# Patient Record
Sex: MALE | Race: WHITE | Employment: OTHER | URBAN - METROPOLITAN AREA
[De-identification: names, ages, dates, MRNs, and addresses within clinical notes are randomized per-mention and may not be internally consistent; named-entity substitution may affect disease eponyms.]

---

## 2020-03-12 ENCOUNTER — APPOINTMENT (OUTPATIENT)
Dept: NON INVASIVE DIAGNOSTICS | Age: 60
DRG: 247 | End: 2020-03-12
Attending: PHYSICIAN ASSISTANT
Payer: MEDICARE

## 2020-03-12 ENCOUNTER — APPOINTMENT (OUTPATIENT)
Dept: CT IMAGING | Age: 60
DRG: 247 | End: 2020-03-12
Attending: PHYSICIAN ASSISTANT
Payer: MEDICARE

## 2020-03-12 ENCOUNTER — APPOINTMENT (OUTPATIENT)
Dept: GENERAL RADIOLOGY | Age: 60
DRG: 247 | End: 2020-03-12
Attending: EMERGENCY MEDICINE
Payer: MEDICARE

## 2020-03-12 ENCOUNTER — HOSPITAL ENCOUNTER (INPATIENT)
Age: 60
LOS: 1 days | Discharge: HOME HEALTH | DRG: 247 | End: 2020-03-13
Attending: EMERGENCY MEDICINE | Admitting: FAMILY MEDICINE
Payer: MEDICARE

## 2020-03-12 DIAGNOSIS — R73.9 HYPERGLYCEMIA: ICD-10-CM

## 2020-03-12 DIAGNOSIS — I21.4 NSTEMI (NON-ST ELEVATED MYOCARDIAL INFARCTION) (HCC): Primary | ICD-10-CM

## 2020-03-12 PROBLEM — I25.10 CAD (CORONARY ARTERY DISEASE): Status: ACTIVE | Noted: 2020-03-12

## 2020-03-12 LAB
ACT BLD: 120 SECS (ref 79–138)
ALBUMIN SERPL-MCNC: 3.5 G/DL (ref 3.4–5)
ALBUMIN/GLOB SERPL: 0.9 {RATIO} (ref 0.8–1.7)
ALP SERPL-CCNC: 141 U/L (ref 45–117)
ALT SERPL-CCNC: 19 U/L (ref 16–61)
ANION GAP SERPL CALC-SCNC: 6 MMOL/L (ref 3–18)
APTT PPP: 140.7 SEC (ref 23–36.4)
AST SERPL-CCNC: 12 U/L (ref 10–38)
ATRIAL RATE: 84 BPM
ATRIAL RATE: 86 BPM
BASOPHILS # BLD: 0 K/UL (ref 0–0.1)
BASOPHILS NFR BLD: 0 % (ref 0–2)
BILIRUB SERPL-MCNC: 1.2 MG/DL (ref 0.2–1)
BUN SERPL-MCNC: 12 MG/DL (ref 7–18)
BUN/CREAT SERPL: 15 (ref 12–20)
CALCIUM SERPL-MCNC: 9 MG/DL (ref 8.5–10.1)
CALCULATED P AXIS, ECG09: 74 DEGREES
CALCULATED P AXIS, ECG09: 78 DEGREES
CALCULATED R AXIS, ECG10: 35 DEGREES
CALCULATED R AXIS, ECG10: 41 DEGREES
CALCULATED T AXIS, ECG11: 0 DEGREES
CALCULATED T AXIS, ECG11: 42 DEGREES
CHLORIDE SERPL-SCNC: 98 MMOL/L (ref 100–111)
CO2 SERPL-SCNC: 29 MMOL/L (ref 21–32)
CREAT SERPL-MCNC: 0.78 MG/DL (ref 0.6–1.3)
D DIMER PPP FEU-MCNC: 0.57 UG/ML(FEU)
DIAGNOSIS, 93000: NORMAL
DIAGNOSIS, 93000: NORMAL
DIFFERENTIAL METHOD BLD: ABNORMAL
EOSINOPHIL # BLD: 0.2 K/UL (ref 0–0.4)
EOSINOPHIL NFR BLD: 1 % (ref 0–5)
ERYTHROCYTE [DISTWIDTH] IN BLOOD BY AUTOMATED COUNT: 12.9 % (ref 11.6–14.5)
EST. AVERAGE GLUCOSE BLD GHB EST-MCNC: 309 MG/DL
GLOBULIN SER CALC-MCNC: 3.9 G/DL (ref 2–4)
GLUCOSE BLD STRIP.AUTO-MCNC: 321 MG/DL (ref 70–110)
GLUCOSE BLD STRIP.AUTO-MCNC: 361 MG/DL (ref 70–110)
GLUCOSE BLD STRIP.AUTO-MCNC: 362 MG/DL (ref 70–110)
GLUCOSE SERPL-MCNC: 330 MG/DL (ref 74–99)
HBA1C MFR BLD: 12.4 % (ref 4.2–5.6)
HCT VFR BLD AUTO: 45.1 % (ref 36–48)
HGB BLD-MCNC: 15.9 G/DL (ref 13–16)
LYMPHOCYTES # BLD: 3.2 K/UL (ref 0.9–3.6)
LYMPHOCYTES NFR BLD: 26 % (ref 21–52)
MCH RBC QN AUTO: 29.4 PG (ref 24–34)
MCHC RBC AUTO-ENTMCNC: 35.3 G/DL (ref 31–37)
MCV RBC AUTO: 83.5 FL (ref 74–97)
MONOCYTES # BLD: 0.6 K/UL (ref 0.05–1.2)
MONOCYTES NFR BLD: 5 % (ref 3–10)
NEUTS SEG # BLD: 8.1 K/UL (ref 1.8–8)
NEUTS SEG NFR BLD: 68 % (ref 40–73)
P-R INTERVAL, ECG05: 170 MS
P-R INTERVAL, ECG05: 176 MS
PLATELET # BLD AUTO: 224 K/UL (ref 135–420)
PMV BLD AUTO: 11.5 FL (ref 9.2–11.8)
POTASSIUM SERPL-SCNC: 3.7 MMOL/L (ref 3.5–5.5)
PROT SERPL-MCNC: 7.4 G/DL (ref 6.4–8.2)
Q-T INTERVAL, ECG07: 368 MS
Q-T INTERVAL, ECG07: 378 MS
QRS DURATION, ECG06: 104 MS
QRS DURATION, ECG06: 92 MS
QTC CALCULATION (BEZET), ECG08: 440 MS
QTC CALCULATION (BEZET), ECG08: 446 MS
RBC # BLD AUTO: 5.4 M/UL (ref 4.7–5.5)
SODIUM SERPL-SCNC: 133 MMOL/L (ref 136–145)
TROPONIN I SERPL-MCNC: 1.24 NG/ML (ref 0–0.04)
TROPONIN I SERPL-MCNC: <0.02 NG/ML (ref 0–0.04)
VENTRICULAR RATE, ECG03: 84 BPM
VENTRICULAR RATE, ECG03: 86 BPM
WBC # BLD AUTO: 12.1 K/UL (ref 4.6–13.2)

## 2020-03-12 PROCEDURE — 027034Z DILATION OF CORONARY ARTERY, ONE ARTERY WITH DRUG-ELUTING INTRALUMINAL DEVICE, PERCUTANEOUS APPROACH: ICD-10-PCS | Performed by: INTERNAL MEDICINE

## 2020-03-12 PROCEDURE — 74011636637 HC RX REV CODE- 636/637: Performed by: PHYSICIAN ASSISTANT

## 2020-03-12 PROCEDURE — 99153 MOD SED SAME PHYS/QHP EA: CPT | Performed by: INTERNAL MEDICINE

## 2020-03-12 PROCEDURE — 96365 THER/PROPH/DIAG IV INF INIT: CPT

## 2020-03-12 PROCEDURE — 83036 HEMOGLOBIN GLYCOSYLATED A1C: CPT

## 2020-03-12 PROCEDURE — C1887 CATHETER, GUIDING: HCPCS | Performed by: INTERNAL MEDICINE

## 2020-03-12 PROCEDURE — 93005 ELECTROCARDIOGRAM TRACING: CPT

## 2020-03-12 PROCEDURE — 92928 PRQ TCAT PLMT NTRAC ST 1 LES: CPT | Performed by: INTERNAL MEDICINE

## 2020-03-12 PROCEDURE — C1725 CATH, TRANSLUMIN NON-LASER: HCPCS | Performed by: INTERNAL MEDICINE

## 2020-03-12 PROCEDURE — 74011000250 HC RX REV CODE- 250: Performed by: NURSE PRACTITIONER

## 2020-03-12 PROCEDURE — 99218 HC RM OBSERVATION: CPT

## 2020-03-12 PROCEDURE — A9270 NON-COVERED ITEM OR SERVICE: HCPCS | Performed by: PHYSICIAN ASSISTANT

## 2020-03-12 PROCEDURE — 74011250636 HC RX REV CODE- 250/636: Performed by: PHYSICIAN ASSISTANT

## 2020-03-12 PROCEDURE — 96366 THER/PROPH/DIAG IV INF ADDON: CPT

## 2020-03-12 PROCEDURE — C1769 GUIDE WIRE: HCPCS | Performed by: INTERNAL MEDICINE

## 2020-03-12 PROCEDURE — 80053 COMPREHEN METABOLIC PANEL: CPT

## 2020-03-12 PROCEDURE — 74011000258 HC RX REV CODE- 258: Performed by: INTERNAL MEDICINE

## 2020-03-12 PROCEDURE — 77030013515 HC DEV INFL ANGI BSC -B: Performed by: INTERNAL MEDICINE

## 2020-03-12 PROCEDURE — 74011000250 HC RX REV CODE- 250: Performed by: INTERNAL MEDICINE

## 2020-03-12 PROCEDURE — 99152 MOD SED SAME PHYS/QHP 5/>YRS: CPT | Performed by: INTERNAL MEDICINE

## 2020-03-12 PROCEDURE — 71045 X-RAY EXAM CHEST 1 VIEW: CPT

## 2020-03-12 PROCEDURE — 77030027845 HC BND COM RDL D-STAT TELE -B: Performed by: INTERNAL MEDICINE

## 2020-03-12 PROCEDURE — 02703ZZ DILATION OF CORONARY ARTERY, ONE ARTERY, PERCUTANEOUS APPROACH: ICD-10-PCS | Performed by: INTERNAL MEDICINE

## 2020-03-12 PROCEDURE — 77030013797 HC KT TRNSDUC PRSSR EDWD -A: Performed by: INTERNAL MEDICINE

## 2020-03-12 PROCEDURE — 85379 FIBRIN DEGRADATION QUANT: CPT

## 2020-03-12 PROCEDURE — 85730 THROMBOPLASTIN TIME PARTIAL: CPT

## 2020-03-12 PROCEDURE — 74011636320 HC RX REV CODE- 636/320: Performed by: EMERGENCY MEDICINE

## 2020-03-12 PROCEDURE — 85025 COMPLETE CBC W/AUTO DIFF WBC: CPT

## 2020-03-12 PROCEDURE — 82962 GLUCOSE BLOOD TEST: CPT

## 2020-03-12 PROCEDURE — B2151ZZ FLUOROSCOPY OF LEFT HEART USING LOW OSMOLAR CONTRAST: ICD-10-PCS | Performed by: INTERNAL MEDICINE

## 2020-03-12 PROCEDURE — C1874 STENT, COATED/COV W/DEL SYS: HCPCS | Performed by: INTERNAL MEDICINE

## 2020-03-12 PROCEDURE — 85347 COAGULATION TIME ACTIVATED: CPT

## 2020-03-12 PROCEDURE — 77030015766: Performed by: INTERNAL MEDICINE

## 2020-03-12 PROCEDURE — 94640 AIRWAY INHALATION TREATMENT: CPT

## 2020-03-12 PROCEDURE — 4A023N7 MEASUREMENT OF CARDIAC SAMPLING AND PRESSURE, LEFT HEART, PERCUTANEOUS APPROACH: ICD-10-PCS | Performed by: INTERNAL MEDICINE

## 2020-03-12 PROCEDURE — 71275 CT ANGIOGRAPHY CHEST: CPT

## 2020-03-12 PROCEDURE — 74011636637 HC RX REV CODE- 636/637: Performed by: FAMILY MEDICINE

## 2020-03-12 PROCEDURE — 74011250637 HC RX REV CODE- 250/637: Performed by: INTERNAL MEDICINE

## 2020-03-12 PROCEDURE — 77030028790 HC ACC ST CTRL VLV COPLT ABBT -B: Performed by: INTERNAL MEDICINE

## 2020-03-12 PROCEDURE — 65660000004 HC RM CVT STEPDOWN

## 2020-03-12 PROCEDURE — 99285 EMERGENCY DEPT VISIT HI MDM: CPT

## 2020-03-12 PROCEDURE — 74011636637 HC RX REV CODE- 636/637: Performed by: NURSE PRACTITIONER

## 2020-03-12 PROCEDURE — C1894 INTRO/SHEATH, NON-LASER: HCPCS | Performed by: INTERNAL MEDICINE

## 2020-03-12 PROCEDURE — 84484 ASSAY OF TROPONIN QUANT: CPT

## 2020-03-12 PROCEDURE — 74011250637 HC RX REV CODE- 250/637: Performed by: EMERGENCY MEDICINE

## 2020-03-12 PROCEDURE — 74011000250 HC RX REV CODE- 250: Performed by: PHYSICIAN ASSISTANT

## 2020-03-12 PROCEDURE — 74011636320 HC RX REV CODE- 636/320: Performed by: INTERNAL MEDICINE

## 2020-03-12 PROCEDURE — B2111ZZ FLUOROSCOPY OF MULTIPLE CORONARY ARTERIES USING LOW OSMOLAR CONTRAST: ICD-10-PCS | Performed by: INTERNAL MEDICINE

## 2020-03-12 PROCEDURE — 96375 TX/PRO/DX INJ NEW DRUG ADDON: CPT

## 2020-03-12 PROCEDURE — 93458 L HRT ARTERY/VENTRICLE ANGIO: CPT | Performed by: INTERNAL MEDICINE

## 2020-03-12 PROCEDURE — 74011250636 HC RX REV CODE- 250/636: Performed by: INTERNAL MEDICINE

## 2020-03-12 PROCEDURE — 96368 THER/DIAG CONCURRENT INF: CPT

## 2020-03-12 DEVICE — XIENCE SIERRA™ EVEROLIMUS ELUTING CORONARY STENT SYSTEM 3.00 MM X 23 MM / RAPID-EXCHANGE
Type: IMPLANTABLE DEVICE | Status: FUNCTIONAL
Brand: XIENCE SIERRA™

## 2020-03-12 RX ORDER — INSULIN LISPRO 100 [IU]/ML
5 INJECTION, SOLUTION INTRAVENOUS; SUBCUTANEOUS
Status: COMPLETED | OUTPATIENT
Start: 2020-03-12 | End: 2020-03-12

## 2020-03-12 RX ORDER — MIDAZOLAM HYDROCHLORIDE 1 MG/ML
INJECTION, SOLUTION INTRAMUSCULAR; INTRAVENOUS AS NEEDED
Status: DISCONTINUED | OUTPATIENT
Start: 2020-03-12 | End: 2020-03-12 | Stop reason: HOSPADM

## 2020-03-12 RX ORDER — INSULIN LISPRO 100 [IU]/ML
INJECTION, SOLUTION INTRAVENOUS; SUBCUTANEOUS
Status: DISCONTINUED | OUTPATIENT
Start: 2020-03-12 | End: 2020-03-13 | Stop reason: HOSPADM

## 2020-03-12 RX ORDER — LIDOCAINE HYDROCHLORIDE 10 MG/ML
INJECTION, SOLUTION EPIDURAL; INFILTRATION; INTRACAUDAL; PERINEURAL AS NEEDED
Status: DISCONTINUED | OUTPATIENT
Start: 2020-03-12 | End: 2020-03-12 | Stop reason: HOSPADM

## 2020-03-12 RX ORDER — PREDNISONE 20 MG/1
40 TABLET ORAL
Status: DISCONTINUED | OUTPATIENT
Start: 2020-03-13 | End: 2020-03-13

## 2020-03-12 RX ORDER — ONDANSETRON 2 MG/ML
4 INJECTION INTRAMUSCULAR; INTRAVENOUS
Status: COMPLETED | OUTPATIENT
Start: 2020-03-12 | End: 2020-03-12

## 2020-03-12 RX ORDER — PREDNISONE 20 MG/1
60 TABLET ORAL
Status: DISCONTINUED | OUTPATIENT
Start: 2020-03-13 | End: 2020-03-12

## 2020-03-12 RX ORDER — GUAIFENESIN 100 MG/5ML
81 LIQUID (ML) ORAL ONCE
Status: DISCONTINUED | OUTPATIENT
Start: 2020-03-12 | End: 2020-03-12

## 2020-03-12 RX ORDER — SODIUM CHLORIDE 450 MG/100ML
150 INJECTION, SOLUTION INTRAVENOUS CONTINUOUS
Status: DISPENSED | OUTPATIENT
Start: 2020-03-12 | End: 2020-03-12

## 2020-03-12 RX ORDER — PREDNISONE 20 MG/1
60 TABLET ORAL
Status: COMPLETED | OUTPATIENT
Start: 2020-03-12 | End: 2020-03-12

## 2020-03-12 RX ORDER — DEXTROSE 50 % IN WATER (D50W) INTRAVENOUS SYRINGE
25-50 AS NEEDED
Status: DISCONTINUED | OUTPATIENT
Start: 2020-03-12 | End: 2020-03-13 | Stop reason: HOSPADM

## 2020-03-12 RX ORDER — FENTANYL CITRATE 50 UG/ML
INJECTION, SOLUTION INTRAMUSCULAR; INTRAVENOUS AS NEEDED
Status: DISCONTINUED | OUTPATIENT
Start: 2020-03-12 | End: 2020-03-12 | Stop reason: HOSPADM

## 2020-03-12 RX ORDER — HEPARIN SODIUM 10000 [USP'U]/100ML
9.7-25 INJECTION, SOLUTION INTRAVENOUS
Status: DISCONTINUED | OUTPATIENT
Start: 2020-03-12 | End: 2020-03-13

## 2020-03-12 RX ORDER — IPRATROPIUM BROMIDE AND ALBUTEROL SULFATE 2.5; .5 MG/3ML; MG/3ML
3 SOLUTION RESPIRATORY (INHALATION)
Status: DISCONTINUED | OUTPATIENT
Start: 2020-03-12 | End: 2020-03-13 | Stop reason: HOSPADM

## 2020-03-12 RX ORDER — VERAPAMIL HYDROCHLORIDE 2.5 MG/ML
INJECTION, SOLUTION INTRAVENOUS AS NEEDED
Status: DISCONTINUED | OUTPATIENT
Start: 2020-03-12 | End: 2020-03-12 | Stop reason: HOSPADM

## 2020-03-12 RX ORDER — BIVALIRUDIN 250 MG/5ML
INJECTION, POWDER, LYOPHILIZED, FOR SOLUTION INTRAVENOUS AS NEEDED
Status: DISCONTINUED | OUTPATIENT
Start: 2020-03-12 | End: 2020-03-12 | Stop reason: HOSPADM

## 2020-03-12 RX ORDER — INSULIN GLARGINE 100 [IU]/ML
20 INJECTION, SOLUTION SUBCUTANEOUS
Status: DISCONTINUED | OUTPATIENT
Start: 2020-03-12 | End: 2020-03-13

## 2020-03-12 RX ORDER — MAGNESIUM SULFATE 100 %
4 CRYSTALS MISCELLANEOUS AS NEEDED
Status: DISCONTINUED | OUTPATIENT
Start: 2020-03-12 | End: 2020-03-13 | Stop reason: HOSPADM

## 2020-03-12 RX ORDER — SODIUM CHLORIDE 0.9 % (FLUSH) 0.9 %
5-40 SYRINGE (ML) INJECTION AS NEEDED
Status: DISCONTINUED | OUTPATIENT
Start: 2020-03-12 | End: 2020-03-13 | Stop reason: HOSPADM

## 2020-03-12 RX ORDER — HEPARIN SODIUM 1000 [USP'U]/ML
INJECTION, SOLUTION INTRAVENOUS; SUBCUTANEOUS AS NEEDED
Status: DISCONTINUED | OUTPATIENT
Start: 2020-03-12 | End: 2020-03-12 | Stop reason: HOSPADM

## 2020-03-12 RX ORDER — SODIUM CHLORIDE 0.9 % (FLUSH) 0.9 %
5-40 SYRINGE (ML) INJECTION EVERY 8 HOURS
Status: DISCONTINUED | OUTPATIENT
Start: 2020-03-12 | End: 2020-03-13 | Stop reason: HOSPADM

## 2020-03-12 RX ORDER — NITROGLYCERIN 40 MG/100ML
0-20 INJECTION INTRAVENOUS
Status: DISCONTINUED | OUTPATIENT
Start: 2020-03-12 | End: 2020-03-13

## 2020-03-12 RX ORDER — ASPIRIN 81 MG/1
81 TABLET ORAL DAILY
Status: DISCONTINUED | OUTPATIENT
Start: 2020-03-13 | End: 2020-03-13 | Stop reason: HOSPADM

## 2020-03-12 RX ORDER — IPRATROPIUM BROMIDE AND ALBUTEROL SULFATE 2.5; .5 MG/3ML; MG/3ML
3 SOLUTION RESPIRATORY (INHALATION)
Status: COMPLETED | OUTPATIENT
Start: 2020-03-12 | End: 2020-03-12

## 2020-03-12 RX ORDER — HEPARIN SODIUM 1000 [USP'U]/ML
4000 INJECTION, SOLUTION INTRAVENOUS; SUBCUTANEOUS ONCE
Status: COMPLETED | OUTPATIENT
Start: 2020-03-12 | End: 2020-03-12

## 2020-03-12 RX ORDER — GUAIFENESIN 100 MG/5ML
324 LIQUID (ML) ORAL
Status: COMPLETED | OUTPATIENT
Start: 2020-03-12 | End: 2020-03-12

## 2020-03-12 RX ORDER — NITROGLYCERIN 400 UG/1
1 SPRAY ORAL
Status: ACTIVE | OUTPATIENT
Start: 2020-03-12 | End: 2020-03-13

## 2020-03-12 RX ORDER — ATORVASTATIN CALCIUM 40 MG/1
80 TABLET, FILM COATED ORAL
Status: DISCONTINUED | OUTPATIENT
Start: 2020-03-12 | End: 2020-03-13 | Stop reason: HOSPADM

## 2020-03-12 RX ORDER — MORPHINE SULFATE 2 MG/ML
2 INJECTION, SOLUTION INTRAMUSCULAR; INTRAVENOUS
Status: COMPLETED | OUTPATIENT
Start: 2020-03-12 | End: 2020-03-12

## 2020-03-12 RX ADMIN — INSULIN LISPRO 10 UNITS: 100 INJECTION, SOLUTION INTRAVENOUS; SUBCUTANEOUS at 21:21

## 2020-03-12 RX ADMIN — SODIUM CHLORIDE 150 ML: 450 INJECTION, SOLUTION INTRAVENOUS at 16:58

## 2020-03-12 RX ADMIN — IOPAMIDOL 80 ML: 755 INJECTION, SOLUTION INTRAVENOUS at 09:39

## 2020-03-12 RX ADMIN — SODIUM CHLORIDE 1000 ML: 900 INJECTION, SOLUTION INTRAVENOUS at 08:12

## 2020-03-12 RX ADMIN — ONDANSETRON 4 MG: 2 INJECTION INTRAMUSCULAR; INTRAVENOUS at 06:31

## 2020-03-12 RX ADMIN — ASPIRIN 81 MG 324 MG: 81 TABLET ORAL at 06:23

## 2020-03-12 RX ADMIN — INSULIN LISPRO 5 UNITS: 100 INJECTION, SOLUTION INTRAVENOUS; SUBCUTANEOUS at 08:00

## 2020-03-12 RX ADMIN — INSULIN GLARGINE 20 UNITS: 100 INJECTION, SOLUTION SUBCUTANEOUS at 21:20

## 2020-03-12 RX ADMIN — ATORVASTATIN CALCIUM 80 MG: 40 TABLET, FILM COATED ORAL at 21:19

## 2020-03-12 RX ADMIN — Medication 10 ML: at 21:25

## 2020-03-12 RX ADMIN — NITROGLYCERIN 10 MCG/MIN: 40 INJECTION INTRAVENOUS at 12:36

## 2020-03-12 RX ADMIN — MORPHINE SULFATE 2 MG: 2 INJECTION, SOLUTION INTRAMUSCULAR; INTRAVENOUS at 06:55

## 2020-03-12 RX ADMIN — Medication 10 ML: at 13:29

## 2020-03-12 RX ADMIN — INSULIN LISPRO 10 UNITS: 100 INJECTION, SOLUTION INTRAVENOUS; SUBCUTANEOUS at 18:50

## 2020-03-12 RX ADMIN — HEPARIN SODIUM 9.7 UNITS/KG/HR: 10000 INJECTION, SOLUTION INTRAVENOUS at 12:34

## 2020-03-12 RX ADMIN — PREDNISONE 60 MG: 20 TABLET ORAL at 06:31

## 2020-03-12 RX ADMIN — IPRATROPIUM BROMIDE AND ALBUTEROL SULFATE 3 ML: .5; 3 SOLUTION RESPIRATORY (INHALATION) at 06:31

## 2020-03-12 RX ADMIN — IPRATROPIUM BROMIDE AND ALBUTEROL SULFATE 3 ML: .5; 3 SOLUTION RESPIRATORY (INHALATION) at 21:35

## 2020-03-12 RX ADMIN — HEPARIN SODIUM 4000 UNITS: 1000 INJECTION INTRAVENOUS; SUBCUTANEOUS at 12:33

## 2020-03-12 NOTE — PROGRESS NOTES
D-Stat band removed from the right radial with no complications. No bleeding and bandage is dry and intact. Small hematoma noted around the site that was expressed with no issues.

## 2020-03-12 NOTE — Clinical Note
Lesion located in the Proximal LAD. Balloon inserted. Balloon inflated using multiple inflations inflation technique. Lesion #1: Pressure = 6 penelope; Duration = 13 sec. Inflation 2: Pressure = 6 penelope; Duration = 8 sec.    Over 1st wire

## 2020-03-12 NOTE — Clinical Note
Balloon inserted. Balloon inflated using multiple inflations inflation technique. Lesion #1: Pressure = 16 penelope; Duration = 14 sec. Inflation 2: Pressure = 14 penelope; Duration = 11 sec. Inflation 3: Pressure = 12 penelope; Duration = 7 sec.

## 2020-03-12 NOTE — Clinical Note
Lesion located in the Proximal LAD. Balloon inserted. Balloon inflated using multiple inflations inflation technique. Lesion #1: Pressure = 6 penelope; Duration = 5 sec. Inflation 2: Pressure = 8 penelope; Duration = 6 sec. Inflation 3: Pressure = 8 penelope; Duration = 3 sec.

## 2020-03-12 NOTE — PROGRESS NOTES
TRANSFER - OUT REPORT:    Verbal report given to Messi Edwards RN (name) on Tessie Dimas  being transferred to CVT Stepdown (unit) for routine progression of care       Report consisted of patients Situation, Background, Assessment and   Recommendations(SBAR). Information from the following report(s) SBAR, Procedure Summary, Intake/Output and MAR was reviewed with the receiving nurse. Lines:   Peripheral IV 03/12/20 Left Antecubital (Active)   Site Assessment Clean, dry, & intact 3/12/2020  6:15 AM   Phlebitis Assessment 0 3/12/2020  6:15 AM   Infiltration Assessment 0 3/12/2020  6:15 AM   Dressing Status Clean, dry, & intact 3/12/2020  6:15 AM   Dressing Type Transparent 3/12/2020  6:15 AM   Hub Color/Line Status Pink;Flushed;Patent 3/12/2020  6:15 AM   Action Taken Blood drawn 3/12/2020  6:15 AM       Peripheral IV 03/12/20 Right Antecubital (Active)   Site Assessment Clean, dry, & intact 3/12/2020  4:59 PM   Phlebitis Assessment 0 3/12/2020  4:59 PM   Dressing Status Clean, dry, & intact 3/12/2020  4:59 PM   Dressing Type Transparent 3/12/2020  4:59 PM   Hub Color/Line Status Pink;Flushed 3/12/2020  4:59 PM        Opportunity for questions and clarification was provided.       Patient transported with:   Registered Nurse, patient chart, family members and belongings

## 2020-03-12 NOTE — Clinical Note
Contrast Dose Calculator:   Patient's age: 63.   Patient's sex: Male. Patient weight (kg) = 102.1. Creatinine level (mg/dL) = 0.78. Creatinine clearance (mL/min): 149. Contrast concentration (mg/mL) = 300. MACD = 300 mL. Max Contrast dose per Creatinine Cl calculator = 335.25 mL.

## 2020-03-12 NOTE — Clinical Note
Stent inserted. Single technique used. First inflation pressure = 16 penelope; inflation time: 11 sec.

## 2020-03-12 NOTE — H&P
History & Physical    Patient: Vinnie Victoria MRN: [id number]  CSN: [id number]    YOB: 1960  Age: 63 y.o. Sex: male      DOA: 3/12/2020  CC:chest pain    PCP: Stefanie Pedraza MD       HPI:     Vinnie Victoria is a 63 y.o. male who presents with chest pain. Patient is on his way to cath lab. Chest pain sharp and constant, midsternal pleuritic chest pain that began one hour prior to ER. Nausea associated symptoms. Productive cough x 2 weeks with steroid and antibiotic treatment. History of COPD and use of nebulizer more frequently in last 2 weeks. Denies SOB, abdominal pain, fever or chills. In ER troponin 0.02 and 1.24. nitro and heparin infusion initiated. Cardiology consulted and plan for cardiac cath.  mg, duoneb, zofran, prednisone and morphine given in ER. Review of Systems   Constitutional: Negative. HENT: Negative. Eyes: Negative. Respiratory: Positive for cough and sputum production. Cardiovascular: Positive for chest pain. Gastrointestinal: Negative. Genitourinary: Negative. Musculoskeletal: Negative. Neurological: Negative. Psychiatric/Behavioral: Negative. Past Medical History:   Diagnosis Date    [facility] [address]     Diabetes [facility] [address]     Emphysema     H/O cardiovascular stress test 2012    negative    Hypertension        No past surgical history on file. Family History   Problem Relation Age of Onset    Diabetes Mother        Social History     Socioeconomic History    Marital status: SINGLE     Spouse name: Not on file    Number of children: Not on file    Years of education: Not on file    Highest education level: Not on file   Tobacco Use    Smoking status: Current Every Day Smoker     Types: Cigarettes    Smokeless tobacco: Never Used   Substance and Sexual Activity    Alcohol use: Yes       Prior to Admission medications    Medication Sig Start Date End Date Taking?  Authorizing Provider   metFORMIN (GLUCOPHAGE) 1,000 mg tablet Take 1 Tab by mouth two (2) times daily (with meals). 5/17/13   Liad Montes De Oca MD   lisinopril (PRINIVIL, ZESTRIL) 10 mg tablet Take 1 Tab by mouth daily. 5/17/13   Lida Montes De Oca MD   albuterol (PROVENTIL HFA, VENTOLIN HFA) 90 mcg/actuation inhaler Take 1 Puff by inhalation every four (4) hours as needed for Wheezing. 11/20/12   Lida Montes De Oca MD   tiotropium (SPIRIVA WITH HANDIHALER) 18 mcg inhalation capsule Take 1 Cap by inhalation daily. 11/20/12   Lida Montes De Oca MD   mometasone-formoterol (DULERA) 100-5 mcg/actuation HFAA HFA inhaler Take 2 Puffs by inhalation two (2) times a day. 11/20/12   Lida Montes De Oca MD       No Known Allergies           Physical Exam:      Visit Vitals  /71   Pulse 80   Temp 97.8 °F (36.6 °C)   Resp 20   Ht 5' 11\" (1.803 m)   Wt 102.1 kg (225 lb)   SpO2 99%   BMI 31.38 kg/m²       Physical Exam:  General:  Alert, NAD  Cardiovascular:  RRR  Pulmonary: rhonchi t/o, productive cough  GI:  +BS in all four quadrants, soft, non-tender  Extremities:  No edema; 2+ dorsalis pedis pulses bilaterally  Neuro: alert and oriented x3    Lab/Data Review:  Labs: Results:       Chemistry Recent Labs     03/12/20  0604   *   *   K 3.7   CL 98*   CO2 29   BUN 12   CREA 0.78   CA 9.0   AGAP 6   BUCR 15   *   TP 7.4   ALB 3.5   GLOB 3.9   AGRAT 0.9      CBC w/Diff Recent Labs     03/12/20  0604   WBC 12.1   RBC 5.40   HGB 15.9   HCT 45.1      GRANS 68   LYMPH 26   EOS 1      Coagulation No results for input(s): PTP, INR, APTT, INREXT in the last 72 hours. Iron/Ferritin No results for input(s): IRON in the last 72 hours. No lab exists for component: TIBCCALC   BNP No results for input(s): BNPP in the last 72 hours. Cardiac Enzymes No results for input(s): CPK, CKND1, LIANE in the last 72 hours.     No lab exists for component: CKRMB, TROIP   Liver Enzymes Recent Labs     03/12/20  0604   TP 7.4   ALB 3.5   * SGOT 12      Thyroid Studies Lab Results   Component Value Date/Time    TSH 0.920 08/27/2019 02:15 PM          All Micro Results     None          Imaging Reviewed:  CT Results (most recent):  Results from [place] encounter on 03/12/20   CTA CHEST W OR W WO CONT    Narrative CT chest with contrast for PE    HISTORY: Respiratory illness for several weeks with weakness and chest pain    COMPARISON: None. TECHNIQUE: Dynamic spiral scan through the chest is obtained from the thoracic  inlet to the diaphragm after dynamic nonionic IV contrast administration  per PE  protocol. Coronal and sagittal MIP computer reconstructions are also obtained  for better visualization of the integrity of pulmonary arteries in 3D dimension,  particularly for lobar/interlobar arterial branches and to minimize radiation  dose. All CT scans at this facility performed using dose optimization techniques as  appreciated to a performed exam, to include automated exposure control,  adjustment of the mA and or KU according to patient size (including appropriate  matching for site specific examination), or use of iterative reconstruction  technique. FINDINGS:    PULMONARY ARTERIES: The contrast bolus is adequate. The right and left mainstem  pulmonary arteries and their branches appear patent without convincing evidence  of intraluminal filling defect identified to suggest pulmonary embolism. No  pulmonary arterial dilatation. AORTA AND THE GREAT VESSELS: Unremarkable. LUNG PARENCHYMA: The lung parenchyma appears clear. No pulmonary infarction or  infiltration identified. IMAGED THYROID: Unremarkable. MEDIASTINUM: No adenopathy. HEART: The heart and the pericardium appear unremarkable. PLEURAL SPACES AND CHEST WALL: No significant pleural pathology. VISUALIZED UPPER ABDOMEN: Unremarkable. OSSEOUS STRUCTURES: Unremarkable. Impression IMPRESSION:    1.   No convincing CT evidence of pulmonary embolism. 2.  Negative chest CT for significant finding. Thank you for your referral.       EKG RESULTS     Procedure Component Value Units Date/Time    EKG, 12 LEAD, SUBSEQUENT [[id number]] Collected:  03/12/20 1202    Order Status:  Completed Updated:  03/12/20 1212     Ventricular Rate 84 BPM      Atrial Rate 84 BPM      P-R Interval 170 ms      QRS Duration 104 ms      Q-T Interval 378 ms      QTC Calculation (Bezet) 446 ms      Calculated P Axis 74 degrees      Calculated R Axis 41 degrees      Calculated T Axis 42 degrees      Diagnosis --     Normal sinus rhythm  Normal ECG  When compared with ECG of 12-MAR-2020 05:59,  Nonspecific T wave abnormality has replaced inverted T waves in Inferior   leads      EKG, 12 LEAD, INITIAL [[id number]] Collected:  03/12/20 0558    Order Status:  Completed Updated:  03/12/20 0820     Ventricular Rate 86 BPM      Atrial Rate 86 BPM      P-R Interval 176 ms      QRS Duration 92 ms      Q-T Interval 368 ms      QTC Calculation (Bezet) 440 ms      Calculated P Axis 78 degrees      Calculated R Axis 35 degrees      Calculated T Axis 0 degrees      Diagnosis --     Normal sinus rhythm  Normal ECG  When compared with ECG of 12-MAR-2020 05:58,  No significant change was found                Assessment:   Active Problems:    NSTEMI (non-ST elevated myocardial infarction) ([facility] [address] (3/12/2020)    COPD exacerbation        Plan:   1. Cardiology consult. Cardiac cath. Plan of care to be updated post cath  2. Continue prednisone and duoneb treatments. Pulmonary rehab consult. 3. Code status, full code  4. Elevated glucose. a1c pending. SSI. Diabetes management consult.            Meliza Baires NP  3/12/2020, 1:39 PM

## 2020-03-12 NOTE — PROGRESS NOTES
TRANSFER - OUT REPORT:    Verbal report given to Psychiatric, RN (name) on Emilia Sinclair  being transferred to Cath Lab (unit) for ordered procedure       Report consisted of patients Situation, Background, Assessment and   Recommendations(SBAR). Information from the following report(s) SBAR, ED Summary, Intake/Output, MAR and Pre Procedure Checklist was reviewed with the receiving nurse. Lines:   Peripheral IV 03/12/20 Left Antecubital (Active)   Site Assessment Clean, dry, & intact 3/12/2020  6:15 AM   Phlebitis Assessment 0 3/12/2020  6:15 AM   Infiltration Assessment 0 3/12/2020  6:15 AM   Dressing Status Clean, dry, & intact 3/12/2020  6:15 AM   Dressing Type Transparent 3/12/2020  6:15 AM   Hub Color/Line Status Pink;Flushed;Patent 3/12/2020  6:15 AM   Action Taken Blood drawn 3/12/2020  6:15 AM        Opportunity for questions and clarification was provided.       Patient transported with:   Registered Nurse

## 2020-03-12 NOTE — PROGRESS NOTES
TRANSFER - IN REPORT:    Verbal report received from Jennifer(name) on Basilio Ambrosio  being received from cath lab(unit) for routine post - op      Report consisted of patients Situation, Background, Assessment and   Recommendations(SBAR). Information from the following report(s) SBAR, Procedure Summary, MAR and Recent Results was reviewed with the receiving nurse. Opportunity for questions and clarification was provided. Assessment completed upon patients arrival to unit and care assumed.      Pt A&Ox4, c/o chest pain still, DSTAT in place to R radial.   /75  HR 83  O2 sat 100% on 2L NC

## 2020-03-12 NOTE — PHYSICIAN ADVISORY
Letter of Admission Status Determination: Upgrade to Inpatient     Barbara Mosher was hospitalized on 3/12/2020 and underwent urgent angiogram and coronary stenting in the setting of NSTEMI (an Inpatient-only procedure per CMS). Therefore, upgrade to Inpatient Admission is indicated. The final decision regarding the patient's hospitalization status depends on the attending physician's clinical judgment.        Luis El MD, EDU, [address] [facility], [address] [place] [address]  [phone]

## 2020-03-12 NOTE — PROGRESS NOTES
1700: TRANSFER - IN REPORT:    Verbal report received from Toño Medina RN(name) on Rodney Alfred  being received from Cath holding(unit) for routine post - op      Report consisted of patients Situation, Background, Assessment and   Recommendations(SBAR). Information from the following report(s) SBAR, Kardex and Cardiac Rhythm NSR was reviewed with the receiving nurse. Opportunity for questions and clarification was provided. Assessment completed upon patients arrival to unit and care assumed. 1753:   Lab Results   Component Value Date/Time    Glucose 330 (H) 03/12/2020 06:04 AM    Glucose (POC) 361 (H) 03/12/2020 05:52 PM    Glucose (POC) 185 (H) 07/19/2014 05:28 PM    Covered patient according to [facility] Paged Dr. Lalo Cardozo.    1900: Bedside shift change report given to Barb Roman RN (oncoming nurse) by Yury Mckeon RN (offgoing nurse). Report included the following information SBAR, Kardex and Cardiac Rhythm NSR.     1945: Updated Dr. Ramires Matter on patient's BS.

## 2020-03-12 NOTE — Clinical Note
TRANSFER - OUT REPORT:     Verbal report given to: Edil Ko. Report consisted of patient's Situation, Background, Assessment and   Recommendations(SBAR). Opportunity for questions and clarification was provided. Patient transported with a Registered Nurse. Patient transported to: [address].

## 2020-03-12 NOTE — ED PROVIDER NOTES
EMERGENCY DEPARTMENT HISTORY AND PHYSICAL EXAM      Date: 3/12/2020  Patient Name: Bennie Lux    History of Presenting Illness     Chief Complaint   Patient presents with    Chest Pain       History Provided By: Patient    HPI: Bennie Lux, 63 y.o. male PMHx significant for COPD, DM, presents ambulatory to the ED with cc of sharp constant nonradiating midsternal pleuritic chest pain that began about 1 hour PTA with assoc nausea. Denies SOB, dizziness, vomiting. Patient reports productive cough x 2 weeks. Patient recently treated with nebulizer solution, steroids, and abx. Patient reports symptoms continue. Patient has not taken anything for symptoms. Denies cardiac history. Denies change in symptoms with activity and movement. There are no other complaints, changes, or physical findings at this time. PCP: Fabian Vences MD    No current facility-administered medications on file prior to encounter. Current Outpatient Medications on File Prior to Encounter   Medication Sig Dispense Refill    metFORMIN (GLUCOPHAGE) 1,000 mg tablet Take 1 Tab by mouth two (2) times daily (with meals). 60 Tab 0    lisinopril (PRINIVIL, ZESTRIL) 10 mg tablet Take 1 Tab by mouth daily. 30 Tab 0    albuterol (PROVENTIL HFA, VENTOLIN HFA) 90 mcg/actuation inhaler Take 1 Puff by inhalation every four (4) hours as needed for Wheezing. 1 Inhaler 1    tiotropium (SPIRIVA WITH HANDIHALER) 18 mcg inhalation capsule Take 1 Cap by inhalation daily. 30 Cap 0    mometasone-formoterol (DULERA) 100-5 mcg/actuation HFAA HFA inhaler Take 2 Puffs by inhalation two (2) times a day. 2 Inhaler 0       Past History     Past Medical History:  Past Medical History:   Diagnosis Date    [facility] [address] [address]     Diabetes ([facility] [address]     Emphysema     H/O cardiovascular stress test 2012    negative    Hypertension        Past Surgical History:  No past surgical history on file.     Family History:  Family History   Problem Relation Age of Onset    Diabetes Mother        Social History:  Social History     Tobacco Use    Smoking status: Current Every Day Smoker     Types: Cigarettes    Smokeless tobacco: Never Used   Substance Use Topics    Alcohol use: Yes    Drug use: Not on file       Allergies:  No Known Allergies      Review of Systems   Review of Systems   Constitutional: Negative for chills and fever. HENT: Negative for facial swelling. Eyes: Negative for photophobia and visual disturbance. Respiratory: Negative for shortness of breath. Cardiovascular: Positive for chest pain. Gastrointestinal: Negative for abdominal pain, nausea and vomiting. Genitourinary: Negative for flank pain. Skin: Negative for color change, pallor, rash and wound. Neurological: Negative for dizziness, weakness, light-headedness and headaches. All other systems reviewed and are negative. Physical Exam   Physical Exam  Vitals signs and nursing note reviewed. Constitutional:       General: He is not in acute distress. Appearance: He is well-developed. Comments: Pt well-appearing in NAD   HENT:      Head: Normocephalic and atraumatic. Eyes:      Conjunctiva/sclera: Conjunctivae normal.   Cardiovascular:      Rate and Rhythm: Normal rate and regular rhythm. Heart sounds: Normal heart sounds. Comments: No murmurs rubs or gallops  No leg swelling  Pulmonary:      Effort: Pulmonary effort is normal. No respiratory distress. Breath sounds: Normal breath sounds. Comments: Expiratory wheezing in all lung fields  No working to breathe  Abdominal:      General: Bowel sounds are normal.      Palpations: Abdomen is soft. Tenderness: There is no abdominal tenderness. Musculoskeletal: Normal range of motion. Skin:     General: Skin is warm. Findings: No rash. Neurological:      Mental Status: He is alert and oriented to person, place, and time.       Cranial Nerves: No cranial nerve deficit. Psychiatric:         Behavior: Behavior normal.         Diagnostic Study Results     Labs -     Recent Results (from the past 12 hour(s))   EKG, 12 LEAD, INITIAL    Collection Time: 03/12/20  5:58 AM   Result Value Ref Range    Ventricular Rate 86 BPM    Atrial Rate 86 BPM    P-R Interval 176 ms    QRS Duration 92 ms    Q-T Interval 368 ms    QTC Calculation (Bezet) 440 ms    Calculated P Axis 78 degrees    Calculated R Axis 35 degrees    Calculated T Axis 0 degrees    Diagnosis       Normal sinus rhythm  Normal ECG  When compared with ECG of 12-MAR-2020 05:58,  No significant change was found     CBC WITH AUTOMATED DIFF    Collection Time: 03/12/20  6:04 AM   Result Value Ref Range    WBC 12.1 4.6 - 13.2 K/uL    RBC 5.40 4.70 - 5.50 M/uL    HGB 15.9 13.0 - 16.0 g/dL    HCT 45.1 36.0 - 48.0 %    MCV 83.5 74.0 - 97.0 FL    MCH 29.4 24.0 - 34.0 PG    MCHC 35.3 31.0 - 37.0 g/dL    RDW 12.9 11.6 - 14.5 %    PLATELET 981 375 - 636 K/uL    MPV 11.5 9.2 - 11.8 FL    NEUTROPHILS 68 40 - 73 %    LYMPHOCYTES 26 21 - 52 %    MONOCYTES 5 3 - 10 %    EOSINOPHILS 1 0 - 5 %    BASOPHILS 0 0 - 2 %    ABS. NEUTROPHILS 8.1 (H) 1.8 - 8.0 K/UL    ABS. LYMPHOCYTES 3.2 0.9 - 3.6 K/UL    ABS. MONOCYTES 0.6 0.05 - 1.2 K/UL    ABS. EOSINOPHILS 0.2 0.0 - 0.4 K/UL    ABS.  BASOPHILS 0.0 0.0 - 0.1 K/UL    DF AUTOMATED     METABOLIC PANEL, COMPREHENSIVE    Collection Time: 03/12/20  6:04 AM   Result Value Ref Range    Sodium 133 (L) 136 - 145 mmol/L    Potassium 3.7 3.5 - 5.5 mmol/L    Chloride 98 (L) 100 - 111 mmol/L    CO2 29 21 - 32 mmol/L    Anion gap 6 3.0 - 18 mmol/L    Glucose 330 (H) 74 - 99 mg/dL    BUN 12 7.0 - 18 MG/DL    Creatinine 0.78 0.6 - 1.3 MG/DL    BUN/Creatinine ratio 15 12 - 20      GFR est AA >60 >60 ml/min/1.73m2    GFR est non-AA >60 >60 ml/min/1.73m2    Calcium 9.0 8.5 - 10.1 MG/DL    Bilirubin, total 1.2 (H) 0.2 - 1.0 MG/DL    ALT (SGPT) 19 16 - 61 U/L    AST (SGOT) 12 10 - 38 U/L Alk. phosphatase 141 (H) 45 - 117 U/L    Protein, total 7.4 6.4 - 8.2 g/dL    Albumin 3.5 3.4 - 5.0 g/dL    Globulin 3.9 2.0 - 4.0 g/dL    A-G Ratio 0.9 0.8 - 1.7     D DIMER    Collection Time: 03/12/20  6:04 AM   Result Value Ref Range    D DIMER 0.57 (H) <0.46 ug/ml(FEU)   TROPONIN I    Collection Time: 03/12/20  6:04 AM   Result Value Ref Range    Troponin-I, QT <0.02 0.0 - 0.045 NG/ML   GLUCOSE, POC    Collection Time: 03/12/20  7:50 AM   Result Value Ref Range    Glucose (POC) 321 (H) 70 - 110 mg/dL   TROPONIN I    Collection Time: 03/12/20 11:10 AM   Result Value Ref Range    Troponin-I, QT 1.24 (H) 0.0 - 0.045 NG/ML   EKG, 12 LEAD, SUBSEQUENT    Collection Time: 03/12/20 12:02 PM   Result Value Ref Range    Ventricular Rate 84 BPM    Atrial Rate 84 BPM    P-R Interval 170 ms    QRS Duration 104 ms    Q-T Interval 378 ms    QTC Calculation (Bezet) 446 ms    Calculated P Axis 74 degrees    Calculated R Axis 41 degrees    Calculated T Axis 42 degrees    Diagnosis       Normal sinus rhythm  Normal ECG  When compared with ECG of 12-MAR-2020 05:59,  Nonspecific T wave abnormality has replaced inverted T waves in Inferior   leads         Radiologic Studies -   CTA CHEST W OR W WO CONT   Final Result   IMPRESSION:      1. No convincing CT evidence of pulmonary embolism. 2.  Negative chest CT for significant finding. Thank you for your referral.         XR CHEST PORT   Final Result   IMPRESSION:      No acute chest process. CT Results  (Last 48 hours)               03/12/20 0939  CTA CHEST W OR W WO CONT Final result    Impression:  IMPRESSION:       1. No convincing CT evidence of pulmonary embolism. 2.  Negative chest CT for significant finding. Thank you for your referral.           Narrative:  CT chest with contrast for PE       HISTORY: Respiratory illness for several weeks with weakness and chest pain       COMPARISON: None.        TECHNIQUE: Dynamic spiral scan through the chest is obtained from the thoracic   inlet to the diaphragm after dynamic nonionic IV contrast administration  per PE   protocol. Coronal and sagittal MIP computer reconstructions are also obtained   for better visualization of the integrity of pulmonary arteries in 3D dimension,   particularly for lobar/interlobar arterial branches and to minimize radiation   dose. All CT scans at this facility performed using dose optimization techniques as   appreciated to a performed exam, to include automated exposure control,   adjustment of the mA and or KU according to patient size (including appropriate   matching for site specific examination), or use of iterative reconstruction   technique. FINDINGS:       PULMONARY ARTERIES: The contrast bolus is adequate. The right and left mainstem   pulmonary arteries and their branches appear patent without convincing evidence   of intraluminal filling defect identified to suggest pulmonary embolism. No   pulmonary arterial dilatation. AORTA AND THE GREAT VESSELS: Unremarkable. LUNG PARENCHYMA: The lung parenchyma appears clear. No pulmonary infarction or   infiltration identified. IMAGED THYROID: Unremarkable. MEDIASTINUM: No adenopathy. HEART: The heart and the pericardium appear unremarkable. PLEURAL SPACES AND CHEST WALL: No significant pleural pathology. VISUALIZED UPPER ABDOMEN: Unremarkable. OSSEOUS STRUCTURES: Unremarkable. CXR Results  (Last 48 hours)               03/12/20 0613  XR CHEST PORT Final result    Impression:  IMPRESSION:       No acute chest process. Narrative:  EXAM:  XR CHEST PORT       INDICATION:   Chest pain       COMPARISON: None       FINDINGS:        Normally aerated lungs. No pneumothorax or pleural effusion. Normal heart size. Bones intact. Medical Decision Making   I am the first provider for this patient.     I reviewed the vital signs, available nursing notes, past medical history, past surgical history, family history and social history. Vital Signs-Reviewed the patient's vital signs. Patient Vitals for the past 12 hrs:   Temp Pulse Resp BP SpO2   03/12/20 1236 -- 80 -- 127/71 --   03/12/20 0815 97.8 °F (36.6 °C) 72 20 129/90 99 %   03/12/20 0814 -- -- -- -- 100 %   03/12/20 0551 97.8 °F (36.6 °C) 90 17 (!) 141/98 93 %         EKG interpretation: (Preliminary) 0559  Rhythm: normal sinus rhythm; and regular . Rate (approx.): 86; Axis: normal; MT interval: normal; QRS interval: normal ; ST/T wave: T wave inverted. When compared to previous EKG from 8/2013 - AVF inverted previously. ED EKG interpretation: 1202  Rhythm: normal sinus rhythm; and regular . Rate (approx.): 84; Axis: normal; P wave: normal; QRS interval: normal ; ST/T wave: T wave inverted; in  Lead: aVF and II. Unchanged from previous EKG. Records Reviewed: Nursing Notes, Old Medical Records and Previous electrocardiograms    Provider Notes (Medical Decision Making):   DDx: ACS, COPD exacerbation, URI, Bronchitis, PNA, GERD, Pericarditis, Pleurisy, PE    62 yo M with complaints of constant sharp nonradiating pleuritic midsternal chest pain that began at 0530 this am. Denies cardiac hx. CTA negative for PE. EKG shows no acute ischemic changes. Second troponin elevated. Heparin and nitro drip started in ED. Admitted. ED Course:   Initial assessment performed. The patients presenting problems have been discussed, and they are in agreement with the care plan formulated and outlined with them. I have encouraged them to ask questions as they arise throughout their visit. 1155  Elevated troponin. Discussed with pt. Nitro drip and heparin ordered. Cardiology consult placed. Pt reports pain has improved but is still present. [address] [facility] [address] with Peyton Nobles, from cardiology. Discussed pt's elevated troponin, EKG, and aspirin, heparin, nitro drip ordered.  She agreed to come evaluate patient. [place] with Dr. Rigoberto Baer, consult for hospitalist.  Discussed patient's elevated troponin, EKG and cardiology consult. He agreed to admit patient to inpatient hospital services. Disposition:  Admit    PLAN:  1. Current Discharge Medication List        2. Follow-up Information    None       Return to ED if worse     Diagnosis     Clinical Impression:   1. NSTEMI (non-ST elevated myocardial infarction) [facility])        Attestations:    GIORGI Dunbar    Please note that this dictation was completed with Ksplice, the computer voice recognition software. Quite often unanticipated grammatical, syntax, homophones, and other interpretive errors are inadvertently transcribed by the computer software. Please disregard these errors. Please excuse any errors that have escaped final proofreading. Thank you.

## 2020-03-12 NOTE — CONSULTS
[facility] - Consult Note    Consultation request by GIORGI Lua for advice/opinion related to evaluating NSTEMI    Date of  Admission: 3/12/2020  6:10 AM   Primary Care Physician:  Brennan Gregg MD     Assessment:     -NSTEMI, troponin < 0.02 --> 1.24, constant chest pain since awakening ~0530 this AM.   -DMII, uncontrolled, A1c 10.2 in 08/2019. On oral agents but admits to noncompliance (takes meds ~3 days/week). Did not take any of his medications today. -Hyperlipidemia. Lipid panel 08/2019 with , Chol 233, HDL 41,   -COPD/emphysema  -Tobacco abuse, approx. 25 pack-year smoking history  -Hx back surgery 4 months ago    No primary cardiologist     Plan:     Independently seen and evaluated. Agree with below. His chest pain is quite suspicious for unstable angina. With inferior ST depression, suspect active CAD. We will proceed with coronary angiography and possible revascularization. Patient agrees with the plan. All questions answered.    -Will plan cardiac catheterization. Pt advised to remain NPO. Details of procedure, risks and benefits were discussed. Answered all questions. Consent form signed. -Continued on nitroglycerin and heparin infusions.  -Has been given 324 mg ASA in ER.  -Ordered Echocardiogram.     History of Present Illness: This is a 63 y.o. male admitted for NSTEMI (non-ST elevated myocardial infarction) ([facility] [address] [I21.4]. Patient complains of:  Chest pain    Salima Coughlin is a 63 y.o. male with PMHx as described above, who presented to the hospital due to chest pain. Pt reports onset onset of lower sternal chest pain this morning after coughing soon after he awakened. He describes the pain as a burning/stabbing sensation associated with nausea and cold sweat. He states the pain has been constant and unchanging, other than some improvement with morphine, which he states \"took the edge off. \"  He currently rates his pain as a 6/10 soreness. He reports a prior episode of \"indigestion\" about a week ago but states that it didn't last long. He reports that he has had a cold recently and just finished a course of steroids yesterday. Pt states that his breathing currently feels at baseline. Cardiac risk factors: smoking/ tobacco exposure, dyslipidemia, diabetes mellitus, male gender      Review of Symptoms:  Except as stated above include:  Constitutional:  + cold sweat this AM  Respiratory:  negative  Cardiovascular:  As per HPI  Gastrointestinal: + N, no vomiting  Genitourinary:  negative  Musculoskeletal:  Negative  Neurological:  Negative  Dermatological:  Negative  Endocrinological: Negative  Psychological:  Negative       Past Medical History:     Past Medical History:   Diagnosis Date    COPD [facility]chronic obstructive pulmonary disease) ([facility] [address]     Diabetes ([facility] [address]     Emphysema     H/O cardiovascular stress test 2012    negative    Hypertension          Social History:     Social History     Socioeconomic History    Marital status: SINGLE     Spouse name: Not on file    Number of children: Not on file    Years of education: Not on file    Highest education level: Not on file   Tobacco Use    Smoking status: Current Every Day Smoker     Types: Cigarettes    Smokeless tobacco: Never Used   Substance and Sexual Activity    Alcohol use: Yes        Family History:     Family History   Problem Relation Age of Onset    Diabetes Mother         Medications:   No Known Allergies     Current Facility-Administered Medications   Medication Dose Route Frequency    heparin 25,000 units in D5W 250 ml infusion  9.7-25 Units/kg/hr IntraVENous TITRATE    nitroglycerin (TRIDIL) 400 mcg/ml infusion  0-20 mcg/min IntraVENous TITRATE     Current Outpatient Medications   Medication Sig    metFORMIN (GLUCOPHAGE) 1,000 mg tablet Take 1 Tab by mouth two (2) times daily (with meals).     lisinopril (PRINIVIL, ZESTRIL) 10 mg tablet Take 1 Tab by mouth daily.    albuterol (PROVENTIL HFA, VENTOLIN HFA) 90 mcg/actuation inhaler Take 1 Puff by inhalation every four (4) hours as needed for Wheezing.  tiotropium (SPIRIVA WITH HANDIHALER) 18 mcg inhalation capsule Take 1 Cap by inhalation daily.  mometasone-formoterol (DULERA) 100-5 mcg/actuation HFAA HFA inhaler Take 2 Puffs by inhalation two (2) times a day. Physical Exam:     Visit Vitals  /71   Pulse 80   Temp 97.8 °F (36.6 °C)   Resp 20   Ht 5' 11\" (1.803 m)   Wt 225 lb (102.1 kg)   SpO2 99%   BMI 31.38 kg/m²     BP Readings from Last 3 Encounters:   03/12/20 127/71   11/20/12 132/72     Pulse Readings from Last 3 Encounters:   03/12/20 80   11/20/12 70     Wt Readings from Last 3 Encounters:   03/12/20 225 lb (102.1 kg)   11/20/12 262 lb 6.4 oz (119 kg)       General:  alert, cooperative, no distress, appears stated age  Neck:  supple  Lungs:  Coarse with expiratory wheezing  Heart:  Regular rate and rhythm  Abdomen:  abdomen is soft without significant tenderness, masses, organomegaly or guarding  Extremities:  Atraumatic, no edema  Skin: Warm and dry.    Neuro: alert, oriented x3, affect appropriate, no focal neurological deficits, moves all extremities well, no involuntary movements  Psych: non focal     Data Review:     Recent Labs     03/12/20  0604   WBC 12.1   HGB 15.9   HCT 45.1        Recent Labs     03/12/20  0604   *   K 3.7   CL 98*   CO2 29   *   BUN 12   CREA 0.78   CA 9.0   ALB 3.5   SGOT 12   ALT 19       Results for orders placed or performed during the hospital encounter of 03/12/20   EKG, 12 LEAD, INITIAL   Result Value Ref Range    Ventricular Rate 86 BPM    Atrial Rate 86 BPM    P-R Interval 176 ms    QRS Duration 92 ms    Q-T Interval 368 ms    QTC Calculation (Bezet) 440 ms    Calculated P Axis 78 degrees    Calculated R Axis 35 degrees    Calculated T Axis 0 degrees    Diagnosis       Normal sinus rhythm  Normal ECG  When compared with ECG of 12-MAR-2020 05:58,  No significant change was found         All Cardiac Markers in the last 24 hours:    Lab Results   Component Value Date/Time    TROIQ 1.24 (H) 03/12/2020 11:10 AM    TROIQ <0.02 03/12/2020 06:04 AM       Last Lipid:    Lab Results   Component Value Date/Time    Cholesterol, total 233 (H) 08/27/2019 02:15 PM    HDL Cholesterol 41 08/27/2019 02:15 PM    LDL, calculated 133 (H) 08/27/2019 02:15 PM    Triglyceride 297 (H) 08/27/2019 02:15 PM    CHOL/HDL Ratio 5.7 (H) 08/27/2019 02:15 PM       Signed By: Laura Turner PA-C     March 12, 2020

## 2020-03-12 NOTE — Clinical Note
Right groin and right radial clipped, prepped with ChloraPrep and draped. Wet prep solution applied at: 1425. Wet prep solution dried at: 1428. Wet prep elapsed drying time: 3 mins.

## 2020-03-12 NOTE — Clinical Note
Balloon inserted. Balloon inflated using multiple inflations inflation technique. Lesion #1: Pressure = 10 penelope; Duration = 5 sec. Inflation 2: Pressure = 10 penelope; Duration = 9 sec. Inflation 3: Pressure = 10 penelope; Duration = 8 sec.

## 2020-03-12 NOTE — ED NOTES
Patient complaining of chest pain, requesting pain medication. Provider made aware, will administer medication as ordered by provider.

## 2020-03-12 NOTE — ED TRIAGE NOTES
Patient c/o chest pain that started about 20-30 when he coughed in bed. Patient says he is just getting over and upper respiratory infection and has a history of COPD. Patient reports some nausea and some shortness of breath.

## 2020-03-13 ENCOUNTER — APPOINTMENT (OUTPATIENT)
Dept: NON INVASIVE DIAGNOSTICS | Age: 60
DRG: 247 | End: 2020-03-13
Attending: PHYSICIAN ASSISTANT
Payer: MEDICARE

## 2020-03-13 ENCOUNTER — HOME HEALTH ADMISSION (OUTPATIENT)
Dept: HOME HEALTH SERVICES | Facility: HOME HEALTH | Age: 60
End: 2020-03-13

## 2020-03-13 VITALS
DIASTOLIC BLOOD PRESSURE: 70 MMHG | RESPIRATION RATE: 19 BRPM | HEIGHT: 70 IN | OXYGEN SATURATION: 95 % | WEIGHT: 211 LBS | HEART RATE: 71 BPM | BODY MASS INDEX: 30.21 KG/M2 | TEMPERATURE: 98.5 F | SYSTOLIC BLOOD PRESSURE: 123 MMHG

## 2020-03-13 LAB
ALBUMIN SERPL-MCNC: 2.9 G/DL (ref 3.4–5)
ALBUMIN/GLOB SERPL: 0.9 {RATIO} (ref 0.8–1.7)
ALP SERPL-CCNC: 121 U/L (ref 45–117)
ALT SERPL-CCNC: 25 U/L (ref 16–61)
ANION GAP SERPL CALC-SCNC: 7 MMOL/L (ref 3–18)
APTT PPP: 25 SEC (ref 23–36.4)
AST SERPL-CCNC: 113 U/L (ref 10–38)
BASOPHILS # BLD: 0 K/UL (ref 0–0.1)
BASOPHILS NFR BLD: 0 % (ref 0–2)
BILIRUB SERPL-MCNC: 0.6 MG/DL (ref 0.2–1)
BUN SERPL-MCNC: 17 MG/DL (ref 7–18)
BUN/CREAT SERPL: 22 (ref 12–20)
CALCIUM SERPL-MCNC: 8.1 MG/DL (ref 8.5–10.1)
CHLORIDE SERPL-SCNC: 102 MMOL/L (ref 100–111)
CO2 SERPL-SCNC: 26 MMOL/L (ref 21–32)
CREAT SERPL-MCNC: 0.78 MG/DL (ref 0.6–1.3)
DIFFERENTIAL METHOD BLD: NORMAL
ECHO AO ROOT DIAM: 3.17 CM
ECHO LA AREA 4C: 12.9 CM2
ECHO LA VOL 2C: 30.88 ML (ref 18–58)
ECHO LA VOL 4C: 30.23 ML (ref 18–58)
ECHO LA VOL BP: 35.17 ML (ref 18–58)
ECHO LA VOL/BSA BIPLANE: 16.47 ML/M2 (ref 16–28)
ECHO LA VOLUME INDEX A2C: 14.46 ML/M2 (ref 16–28)
ECHO LA VOLUME INDEX A4C: 14.16 ML/M2 (ref 16–28)
ECHO LV EDV A2C: 109 ML
ECHO LV EDV A4C: 86.5 ML
ECHO LV EDV BP: 101.5 ML (ref 67–155)
ECHO LV EDV INDEX A4C: 40.5 ML/M2
ECHO LV EDV INDEX BP: 47.5 ML/M2
ECHO LV EDV NDEX A2C: 51 ML/M2
ECHO LV EDV TEICHHOLZ: 0.72 ML
ECHO LV EJECTION FRACTION A2C: 34 %
ECHO LV EJECTION FRACTION A4C: 47 %
ECHO LV EJECTION FRACTION BIPLANE: 43.2 % (ref 55–100)
ECHO LV ESV A2C: 71.8 ML
ECHO LV ESV A4C: 46.2 ML
ECHO LV ESV BP: 57.6 ML (ref 22–58)
ECHO LV ESV INDEX A2C: 33.6 ML/M2
ECHO LV ESV INDEX A4C: 21.6 ML/M2
ECHO LV ESV INDEX BP: 27 ML/M2
ECHO LV ESV TEICHHOLZ: 0.45 ML
ECHO LV INTERNAL DIMENSION DIASTOLIC: 5.15 CM (ref 4.2–5.9)
ECHO LV INTERNAL DIMENSION SYSTOLIC: 4.19 CM
ECHO LV IVSD: 1.05 CM (ref 0.6–1)
ECHO LV MASS 2D: 253.1 G (ref 88–224)
ECHO LV MASS INDEX 2D: 118.5 G/M2 (ref 49–115)
ECHO LV POSTERIOR WALL DIASTOLIC: 1.13 CM (ref 0.6–1)
ECHO LVOT DIAM: 2.09 CM
ECHO LVOT PEAK GRADIENT: 4 MMHG
ECHO LVOT PEAK VELOCITY: 100.52 CM/S
ECHO LVOT VTI: 18.64 CM
ECHO MV A VELOCITY: 61.6 CM/S
ECHO MV E DECELERATION TIME (DT): 125.8 MS
ECHO MV E VELOCITY: 79.72 CM/S
ECHO MV E/A RATIO: 1.29
ECHO PULMONARY ARTERY SYSTOLIC PRESSURE (PASP): 30 MMHG
ECHO TV REGURGITANT MAX VELOCITY: 261.7 CM/S
ECHO TV REGURGITANT PEAK GRADIENT: 27.4 MMHG
EOSINOPHIL # BLD: 0.2 K/UL (ref 0–0.4)
EOSINOPHIL NFR BLD: 1 % (ref 0–5)
ERYTHROCYTE [DISTWIDTH] IN BLOOD BY AUTOMATED COUNT: 13 % (ref 11.6–14.5)
GLOBULIN SER CALC-MCNC: 3.4 G/DL (ref 2–4)
GLUCOSE BLD STRIP.AUTO-MCNC: 305 MG/DL (ref 70–110)
GLUCOSE BLD STRIP.AUTO-MCNC: 366 MG/DL (ref 70–110)
GLUCOSE BLD STRIP.AUTO-MCNC: 424 MG/DL (ref 70–110)
GLUCOSE SERPL-MCNC: 303 MG/DL (ref 74–99)
HCT VFR BLD AUTO: 42 % (ref 36–48)
HGB BLD-MCNC: 14.2 G/DL (ref 13–16)
LVFS 2D: 18.53 %
LVOT MG: 2.08 MMHG
LVOT MV: 0.67 CM/S
LVSV (MOD BI): 19.68 ML
LVSV (MOD SINGLE 4C): 18.08 ML
LVSV (MOD SINGLE): 16.7 ML
LVSV (TEICH): 21.61 ML
LYMPHOCYTES # BLD: 2.5 K/UL (ref 0.9–3.6)
LYMPHOCYTES NFR BLD: 23 % (ref 21–52)
MCH RBC QN AUTO: 29 PG (ref 24–34)
MCHC RBC AUTO-ENTMCNC: 33.8 G/DL (ref 31–37)
MCV RBC AUTO: 85.9 FL (ref 74–97)
MONOCYTES # BLD: 0.7 K/UL (ref 0.05–1.2)
MONOCYTES NFR BLD: 7 % (ref 3–10)
MV DEC SLOPE: 6.34
NEUTS SEG # BLD: 7.6 K/UL (ref 1.8–8)
NEUTS SEG NFR BLD: 69 % (ref 40–73)
PLATELET # BLD AUTO: 203 K/UL (ref 135–420)
PMV BLD AUTO: 11.5 FL (ref 9.2–11.8)
POTASSIUM SERPL-SCNC: 3.7 MMOL/L (ref 3.5–5.5)
PROT SERPL-MCNC: 6.3 G/DL (ref 6.4–8.2)
RBC # BLD AUTO: 4.89 M/UL (ref 4.7–5.5)
SODIUM SERPL-SCNC: 135 MMOL/L (ref 136–145)
WBC # BLD AUTO: 11 K/UL (ref 4.6–13.2)

## 2020-03-13 PROCEDURE — A9270 NON-COVERED ITEM OR SERVICE: HCPCS | Performed by: NURSE PRACTITIONER

## 2020-03-13 PROCEDURE — 74011250637 HC RX REV CODE- 250/637: Performed by: INTERNAL MEDICINE

## 2020-03-13 PROCEDURE — 74011250637 HC RX REV CODE- 250/637: Performed by: PHYSICIAN ASSISTANT

## 2020-03-13 PROCEDURE — 74011000250 HC RX REV CODE- 250: Performed by: NURSE PRACTITIONER

## 2020-03-13 PROCEDURE — 94640 AIRWAY INHALATION TREATMENT: CPT

## 2020-03-13 PROCEDURE — 85025 COMPLETE CBC W/AUTO DIFF WBC: CPT

## 2020-03-13 PROCEDURE — 65660000004 HC RM CVT STEPDOWN

## 2020-03-13 PROCEDURE — 74011636637 HC RX REV CODE- 636/637: Performed by: NURSE PRACTITIONER

## 2020-03-13 PROCEDURE — 93306 TTE W/DOPPLER COMPLETE: CPT

## 2020-03-13 PROCEDURE — 94762 N-INVAS EAR/PLS OXIMTRY CONT: CPT

## 2020-03-13 PROCEDURE — 36415 COLL VENOUS BLD VENIPUNCTURE: CPT

## 2020-03-13 PROCEDURE — 74011636637 HC RX REV CODE- 636/637: Performed by: INTERNAL MEDICINE

## 2020-03-13 PROCEDURE — 82962 GLUCOSE BLOOD TEST: CPT

## 2020-03-13 PROCEDURE — 80053 COMPREHEN METABOLIC PANEL: CPT

## 2020-03-13 PROCEDURE — 93005 ELECTROCARDIOGRAM TRACING: CPT

## 2020-03-13 PROCEDURE — 85730 THROMBOPLASTIN TIME PARTIAL: CPT

## 2020-03-13 PROCEDURE — 99218 HC RM OBSERVATION: CPT

## 2020-03-13 PROCEDURE — 74011636637 HC RX REV CODE- 636/637: Performed by: HOSPITALIST

## 2020-03-13 RX ORDER — INSULIN PUMP SYRINGE, 3 ML
EACH MISCELLANEOUS
Qty: 1 KIT | Refills: 0 | Status: SHIPPED | OUTPATIENT
Start: 2020-03-13 | End: 2022-10-27

## 2020-03-13 RX ORDER — ASPIRIN 81 MG/1
81 TABLET ORAL DAILY
Qty: 30 TAB | Refills: 0 | Status: SHIPPED | OUTPATIENT
Start: 2020-03-14

## 2020-03-13 RX ORDER — METOPROLOL TARTRATE 25 MG/1
25 TABLET, FILM COATED ORAL EVERY 12 HOURS
Status: DISCONTINUED | OUTPATIENT
Start: 2020-03-13 | End: 2020-03-13 | Stop reason: HOSPADM

## 2020-03-13 RX ORDER — INSULIN GLARGINE 100 [IU]/ML
INJECTION, SOLUTION SUBCUTANEOUS
Qty: 1 VIAL | Refills: 0 | Status: SHIPPED | OUTPATIENT
Start: 2020-03-13 | End: 2020-04-02 | Stop reason: ALTCHOICE

## 2020-03-13 RX ORDER — METOPROLOL TARTRATE 25 MG/1
25 TABLET, FILM COATED ORAL EVERY 12 HOURS
Qty: 60 TAB | Refills: 0 | Status: SHIPPED | OUTPATIENT
Start: 2020-03-13 | End: 2020-04-02 | Stop reason: SDUPTHER

## 2020-03-13 RX ORDER — INSULIN LISPRO 100 [IU]/ML
INJECTION, SOLUTION INTRAVENOUS; SUBCUTANEOUS
Qty: 1 VIAL | Refills: 0 | Status: ON HOLD | OUTPATIENT
Start: 2020-03-13 | End: 2022-02-17 | Stop reason: SDUPTHER

## 2020-03-13 RX ORDER — NITROGLYCERIN 400 UG/1
1 SPRAY ORAL
Qty: 1 BOTTLE | Refills: 0 | Status: SHIPPED | OUTPATIENT
Start: 2020-03-13 | End: 2022-02-17

## 2020-03-13 RX ORDER — BUDESONIDE 0.25 MG/2ML
250 INHALANT ORAL
Status: DISCONTINUED | OUTPATIENT
Start: 2020-03-13 | End: 2020-03-13 | Stop reason: HOSPADM

## 2020-03-13 RX ORDER — ATORVASTATIN CALCIUM 80 MG/1
80 TABLET, FILM COATED ORAL
Qty: 30 TAB | Refills: 0 | Status: SHIPPED | OUTPATIENT
Start: 2020-03-13 | End: 2020-04-02 | Stop reason: SDUPTHER

## 2020-03-13 RX ORDER — PREDNISONE 10 MG/1
TABLET ORAL
Qty: 10 TAB | Refills: 0 | OUTPATIENT
Start: 2020-03-13 | End: 2020-07-10

## 2020-03-13 RX ORDER — LANCETS
EACH MISCELLANEOUS
Qty: 100 EACH | Refills: 0 | Status: SHIPPED | OUTPATIENT
Start: 2020-03-13 | End: 2022-10-27

## 2020-03-13 RX ORDER — INSULIN GLARGINE 100 [IU]/ML
5 INJECTION, SOLUTION SUBCUTANEOUS
Status: COMPLETED | OUTPATIENT
Start: 2020-03-13 | End: 2020-03-13

## 2020-03-13 RX ORDER — CLOPIDOGREL BISULFATE 75 MG/1
TABLET ORAL
Qty: 33 TAB | Refills: 0 | Status: SHIPPED | OUTPATIENT
Start: 2020-04-12 | End: 2020-04-02 | Stop reason: SDUPTHER

## 2020-03-13 RX ORDER — CALCIUM CARB/VITAMIN D3/VIT K1 500-100-40
TABLET,CHEWABLE ORAL
Qty: 100 SYRINGE | Refills: 0 | Status: SHIPPED | OUTPATIENT
Start: 2020-03-13

## 2020-03-13 RX ORDER — PREDNISONE 20 MG/1
20 TABLET ORAL
Status: DISCONTINUED | OUTPATIENT
Start: 2020-03-14 | End: 2020-03-13 | Stop reason: HOSPADM

## 2020-03-13 RX ORDER — INSULIN GLARGINE 100 [IU]/ML
25 INJECTION, SOLUTION SUBCUTANEOUS
Status: DISCONTINUED | OUTPATIENT
Start: 2020-03-13 | End: 2020-03-13 | Stop reason: HOSPADM

## 2020-03-13 RX ADMIN — Medication 10 ML: at 15:52

## 2020-03-13 RX ADMIN — INSULIN LISPRO 12 UNITS: 100 INJECTION, SOLUTION INTRAVENOUS; SUBCUTANEOUS at 08:10

## 2020-03-13 RX ADMIN — ASPIRIN 81 MG: 81 TABLET, COATED ORAL at 08:13

## 2020-03-13 RX ADMIN — IPRATROPIUM BROMIDE AND ALBUTEROL SULFATE 3 ML: .5; 3 SOLUTION RESPIRATORY (INHALATION) at 09:00

## 2020-03-13 RX ADMIN — INSULIN GLARGINE 5 UNITS: 100 INJECTION, SOLUTION SUBCUTANEOUS at 11:33

## 2020-03-13 RX ADMIN — TICAGRELOR 90 MG: 90 TABLET ORAL at 08:11

## 2020-03-13 RX ADMIN — IPRATROPIUM BROMIDE AND ALBUTEROL SULFATE 3 ML: .5; 3 SOLUTION RESPIRATORY (INHALATION) at 05:06

## 2020-03-13 RX ADMIN — INSULIN LISPRO 15 UNITS: 100 INJECTION, SOLUTION INTRAVENOUS; SUBCUTANEOUS at 11:32

## 2020-03-13 RX ADMIN — TICAGRELOR 90 MG: 90 TABLET ORAL at 03:32

## 2020-03-13 RX ADMIN — METOPROLOL TARTRATE 25 MG: 25 TABLET ORAL at 11:00

## 2020-03-13 RX ADMIN — PREDNISONE 40 MG: 20 TABLET ORAL at 08:11

## 2020-03-13 RX ADMIN — INSULIN LISPRO 15 UNITS: 100 INJECTION, SOLUTION INTRAVENOUS; SUBCUTANEOUS at 15:42

## 2020-03-13 RX ADMIN — Medication 25 ML: at 06:00

## 2020-03-13 NOTE — PROGRESS NOTES
1630: I have reviewed discharge instructions with the patient and spouse. The patient and spouse verbalized understanding. Current Discharge Medication List      START taking these medications    Details   ticagrelor (BRILINTA) 90 mg tablet Take 1 Tab by mouth two (2) times a day. Qty: 60 Tab, Refills: 0      clopidogreL (Plavix) 75 mg tab Take four tablets day 1, then 1 tablet daily starting day 2. Qty: 33 Tab, Refills: 0      aspirin delayed-release 81 mg tablet Take 1 Tab by mouth daily. Qty: 30 Tab, Refills: 0      atorvastatin (LIPITOR) 80 mg tablet Take 1 Tab by mouth nightly. Qty: 30 Tab, Refills: 0      glucagon (GLUCAGEN) 1 mg injection 1 mL by IntraMUSCular route as needed for Hypoglycemia. Qty: 1 Vial, Refills: 0      insulin glargine (LANTUS) 100 unit/mL injection Inject 25 units subcutaneously every night  Qty: 1 Vial, Refills: 0      insulin lispro (HUMALOG) 100 unit/mL injection Very Insulin Resistant  For Blood Sugar (mg/dL) of:              Less than 150 =   0 units  150 -199 =   3 units  200 -249 =   6 units  250 -299 =   9 units  300 -349 =   12 units  350 and above =   15 units  Initiate Hypoglycemic protocol if blood glucose is <70 mg/dL. Fast Acting - Administer Immediately - or within 15 minutes of start of meal, if mealtime coverage. Qty: 1 Vial, Refills: 0      metoprolol tartrate (LOPRESSOR) 25 mg tablet Take 1 Tab by mouth every twelve (12) hours. Qty: 60 Tab, Refills: 0      nitroglycerin (NITROLINGUAL) 400 mcg/spray spray 1 Spray by SubLINGual route every five (5) minutes as needed for Chest Pain (do not exceed more than 3 sprays in one day, seek acute medical care if chest pain continues).   Qty: 1 Bottle, Refills: 0      predniSONE (DELTASONE) 10 mg tablet Take two tabs by mouth daily for two days, then take one tablet by mouth daily for four days, then take half a tablet by mouth for four days [23MAR end date]  Qty: 10 Tab, Refills: 0      Insulin Syringe-Needle U-100 0.3 mL 31 gauge x 5/16\" syrg TID as needed  Qty: 100 Syringe, Refills: 0      lancets misc As directed  Qty: 100 Each, Refills: 0      Blood-Glucose Meter monitoring kit As directed  Qty: 1 Kit, Refills: 0      glucose blood VI test strips (blood glucose test) strip As directed  Qty: 60 Strip, Refills: 0      OTHER CMP in 2 week  Dx: elevated AST  Fax results to Dr. Allan Guy: 1 Each, Refills: 0         CONTINUE these medications which have NOT CHANGED    Details   metFORMIN (GLUCOPHAGE) 1,000 mg tablet Take 1 Tab by mouth two (2) times daily (with meals). Qty: 60 Tab, Refills: 0    Associated Diagnoses: Type II or unspecified type diabetes mellitus without mention of complication, uncontrolled      lisinopril (PRINIVIL, ZESTRIL) 10 mg tablet Take 1 Tab by mouth daily. Qty: 30 Tab, Refills: 0    Associated Diagnoses: Hypertension      albuterol (PROVENTIL HFA, VENTOLIN HFA) 90 mcg/actuation inhaler Take 1 Puff by inhalation every four (4) hours as needed for Wheezing. Qty: 1 Inhaler, Refills: 1    Associated Diagnoses: COPD exacerbation ([facility])      tiotropium (SPIRIVA WITH HANDIHALER) 18 mcg inhalation capsule Take 1 Cap by inhalation daily. Qty: 30 Cap, Refills: 0    Associated Diagnoses: COPD exacerbation ([address]      mometasone-formoterol (DULERA) 100-5 mcg/actuation HFAA HFA inhaler Take 2 Puffs by inhalation two (2) times a day.   Qty: 2 Inhaler, Refills: 0    Associated Diagnoses: COPD exacerbation ([address]

## 2020-03-13 NOTE — DISCHARGE INSTRUCTIONS
Discharge Instructions    Patient: Seda Garcia MRN: [id number]  CSN: [id number]    YOB: 1960  Age: 63 y.o. Sex: male    DOA: 3/12/2020 LOS:  LOS: 1 day   Discharge Date:      DIET:  Diabetic Diet    ACTIVITY: Activity as tolerated  Home health care for Skilled care for DM, Hypertension, and medication management    ADDITIONAL INFORMATION: If you experience any of the following symptoms but not limited to Fever, chills, nausea, vomiting, diarrhea, change in mentation, falling, bleeding, shortness of breath, chest pain, please call your primary care physician or return to the emergency room if you cannot get hold of your doctor:     FOLLOW UP CARE:  MD Dr. Shante Lawler MD, MD  3/13/2020 12:21 PM      Patient Education        Percutaneous Coronary Intervention: What to Expect at Home  Your Recovery    Percutaneous coronary intervention (PCI) is the name for procedures that are used to open a narrowed or blocked coronary artery. The two most common PCI procedures are coronary angioplasty and coronary stent placement. Your groin or arm may have a bruise and feel sore for a day or two after a percutaneous coronary intervention (PCI). You can do light activities around the house, but nothing strenuous for several days. This care sheet gives you a general idea about how long it will take for you to recover. But each person recovers at a different pace. Follow the steps below to get better as quickly as possible. How can you care for yourself at home? Activity    · If the doctor gave you a sedative:  ? For 24 hours, don't do anything that requires attention to detail, such as going to work, making important decisions, or signing any legal documents. It takes time for the medicine's effects to completely wear off.  ? For your safety, do not drive or operate any machinery that could be dangerous.  Wait until the medicine wears off and you can think clearly and react easily.     · Do not do strenuous exercise and do not lift, pull, or push anything heavy until your doctor says it is okay. This may be for a day or two. You can walk around the house and do light activity, such as cooking.     · If the catheter was placed in your groin, try not to walk up stairs for the first couple of days.     · If the catheter was placed in your arm near your wrist, do not bend your wrist deeply for the first couple of days. Be careful using your hand to get into and out of a chair or bed.     · Carry your stent identification card with you at all times.     · If your doctor recommends it, get more exercise. Walking is a good choice. Bit by bit, increase the amount you walk every day. Try for at least 30 minutes on most days of the week. Diet    · Drink plenty of fluids to help your body flush out the dye. If you have kidney, heart, or liver disease and have to limit fluids, talk with your doctor before you increase the amount of fluids you drink.     · Keep eating a heart-healthy diet that has lots of fruits, vegetables, and whole grains. If you have not been eating this way, talk to your doctor. You also may want to talk to a dietitian. This expert can help you to learn about healthy foods and plan meals. Medicines    · Your doctor will tell you if and when you can restart your medicines. He or she will also give you instructions about taking any new medicines.     · If you take aspirin or some other blood thinner, ask your doctor if and when to start taking it again. Make sure that you understand exactly what your doctor wants you to do.     · Your doctor will prescribe blood-thinning medicines. You will likely take aspirin plus another antiplatelet, such as clopidogrel (Plavix). It is very important that you take these medicines exactly as directed.  These medicines help keep the coronary artery open and reduce your risk of a heart attack.     · Call your doctor if you think you are having a problem with your medicine.    Care of the catheter site    · For 1 or 2 days, keep a bandage over the spot where the catheter was inserted. The bandage probably will fall off in this time.     · Put ice or a cold pack on the area for 10 to 20 minutes at a time to help with soreness or swelling. Put a thin cloth between the ice and your skin.     · You may shower 24 to 48 hours after the procedure, if your doctor okays it. Pat the incision dry.     · Do not soak the catheter site until it is healed. Don't take a bath for 1 week, or until your doctor tells you it is okay.     · Watch for bleeding from the site. A small amount of blood (up to the size of a quarter) on the bandage can be normal.     · If you are bleeding, lie down and press on the area for 15 minutes to try to make it stop. If the bleeding does not stop, call your doctor or seek immediate medical care. Follow-up care is a key part of your treatment and safety. Be sure to make and go to all appointments, and call your doctor if you are having problems. It's also a good idea to know your test results and keep a list of the medicines you take. When should you call for help? Call 911 anytime you think you may need emergency care. For example, call if:    · You passed out (lost consciousness).     · You have severe trouble breathing.     · You have sudden chest pain and shortness of breath, or you cough up blood.     · You have symptoms of a heart attack, such as:  ? Chest pain or pressure. ? Sweating. ? Shortness of breath. ? Nausea or vomiting. ? Pain that spreads from the chest to the neck, jaw, or one or both shoulders or arms. ? Dizziness or lightheadedness. ? A fast or uneven pulse. After calling  911, chew 1 adult-strength aspirin. Wait for an ambulance.  Do not try to drive yourself.     · You have been diagnosed with angina, and you have angina symptoms that do not go away with rest or are not getting better within 5 minutes after you take one dose of nitroglycerin.    Call your doctor now or seek immediate medical care if:    · You are bleeding from the area where the catheter was put in your artery.     · You have a fast-growing, painful lump at the catheter site.     · You have signs of infection, such as:  ? Increased pain, swelling, warmth, or redness. ? Red streaks leading from the catheter site. ? Pus draining from the catheter site. ? A fever.     · Your leg, arm, or hand is painful, looks blue, or feels cold, numb, or tingly.    Watch closely for changes in your health, and be sure to contact your doctor if you have any problems. Where can you learn more? Go to http://dena-carly.info/  Enter C044 in the search box to learn more about \"Percutaneous Coronary Intervention: What to Expect at Home. \"  Current as of: December 15, 2019Content Version: 12.4  © 9788-0812 [employer]. Care instructions adapted under license by [employer] (which disclaims liability or warranty for this information). If you have questions about a medical condition or this instruction, always ask your healthcare professional. [place] [address] any warranty or liability for your use of this information. Patient Education        Coronary Artery Disease: Care Instructions  Your Care Instructions    The heart is a muscle, and like any muscle, it needs blood to work well. Coronary artery disease occurs when the arteries that bring oxygen-rich blood to your heart have a buildup of plaque--deposits of fats and other substances. Plaque can reduce blood flow to the heart muscle. This can cause angina symptoms such as chest pain or pressure. A heart attack can happen if blood flow is completely blocked. You can do a lot to improve your health and prevent a heart attack.  Eating healthy food, not smoking, getting regular exercise, and taking your medicine are the main things you can do every day to stay healthy. Follow-up care is a key part of your treatment and safety. Be sure to make and go to all appointments, and call your doctor if you are having problems. It's also a good idea to know your test results and keep a list of the medicines you take. How can you care for yourself at home? Medicines    · Be safe with medicines. Take your medicines exactly as prescribed. Call your doctor if you think you are having a problem with your medicine. You will get more details on the specific medicines your doctor prescribes. You may need several medicines. ? Angiotensin-converting enzyme (ACE) inhibitors, angiotensin II receptor blockers (ARBs), beta-blockers, and statins can help prevent a heart attack. ACE inhibitors, ARBs, and beta-blockers help lower your blood pressure. Statins help lower cholesterol, which is a type of fat that can clog your arteries. ? Nitrates can help make chest pain happen less often. ? Aspirin and other blood thinners help prevent heart attacks and strokes.     · If your doctor has given you nitroglycerin for angina symptoms (such as chest pain or pressure) keep it with you at all times. If you have symptoms, sit down and rest, and take the first dose of nitroglycerin as directed. If your symptoms get worse or are not getting better within 5 minutes, call  911 right away. Stay on the phone. The emergency  will give you further instructions.     · Be sure to tell your doctor about any angina symptoms that you have had, even if they went away.     · Do not take any over-the-counter medicines, vitamins, or herbal products without talking to your doctor first.   Jeanie Christensent your doctor if a cardiac rehab program is right for you. Cardiac rehab can help you make lifestyle changes. In cardiac rehab, a team of health professionals provides education and support to help you make new, healthy habits.    · Do not smoke. Avoid secondhand smoke too.  Smoking can increase your risk of a heart attack or stroke. If you need help quitting, talk to your doctor about stop-smoking programs and medicines. These can increase your chances of quitting for good.     · Eat a heart-healthy diet that is high in fiber and low in saturated fat and sodium. ? Learn what a serving is. A \"serving\" and a \"portion\" are not always the same thing. Make sure that you are not eating larger portions than recommended. For example, a serving of pasta is ½ cup. A serving size of meat is 2 to 3 ounces; a 3-ounce serving is about the size of a deck of cards. ? Eat a variety of grain products every day. Include whole-grain foods such as oats, whole wheat bread, and brown rice. ? Eat fish, skinless poultry, lean meats, and soy products such as tofu instead of high-fat meats. Cut out all visible fat when you prepare meat. ? Eat a variety of fruit and vegetables every day. They have lots of nutrients that help protect against heart disease, and they have little--if any--fat. Keep carrots, celery, and other veggies handy for snacks. Buy fruit that is in season and store it where you can see it so that you will be tempted to eat it. Cook dishes that have a lot of veggies in them, such as stir-fried dishes and soups. ? Read food labels and try to avoid saturated fat and trans fat. They increase your risk of heart disease. Bake, broil, grill, or steam foods instead of frying them. Use olive or canola oil when you cook. Try cholesterol-lowering spreads, such as Benecol or Take Control. ? Limit sodium. Your doctor may recommend that you limit sodium to less than 1,500 mg a day. ? Limit processed foods, including cookies and crackers. ? Limit drinks and foods with added sugar. ? Choose low-fat or fat-free milk and dairy products. ? Limit alcohol to 2 drinks a day for men and 1 drink a day for women. Too much alcohol can cause health problems.     · If your doctor recommends it, get more exercise.  Walking is a good choice. Bit by bit, increase the amount you walk every day. Try for at least 30 minutes on most days of the week. You also may want to swim, bike, or do other activities.     · Stay at a healthy weight. Lose weight if you need to.     · Talk to your family, friends, or a therapist about your feelings. It is normal to feel upset about having this disease and to feel afraid of having a heart attack. Talking openly about your feelings can help you cope. If you think you have symptoms of depression, talk to your doctor.     · Avoid colds and flu. Get a pneumococcal vaccine shot. If you have had one before, ask your doctor whether you need another dose. Get a flu vaccine every year. If you must be around people with colds or flu, wash your hands often. When should you call for help? Call 911 anytime you think you may need emergency care. For example, call if:    · You have symptoms of a heart attack. These may include:  ? Chest pain or pressure, or a strange feeling in the chest.  ? Sweating. ? Shortness of breath. ? Nausea or vomiting. ? Pain, pressure, or a strange feeling in the back, neck, jaw, or upper belly or in one or both shoulders or arms. ? Lightheadedness or sudden weakness. ? A fast or irregular heartbeat. After you call  911, the  may tell you to chew 1 adult-strength or 2 to 4 low-dose aspirin. Wait for an ambulance.  Do not try to drive yourself.     · You have angina symptoms (such as chest pain or pressure) that do not go away with rest or are not getting better within 5 minutes after you take a dose of nitroglycerin.     · You passed out (lost consciousness).    Call your doctor now or seek immediate medical care if:    · You are having angina symptoms, such as chest pain or pressure, more often than usual, or they are different or worse than usual.     · You have new or increased shortness of breath.     · You are dizzy or lightheaded, or you feel like you may faint.    Watch closely for changes in your health, and be sure to contact your doctor if you have any problems. Where can you learn more? Go to http://dena-carly.info/  Enter B943 in the search box to learn more about \"Coronary Artery Disease: Care Instructions. \"  Current as of: December 15, 2019Content Version: 12.4  © 1611-3233 [employer]. Care instructions adapted under license by [employer] (which disclaims liability or warranty for this information). If you have questions about a medical condition or this instruction, always ask your healthcare professional. [address] any warranty or liability for your use of this information.

## 2020-03-13 NOTE — DISCHARGE SUMMARY
Discharge Summary    Patient: Marlene Joyce MRN: [id number]  CSN: [id number]    YOB: 1960  Age: 63 y.o. Sex: male    DOA: 3/12/2020 LOS:  LOS: 1 day        Disposition: Home with Prachi Kyle    Discharge Date: 13MAR2020    Admission Diagnoses: NSTEMI (non-ST elevated myocardial infarction) ([facility] [I21.4]  CAD (coronary artery disease) [I25.10]  NSTEMI (non-ST elevated myocardial infarction) ([facility] [I21.4]    Discharge Diagnoses:    - Coronary artery disease  - NSTEMI s/p PCI  - COPD  - Diabetes mellitus type II  - HTN  - Tobacco use    Discharge Condition: Stable    PHYSICAL EXAM  Visit Vitals  /67   Pulse 84   Temp 97.4 °F (36.3 °C)   Resp 19   Ht 5' 10\" (1.778 m)   Wt 95.7 kg (211 lb)   SpO2 93%   BMI 30.28 kg/m²       General: Alert, cooperative, no acute distress    HEENT: PERRLA, EOMI. Anicteric sclerae. Lungs:  CTA Bilaterally. No Wheezing/Rales. Heart:             Regular rate and Rhythm. Abdomen: Soft, Non distended, Non tender. + Bowel sounds. Extremities: No edema. Psych:   Good insight. Not anxious or agitated. Neurologic:  AA, oriented X 3. Moves all ext                               Hospital Course:   Mr. Parag Nroth is a 63year old gentleman active smoker with diabetes mellitus type II, hypertension, and COPD who was admitted to [facility] on 12MAR2020 with unstable angina. Patient was found to have an NSTEMI and was then taken to the cardiac catheterization lab. The study showed multiple areas of stenotic coronary artery disease and a drug eluting stent was successfully placed in the left anterior descending coronary artery with good return of flow. Patient has remained chest pain free since the procedure. Blood sugar was difficult to control this short inpatient stay. An A1C on admission was 12.4%. Blood sugars remained in the 300s overnight, despite correctional insulin and long acting insulin.  This is likely a combination of recent high-dose steroid therapy for a COPD exacerbation and lack of attention to the state of patient's diabetes as an outpatient. Given the elevated blood sugars, patient requires insulin for home use on discharge. Diabetic teaching was performed while inpatient and all questions were answered. Also had an extensive discussion on the importance of smoking cessation and medication compliance. Patient to complete a steroid taper, as he was reportedly taking 40mg prednisone for over a week, just prior to admission. Procedures:   [id number] left heart catheterization:  · Non-STEMI. · Mildly diminished LV function with EF of 45-50% with large anteroapical hypokinesis. · Coronary angiography reveals occluded first diagonal branch. He also has 95% long mid LAD stenosis with evidence of an aneurysm. · Successful balloon angioplasty of the diagonal branch to residual 0%. · Successful stent to proximal and mid LAD to residual 0%. Consults:     Imaging studies:       Discharge Medications:     Current Discharge Medication List      START taking these medications    Details   ticagrelor (BRILINTA) 90 mg tablet Take 1 Tab by mouth two (2) times a day. Qty: 60 Tab, Refills: 0      clopidogreL (Plavix) 75 mg tab Take four tablets day 1, then 1 tablet daily starting day 2. Qty: 33 Tab, Refills: 0      aspirin delayed-release 81 mg tablet Take 1 Tab by mouth daily. Qty: 30 Tab, Refills: 0      atorvastatin (LIPITOR) 80 mg tablet Take 1 Tab by mouth nightly. Qty: 30 Tab, Refills: 0      glucagon (GLUCAGEN) 1 mg injection 1 mL by IntraMUSCular route as needed for Hypoglycemia.   Qty: 1 Vial, Refills: 0      insulin glargine (LANTUS) 100 unit/mL injection Inject 25 units subcutaneously every night  Qty: 1 Vial, Refills: 0      insulin lispro (HUMALOG) 100 unit/mL injection Very Insulin Resistant  For Blood Sugar (mg/dL) of:              Less than 150 =   0 units  150 -199 =   3 units  200 -249 =   6 units  250 -299 =   9 units  300 -349 =   12 units  350 and above =   15 units  Initiate Hypoglycemic protocol if blood glucose is <70 mg/dL. Fast Acting - Administer Immediately - or within 15 minutes of start of meal, if mealtime coverage. Qty: 1 Vial, Refills: 0      metoprolol tartrate (LOPRESSOR) 25 mg tablet Take 1 Tab by mouth every twelve (12) hours. Qty: 60 Tab, Refills: 0      nitroglycerin (NITROLINGUAL) 400 mcg/spray spray 1 Spray by SubLINGual route every five (5) minutes as needed for Chest Pain (do not exceed more than 3 sprays in one day, seek acute medical care if chest pain continues). Qty: 1 Bottle, Refills: 0      predniSONE (DELTASONE) 10 mg tablet Take two tabs by mouth daily for two days, then take one tablet by mouth daily for four days, then take half a tablet by mouth for four days [MAR end date]  Qty: 10 Tab, Refills: 0         CONTINUE these medications which have NOT CHANGED    Details   metFORMIN (GLUCOPHAGE) 1,000 mg tablet Take 1 Tab by mouth two (2) times daily (with meals). Qty: 60 Tab, Refills: 0    Associated Diagnoses: Type II or unspecified type diabetes mellitus without mention of complication, uncontrolled      lisinopril (PRINIVIL, ZESTRIL) 10 mg tablet Take 1 Tab by mouth daily. Qty: 30 Tab, Refills: 0    Associated Diagnoses: Hypertension      albuterol (PROVENTIL HFA, VENTOLIN HFA) 90 mcg/actuation inhaler Take 1 Puff by inhalation every four (4) hours as needed for Wheezing. Qty: 1 Inhaler, Refills: 1    Associated Diagnoses: COPD exacerbation (HCC)      tiotropium (SPIRIVA WITH HANDIHALER) 18 mcg inhalation capsule Take 1 Cap by inhalation daily. Qty: 30 Cap, Refills: 0    Associated Diagnoses: COPD exacerbation ([address]      mometasone-formoterol (DULERA) 100-5 mcg/actuation HFAA HFA inhaler Take 2 Puffs by inhalation two (2) times a day. Qty: 2 Inhaler, Refills: 0    Associated Diagnoses: COPD exacerbation ([address]           · It is important that you take the medication exactly as they are prescribed.    · Keep your medication in the bottles provided by the pharmacist and keep a list of the medication names, dosages, and times to be taken in your wallet. · Do not take other medications without consulting your doctor. DIET:  Diabetic Diet    ACTIVITY: Activity as tolerated  Home health care for Skilled care for DM, Hypertension, and medication management    ADDITIONAL INFORMATION: If you experience any of the following symptoms but not limited to Fever, chills, nausea, vomiting, diarrhea, change in mentation, falling, bleeding, shortness of breath, chest pain, please call your primary care physician or return to the emergency room if you cannot get hold of your doctor:     FOLLOW UP CARE:  MD Dr. Sarthak Fay MD      Minutes spent on discharge: 40 minutes spent coordinating this discharge (review instructions/follow-up, prescriptions, preparing report for sign off)    Ivana North MD  3/13/2020 12:23 PM    Disclaimer: Sections of this note are dictated using utilizing voice recognition software. Minor typographical errors may be present. If questions arise, please do not hesitate to contact me or call our department.

## 2020-03-13 NOTE — PROGRESS NOTES
Discharge order noted for today. Pt has been referred to [facility] agency for a [facility] visit. . Met with patient and girlfirend and are agreeable to the transition plan today. Transport has been arranged through girlfriend. Patient's discharge summary and home health  orders have been forwarded to [facility] home health  agency via [facility]. Updated bedside RN to the transition plan. Discharge information has been documented on the AVS. Pt met with diabetes educator and was given a free meter. Pharmacy is supplying pt with a one month supply of Brilinta which will transition to plavix after a month. Encouraged pt to go to [facility] and apply for Medicaid to help pay for his medications.       Emily Richardson RN - Outcomes Manager  [phone]

## 2020-03-13 NOTE — PROGRESS NOTES
Problem: Falls - Risk of  Goal: *Absence of Falls  Description: Document Fifi Lovett Fall Risk and appropriate interventions in the flowsheet. 3/13/2020 1619 by Brodie Elise RN  Outcome: Resolved/Met  Note: Fall Risk Interventions:            Medication Interventions: Teach patient to arise slowly                3/13/2020 0821 by Brodie Elise RN  Outcome: Progressing Towards Goal  Note: Fall Risk Interventions:            Medication Interventions: Bed/chair exit alarm                   Problem: Patient Education: Go to Patient Education Activity  Goal: Patient/Family Education  3/13/2020 1619 by Brodie Elise RN  Outcome: Resolved/Met  3/13/2020 0821 by Brodie Elise RN  Outcome: Progressing Towards Goal     Problem:  Moderate Sedation (Adult)  Goal: *Patent airway  3/13/2020 1619 by Brodie Elise RN  Outcome: Resolved/Met  3/13/2020 0821 by Brodie Elise RN  Outcome: Progressing Towards Goal  Goal: *Adequate oxygenation  3/13/2020 1619 by Brodie Elise RN  Outcome: Resolved/Met  3/13/2020 0821 by Brodie Elise RN  Outcome: Progressing Towards Goal  Goal: *Absence of aspiration  3/13/2020 1619 by Bordie Elise RN  Outcome: Resolved/Met  3/13/2020 0821 by Brodie Elise RN  Outcome: Progressing Towards Goal  Goal: *Hemodynamically stable  3/13/2020 1619 by Brodie Elise RN  Outcome: Resolved/Met  3/13/2020 0821 by Brodie Elise RN  Outcome: Progressing Towards Goal  Goal: *Optimal pain control at patient's stated goal  3/13/2020 1619 by Brodie Elise RN  Outcome: Resolved/Met  3/13/2020 0821 by Brodie Elise RN  Outcome: Progressing Towards Goal  Goal: *Absence of nausea/vomiting  3/13/2020 1619 by Brodie Elise RN  Outcome: Resolved/Met  3/13/2020 0821 by Brodie Elise RN  Outcome: Progressing Towards Goal  Goal: *Anxiety reduced or absent  3/13/2020 1619 by Brodie Elise RN  Outcome: Resolved/Met  3/13/2020 0821 by Brodie Elise RN  Outcome: Progressing Towards Goal  Goal: *Absence of injury  3/13/2020 1619 by Musa Whittington RN  Outcome: Resolved/Met  3/13/2020 0821 by Musa Whittington RN  Outcome: Progressing Towards Goal  Goal: *Level of consciousness returns to baseline  3/13/2020 1619 by Musa Whittington RN  Outcome: Resolved/Met  3/13/2020 0821 by Musa Whittington RN  Outcome: Progressing Towards Goal  Goal: Interventions  3/13/2020 1619 by Musa Whittington RN  Outcome: Resolved/Met  3/13/2020 0821 by Musa Whittington RN  Outcome: Progressing Towards Goal     Problem: Patient Education: Go to Patient Education Activity  Goal: Patient/Family Education  3/13/2020 1619 by Musa Whittington RN  Outcome: Resolved/Met  3/13/2020 0821 by Musa Whittington RN  Outcome: Progressing Towards Goal     Problem: Diabetes Self-Management  Goal: *Disease process and treatment process  Description: Define diabetes and identify own type of diabetes; list 3 options for treating diabetes. 3/13/2020 1619 by Musa Whittington RN  Outcome: Resolved/Met  3/13/2020 0821 by Musa Whittington RN  Outcome: Progressing Towards Goal  Goal: *Incorporating nutritional management into lifestyle  Description: Describe effect of type, amount and timing of food on blood glucose; list 3 methods for planning meals. 3/13/2020 1619 by Musa Whittington RN  Outcome: Resolved/Met  3/13/2020 0821 by Musa Whittington RN  Outcome: Progressing Towards Goal  Goal: *Incorporating physical activity into lifestyle  Description: State effect of exercise on blood glucose levels. 3/13/2020 1619 by Musa Whittington RN  Outcome: Resolved/Met  3/13/2020 0821 by Musa Whittington RN  Outcome: Progressing Towards Goal  Goal: *Developing strategies to promote health/change behavior  Description: Define the ABC's of diabetes; identify appropriate screenings, schedule and personal plan for screenings.   3/13/2020 1619 by Musa Whittington RN  Outcome: Resolved/Met  3/13/2020 0821 by Musa Whittington RN  Outcome: Progressing Towards Goal  Goal: *Using medications safely  Description: State effect of diabetes medications on diabetes; name diabetes medication taking, action and side effects. 3/13/2020 1619 by Mimi Baires RN  Outcome: Resolved/Met  3/13/2020 0821 by Mimi Baires RN  Outcome: Progressing Towards Goal  Goal: *Monitoring blood glucose, interpreting and using results  Description: Identify recommended blood glucose targets  and personal targets. 3/13/2020 1619 by Mimi Baires RN  Outcome: Resolved/Met  3/13/2020 0821 by Mimi Baires RN  Outcome: Progressing Towards Goal  Goal: *Prevention, detection, treatment of acute complications  Description: List symptoms of hyper- and hypoglycemia; describe how to treat low blood sugar and actions for lowering  high blood glucose level. 3/13/2020 1619 by Mimi Baires RN  Outcome: Resolved/Met  3/13/2020 0821 by Mimi Baires RN  Outcome: Progressing Towards Goal  Goal: *Prevention, detection and treatment of chronic complications  Description: Define the natural course of diabetes and describe the relationship of blood glucose levels to long term complications of diabetes.   3/13/2020 1619 by Mimi Baires RN  Outcome: Resolved/Met  3/13/2020 0821 by Mimi Baires RN  Outcome: Progressing Towards Goal  Goal: *Developing strategies to address psychosocial issues  Description: Describe feelings about living with diabetes; identify support needed and support network  3/13/2020 1619 by Mimi Baires RN  Outcome: Resolved/Met  3/13/2020 0821 by Mimi Baires RN  Outcome: Progressing Towards Goal  Goal: *Insulin pump training  3/13/2020 1619 by Mimi Baires RN  Outcome: Resolved/Met  3/13/2020 0821 by Mimi Baires RN  Outcome: Progressing Towards Goal  Goal: *Sick day guidelines  3/13/2020 1619 by Mimi Baires RN  Outcome: Resolved/Met  3/13/2020 0821 by Mimi Baires RN  Outcome: Progressing Towards Goal  Goal: *Patient Specific Goal (EDIT GOAL, INSERT TEXT)  3/13/2020 1619 by Mimi Baires RN  Outcome: Resolved/Met  3/13/2020 0821 by Yeny Martin RN  Outcome: Progressing Towards Goal     Problem: Patient Education: Go to Patient Education Activity  Goal: Patient/Family Education  3/13/2020 1619 by Yeny Martin RN  Outcome: Resolved/Met  3/13/2020 0821 by Yeny Martin RN  Outcome: Progressing Towards Goal

## 2020-03-13 NOTE — HOME CARE
Received [facility] referral, [facility] will follow for [facility] for MI and DM, Discharge order noted for today, patient's life partner Abilio Garnett) states patient has a glucometer and a Nebulizer at home; [facility] will follow, [facility] referral processed to [facility] central intake. CHARLOTTE ARELLANO.

## 2020-03-13 NOTE — PROGRESS NOTES
Lab Results   Component Value Date/Time    Glucose 303 (H) 03/13/2020 05:32 AM    Glucose (POC) 424 (HH) 03/13/2020 11:22 AM    Glucose (POC) 185 (H) 07/19/2014 05:28 PM     Doctor Sulma Santiago notified, received orders to administer sliding scale insulin and Lantus, will continue to monitor

## 2020-03-13 NOTE — PROGRESS NOTES
[facility]  -  Progress Note      Patient: Barbara Mosher MRN: [id number]  SSN: [id number]    YOB: 1960  Age: 63 y.o. Sex: male      Admit Date: 3/12/2020    Assessment:     -NSTEMI. S/p cardiac catheterization 3/12/2020 with findings as follows:  · 95% long mid LAD stenosis with evidence of an aneurysm. · Successful stent to proximal/mid LAD to residual 0%  · Occluded first diagonal branch. · Successful balloon angioplasty to residual 0%  · Mildly diminished LV function with EF 45-50% with large anteroapical hypokinesis. -DMII, uncontrolled, A1c 10.2 in 08/2019. On oral agents but admits to noncompliance (takes meds ~3 days/week). Did not take any of his medications today. -Hyperlipidemia. Lipid panel 08/2019 with , Chol 233, HDL 41,   -COPD/emphysema  -Tobacco abuse, approx. 25 pack-year smoking history  -Hx back surgery 4 months ago     No primary cardiologist    Plan:     -Continued on ASA, Brilinta, Lipitor. Importance of DAPT emphasized to patient. He will take Brilinta x 1 month and then switch to Plavix with loading dose 300 mg --> 75 mg daily afterwards, this was discussed with patient and family. -Advised pt that he needs to completely stop tobacco use. Pt seems motivated to do so. -Will start Metoprolol.  -Pending Echocardiogram, to be completed today.  -Pt needs to follow-up with our office within the next 3-4 weeks. Subjective:     No new complaints. States has been ambulating without complaint.      Objective:      Patient Vitals for the past 8 hrs:   Temp Pulse Resp BP SpO2   03/13/20 0738 99.5 °F (37.5 °C) 80 18 119/74 96 %   03/13/20 0506 -- -- -- -- 99 %   03/13/20 0339 97.9 °F (36.6 °C) 79 18 141/84 93 %         Patient Vitals for the past 96 hrs:   Weight   03/13/20 0339 231 lb 3.2 oz (104.9 kg)   03/12/20 0815 225 lb (102.1 kg)         Intake/Output Summary (Last 24 hours) at 3/13/2020 9346  Last data filed at 3/13/2020 6981  Gross per 24 hour   Intake 522.1 ml   Output 1325 ml   Net -802.9 ml       Physical Exam:  General:  alert, cooperative, no distress, appears stated age  Neck:  supple  Lungs:  clear to auscultation bilaterally  Heart:  Regular rate and rhythm  Abdomen:  abdomen is soft without significant tenderness, masses, organomegaly or guarding  Extremities:  Bruising to R wrist without underlying tenderness. No lower extremity edema.     Data Review:     Labs: Results:       Chemistry Recent Labs     03/13/20  0532 03/12/20  0604   * 330*   * 133*   K 3.7 3.7    98*   CO2 26 29   BUN 17 12   CREA 0.78 0.78   CA 8.1* 9.0   AGAP 7 6   BUCR 22* 15   * 141*   TP 6.3* 7.4   ALB 2.9* 3.5   GLOB 3.4 3.9   AGRAT 0.9 0.9      CBC w/Diff Recent Labs     03/13/20  0532 03/12/20  0604   WBC 11.0 12.1   RBC 4.89 5.40   HGB 14.2 15.9   HCT 42.0 45.1    224   GRANS 69 68   LYMPH 23 26   EOS 1 1      Cardiac Enzymes Lab Results   Component Value Date/Time    TROIQ 1.24 (H) 03/12/2020 11:10 AM      Coagulation Recent Labs     03/13/20  0532 03/12/20  1233   APTT 25.0 140.7*       Lipid Panel Lab Results   Component Value Date/Time    Cholesterol, total 233 (H) 08/27/2019 02:15 PM    HDL Cholesterol 41 08/27/2019 02:15 PM    LDL, calculated 133 (H) 08/27/2019 02:15 PM    Triglyceride 297 (H) 08/27/2019 02:15 PM    CHOL/HDL Ratio 5.7 (H) 08/27/2019 02:15 PM      Liver Enzymes Recent Labs     03/13/20  0532   TP 6.3*   ALB 2.9*   *   SGOT 113*      Thyroid Studies Lab Results   Component Value Date/Time    TSH 0.920 08/27/2019 02:15 PM

## 2020-03-13 NOTE — PROGRESS NOTES
Problem: Falls - Risk of  Goal: *Absence of Falls  Description: Document Tia Manus Fall Risk and appropriate interventions in the flowsheet. Outcome: Progressing Towards Goal  Note: Fall Risk Interventions:            Medication Interventions: Bed/chair exit alarm                   Problem: Patient Education: Go to Patient Education Activity  Goal: Patient/Family Education  Outcome: Progressing Towards Goal     Problem: Diabetes Self-Management  Goal: *Disease process and treatment process  Description: Define diabetes and identify own type of diabetes; list 3 options for treating diabetes. Outcome: Progressing Towards Goal  Goal: *Incorporating nutritional management into lifestyle  Description: Describe effect of type, amount and timing of food on blood glucose; list 3 methods for planning meals. Outcome: Progressing Towards Goal  Goal: *Incorporating physical activity into lifestyle  Description: State effect of exercise on blood glucose levels. Outcome: Progressing Towards Goal  Goal: *Developing strategies to promote health/change behavior  Description: Define the ABC's of diabetes; identify appropriate screenings, schedule and personal plan for screenings. Outcome: Progressing Towards Goal  Goal: *Using medications safely  Description: State effect of diabetes medications on diabetes; name diabetes medication taking, action and side effects. Outcome: Progressing Towards Goal  Goal: *Monitoring blood glucose, interpreting and using results  Description: Identify recommended blood glucose targets  and personal targets. Outcome: Progressing Towards Goal  Goal: *Prevention, detection, treatment of acute complications  Description: List symptoms of hyper- and hypoglycemia; describe how to treat low blood sugar and actions for lowering  high blood glucose level.   Outcome: Progressing Towards Goal  Goal: *Prevention, detection and treatment of chronic complications  Description: Define the natural course of diabetes and describe the relationship of blood glucose levels to long term complications of diabetes.   Outcome: Progressing Towards Goal  Goal: *Developing strategies to address psychosocial issues  Description: Describe feelings about living with diabetes; identify support needed and support network  Outcome: Progressing Towards Goal  Goal: *Insulin pump training  Outcome: Progressing Towards Goal  Goal: *Sick day guidelines  Outcome: Progressing Towards Goal  Goal: *Patient Specific Goal (EDIT GOAL, INSERT TEXT)  Outcome: Progressing Towards Goal     Problem: Patient Education: Go to Patient Education Activity  Goal: Patient/Family Education  Outcome: Progressing Towards Goal     Problem: Patient Education: Go to Patient Education Activity  Goal: Patient/Family Education  Outcome: Progressing Towards Goal

## 2020-03-13 NOTE — DIABETES MGMT
Diabetes Patient/Family Education Record  Factors That  May Influence Patients Ability  to Learn or  Comply with Recommendations   []   Language barrier    []   Cultural needs   []   Motivation    []   Cognitive limitation    []   Physical   [x]   Education    []   Physiological factors   []   Hearing/vision/speaking impairment   []   Islam beliefs    []   Financial factors   []  Other:   []  No factors identified at this time. Person Instructed:   [x]   Patient   [x]   Family: supportive wife at bedside actively participated and stated that she is diabetic also. []  Other     Preference for Learning:   [x]   Verbal   [x]   Written: diab educ packet    [x]  Demonstration     Level of Comprehension & Competence:    [x]  Good                                      [] Fair                                     []  Poor                             []  Needs Reinforcement   [x]  Teachback completed    Education Component:   [x]  Medication management, including how to administer insulin (if appropriate) and potential medication interactions: Yes. Patient reported list of home diabetes medications prior to admission:   Metformin 1000 mg BID with meals. Sitagliptin (Januvia) daily. Patient cannot remember the dose and stated that it was too expensive therefore he did not take the medicine. Discussed/explained to patient that his current A1c level is high at 12.4% (3/12/2020). Educated patient on how adding insulin to his regimen can gradually lower the A1c. He is willing to take insulin. Educated patient:  Types of insulin: lantus (long acting) and humalog (fast acting, sliding scale). Patient verbalized understanding. Completed insulin training. Patient and wife expressed concern about cost of insulin.   Informed patient and his wife about Humulin 70/30 mix insulin [facility], $24.99 per vial. Educated them about mix insulin and encouraged to discuss with his medical provider if this can be an option instead of lantus or levemir insulin. They verbalized understanding to talk to his doctor. Addressed the risks of drinking alcohol. [x]  Nutritional management -obtain usual meal pattern: Yes. The patient reported only eating 2 meals/dayl. Patient and wife already know the basics and they also receptive for additional education. Explained the impt of eating 3 meals daily to spread carbs throughout the day. Also educated them about the plate method for the recommended serving size and portion control of carbs (starches, fruits, dairy) and limiting intake of concentrated sweets. [x]  Exercise: Yes. Patient stated that he's able tolerate regular walking exercise. [x]  Signs, symptoms, and treatment of hyperglycemia and hypoglycemia:Yes. Patient and wife verbalized understanding including symptoms, treatment, cause, and prevention of hypoglycemia. [x] Prevention, recognition and treatment of hyperglycemia and hypoglycemia: Yes. Patient verbalized understanding including symptoms, cause, prevention and when to call his doctor or get urgent medical care for uncontrolled high blood sugar. [x]  Importance of blood glucose monitoring and how to obtain a blood glucose meter: Yes. Patient stated that he has BG meter and testing supplies, but rarely test his blood sugar. Educated patient and encouraged him to monitor his blood sugar regularly, keep log and share it with his medical provider each office visit. Patient verbalized understanding that checking his blood sugar will help determine if the diabetes medication, food and exercise is working. Educated patient about the following recommendations:  Fasting blood sugar range before meals:   Random blood sugar two hours after meals: <180      []  Instruction on use of the blood glucose meter   [x]  Discuss the importance of HbA1C monitoring: Yes.  Educated patient and explained that his current A1c level is 12.4% (3/12/2020) is equivalent to estimated average blood glucose of 309 mg/dL during the past 2-3 months. The recommended A1c level is <7%. []  Sick day guidelines   [x]  Proper use and disposal of lancets, needles, syringes or insulin pens (if appropriate): Yes. [x]  Potential long-term complications (retinopathy, kidney disease, neuropathy, foot care): Yes. [x] Information about whom to contact in case of emergency or for more information:Yes. [x]  Goal:  Patient/family will demonstrate understanding of Diabetes Self Management Skills by: 03/20/2020  Plan for post-discharge education or self-management support:    [x] Outpatient class schedule provided            [] Patient Declined    [] Scheduled for outpatient classes (date) _______  Verify:  Does patient understand how diabetes medications work? Yes, after educating the patient. Does patient know what their most recent A1c is? Yes, after educating the patient. Does patient monitor glucose at home? No. Educated patient. Does patient have difficulty obtaining diabetes medications and testing supplies? No, with diabetes testing supplies but \"yes\" with diabetes medications due to cost of lantus insulin. Educated about other insulin (Humulin 70/30) and encouraged him to discuss with his medical provider.        Axel Cutler RN [facility]  Pager: [phone]

## 2020-03-13 NOTE — PROGRESS NOTES
Progress Note    Patient: Vinnie Victoria MRN: [id number]  SSN: [id number]    YOB: 1960  Age: 63 y.o. Sex: male      Admit Date: 3/12/2020    LOS: 1 day     Subjective:     No acute events recorded overnight. Patient denies chest pain and palpitations. He has no acute concerns this morning. Objective:     Vitals:    03/13/20 0018 03/13/20 0339 03/13/20 0506 03/13/20 0738   BP: 122/72 141/84  119/74   Pulse: 79 79  80   Resp: 18 18  18   Temp: 98.6 °F (37 °C) 97.9 °F (36.6 °C)  99.5 °F (37.5 °C)   SpO2: 94% 93% 99% 96%   Weight:  104.9 kg (231 lb 3.2 oz)     Height:            Intake and Output:  Current Shift: No intake/output data recorded. Last three shifts: 03/11 1901 - 03/13 0700  In: 282.1 [P.O.:240; I.V.:42.1]  Out: 1325 [Urine:1325]    Physical Exam:    General: in no apparent distress, well developed and well nourished, non-toxic and oriented times 3   Chest: normal   Lungs: clear to auscultation bilaterally. Good air movment   Heart: Regular rate and rhythm, S1S2 present or without murmur or extra heart sounds   Abdomen: abdomen is soft without significant tenderness, masses, organomegaly or guarding   Extremity: negative for edema   Neuro: alert.  CNs II-XII grossly intact   Skin: Skin color, texture, turgor normal.     Lab/Data Review:  CMP:   Lab Results   Component Value Date/Time     (L) 03/13/2020 05:32 AM    K 3.7 03/13/2020 05:32 AM     03/13/2020 05:32 AM    CO2 26 03/13/2020 05:32 AM    AGAP 7 03/13/2020 05:32 AM     (H) 03/13/2020 05:32 AM    BUN 17 03/13/2020 05:32 AM    CREA 0.78 03/13/2020 05:32 AM    GFRAA >60 03/13/2020 05:32 AM    GFRNA >60 03/13/2020 05:32 AM    CA 8.1 (L) 03/13/2020 05:32 AM    ALB 2.9 (L) 03/13/2020 05:32 AM    TP 6.3 (L) 03/13/2020 05:32 AM    GLOB 3.4 03/13/2020 05:32 AM    AGRAT 0.9 03/13/2020 05:32 AM    SGOT 113 (H) 03/13/2020 05:32 AM    ALT 25 03/13/2020 05:32 AM     CBC:   Lab Results   Component Value Date/Time    WBC 11.0 03/13/2020 05:32 AM    HGB 14.2 03/13/2020 05:32 AM    HCT 42.0 03/13/2020 05:32 AM     03/13/2020 05:32 AM     All Cardiac Markers in the last 24 hours:   Lab Results   Component Value Date/Time    TROIQ 1.24 (H) 03/12/2020 11:10 AM     Recent Glucose Results:   Lab Results   Component Value Date/Time     (H) 03/13/2020 05:32 AM     ABG: No results found for: PH, PHI, PCO2, PCO2I, PO2, PO2I, HCO3, HCO3I, FIO2, FIO2I  COAGS:   Lab Results   Component Value Date/Time    APTT 25.0 03/13/2020 05:32 AM     Liver Panel:   Lab Results   Component Value Date/Time    ALB 2.9 (L) 03/13/2020 05:32 AM    TP 6.3 (L) 03/13/2020 05:32 AM    GLOB 3.4 03/13/2020 05:32 AM    AGRAT 0.9 03/13/2020 05:32 AM    SGOT 113 (H) 03/13/2020 05:32 AM    ALT 25 03/13/2020 05:32 AM     (H) 03/13/2020 05:32 AM     Pancreatic Markers: No results found for: AMYLPOCT, AML, LIPPOCT, LPSE       Assessment:     - NSTEMI s/p PCI  - COPD  - Diabetes mellitus type II  - HTN    Plan:     # NSTEMI s/p PCI 1xDES to the prox-mid LAD - no further chest pain   - Aspirin 81mg daily  - Ticagrelor 90mg BID  - Atorvastatin 80mg QHS  - Cardiology following    # DMII - blood glucose uncontrolled in the 300s since admission   - Lantus 25 units QHS  - Lispro correctional insulin AC&QHS  - Need to discharge home with insulin and diabetic education     # COPD - uses spiriva and dulera at home  - Duonebs  - Pulmicort     DVT prophylaxis: SCDs, ambulation  Activity: as toelrated  Diet: cardiac  Code status: FULL  Disposition: likely discharge home today    Discussed case with: Dr. Leticia Hernandez By: Moriah Morales MD   [place], [address], [address] [facility] Internal Medicine PGY3    March 13, 2020

## 2020-03-13 NOTE — PROGRESS NOTES
Patient's BS trends greater than 200 twice in 24 hour period. Patient placed on very insulin resistant scale according to protocol.    Lab Results   Component Value Date/Time    Glucose 303 (H) 03/13/2020 05:32 AM    Glucose (POC) 305 (H) 03/13/2020 07:42 AM    Glucose (POC) 185 (H) 07/19/2014 05:28 PM

## 2020-03-13 NOTE — DIABETES MGMT
Glycemic Control Plan of Care    T2DM with current A1c of 12.4% (3/12/2020). See separate notes, 03/13/2020, for assessment of home diabetes management and education. Home diabetes medications: Patient reported on 03/13/2020:  Metformin 1000 mg BID with meals. Sitagliptin daily. The patient cannot remember the dose and stated that it was too expensive to fill the prescription. POC BG range on 03/12/2020: 321-362. He received 20 units of lantus and 20 units of correctional lispro insulin. Patient received 60 mg of prednisone. POC BG report on 03/13/2020 at time of review: 305, 424. Noted lantus insulin dose increased to 25 units at bedtime. Patient stated financial barrier in affording lantus insulin. Noted that he has Medicare part A&B. He does not have part D for prescription. Recommendation(s):  1.) cont glycemic monitoring and intervention. 2.) if decision to disch patient on Humulin 70/30 mix insulin, the conversion dose from 25 units of lantus is 45-46 units daily. Humulin insulin is available at [employer] for $24.99 per vial.  Patient will need prescription for syringes if prescribed insulin vials. [employer] humulin 70/30 mix insulin is now available in pens. He will need prescription for pen needles. Assessment:  Patient is 63year old with past medical history including type 2 diabetes mellitus, COPD, emphysema and hypertension - was admitted on 3/12/2020 with report of chest pain. Noted:  NSTEMI. Status post cardiac stent on 03/12/2020. Hyperlipidemia. Tobacco abuse, approx 25 ppy  T2DM. Most recent blood glucose values:    Results for Lee Ann Novak (MRN [id number]) as of 3/13/2020 15:31   Ref. Range 3/12/2020 07:50 3/12/2020 14:45 3/12/2020 17:52 3/12/2020 21:03   GLUCOSE,FAST - POC Latest Ref Range: 70 - 110 mg/dL 321 (H)  361 (H) 362 (H)     Results for Lee Ann Novak (MRN [id number]) as of 3/13/2020 15:31   Ref.  Range 3/13/2020 07:42 3/13/2020 11:22   GLUCOSE,FAST - POC Latest Ref Range: 70 - 110 mg/dL 305 (H) 424 (HH)     Current A1C: 12.4% (3/12/2020) which is equivalent to estimated average blood glucose of 309 mg/dL during the past 2-3 months. Current hospital diabetes medications:  Basal lantus insulin 25 units daily at bedtime. Correctional lispro insulin ACHS. Very resistant dose.     Total daily dose insulin requirement previous day: 03/12/2020:  Lantus: 20 units  Lispro: 25 units  TDD insulin: 45 units    Diet: Cardiac regular; consistent carb 1800kcal.    Goals:  Blood glucose will be within target range of  mg/dL by 03/16/2020    Education:  __X_  Refer to Diabetes Education Record: 03/13/2020             ___  Education not indicated at this time    Stanley Abel RN [facility]  Pager: [phone]

## 2020-03-13 NOTE — PROGRESS NOTES
1930 bedside turnover given to me by RN . Pt is in the recliner chair visiting with family. Denies pain and denies shortness of breath. Has a small hematoma on his right radial at insertion point for cardiac cath. 2015 visiting with family, educated on lifestyle changes his blood sugars have been running high, that paired with his recent heart conditions informed that changes in smoking cessation and diet modifications can increase his quality of life. 2130 moved from recliner to bed, sleepy. Denies pain and denies needs. Hematoma has gone down. 2200 meds given as well as lantus and humalog. Dr Napoleon Mcfadden is aware of his blood sugar being over 350, he was at bedside discussed patient's A1C. Talked to him and his girlfriend, they are aware and answer questions appropriately regarding carbohydrates and healthy dinner plates as far as decreasing carbohydrates, dressings and sauces while increasing veggies. 0020 in bed sleeping on the cardiac telemetry monitor. No signs of distress  0330 in bed, woke him for brilenta, did an EKG as scheduled by Dr. Soniya Breen. 0430 pt used call bell, c/o feeling short of breath. Respiratory paged to do a breathing tx and the patient was informed that is a side effect of the brilenta. 0730 bedside turnover given to KENDRA Riley as well as MAR, ED summary, SBAR, and overnight updates including patient feeling short of breath post brilenta administration given. Given a chance to ask questions.

## 2020-03-14 LAB
ATRIAL RATE: 74 BPM
CALCULATED P AXIS, ECG09: 67 DEGREES
CALCULATED R AXIS, ECG10: 73 DEGREES
CALCULATED T AXIS, ECG11: 30 DEGREES
DIAGNOSIS, 93000: NORMAL
P-R INTERVAL, ECG05: 166 MS
Q-T INTERVAL, ECG07: 394 MS
QRS DURATION, ECG06: 110 MS
QTC CALCULATION (BEZET), ECG08: 437 MS
VENTRICULAR RATE, ECG03: 74 BPM

## 2020-03-15 NOTE — HOME CARE
Referral processed by central office over the weekend. [facility] will follow.      Samuel Remy LPN   [facility] Liaison  [phone]

## 2020-03-16 ENCOUNTER — TELEPHONE (OUTPATIENT)
Dept: CARDIAC REHAB | Age: 60
End: 2020-03-16

## 2020-03-16 NOTE — TELEPHONE ENCOUNTER
Cardiac Rehab called patient and spoke to him about the program. He wants to follow up with Dr. Farhana Chun first, then get call back after his follow-up visit on 4/2/2020. Will check in at that time.     Thank you,  Layla Simon

## 2020-03-24 ENCOUNTER — HOME CARE VISIT (OUTPATIENT)
Dept: SCHEDULING | Facility: HOME HEALTH | Age: 60
End: 2020-03-24

## 2020-04-02 ENCOUNTER — VIRTUAL VISIT (OUTPATIENT)
Dept: CARDIOLOGY CLINIC | Age: 60
End: 2020-04-02

## 2020-04-02 VITALS — OXYGEN SATURATION: 97 % | HEART RATE: 76 BPM

## 2020-04-02 DIAGNOSIS — J44.9 CHRONIC OBSTRUCTIVE PULMONARY DISEASE, UNSPECIFIED COPD TYPE (HCC): ICD-10-CM

## 2020-04-02 DIAGNOSIS — I10 ESSENTIAL HYPERTENSION: ICD-10-CM

## 2020-04-02 DIAGNOSIS — I21.4 NSTEMI (NON-ST ELEVATED MYOCARDIAL INFARCTION) (HCC): ICD-10-CM

## 2020-04-02 DIAGNOSIS — I25.10 CORONARY ARTERY DISEASE INVOLVING NATIVE CORONARY ARTERY OF NATIVE HEART WITHOUT ANGINA PECTORIS: Primary | ICD-10-CM

## 2020-04-02 RX ORDER — METOPROLOL TARTRATE 25 MG/1
25 TABLET, FILM COATED ORAL EVERY 12 HOURS
Qty: 60 TAB | Refills: 6 | Status: SHIPPED | OUTPATIENT
Start: 2020-04-02 | End: 2022-05-17

## 2020-04-02 RX ORDER — ATORVASTATIN CALCIUM 80 MG/1
80 TABLET, FILM COATED ORAL
Qty: 30 TAB | Refills: 6 | Status: SHIPPED | OUTPATIENT
Start: 2020-04-02 | End: 2021-06-22

## 2020-04-02 RX ORDER — CLOPIDOGREL BISULFATE 75 MG/1
TABLET ORAL
Qty: 33 TAB | Refills: 11 | Status: SHIPPED | OUTPATIENT
Start: 2020-04-12 | End: 2021-06-22

## 2020-04-02 NOTE — PROGRESS NOTES
Consent: Robles Eubanks, who was seen by synchronous (real-time) audio-video technology, and/or his healthcare decision maker, is aware that this patient-initiated, Telehealth encounter on 4/2/2020 is a billable service, with coverage as determined by his insurance carrier. He is aware that he may receive a bill and has provided verbal consent to proceed: Yes. Assessment & Plan:   Diagnoses and all orders for this visit:    1. Coronary artery disease involving native coronary artery of native heart without angina pectoris    2. NSTEMI (non-ST elevated myocardial infarction) ([facility]    3. Essential hypertension    4. Chronic obstructive pulmonary disease, unspecified COPD type ([facility] [address]    Other orders  -     atorvastatin (LIPITOR) 80 mg tablet; Take 1 Tab by mouth nightly. -     metoprolol tartrate (LOPRESSOR) 25 mg tablet; Take 1 Tab by mouth every twelve (12) hours. -     clopidogreL (Plavix) 75 mg tab; Take four tablets day 1, then 1 tablet daily starting day 2. Once Brilinta is completed, begin taking Plavix as instructed on the bottle  Take Brilinta and Aspirin and then Plavix (clopidogrel) and Aspirin daily without fail  Once you obtain a blood pressure monitor, check blood pressure (best time to check it is 2-3 hours after you take your morning medications) and record the readings. Heart healthy, low sodium (2000mg per day) diet  Follow-up with Dr. Jiménez Records as scheduled and as needed    Mr. Marshall Coombs was seen via virtual visit initially. They had some difficulty with audio and therefore could not hear me so the video was ended and I conducted his visit via phone. He reports that he has been feeling well since being discharged home and has not had anymore chest pain. He reports compliance with his medications. We had a long discussion regarding his medications and teaching was done as to the purpose and function of his new cardiac medications.   He was advised to take his Brilinta and ASA (until he switches to Plavix 75mg) daily without fail and when he switches to Plavix, both Plavix and ASA should be taken daily without fail as well. Cath findings explained to him and interventions explained also. He states that he will purchase a blood pressure monitor for home and will begin monitoring his vital signs. I informed him that the metoprolol will lower his heart rate and blood pressure as he was only on lisinopril and his diabetes medications prior to his cardiac event. I asked that he call the office if he has any questions or concerns. He will follow-up with Dr Burnard Dakin as scheduled for early August 2020 and as needed. 20 minutes was spent on the phone with him. 712  Subjective:   Annette Elliott is a 63 y.o. male who was seen for Hospital Follow Up (3-4 week Post Cath)    He presented to the hospital on 3/12/20 with unstable angina. He was diagnosed with a non-STEMI and was taken to the cath lab which showed:       Left Anterior Descending   Prox LAD to Mid LAD lesion 95% stenosed. . Diagnostic coronary angiography shows negative for . The lesion is distal to major branch, dissected and ulcerative. The lesion is severely calcified. The lesion was not previously treated. The stenosis was measured by a visual reading. First Diagonal Branch   1st Diag lesion 100% stenosed. . Lesion is the culprit lesion. The lesion is distal to major branch and thrombotic. The stenosis was measured by a visual reading. He underwent successful balloon angioplasty of the diagonal branch to residual 0% and underwent successful stenting to the proximal and mid LAD to residual 0%. Since being discharged home, he states that he has been feeling well. He denies chest pain, tightness, heaviness, and palpitations. He states that he has some mild chronic shortness of breath due to his COPD. He remains active physically and states that he has not had any cardiac symptoms with activity.     Current Outpatient Medications   Medication Sig    ticagrelor (BRILINTA) 90 mg tablet Take 1 Tab by mouth two (2) times a day.  [START ON 4/12/2020] clopidogreL (Plavix) 75 mg tab Take four tablets day 1, then 1 tablet daily starting day 2.    aspirin delayed-release 81 mg tablet Take 1 Tab by mouth daily.  atorvastatin (LIPITOR) 80 mg tablet Take 1 Tab by mouth nightly.  glucagon (GLUCAGEN) 1 mg injection 1 mL by IntraMUSCular route as needed for Hypoglycemia.  insulin lispro (HUMALOG) 100 unit/mL injection Very Insulin Resistant  For Blood Sugar (mg/dL) of:              Less than 150 =   0 units  150 -199 =   3 units  200 -249 =   6 units  250 -299 =   9 units  300 -349 =   12 units  350 and above =   15 units  Initiate Hypoglycemic protocol if blood glucose is <70 mg/dL. Fast Acting - Administer Immediately - or within 15 minutes of start of meal, if mealtime coverage.  metoprolol tartrate (LOPRESSOR) 25 mg tablet Take 1 Tab by mouth every twelve (12) hours.  nitroglycerin (NITROLINGUAL) 400 mcg/spray spray 1 Spray by SubLINGual route every five (5) minutes as needed for Chest Pain (do not exceed more than 3 sprays in one day, seek acute medical care if chest pain continues).  predniSONE (DELTASONE) 10 mg tablet Take two tabs by mouth daily for two days, then take one tablet by mouth daily for four days, then take half a tablet by mouth for four days [23MAR end date]    Insulin Syringe-Needle U-100 0.3 mL 31 gauge x 5/16\" syrg TID as needed    lancets misc As directed    Blood-Glucose Meter monitoring kit As directed    glucose blood VI test strips (blood glucose test) strip As directed    OTHER CMP in 2 week  Dx: elevated AST  Fax results to Dr. Jordyn Dia    metFORMIN (GLUCOPHAGE) 1,000 mg tablet Take 1 Tab by mouth two (2) times daily (with meals).  lisinopril (PRINIVIL, ZESTRIL) 10 mg tablet Take 1 Tab by mouth daily.     albuterol (PROVENTIL HFA, VENTOLIN HFA) 90 mcg/actuation inhaler Take 1 Puff by inhalation every four (4) hours as needed for Wheezing.  tiotropium (SPIRIVA WITH HANDIHALER) 18 mcg inhalation capsule Take 1 Cap by inhalation daily.  mometasone-formoterol (DULERA) 100-5 mcg/actuation HFAA HFA inhaler Take 2 Puffs by inhalation two (2) times a day. No current facility-administered medications for this visit. No Known Allergies        Review of Systems   Constitutional: Negative. Respiratory: Positive for shortness of breath. Negative for wheezing. Cardiovascular: Negative. Gastrointestinal: Negative. Negative for blood in stool, heartburn, melena and nausea. Neurological: Negative for dizziness. Objective:     Visit Vitals  Pulse 76   SpO2 97%      General: alert, cooperative, no distress   Mental  status: normal mood, behavior, speech, dress, motor activity, and thought processes, able to follow commands   HENT: NCAT   Neck: no visualized mass   Resp: no respiratory distress   Neuro: no gross deficits   Skin: no discoloration or lesions of concern on visible areas   Psychiatric: normal affect, consistent with stated mood, no evidence of hallucinations     Additional exam findings:  none      We discussed the expected course, resolution and complications of the diagnosis(es) in detail. Medication risks, benefits, costs, interactions, and alternatives were discussed as indicated. I advised him to contact the office if his condition worsens, changes or fails to improve as anticipated. He expressed understanding with the diagnosis(es) and plan. Jarred Power is a 63 y.o. male being evaluated by a video visit encounter for concerns as above. A caregiver was present when appropriate. Due to this being a TeleHealth encounter (During [facility] public health emergency), evaluation of the following organ systems was limited: Vitals/Constitutional/EENT/Resp/CV/GI//MS/Neuro/Skin/Heme-Lymph-Imm.   Pursuant to the emergency declaration under the [facility] waiver authority and the [employer] and [employer] Act, this Virtual  Visit was conducted, with patient's (and/or legal guardian's) consent, to reduce the patient's risk of exposure to COVID-19 and provide necessary medical care. Services were provided through a video synchronous discussion virtually to substitute for in-person clinic visit. Patient and provider were located at their individual homes.         Joyce Montez NP

## 2020-04-02 NOTE — PATIENT INSTRUCTIONS
Once Brilinta is completed, begin taking Plavix as instructed on the bottle Take Brilinta and Aspirin and then Plavix (clopidogrel) and Aspirin daily without fail Once you obtain a blood pressure monitor, check blood pressure (best time to check it is 2-3 hours after you take your morning medications) and record the readings. Heart healthy, low sodium (2000mg per day) diet Follow-up with Dr. Ruth Gonsales as scheduled and as needed

## 2020-04-02 NOTE — PROGRESS NOTES
Pacheco Jones presents today for   Chief Complaint   Patient presents with   [facility] Follow Up     3-4 week [address] preferred language for health care discussion is english/other. Is someone accompanying this pt? Virtual Visit    Is the patient using any DME equipment during OV? Virtual Visit    Depression Screening:  3 most recent PHQ Screens 4/2/2020   Little interest or pleasure in doing things Not at all   Feeling down, depressed, irritable, or hopeless Not at all   Total Score PHQ 2 0       Learning Assessment:  Learning Assessment 4/2/2020   PRIMARY LEARNER Patient   HIGHEST LEVEL OF EDUCATION - PRIMARY LEARNER  GRADUATED HIGH SCHOOL OR GED   BARRIERS PRIMARY LEARNER NONE   CO-LEARNER CAREGIVER No   CO-LEARNER NAME no   PRIMARY LANGUAGE ENGLISH   LEARNER PREFERENCE PRIMARY DEMONSTRATION   ANSWERED BY patient   RELATIONSHIP SELF       Abuse Screening:  Abuse Screening Questionnaire 4/2/2020   Do you ever feel afraid of your partner? N   Are you in a relationship with someone who physically or mentally threatens you? N   Is it safe for you to go home? Y       Fall Risk  Fall Risk Assessment, last 12 mths 4/2/2020   Able to walk? Yes   Fall in past 12 months? No       Pt currently taking Anticoagulant therapy? Plavix 75 mg and ASA 81 mg    Coordination of Care:  1. Have you been to the ER, urgent care clinic since your last visit? Hospitalized since your last visit? Yes, heart cath on 03-    2. Have you seen or consulted any other health care providers outside of the [address] [place] [address] since your last visit? Include any pap smears or colon screening.  no

## 2020-06-30 ENCOUNTER — TELEPHONE (OUTPATIENT)
Dept: CARDIAC REHAB | Age: 60
End: 2020-06-30

## 2020-06-30 NOTE — TELEPHONE ENCOUNTER
Cardiac Rehab called patient and left a message about the program. Additional attempts at contact will be made.     Thank you,  Beck Infante

## 2020-07-10 ENCOUNTER — HOSPITAL ENCOUNTER (EMERGENCY)
Age: 60
Discharge: HOME | End: 2020-07-10
Attending: EMERGENCY MEDICINE
Payer: MEDICARE

## 2020-07-10 ENCOUNTER — APPOINTMENT (OUTPATIENT)
Dept: GENERAL RADIOLOGY | Age: 60
End: 2020-07-10
Attending: EMERGENCY MEDICINE
Payer: MEDICARE

## 2020-07-10 VITALS
WEIGHT: 225 LBS | HEART RATE: 74 BPM | OXYGEN SATURATION: 95 % | SYSTOLIC BLOOD PRESSURE: 133 MMHG | HEIGHT: 71 IN | BODY MASS INDEX: 31.5 KG/M2 | DIASTOLIC BLOOD PRESSURE: 64 MMHG | RESPIRATION RATE: 20 BRPM | TEMPERATURE: 98.7 F

## 2020-07-10 DIAGNOSIS — J44.1 ACUTE EXACERBATION OF CHRONIC OBSTRUCTIVE PULMONARY DISEASE (COPD) (HCC): Primary | ICD-10-CM

## 2020-07-10 LAB
ANION GAP SERPL CALC-SCNC: 6 MMOL/L (ref 3–18)
BASOPHILS # BLD: 0 K/UL (ref 0–0.1)
BASOPHILS NFR BLD: 0 % (ref 0–2)
BUN SERPL-MCNC: 16 MG/DL (ref 7–18)
BUN/CREAT SERPL: 18 (ref 12–20)
CALCIUM SERPL-MCNC: 9.3 MG/DL (ref 8.5–10.1)
CHLORIDE SERPL-SCNC: 101 MMOL/L (ref 100–111)
CK MB CFR SERPL CALC: 2 % (ref 0–4)
CK MB SERPL-MCNC: 3 NG/ML (ref 5–25)
CK SERPL-CCNC: 147 U/L (ref 39–308)
CO2 SERPL-SCNC: 33 MMOL/L (ref 21–32)
CREAT SERPL-MCNC: 0.91 MG/DL (ref 0.6–1.3)
DIFFERENTIAL METHOD BLD: NORMAL
EOSINOPHIL # BLD: 0.2 K/UL (ref 0–0.4)
EOSINOPHIL NFR BLD: 2 % (ref 0–5)
ERYTHROCYTE [DISTWIDTH] IN BLOOD BY AUTOMATED COUNT: 12.7 % (ref 11.6–14.5)
GLUCOSE SERPL-MCNC: 239 MG/DL (ref 74–99)
HCT VFR BLD AUTO: 44.9 % (ref 36–48)
HGB BLD-MCNC: 15.2 G/DL (ref 13–16)
LYMPHOCYTES # BLD: 3.4 K/UL (ref 0.9–3.6)
LYMPHOCYTES NFR BLD: 36 % (ref 21–52)
MCH RBC QN AUTO: 29.3 PG (ref 24–34)
MCHC RBC AUTO-ENTMCNC: 33.9 G/DL (ref 31–37)
MCV RBC AUTO: 86.5 FL (ref 74–97)
MONOCYTES # BLD: 0.5 K/UL (ref 0.05–1.2)
MONOCYTES NFR BLD: 5 % (ref 3–10)
NEUTS SEG # BLD: 5.4 K/UL (ref 1.8–8)
NEUTS SEG NFR BLD: 57 % (ref 40–73)
PLATELET # BLD AUTO: 235 K/UL (ref 135–420)
PMV BLD AUTO: 11.3 FL (ref 9.2–11.8)
POTASSIUM SERPL-SCNC: 3.9 MMOL/L (ref 3.5–5.5)
RBC # BLD AUTO: 5.19 M/UL (ref 4.7–5.5)
SODIUM SERPL-SCNC: 140 MMOL/L (ref 136–145)
TROPONIN I SERPL-MCNC: <0.02 NG/ML (ref 0–0.04)
WBC # BLD AUTO: 9.5 K/UL (ref 4.6–13.2)

## 2020-07-10 PROCEDURE — 74011636637 HC RX REV CODE- 636/637: Performed by: EMERGENCY MEDICINE

## 2020-07-10 PROCEDURE — 80048 BASIC METABOLIC PNL TOTAL CA: CPT

## 2020-07-10 PROCEDURE — 82550 ASSAY OF CK (CPK): CPT

## 2020-07-10 PROCEDURE — 85025 COMPLETE CBC W/AUTO DIFF WBC: CPT

## 2020-07-10 PROCEDURE — 93005 ELECTROCARDIOGRAM TRACING: CPT

## 2020-07-10 PROCEDURE — 99284 EMERGENCY DEPT VISIT MOD MDM: CPT

## 2020-07-10 PROCEDURE — A9270 NON-COVERED ITEM OR SERVICE: HCPCS | Performed by: EMERGENCY MEDICINE

## 2020-07-10 PROCEDURE — 71046 X-RAY EXAM CHEST 2 VIEWS: CPT

## 2020-07-10 RX ORDER — PREDNISONE 50 MG/1
50 TABLET ORAL DAILY
Qty: 5 TAB | Refills: 0 | Status: SHIPPED | OUTPATIENT
Start: 2020-07-10 | End: 2020-07-15

## 2020-07-10 RX ORDER — PREDNISONE 20 MG/1
60 TABLET ORAL
Status: COMPLETED | OUTPATIENT
Start: 2020-07-10 | End: 2020-07-10

## 2020-07-10 RX ADMIN — PREDNISONE 60 MG: 20 TABLET ORAL at 22:02

## 2020-07-10 NOTE — ED TRIAGE NOTES
Pt states he thinks he has pneumonia he states he has had SOB feeling run down and productive cough for 2 weeks

## 2020-07-11 ENCOUNTER — PATIENT OUTREACH (OUTPATIENT)
Dept: CASE MANAGEMENT | Age: 60
End: 2020-07-11

## 2020-07-11 LAB
ATRIAL RATE: 85 BPM
CALCULATED P AXIS, ECG09: 83 DEGREES
CALCULATED R AXIS, ECG10: 79 DEGREES
CALCULATED T AXIS, ECG11: 60 DEGREES
DIAGNOSIS, 93000: NORMAL
P-R INTERVAL, ECG05: 154 MS
Q-T INTERVAL, ECG07: 372 MS
QRS DURATION, ECG06: 100 MS
QTC CALCULATION (BEZET), ECG08: 442 MS
VENTRICULAR RATE, ECG03: 85 BPM

## 2020-07-11 NOTE — PROGRESS NOTES
Date/Time:  7/11/2020 9:56 AM  Attempted to reach patient by telephone. Left HIPPA compliant message requesting a return call. Will attempt to reach patient again.

## 2020-07-11 NOTE — ED NOTES
Provided patient with discharge instructions. Informed patient of medications that were called into the pharmacy for him. Patient verbally acknowledge understanding of discharge instructions. He had no further questions or concerns.

## 2020-07-11 NOTE — ED PROVIDER NOTES
EMERGENCY DEPARTMENT HISTORY AND PHYSICAL EXAM  This was created with voice recognition software and transcription errors may be present. 9:25 PM  Date: 7/10/2020  Patient Name: Lyn Hicks    History of Presenting Illness     Chief Complaint:    History Provided By:     HPI: Lyn Hicks is a 63 y.o. male past medical history of COPD diabetes emphysema hypertension and MI who presents with shortness of breath associated with coughing and wheezing for the past 2 weeks. No nausea no vomiting. No chest pain except when coughing. No fever or chills. Consistent with prior COPD    PCP: Delvin Myles MD      Past History     Past Medical History:  Past Medical History:   Diagnosis Date    [facility]chronic obstructive pulmonary disease) [facility]     Diabetes ([facility]     Emphysema     H/O cardiovascular stress test 2012    negative    Hypertension     [facility] infarction[facility]        Past Surgical History:  History reviewed. No pertinent surgical history. Family History:  Family History   Problem Relation Age of Onset    Diabetes Mother        Social History:  Social History     Tobacco Use    Smoking status: Current Every Day Smoker     Packs/day: 0.50     Types: Cigarettes    Smokeless tobacco: Never Used   Substance Use Topics    Alcohol use: Not Currently    Drug use: Yes     Types: Marijuana       Allergies:  No Known Allergies    Review of Systems     Review of Systems   All other systems reviewed and are negative. 10 point review of systems otherwise negative unless noted in HPI. Physical Exam       Physical Exam  Constitutional:       Appearance: He is well-developed. HENT:      Head: Normocephalic and atraumatic. Eyes:      Pupils: Pupils are equal, round, and reactive to light. Neck:      Musculoskeletal: Normal range of motion and neck supple. Cardiovascular:      Rate and Rhythm: Normal rate and regular rhythm. Heart sounds: Normal heart sounds. No murmur.  No friction rub. Pulmonary:      Effort: Pulmonary effort is normal. No respiratory distress. Breath sounds: Normal breath sounds. No wheezing. Comments: Bilateral expiratory wheeze diffusely  Abdominal:      General: There is no distension. Palpations: Abdomen is soft. Tenderness: There is no abdominal tenderness. There is no guarding or rebound. Musculoskeletal: Normal range of motion. Skin:     General: Skin is warm and dry. Neurological:      Mental Status: He is alert and oriented to person, place, and time. Psychiatric:         Behavior: Behavior normal.         Thought Content: Thought content normal.         Diagnostic Study Results     Vital Signs   Visit Vitals  /64 (BP 1 Location: Right arm, BP Patient Position: At rest)   Pulse 74   Temp 98.7 °F (37.1 °C)   Resp 20   Ht 5' 11\" (1.803 m)   Wt 102.1 kg (225 lb)   SpO2 94%   BMI 31.38 kg/m²      EKG: EKG shows sinus at 85 with a normal axis normal intervals there is no ST elevation or depression no hypertrophy  Labs: CBC is unremarkable chemistry unremarkable Trope negative  Imaging: cxr napd     Medical Decision Making     ED Course: Progress Notes, Reevaluation, and Consults: This is a 60-year-old gentleman with a history of COPD who presents with shortness of breath x2 weeks with a diffuse expiratory wheeze. Will check basic labs EKG and give some prednisone. Provider Notes (Medical Decision Making):     \  Patient is feeling much better. Labs unremarkable will discharge with albuterol and steroids       Diagnosis     Clinical Impression: No diagnosis found. Disposition:    Patient's Medications   Start Taking    No medications on file   Continue Taking    ALBUTEROL (PROVENTIL HFA, VENTOLIN HFA) 90 MCG/ACTUATION INHALER    Take 1 Puff by inhalation every four (4) hours as needed for Wheezing. ASPIRIN DELAYED-RELEASE 81 MG TABLET    Take 1 Tab by mouth daily.     ATORVASTATIN (LIPITOR) 80 MG TABLET    Take 1 Tab by mouth nightly. BLOOD-GLUCOSE METER MONITORING KIT    As directed    CLOPIDOGREL (PLAVIX) 75 MG TAB    Take four tablets day 1, then 1 tablet daily starting day 2. GLUCAGON (GLUCAGEN) 1 MG INJECTION    1 mL by IntraMUSCular route as needed for Hypoglycemia. GLUCOSE BLOOD VI TEST STRIPS (BLOOD GLUCOSE TEST) STRIP    As directed    INSULIN LISPRO (HUMALOG) 100 UNIT/ML INJECTION    Very Insulin Resistant  For Blood Sugar (mg/dL) of:              Less than 150 =   0 units  150 -199 =   3 units  200 -249 =   6 units  250 -299 =   9 units  300 -349 =   12 units  350 and above =   15 units  Initiate Hypoglycemic protocol if blood glucose is <70 mg/dL. Fast Acting - Administer Immediately - or within 15 minutes of start of meal, if mealtime coverage. INSULIN SYRINGE-NEEDLE U-100 0.3 ML 31 GAUGE X 5/16\" SYRG    TID as needed    LANCETS MISC    As directed    LISINOPRIL (PRINIVIL, ZESTRIL) 10 MG TABLET    Take 1 Tab by mouth daily. METFORMIN (GLUCOPHAGE) 1,000 MG TABLET    Take 1 Tab by mouth two (2) times daily (with meals). METOPROLOL TARTRATE (LOPRESSOR) 25 MG TABLET    Take 1 Tab by mouth every twelve (12) hours. MOMETASONE-FORMOTEROL (DULERA) 100-5 MCG/ACTUATION HFAA HFA INHALER    Take 2 Puffs by inhalation two (2) times a day. NITROGLYCERIN (NITROLINGUAL) 400 MCG/SPRAY SPRAY    1 Spray by SubLINGual route every five (5) minutes as needed for Chest Pain (do not exceed more than 3 sprays in one day, seek acute medical care if chest pain continues). OTHER    CMP in 2 week  Dx: elevated AST  Fax results to Dr. Dallas Li (DELTASONE) 10 MG TABLET    Take two tabs by mouth daily for two days, then take one tablet by mouth daily for four days, then take half a tablet by mouth for four days [23MAR end date]    TIOTROPIUM (SPIRIVA WITH HANDIHALER) 18 MCG INHALATION CAPSULE    Take 1 Cap by inhalation daily.    These Medications have changed    No medications on file   Stop Taking No medications on file

## 2020-07-11 NOTE — DISCHARGE INSTRUCTIONS
Patient Education        Chronic Obstructive Pulmonary Disease (COPD): Care Instructions  Your Care Instructions     Chronic obstructive pulmonary disease (COPD) is a general term for a group of lung diseases, including emphysema and chronic bronchitis. People with COPD have decreased airflow in and out of the lungs, which makes it hard to breathe. The airways also can get clogged with thick mucus. Cigarette smoking is a major cause of COPD. Although there is no cure for COPD, you can slow its progress. Following your treatment plan and taking care of yourself can help you feel better and live longer. Follow-up care is a key part of your treatment and safety. Be sure to make and go to all appointments, and call your doctor if you are having problems. It's also a good idea to know your test results and keep a list of the medicines you take. How can you care for yourself at home? Staying healthy  · Do not smoke. This is the most important step you can take to prevent more damage to your lungs. If you need help quitting, talk to your doctor about stop-smoking programs and medicines. These can increase your chances of quitting for good. · Avoid colds and flu. Get a pneumococcal vaccine shot. If you have had one before, ask your doctor whether you need a second dose. Get the flu vaccine every fall. If you must be around people with colds or the flu, wash your hands often. · Avoid secondhand smoke, air pollution, and high altitudes. Also avoid cold, dry air and hot, humid air. Stay at home with your windows closed when air pollution is bad. Medicines and oxygen therapy  · Take your medicines exactly as prescribed. Call your doctor if you think you are having a problem with your medicine. You may be taking medicines such as:  ? Bronchodilators. These help open your airways and make breathing easier. They are either short-acting (work for 6 to 9 hours) or long-acting (work for 24 hours).  You inhale most bronchodilators, so they start to act quickly. Always carry your quick-relief inhaler with you in case you need it while you are away from home. ? Corticosteroids (prednisone, budesonide). These reduce airway inflammation. They come in pill or inhaled form. You must take these medicines every day for them to work well. · Ask your doctor or pharmacist if a spacer is right for you. A spacer may help you get more inhaled medicine to your lungs. If you use one, ask how to use it properly. · Do not take any vitamins, over-the-counter medicine, or herbal products without talking to your doctor first.  · If your doctor prescribed antibiotics, take them as directed. Do not stop taking them just because you feel better. You need to take the full course of antibiotics. · If you use oxygen therapy, use the flow rate your doctor has recommended. Don't change it without talking to your doctor first. Oxygen therapy boosts the amount of oxygen in your blood and helps you breathe easier. Activity  · Get regular exercise. Walking is an easy way to get exercise. Start out slowly, and walk a little more each day. · Pay attention to your breathing. You are exercising too hard if you can't talk while you exercise. · Take short rest breaks when doing household chores and other activities. · Learn breathing methods--such as breathing through pursed lips--to help you become less short of breath. · If your doctor has not set you up with a pulmonary rehabilitation program, ask if rehab is right for you. Rehab includes exercise programs, education about your disease and how to manage it, help with diet and other changes, and emotional support. Diet  · Eat regular, healthy meals. Use bronchodilators about 1 hour before you eat to make it easier to eat. Eat several small meals instead of three large ones. Drink beverages at the end of the meal. Avoid foods that are hard to chew.   · Eat foods that contain protein so you don't lose muscle mass. · Talk with your doctor if you gain too much weight or if you lose weight without trying. Mental health  · Talk to your family, friends, or a therapist about your feelings. Some people feel frightened, angry, hopeless, helpless, and even guilty. Talking openly about bad feelings can help you cope. If these feelings last, talk to your doctor. When should you call for help? [id number] anytime you think you may need emergency care. For example, call if:  · You have severe trouble breathing. Call your doctor now or seek immediate medical care if:  · You have new or worse trouble breathing. · You cough up blood. · You have a fever. Watch closely for changes in your health, and be sure to contact your doctor if:  · You cough more deeply or more often, especially if you notice more mucus or a change in the color of your mucus. · You have new or worse swelling in your legs or belly. · You are not getting better as expected. Where can you learn more? Go to http://dena-carly.info/  Enter P757 in the search box to learn more about \"Chronic Obstructive Pulmonary Disease (COPD): Care Instructions. \"  Current as of: February 24, 2020               Content Version: 12.5  © 0861-5894 Healthwise, Incorporated. Care instructions adapted under license by [employer] (which disclaims liability or warranty for this information). If you have questions about a medical condition or this instruction, always ask your healthcare professional. [place] [address] any warranty or liability for your use of this information.

## 2020-07-13 ENCOUNTER — PATIENT OUTREACH (OUTPATIENT)
Dept: CASE MANAGEMENT | Age: 60
End: 2020-07-13

## 2020-07-21 ENCOUNTER — TELEPHONE (OUTPATIENT)
Dept: CARDIAC REHAB | Age: 60
End: 2020-07-21

## 2020-07-21 NOTE — TELEPHONE ENCOUNTER
[facility] called patient and left a message about the program. This is the second attempt at contact with no response, so no additional attempts at contact will be made.     Thank you,  Sintia Francis

## 2020-08-24 ENCOUNTER — APPOINTMENT (OUTPATIENT)
Dept: GENERAL RADIOLOGY | Age: 60
End: 2020-08-24
Attending: PHYSICIAN ASSISTANT
Payer: MEDICARE

## 2020-08-24 ENCOUNTER — HOSPITAL ENCOUNTER (EMERGENCY)
Age: 60
Discharge: HOME | End: 2020-08-24
Attending: EMERGENCY MEDICINE
Payer: MEDICARE

## 2020-08-24 VITALS
DIASTOLIC BLOOD PRESSURE: 74 MMHG | WEIGHT: 225 LBS | HEIGHT: 71 IN | RESPIRATION RATE: 17 BRPM | SYSTOLIC BLOOD PRESSURE: 126 MMHG | BODY MASS INDEX: 31.5 KG/M2 | TEMPERATURE: 98.4 F | HEART RATE: 72 BPM | OXYGEN SATURATION: 98 %

## 2020-08-24 DIAGNOSIS — R73.9 HYPERGLYCEMIA: ICD-10-CM

## 2020-08-24 DIAGNOSIS — R07.9 CHEST PAIN, UNSPECIFIED TYPE: Primary | ICD-10-CM

## 2020-08-24 LAB
ALBUMIN SERPL-MCNC: 3.5 G/DL (ref 3.4–5)
ALBUMIN/GLOB SERPL: 1 {RATIO} (ref 0.8–1.7)
ALP SERPL-CCNC: 106 U/L (ref 45–117)
ALT SERPL-CCNC: 17 U/L (ref 16–61)
ANION GAP SERPL CALC-SCNC: 4 MMOL/L (ref 3–18)
AST SERPL-CCNC: 11 U/L (ref 10–38)
ATRIAL RATE: 71 BPM
BASOPHILS # BLD: 0 K/UL (ref 0–0.1)
BASOPHILS NFR BLD: 0 % (ref 0–2)
BILIRUB SERPL-MCNC: 0.5 MG/DL (ref 0.2–1)
BUN SERPL-MCNC: 15 MG/DL (ref 7–18)
BUN/CREAT SERPL: 17 (ref 12–20)
CALCIUM SERPL-MCNC: 8.8 MG/DL (ref 8.5–10.1)
CALCULATED P AXIS, ECG09: 72 DEGREES
CALCULATED R AXIS, ECG10: 75 DEGREES
CALCULATED T AXIS, ECG11: 71 DEGREES
CHLORIDE SERPL-SCNC: 102 MMOL/L (ref 100–111)
CK MB CFR SERPL CALC: 1.9 % (ref 0–4)
CK MB CFR SERPL CALC: 2 % (ref 0–4)
CK MB SERPL-MCNC: 2.1 NG/ML (ref 5–25)
CK MB SERPL-MCNC: 2.2 NG/ML (ref 5–25)
CK SERPL-CCNC: 106 U/L (ref 39–308)
CK SERPL-CCNC: 114 U/L (ref 39–308)
CO2 SERPL-SCNC: 31 MMOL/L (ref 21–32)
CREAT SERPL-MCNC: 0.89 MG/DL (ref 0.6–1.3)
DIAGNOSIS, 93000: NORMAL
DIFFERENTIAL METHOD BLD: NORMAL
EOSINOPHIL # BLD: 0.2 K/UL (ref 0–0.4)
EOSINOPHIL NFR BLD: 3 % (ref 0–5)
ERYTHROCYTE [DISTWIDTH] IN BLOOD BY AUTOMATED COUNT: 13.2 % (ref 11.6–14.5)
GLOBULIN SER CALC-MCNC: 3.6 G/DL (ref 2–4)
GLUCOSE SERPL-MCNC: 252 MG/DL (ref 74–99)
HCT VFR BLD AUTO: 42.3 % (ref 36–48)
HGB BLD-MCNC: 14.1 G/DL (ref 13–16)
LYMPHOCYTES # BLD: 2.3 K/UL (ref 0.9–3.6)
LYMPHOCYTES NFR BLD: 27 % (ref 21–52)
MCH RBC QN AUTO: 29.3 PG (ref 24–34)
MCHC RBC AUTO-ENTMCNC: 33.3 G/DL (ref 31–37)
MCV RBC AUTO: 87.9 FL (ref 74–97)
MONOCYTES # BLD: 0.7 K/UL (ref 0.05–1.2)
MONOCYTES NFR BLD: 8 % (ref 3–10)
NEUTS SEG # BLD: 5.4 K/UL (ref 1.8–8)
NEUTS SEG NFR BLD: 62 % (ref 40–73)
P-R INTERVAL, ECG05: 174 MS
PLATELET # BLD AUTO: 207 K/UL (ref 135–420)
PMV BLD AUTO: 11.2 FL (ref 9.2–11.8)
POTASSIUM SERPL-SCNC: 4.3 MMOL/L (ref 3.5–5.5)
PROT SERPL-MCNC: 7.1 G/DL (ref 6.4–8.2)
Q-T INTERVAL, ECG07: 400 MS
QRS DURATION, ECG06: 100 MS
QTC CALCULATION (BEZET), ECG08: 434 MS
RBC # BLD AUTO: 4.81 M/UL (ref 4.7–5.5)
SODIUM SERPL-SCNC: 137 MMOL/L (ref 136–145)
TROPONIN I SERPL-MCNC: <0.02 NG/ML (ref 0–0.04)
TROPONIN I SERPL-MCNC: <0.02 NG/ML (ref 0–0.04)
VENTRICULAR RATE, ECG03: 71 BPM
WBC # BLD AUTO: 8.6 K/UL (ref 4.6–13.2)

## 2020-08-24 PROCEDURE — 85025 COMPLETE CBC W/AUTO DIFF WBC: CPT

## 2020-08-24 PROCEDURE — 99283 EMERGENCY DEPT VISIT LOW MDM: CPT

## 2020-08-24 PROCEDURE — 80053 COMPREHEN METABOLIC PANEL: CPT

## 2020-08-24 PROCEDURE — 93005 ELECTROCARDIOGRAM TRACING: CPT

## 2020-08-24 PROCEDURE — 82550 ASSAY OF CK (CPK): CPT

## 2020-08-24 PROCEDURE — 71046 X-RAY EXAM CHEST 2 VIEWS: CPT

## 2020-08-24 NOTE — ED NOTES
Pt discharged completed by provider   No RN assessment completed   Tech completed vital signs before discharge.

## 2020-08-24 NOTE — ED PROVIDER NOTES
EMERGENCY DEPARTMENT HISTORY AND PHYSICAL EXAM    10:25 AM      Date: 8/24/2020  Patient Name: Allie Jackson    History of Presenting Illness     No chief complaint on file. History Provided By: Patient    Additional History (Context): Allie Jackson is a 63 y.o. male with hx of DM, HTN, COPD, NSTEMI, CAD who presents with complaint of throbbing central chest pain that occurred at 2 AM and resolved about an hour later without intervention. Pt denies diaphoresis, dyspnea, pleuritic or exertional symptoms, radiation of pain into neck or arm, orthopnea, leg edema, n/v. Denies hx of DVT/PE, hemoptysis, recent surgery/travel. Notes he took his daily aspirin prior to arrival. Denies sick contacts, known exposure to [employer]. PCP: Agus Huerta MD    Current Outpatient Medications   Medication Sig Dispense Refill    albuterol sulfate 90 mcg/actuation aebs Take 1 Puff by inhalation every four to six (4-6) hours as needed for Wheezing. 1 Each 0    atorvastatin (LIPITOR) 80 mg tablet Take 1 Tab by mouth nightly. 30 Tab 6    metoprolol tartrate (LOPRESSOR) 25 mg tablet Take 1 Tab by mouth every twelve (12) hours. 60 Tab 6    clopidogreL (Plavix) 75 mg tab Take four tablets day 1, then 1 tablet daily starting day 2. 33 Tab 11    aspirin delayed-release 81 mg tablet Take 1 Tab by mouth daily. 30 Tab 0    glucagon (GLUCAGEN) 1 mg injection 1 mL by IntraMUSCular route as needed for Hypoglycemia. 1 Vial 0    insulin lispro (HUMALOG) 100 unit/mL injection Very Insulin Resistant  For Blood Sugar (mg/dL) of:              Less than 150 =   0 units  150 -199 =   3 units  200 -249 =   6 units  250 -299 =   9 units  300 -349 =   12 units  350 and above =   15 units  Initiate Hypoglycemic protocol if blood glucose is <70 mg/dL. Fast Acting - Administer Immediately - or within 15 minutes of start of meal, if mealtime coverage.  1 Vial 0    nitroglycerin (NITROLINGUAL) 400 mcg/spray spray 1 Spray by SubLINGual route every five (5) minutes as needed for Chest Pain (do not exceed more than 3 sprays in one day, seek acute medical care if chest pain continues). 1 Bottle 0    Insulin Syringe-Needle U-100 0.3 mL 31 gauge x 5/16\" syrg TID as needed 100 Syringe 0    lancets misc As directed 100 Each 0    Blood-Glucose Meter monitoring kit As directed 1 Kit 0    glucose blood VI test strips (blood glucose test) strip As directed 60 Strip 0    OTHER CMP in 2 week  Dx: elevated AST  Fax results to Dr. Pinzon Seat 1 Each 0    metFORMIN (GLUCOPHAGE) 1,000 mg tablet Take 1 Tab by mouth two (2) times daily (with meals). 60 Tab 0    lisinopril (PRINIVIL, ZESTRIL) 10 mg tablet Take 1 Tab by mouth daily. 30 Tab 0    tiotropium (SPIRIVA WITH HANDIHALER) 18 mcg inhalation capsule Take 1 Cap by inhalation daily. 30 Cap 0    mometasone-formoterol (DULERA) 100-5 mcg/actuation HFAA HFA inhaler Take 2 Puffs by inhalation two (2) times a day. 2 Inhaler 0       Past History     Past Medical History:  Past Medical History:   Diagnosis Date    [facility]     Diabetes [facility] [address] [facility]     Emphysema     H/O cardiovascular stress test 2012    negative    Hypertension     MI (myocardial infarction) [facility] [address]        Past Surgical History:  No past surgical history on file. Family History:  Family History   Problem Relation Age of Onset    Diabetes Mother        Social History:  Social History     Tobacco Use    Smoking status: Current Every Day Smoker     Packs/day: 0.50     Types: Cigarettes    Smokeless tobacco: Never Used   Substance Use Topics    Alcohol use: Not Currently    Drug use: Yes     Types: Marijuana       Allergies:  No Known Allergies      Review of Systems       Review of Systems   Constitutional: Negative for chills and fever. Respiratory: Negative for shortness of breath. Cardiovascular: Positive for chest pain. Gastrointestinal: Negative for abdominal pain, nausea and vomiting.    Skin: Negative for rash. Neurological: Negative for weakness. All other systems reviewed and are negative. Physical Exam     Visit Vitals  /74 (BP 1 Location: Right arm, BP Patient Position: At rest)   Pulse 72   Temp 98.4 °F (36.9 °C)   Resp 17   Ht 5' 11\" (1.803 m)   Wt 102.1 kg (225 lb)   SpO2 98%   BMI 31.38 kg/m²         Physical Exam  Vitals signs and nursing note reviewed. Constitutional:       General: He is not in acute distress. Appearance: Normal appearance. He is well-developed. He is not ill-appearing, toxic-appearing or diaphoretic. HENT:      Head: Normocephalic and atraumatic. Neck:      Musculoskeletal: Normal range of motion and neck supple. Cardiovascular:      Rate and Rhythm: Normal rate and regular rhythm. Heart sounds: Normal heart sounds. No murmur. No friction rub. No gallop. Pulmonary:      Effort: Pulmonary effort is normal. No respiratory distress. Breath sounds: Normal breath sounds. No wheezing or rales. Abdominal:      General: Abdomen is flat. There is no distension. Palpations: Abdomen is soft. Tenderness: There is no abdominal tenderness. There is no guarding. Musculoskeletal: Normal range of motion. Right lower leg: No edema. Left lower leg: No edema. Skin:     General: Skin is warm. Findings: No rash. Neurological:      Mental Status: He is alert. Diagnostic Study Results     Labs -  No results found for this or any previous visit (from the past 12 hour(s)). Radiologic Studies -   XR CHEST PA LAT   Final Result   IMPRESSION:      No acute cardiopulmonary process. Medical Decision Making   I am the first provider for this patient. I reviewed the vital signs, available nursing notes, past medical history, past surgical history, family history and social history. Vital Signs-Reviewed the patient's vital signs. Pulse Oximetry Analysis -  98 on room air     EKG:   Interpreted by the EP.   Time Interpreted: 900   Rate: 71   Rhythm: normal sinus rhythm, RBBB, T wave inversion avR   Interpretation: no evidence of acute ischemia when compared with 7/10/20    Records Reviewed: Nursing Notes, Old Medical Records and Previous electrocardiograms (Time of Review: 10:25 AM)  Admission 3/12/20:  [id number] left heart catheterization:  · Non-STEMI. · Mildly diminished LV function with EF of 45-50% with large anteroapical hypokinesis. · Coronary angiography reveals occluded first diagonal branch. He also has 95% long mid LAD stenosis with evidence of an aneurysm. · Successful balloon angioplasty of the diagonal branch to residual 0%. · Successful stent to proximal and mid LAD to residual 0%. ED Course: Progress Notes, Reevaluation, and Consults:  CONSULT NOTE:   12:31 PM  I spoke with [place] Dianne  Specialty: Cardiology  Discussed pt's hx, disposition, and available diagnostic and imaging results. Reviewed care plans. Consulting physician agrees with plans as outlined. Reviewed EKGs and labs, pt can follow-up tomorrow in office, outpatient stress test.   Written by Maria Esther Reis PA-C    1:00 PM Reviewed results and plan with patient. Notes cardiology office called and set up an appointment for tomorrow at 2:45 PM.  Discussed need for close outpatient follow-up as scheduled. Discussed strict return precautions, including chest pain, shortness of breath, or any other medical concerns. Patient notes he feels fine, is ready to go home. Provider Notes (Medical Decision Making): 60-year-old male with history of diabetes, hypertension, coronary artery disease who presents to the ED due to central chest pain that occurred at 2 AM and resolved prior to arrival.  Afebrile, nontoxic-appearing, looks well. Vital signs stable. EKG without evidence of acute ischemia, troponin negative x2. Chest x-ray without acute process. Do not suspect ACS, PE, or aortic pathology.   Discussed case with cardiology team, patient will be evaluated tomorrow in office. Stable for discharge with close outpatient follow-up, strict return precautions for any new or worsening symptoms. Diagnosis     Clinical Impression:   1. Chest pain, unspecified type    2. Hyperglycemia        Disposition: home     Follow-up Information     Follow up With Specialties Details Why [address]    [facility] EMERGENCY DEPT Emergency Medicine  If symptoms worsen [address], Phillip Alberts MD Cardiology Schedule an appointment as soon as possible for a visit call today to set up an appointment this week for further assessment, state you were seen at [facility] today [facility]  [phone]             Discharge Medication List as of 8/24/2020 12:34 PM      CONTINUE these medications which have NOT CHANGED    Details   albuterol sulfate 90 mcg/actuation aebs Take 1 Puff by inhalation every four to six (4-6) hours as needed for Wheezing., Normal, Disp-1 Each,R-0      atorvastatin (LIPITOR) 80 mg tablet Take 1 Tab by mouth nightly., Normal, Disp-30 Tab, R-6      metoprolol tartrate (LOPRESSOR) 25 mg tablet Take 1 Tab by mouth every twelve (12) hours. , Normal, Disp-60 Tab, R-6      clopidogreL (Plavix) 75 mg tab Take four tablets day 1, then 1 tablet daily starting day 2., Normal, Disp-33 Tab, R-11      aspirin delayed-release 81 mg tablet Take 1 Tab by mouth daily. , Print, Disp-30 Tab, R-0      glucagon (GLUCAGEN) 1 mg injection 1 mL by IntraMUSCular route as needed for Hypoglycemia., Print, Disp-1 Vial, R-0      nitroglycerin (NITROLINGUAL) 400 mcg/spray spray 1 Spray by SubLINGual route every five (5) minutes as needed for Chest Pain (do not exceed more than 3 sprays in one day, seek acute medical care if chest pain continues). , Print, Disp-1 Bottle, R-0      Insulin Syringe-Needle U-100 0.3 mL 31 gauge x 5/16\" syrg TID as needed, Print, Disp-100 Syringe, R-0 lancets misc As directed, Print, Disp-100 Each, R-0      Blood-Glucose Meter monitoring kit As directed, Print, Disp-1 Kit, R-0      glucose blood VI test strips (blood glucose test) strip As directed, Print, Disp-60 Strip, R-0      metFORMIN (GLUCOPHAGE) 1,000 mg tablet Take 1 Tab by mouth two (2) times daily (with meals). Normal, 1,000 mg, Disp-60 Tab, R-0      lisinopril (PRINIVIL, ZESTRIL) 10 mg tablet Take 1 Tab by mouth daily. Normal, 10 mg, Disp-30 Tab, R-0      tiotropium (SPIRIVA WITH HANDIHALER) 18 mcg inhalation capsule Not intended for PRN useTake 1 Cap by inhalation daily. Sample, 1 Cap = 18 mcg, Disp-30 Cap, R-0      mometasone-formoterol (DULERA) 100-5 mcg/actuation HFAA HFA inhaler Take 2 Puffs by inhalation two (2) times a day. Sample, 2 Puff = 0.2 mg, Disp-2 Inhaler, R-0      insulin lispro (HUMALOG) 100 unit/mL injection Very Insulin Resistant  For Blood Sugar (mg/dL) of:              Less than 150 =   0 units  150 -199 =   3 units  200 -249 =   6 units  250 -299 =   9 units  300 -349 =   12 units  350 and above =   15 units  Initiate Hypoglycemic protocol if blood gluco se is <70 mg/dL. Fast Acting - Administer Immediately - or within 15 minutes of start of meal, if mealtime coverage. , Print, Disp-1 Vial, R-0      OTHER CMP in 2 week  Dx: elevated AST  Fax results to Dr. Tyron Heredia, Print, Disp-1 Each, R-0             Dictation disclaimer:  Please note that this dictation was completed with [employer], the [employer] voice recognition software. Quite often unanticipated grammatical, syntax, homophones, and other interpretive errors are inadvertently transcribed by the computer software. Please disregard these errors. Please excuse any errors that have escaped final proofreading.

## 2020-08-24 NOTE — DISCHARGE INSTRUCTIONS
Patient Education   Take medication as prescribed. Follow-up with your cardiologist within 2 days for reassessment. Bring the results from this visit with you for their review. Return to the ED immediately for any new, worsening, or persistent symptoms, including chest pain, shortness of breath, or any other medical concerns. Chest Pain: Care Instructions  Your Care Instructions     There are many things that can cause chest pain. Some are not serious and will get better on their own in a few days. But some kinds of chest pain need more testing and treatment. Your doctor may have recommended a follow-up visit in the next 8 to 12 hours. If you are not getting better, you may need more tests or treatment. Even though your doctor has released you, you still need to watch for any problems. The doctor carefully checked you, but sometimes problems can develop later. If you have new symptoms or if your symptoms do not get better, get medical care right away. If you have worse or different chest pain or pressure that lasts more than 5 minutes or you passed out (lost consciousness), [id number] or seek other emergency help right away. A medical visit is only one step in your treatment. Even if you feel better, you still need to do what your doctor recommends, such as going to all suggested follow-up appointments and taking medicines exactly as directed. This will help you recover and help prevent future problems. How can you care for yourself at home? · Rest until you feel better. · Take your medicine exactly as prescribed. Call your doctor if you think you are having a problem with your medicine. · Do not drive after taking a prescription pain medicine. When should you call for help? [id number]:   · You passed out (lost consciousness). · You have severe difficulty breathing. · You have symptoms of a heart attack. These may include:  ?  Chest pain or pressure, or a strange feeling in your chest.  ? Sweating. ? Shortness of breath. ? Nausea or vomiting. ? Pain, pressure, or a strange feeling in your back, neck, jaw, or upper belly or in one or both shoulders or arms. ? Lightheadedness or sudden weakness. ? A fast or irregular heartbeat. After you call 911, the  may tell you to chew 1 adult-strength or 2 to 4 low-dose aspirin. Wait for an ambulance. Do not try to drive yourself. Call your doctor today if:   · You have any trouble breathing. · Your chest pain gets worse. · You are dizzy or lightheaded, or you feel like you may faint. · You are not getting better as expected. · You are having new or different chest pain. Where can you learn more? Go to http://dena-carly.info/  Enter A120 in the search box to learn more about \"Chest Pain: Care Instructions. \"  Current as of: June 26, 2019               Content Version: 12.5  © 6670-0028 [employer]. Care instructions adapted under license by [employer] (which disclaims liability or warranty for this information). If you have questions about a medical condition or this instruction, always ask your healthcare professional. [address] any warranty or liability for your use of this information. Patient Education        Learning About High Blood Sugar  What is high blood sugar? Your body turns the food you eat into glucose (sugar), which it uses for energy. But if your body isn't able to use the sugar right away, it can build up in your blood and lead to high blood sugar. When the amount of sugar in your blood stays too high for too much of the time, you may have diabetes. Diabetes is a disease that can cause serious health problems. The good news is that lifestyle changes may help you get your blood sugar back to normal and avoid or delay diabetes. What causes high blood sugar?   Sugar (glucose) can build up in your blood if you:  · Are overweight. · Have a family history of diabetes. · Take certain medicines, such as steroids. What are the symptoms? Having high blood sugar may not cause any symptoms at all. Or it may make you feel very thirsty or very hungry. You may also urinate more often than usual, have blurry vision, or lose weight without trying. How is high blood sugar treated? You can take steps to lower your blood sugar level if you understand what makes it get higher. Your doctor may want you to learn how to test your blood sugar level at home. Then you can see how illness, stress, or different kinds of food or medicine raise or lower your blood sugar level. Other tests may be needed to see if you have diabetes. How can you prevent high blood sugar? · Watch your weight. If you're overweight, losing just a small amount of weight may help. Reducing fat around your waist is most important. · Limit the amount of calories, sweets, and unhealthy fat you eat. Ask your doctor if a dietitian can help you. A registered dietitian can help you create meal plans that fit your lifestyle. · Get at least 30 minutes of exercise on most days of the week. Exercise helps control your blood sugar. It also helps you maintain a healthy weight. Walking is a good choice. You also may want to do other activities, such as running, swimming, cycling, or playing tennis or team sports. · If your doctor prescribed medicines, take them exactly as prescribed. Call your doctor if you think you are having a problem with your medicine. You will get more details on the specific medicines your doctor prescribes. Follow-up care is a key part of your treatment and safety. Be sure to make and go to all appointments, and call your doctor if you are having problems. It's also a good idea to know your test results and keep a list of the medicines you take. Where can you learn more?   Go to http://dena-carly.info/  Enter O108 in the search box to learn more about \"Learning About High Blood Sugar. \"  Current as of: December 20, 2019               Content Version: 12.5  © 3784-1149 Healthwise, Incorporated. Care instructions adapted under license by [employer] (which disclaims liability or warranty for this information). If you have questions about a medical condition or this instruction, always ask your healthcare professional. [address] any warranty or liability for your use of this information.

## 2020-08-25 ENCOUNTER — OFFICE VISIT (OUTPATIENT)
Dept: CARDIOLOGY CLINIC | Age: 60
End: 2020-08-25

## 2020-08-25 VITALS
RESPIRATION RATE: 22 BRPM | HEART RATE: 78 BPM | OXYGEN SATURATION: 97 % | SYSTOLIC BLOOD PRESSURE: 140 MMHG | DIASTOLIC BLOOD PRESSURE: 80 MMHG

## 2020-08-25 DIAGNOSIS — I25.10 CORONARY ARTERY DISEASE INVOLVING NATIVE CORONARY ARTERY OF NATIVE HEART WITHOUT ANGINA PECTORIS: ICD-10-CM

## 2020-08-25 DIAGNOSIS — R07.9 CHEST PAIN, UNSPECIFIED TYPE: ICD-10-CM

## 2020-08-25 DIAGNOSIS — I21.4 NSTEMI (NON-ST ELEVATED MYOCARDIAL INFARCTION) (HCC): Primary | ICD-10-CM

## 2020-08-25 NOTE — PROGRESS NOTES
HISTORY OF PRESENT ILLNESS  Rodolfo Barrios is a 63 y.o. male. ASSESSMENT and PLAN    Mr. Jess Rodriguez presented in March 2020 with chest pains. He was diagnosed with non-STEMI. You underwent emergent heart catheterization coronary angiography. His EF was noted to be 45-50% with anteroapical hypokinesis. He had long mid 95% LAD stenosis which was successfully stented to residual 0%. He also had diagonal branch lesion which was balloon dilated. Unfortunately, he has smoked for over 40 years and continued to smoke at the time of his non-STEMI. He has COPD as well as insulin-dependent diabetes mellitus. He has hypertension and hyperlipidemia. 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 When he gets tired, he slows down. He has not had any recurrent episodes of chest pain despite vigorous physical activity. He was seen in the emergency room on 8/24/2020 for atypical chest pain. He was evaluated and discharged home. From cardiac standpoint, he is doing well. He has not had any recurrent angina. His blood pressure is well controlled. His heart rate is stable. There is no evidence of decompensated CHF noted. His weight today is 229 pounds. His weight was 210 pounds in earlier 2020. Again, I have encouraged him to try to get down to his weight of 210 pounds. His target LDL is less than 70. Lipitor 80 mg daily. He continues on aspirin and Plavix daily. Unfortunately, he still smokes about half a pack a day. Strong encouragement and lengthy discussion was carried on with the patient. He understands and will try his best.  Discussion about smoking to 15-20 minutes. See him back in 6 months. He will have exercise nuclear scan prior to his next visit. Encounter Diagnoses   Name Primary?     NSTEMI (non-ST elevated myocardial infarction) ([facility] Yes    Coronary artery disease involving native coronary artery of native heart without angina pectoris     Chest pain, unspecified type      current treatment plan is effective, no change in therapy  lab results and schedule of future lab studies reviewed with patient  reviewed diet, exercise and weight control  very strongly urged to quit smoking to reduce cardiovascular risk  cardiovascular risk and specific lipid/LDL goals reviewed  use of aspirin to prevent MI and TIA's discussed      HPI   Today, Mr. Mac Lara has no chest pains. Yesterday, he had an episode of atypical chest pain that was at rest.  He was seen in emergency room and evaluated. He was subsequently discharged home. He has his own lawn care service where he does most of the physical activities. He has not noted any exertional chest pains. Unfortunately, he still smokes about half a pack a day. Denies any orthopnea or PND. He denies any palpitations or dizziness. Review of Systems   Respiratory: Positive for cough, shortness of breath and wheezing. Cardiovascular: Positive for chest pain. Negative for palpitations, orthopnea, claudication, leg swelling and PND. All other systems reviewed and are negative. Physical Exam  Vitals signs and nursing note reviewed. Constitutional:       Appearance: He is obese. HENT:      Head: Normocephalic and atraumatic. Eyes:      Conjunctiva/sclera: Conjunctivae normal.   Neck:      Musculoskeletal: No neck rigidity. Cardiovascular:      Rate and Rhythm: Normal rate and regular rhythm. Pulmonary:      Effort: Pulmonary effort is normal.      Breath sounds: Wheezing present. Abdominal:      General: Bowel sounds are normal.      Palpations: Abdomen is soft. Musculoskeletal:         General: No swelling. Skin:     General: Skin is warm and dry. Neurological:      General: No focal deficit present. Mental Status: He is alert and oriented to person, place, and time.    Psychiatric:         Mood and Affect: Mood normal.         Behavior: Behavior normal. PCP: Nichole Damon MD    Past Medical History:   Diagnosis Date    COPD (chronic obstructive pulmonary disease) ([facility]     Diabetes ([facility]     Emphysema     H/O cardiovascular stress test 2012    negative    Hypertension     MI (myocardial infarction) ([facility]        No past surgical history on file. Current Outpatient Medications   Medication Sig Dispense Refill    albuterol sulfate 90 mcg/actuation aebs Take 1 Puff by inhalation every four to six (4-6) hours as needed for Wheezing. 1 Each 0    atorvastatin (LIPITOR) 80 mg tablet Take 1 Tab by mouth nightly. 30 Tab 6    metoprolol tartrate (LOPRESSOR) 25 mg tablet Take 1 Tab by mouth every twelve (12) hours. 60 Tab 6    clopidogreL (Plavix) 75 mg tab Take four tablets day 1, then 1 tablet daily starting day 2. 33 Tab 11    aspirin delayed-release 81 mg tablet Take 1 Tab by mouth daily. 30 Tab 0    glucagon (GLUCAGEN) 1 mg injection 1 mL by IntraMUSCular route as needed for Hypoglycemia. 1 Vial 0    nitroglycerin (NITROLINGUAL) 400 mcg/spray spray 1 Spray by SubLINGual route every five (5) minutes as needed for Chest Pain (do not exceed more than 3 sprays in one day, seek acute medical care if chest pain continues). 1 Bottle 0    Insulin Syringe-Needle U-100 0.3 mL 31 gauge x 5/16\" syrg TID as needed 100 Syringe 0    lancets misc As directed 100 Each 0    Blood-Glucose Meter monitoring kit As directed 1 Kit 0    glucose blood VI test strips (blood glucose test) strip As directed 60 Strip 0    metFORMIN (GLUCOPHAGE) 1,000 mg tablet Take 1 Tab by mouth two (2) times daily (with meals). 60 Tab 0    lisinopril (PRINIVIL, ZESTRIL) 10 mg tablet Take 1 Tab by mouth daily. 30 Tab 0    tiotropium (SPIRIVA WITH HANDIHALER) 18 mcg inhalation capsule Take 1 Cap by inhalation daily. 30 Cap 0    mometasone-formoterol (DULERA) 100-5 mcg/actuation HFAA HFA inhaler Take 2 Puffs by inhalation two (2) times a day.  2 Inhaler 0    insulin lispro (HUMALOG) 100 unit/mL injection Very Insulin Resistant  For Blood Sugar (mg/dL) of:              Less than 150 =   0 units  150 -199 =   3 units  200 -249 =   6 units  250 -299 =   9 units  300 -349 =   12 units  350 and above =   15 units  Initiate Hypoglycemic protocol if blood glucose is <70 mg/dL. Fast Acting - Administer Immediately - or within 15 minutes of start of meal, if mealtime coverage. 1 Vial 0    OTHER CMP in 2 week  Dx: elevated AST  Fax results to Dr. Roderick Snyder 1 Each 0       The patient has a family history of    Social History     Tobacco Use    Smoking status: Current Every Day Smoker     Packs/day: 0.50     Types: Cigarettes    Smokeless tobacco: Never Used   Substance Use Topics    Alcohol use: Not Currently    Drug use: Yes     Types: Marijuana       Lab Results   Component Value Date/Time    Cholesterol, total 233 (H) 08/27/2019 02:15 PM    HDL Cholesterol 41 08/27/2019 02:15 PM    LDL, calculated 133 (H) 08/27/2019 02:15 PM    Triglyceride 297 (H) 08/27/2019 02:15 PM    CHOL/HDL Ratio 5.7 (H) 08/27/2019 02:15 PM        BP Readings from Last 3 Encounters:   08/25/20 140/80   08/24/20 126/74   07/10/20 133/64        Pulse Readings from Last 3 Encounters:   08/25/20 78   08/24/20 72   07/10/20 74       Wt Readings from Last 3 Encounters:   08/24/20 102.1 kg (225 lb)   07/10/20 102.1 kg (225 lb)   03/13/20 95.7 kg (211 lb)         EKG: unchanged from previous tracings, normal sinus rhythm, nonspecific ST and T waves changes.

## 2020-08-25 NOTE — PATIENT INSTRUCTIONS
Follow up 6 months  Pharm up     If you have not heard from the central scheduler to schedule your testing in 48 hours, please call [phone].

## 2020-08-25 NOTE — PROGRESS NOTES
Lyn Hicks presents today for   Chief Complaint   Patient presents with    Coronary Artery Disease   [facility] Follow Up       Lyn Hicks preferred language for health care discussion is english/other. Is someone accompanying this pt? No     Is the patient using any DME equipment during OV? no    Depression Screening:  3 most recent PHQ Screens 8/25/2020   Little interest or pleasure in doing things Not at all   Feeling down, depressed, irritable, or hopeless Not at all   Total Score PHQ 2 0       Learning Assessment:  Learning Assessment 4/2/2020   PRIMARY LEARNER Patient   HIGHEST LEVEL OF EDUCATION - PRIMARY LEARNER  GRADUATED HIGH SCHOOL OR GED   BARRIERS PRIMARY LEARNER NONE   CO-LEARNER CAREGIVER No   CO-LEARNER NAME no   PRIMARY LANGUAGE ENGLISH   LEARNER PREFERENCE PRIMARY DEMONSTRATION   ANSWERED BY patient   RELATIONSHIP SELF       Abuse Screening:  Abuse Screening Questionnaire 4/2/2020   Do you ever feel afraid of your partner? N   Are you in a relationship with someone who physically or mentally threatens you? N   Is it safe for you to go home? Y       Fall Risk  Fall Risk Assessment, last 12 mths 4/2/2020   Able to walk? Yes   Fall in past 12 months? No       Pt currently taking Anticoagulant therapy? Aspirin daily    Coordination of Care:  1. Have you been to the ER, urgent care clinic since your last visit? Hospitalized since your last visit? yes    2. Have you seen or consulted any other health care providers outside of the [address] [place] [address] since your last visit?  Include any pap smears or colon screening. n/a

## 2021-04-14 ENCOUNTER — OFFICE VISIT (OUTPATIENT)
Dept: CARDIOLOGY CLINIC | Age: 61
End: 2021-04-14
Payer: MEDICARE

## 2021-04-14 VITALS
BODY MASS INDEX: 35 KG/M2 | SYSTOLIC BLOOD PRESSURE: 140 MMHG | OXYGEN SATURATION: 97 % | WEIGHT: 250 LBS | DIASTOLIC BLOOD PRESSURE: 82 MMHG | HEIGHT: 71 IN | HEART RATE: 78 BPM

## 2021-04-14 DIAGNOSIS — R06.02 SOB (SHORTNESS OF BREATH): ICD-10-CM

## 2021-04-14 DIAGNOSIS — R42 DIZZINESS: ICD-10-CM

## 2021-04-14 DIAGNOSIS — I21.4 NSTEMI (NON-ST ELEVATED MYOCARDIAL INFARCTION) (HCC): Primary | ICD-10-CM

## 2021-04-14 DIAGNOSIS — I25.10 CORONARY ARTERY DISEASE INVOLVING NATIVE CORONARY ARTERY OF NATIVE HEART WITHOUT ANGINA PECTORIS: ICD-10-CM

## 2021-04-14 DIAGNOSIS — R07.9 CHEST PAIN, UNSPECIFIED TYPE: ICD-10-CM

## 2021-04-14 PROCEDURE — 3017F COLORECTAL CA SCREEN DOC REV: CPT | Performed by: INTERNAL MEDICINE

## 2021-04-14 PROCEDURE — G8427 DOCREV CUR MEDS BY ELIG CLIN: HCPCS | Performed by: INTERNAL MEDICINE

## 2021-04-14 PROCEDURE — 93000 ELECTROCARDIOGRAM COMPLETE: CPT | Performed by: INTERNAL MEDICINE

## 2021-04-14 PROCEDURE — G8432 DEP SCR NOT DOC, RNG: HCPCS | Performed by: INTERNAL MEDICINE

## 2021-04-14 PROCEDURE — 99214 OFFICE O/P EST MOD 30 MIN: CPT | Performed by: INTERNAL MEDICINE

## 2021-04-14 PROCEDURE — G8754 DIAS BP LESS 90: HCPCS | Performed by: INTERNAL MEDICINE

## 2021-04-14 PROCEDURE — G8417 CALC BMI ABV UP PARAM F/U: HCPCS | Performed by: INTERNAL MEDICINE

## 2021-04-14 PROCEDURE — G8753 SYS BP > OR = 140: HCPCS | Performed by: INTERNAL MEDICINE

## 2021-04-14 RX ORDER — VARENICLINE TARTRATE 25 MG
KIT ORAL
Qty: 1 DOSE PACK | Refills: 1 | Status: SHIPPED | OUTPATIENT
Start: 2021-04-14 | End: 2022-02-14

## 2021-04-14 NOTE — PROGRESS NOTES
HISTORY OF PRESENT ILLNESS  Ca Banks is a 62 y.o. male. ASSESSMENT and PLAN    Mr. Zaira Zacarias presented in March 2020 with chest pains. He was diagnosed with non-STEMI. You underwent emergent heart catheterization coronary angiography. His EF was noted to be 45-50% with anteroapical hypokinesis. He had long mid 95% LAD stenosis which was successfully stented to residual 0%. He also had diagonal branch lesion which was balloon dilated. Unfortunately, he has smoked for over 40 years and continued to smoke at the time of his non-STEMI. He has COPD as well as insulin-dependent diabetes mellitus. He has hypertension and hyperlipidemia. 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 When he gets tired, he slows down. He has not had any recurrent episodes of chest pain despite vigorous physical activity. He was seen in the emergency room on 8/24/2020 for atypical chest pain. He was evaluated and discharged home.  CAD:    Symptomatically stable.  BP:    Upper normal but acceptable.  Rhythm: Stable sinus.  CHF:    Currently, there is no evidence of decompensated CHF noted.  Weight:    His weight today is 250 pounds. His weight back in August 2020 was 229 pounds. Earlier in 2020, he weighed 210 pounds. I had a lengthy discussion with him about the importance of weight control as well as tobacco cessation. All questions were answered. Over 30 minutes spent on discussion.  Cholesterol:  Target LDL<70. Lipitor 80.  Tobacco:    Unfortunately, he still smokes about a pack a day.  Anticoagulant:  Remains on ASA, and Plavix a highly. I will see him back in 6 months. He will have exercise nuclear scan prior to his next visit. Thank you      Encounter Diagnoses   Name Primary?     NSTEMI (non-ST elevated myocardial infarction) ([facility] [address] Yes    Coronary artery disease involving native coronary artery of native heart without angina pectoris     Chest pain, unspecified type     Dizziness     SOB (shortness of breath)      current treatment plan is effective, no change in therapy  lab results and schedule of future lab studies reviewed with patient  reviewed diet, exercise and weight control  very strongly urged to quit smoking to reduce cardiovascular risk  cardiovascular risk and specific lipid/LDL goals reviewed  use of aspirin to prevent MI and TIA's discussed      HPI   Today, Mr. Miguel Angel Barnett has no complaints of chest pains. He denies any changes in his activity levels. 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 His main issue appears to be shortness of breath and loss of balance. He never underwent a nuclear scan that was ordered last time. Hopefully, this can be done. He denies any orthopnea or PND. He denies any palpitations or dizziness. Unfortunately, he still smokes about half a pack per day. He has put on noted 20 pounds since his visit in August 2020. I did advise him to try to lose the weight that he recently gained and try to get back to his weight of 210 pounds. Review of Systems   Respiratory: Positive for shortness of breath. Cardiovascular: Negative for chest pain, palpitations, orthopnea, claudication, leg swelling and PND. Neurological: Positive for dizziness. All other systems reviewed and are negative. Physical Exam  Vitals signs and nursing note reviewed. Constitutional:       Appearance: He is obese. HENT:      Head: Normocephalic. Eyes:      Conjunctiva/sclera: Conjunctivae normal.   Neck:      Musculoskeletal: No neck rigidity. Cardiovascular:      Rate and Rhythm: Normal rate and regular rhythm. Pulmonary:      Breath sounds: Normal breath sounds. Abdominal:      Palpations: Abdomen is soft. Musculoskeletal:         General: No swelling. Skin:     General: Skin is warm and dry.    Neurological:      General: No focal deficit present. Mental Status: He is alert and oriented to person, place, and time. Psychiatric:         Mood and Affect: Mood normal.         Behavior: Behavior normal.         PCP: Cory Segal MD    Past Medical History:   Diagnosis Date    [facility] [facility]     [facility]     Emphysema     H/O cardiovascular stress test 2012    negative    Hypertension     [facility]        No past surgical history on file. Current Outpatient Medications   Medication Sig Dispense Refill    varenicline (CHANTIX STARTER HUMPHREY) 0.5 mg (11)- 1 mg (42) DsPk 0.5 mg tab daily x 3 days, 0.5 mg tab bid x 4 days 1 Dose Pack 1    albuterol sulfate 90 mcg/actuation aebs Take 1 Puff by inhalation every four to six (4-6) hours as needed for Wheezing. 1 Each 0    atorvastatin (LIPITOR) 80 mg tablet Take 1 Tab by mouth nightly. 30 Tab 6    metoprolol tartrate (LOPRESSOR) 25 mg tablet Take 1 Tab by mouth every twelve (12) hours. 60 Tab 6    clopidogreL (Plavix) 75 mg tab Take four tablets day 1, then 1 tablet daily starting day 2. 33 Tab 11    aspirin delayed-release 81 mg tablet Take 1 Tab by mouth daily. 30 Tab 0    glucagon (GLUCAGEN) 1 mg injection 1 mL by IntraMUSCular route as needed for Hypoglycemia. 1 Vial 0    insulin lispro (HUMALOG) 100 unit/mL injection Very Insulin Resistant  For Blood Sugar (mg/dL) of:              Less than 150 =   0 units  150 -199 =   3 units  200 -249 =   6 units  250 -299 =   9 units  300 -349 =   12 units  350 and above =   15 units  Initiate Hypoglycemic protocol if blood glucose is <70 mg/dL. Fast Acting - Administer Immediately - or within 15 minutes of start of meal, if mealtime coverage. 1 Vial 0    nitroglycerin (NITROLINGUAL) 400 mcg/spray spray 1 Spray by SubLINGual route every five (5) minutes as needed for Chest Pain (do not exceed more than 3 sprays in one day, seek acute medical care if chest pain continues).  1 Bottle 0    Insulin Syringe-Needle U-100 0.3 mL 31 gauge x 5/16\" syrg TID as needed 100 Syringe 0    lancets misc As directed 100 Each 0    Blood-Glucose Meter monitoring kit As directed 1 Kit 0    glucose blood VI test strips (blood glucose test) strip As directed 60 Strip 0    OTHER CMP in 2 week  Dx: elevated AST  Fax results to Dr. Raoul Kawasaki 1 Each 0    metFORMIN (GLUCOPHAGE) 1,000 mg tablet Take 1 Tab by mouth two (2) times daily (with meals). 60 Tab 0    lisinopril (PRINIVIL, ZESTRIL) 10 mg tablet Take 1 Tab by mouth daily. 30 Tab 0    tiotropium (SPIRIVA WITH HANDIHALER) 18 mcg inhalation capsule Take 1 Cap by inhalation daily. 30 Cap 0    mometasone-formoterol (DULERA) 100-5 mcg/actuation HFAA HFA inhaler Take 2 Puffs by inhalation two (2) times a day. 2 Inhaler 0       The patient has a family history of    Social History     Tobacco Use    Smoking status: Current Every Day Smoker     Packs/day: 0.50     Types: Cigarettes    Smokeless tobacco: Never Used   Substance Use Topics    Alcohol use: Not Currently    Drug use: Yes     Types: Marijuana       Lab Results   Component Value Date/Time    Cholesterol, total 118 (L) 04/01/2021 09:40 AM    HDL Cholesterol 35 (L) 04/01/2021 09:40 AM    LDL, calculated 58 04/01/2021 09:40 AM    Triglyceride 124 04/01/2021 09:40 AM    CHOL/HDL Ratio 3.4 04/01/2021 09:40 AM        BP Readings from Last 3 Encounters:   04/14/21 (!) 140/82   08/25/20 140/80   08/24/20 126/74        Pulse Readings from Last 3 Encounters:   04/14/21 78   08/25/20 78   08/24/20 72       Wt Readings from Last 3 Encounters:   04/14/21 113.4 kg (250 lb)   08/24/20 102.1 kg (225 lb)   07/10/20 102.1 kg (225 lb)         EKG: unchanged from previous tracings, normal sinus rhythm, nonspecific ST and T waves changes.

## 2021-04-14 NOTE — PROGRESS NOTES
Alberto Marie presents today for   Chief Complaint   Patient presents with    Follow-up     6 month follow up        Alberto Marie preferred language for health care discussion is english/other. Is someone accompanying this pt? no    Is the patient using any DME equipment during [address]? no    Depression Screening:  3 most recent PHQ Screens 8/25/2020   Little interest or pleasure in doing things Not at all   Feeling down, depressed, irritable, or hopeless Not at all   Total Score PHQ 2 0       Learning Assessment:  Learning Assessment 4/2/2020   PRIMARY LEARNER Patient   HIGHEST LEVEL OF EDUCATION - PRIMARY LEARNER  GRADUATED HIGH SCHOOL OR GED   BARRIERS PRIMARY LEARNER NONE   CO-LEARNER CAREGIVER No   CO-LEARNER NAME no   PRIMARY LANGUAGE ENGLISH   LEARNER PREFERENCE PRIMARY DEMONSTRATION   ANSWERED BY patient   RELATIONSHIP SELF       Abuse Screening:  Abuse Screening Questionnaire 4/2/2020   Do you ever feel afraid of your partner? N   Are you in a relationship with someone who physically or mentally threatens you? N   Is it safe for you to go home? Y       Fall Risk  Fall Risk Assessment, last 12 mths 4/2/2020   Able to walk? Yes   Fall in past 12 months? No       Pt currently taking Anticoagulant therapy? ASA 81mg every day     Coordination of Care:  1. Have you been to the ER, urgent care clinic since your last visit? Hospitalized since your last visit? no    2. Have you seen or consulted any other health care providers outside of the [address] [place] [address] since your last visit? Include any pap smears or colon screening.  no

## 2021-04-16 LAB
ECHO AO ROOT DIAM: 3.45 CM
ECHO AV MEAN GRADIENT: 2.79 MMHG
ECHO AV PEAK GRADIENT: 4.69 MMHG
ECHO AV PEAK VELOCITY: 108.34 CM/S
ECHO AV VTI: 22.33 CM
ECHO LA AREA 4C: 20.82 CM2
ECHO LA VOL 2C: 73.94 ML (ref 18–58)
ECHO LA VOL 4C: 56.68 ML (ref 18–58)
ECHO LA VOL BP: 71.8 ML (ref 18–58)
ECHO LA VOL/BSA BIPLANE: 30.97 ML/M2 (ref 16–28)
ECHO LA VOLUME INDEX A2C: 31.89 ML/M2 (ref 16–28)
ECHO LA VOLUME INDEX A4C: 24.44 ML/M2 (ref 16–28)
ECHO LV E' LATERAL VELOCITY: 9.2 CM/S
ECHO LV E' SEPTAL VELOCITY: 7.54 CM/S
ECHO LV INTERNAL DIMENSION DIASTOLIC: 5.24 CM (ref 4.2–5.9)
ECHO LV INTERNAL DIMENSION SYSTOLIC: 3.09 CM
ECHO LV IVSD: 1.37 CM (ref 0.6–1)
ECHO LV MASS 2D: 308.5 G (ref 88–224)
ECHO LV MASS INDEX 2D: 133 G/M2 (ref 49–115)
ECHO LV POSTERIOR WALL DIASTOLIC: 1.4 CM (ref 0.6–1)
ECHO LVOT DIAM: 2.14 CM
ECHO MV A VELOCITY: 91.82 CM/S
ECHO MV E DECELERATION TIME (DT): 195.73 MS
ECHO MV E VELOCITY: 107.68 CM/S
ECHO MV E/A RATIO: 1.17
ECHO MV E/E' LATERAL: 11.7
ECHO MV E/E' RATIO (AVERAGED): 12.99
ECHO MV E/E' SEPTAL: 14.28
ECHO PV REGURGITANT MAX VELOCITY: 271.12 CM/S
ECHO PVEIN A DURATION: 93.24 MS
ECHO PVEIN A VELOCITY: 25.45 CM/S
ECHO RV TAPSE: 2.41 CM (ref 1.5–2)
ECHO TV REGURGITANT PEAK GRADIENT: 31.91 MMHG

## 2021-04-19 NOTE — PROGRESS NOTES
PER YOUR LAST NOTE\" angiography.  His EF was noted to be 45-50% with anteroapical hypokinesis.  He had long mid 95% LAD stenosis which was successfully stented to residual 0%.  He also had diagonal branch lesion which was balloon dilated.  Unfortunately, he has smoked for over 40 years and continued to smoke at the time of his non-STEMI. He has COPD as well as insulin-dependent diabetes mellitus. Latanya Estevez has hypertension and hyperlipidemia. 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  When he gets tired, he slows down. Latanya Estevez has not had any recurrent episodes of chest pain despite vigorous physical activity.  He was seen in the emergency room on 8/24/2020 for atypical chest pain.  He was evaluated and discharged home.     · CAD:    Symptomatically stable. · BP:    Upper normal but acceptable. · Rhythm: Stable sinus. · CHF:    Currently, there is no evidence of decompensated CHF noted. · Weight:    His weight today is 250 pounds. His weight back in August 2020 was 229 pounds. Earlier in 2020, he weighed 210 pounds. I had a lengthy discussion with him about the importance of weight control as well as tobacco cessation. All questions were answered. Over 30 minutes spent on discussion. · Cholesterol:  Target LDL<70. Lipitor 80. · Tobacco:    Unfortunately, he still smokes about a pack a day. · Anticoagulant:  Remains on ASA, and Plavix a highly.

## 2021-05-15 ENCOUNTER — TELEPHONE (OUTPATIENT)
Dept: CARDIOLOGY CLINIC | Age: 61
End: 2021-05-15

## 2021-05-15 NOTE — TELEPHONE ENCOUNTER
----- Message from [id number] aJnnet Miles MD sent at 5/10/2021  3:14 PM EDT -----  Please let the patient know that his echocardiogram shows no remarkable changes. ----- Message -----  From: Lamar Jara LPN  Sent: [id number]/45/9791  10:09 AM EDT  To: [id number] Jannet Miles MD    PER YOUR LAST NOTE\" angiography.  His EF was noted to be 45-50% with anteroapical hypokinesis.  He had long mid 95% LAD stenosis which was successfully stented to residual 0%.  He also had diagonal branch lesion which was balloon dilated.  Unfortunately, he has smoked for over 40 years and continued to smoke at the time of his non-STEMI. He has COPD as well as insulin-dependent diabetes mellitus. James Coon has hypertension and hyperlipidemia. 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  When he gets tired, he slows down. James Coon has not had any recurrent episodes of chest pain despite vigorous physical activity.  He was seen in the emergency room on 8/24/2020 for atypical chest pain.  He was evaluated and discharged home.     · CAD:    Symptomatically stable. · BP:    Upper normal but acceptable. · Rhythm: Stable sinus. · CHF:    Currently, there is no evidence of decompensated CHF noted. · Weight:    His weight today is 250 pounds. His weight back in August 2020 was 229 pounds. Earlier in 2020, he weighed 210 pounds. I had a lengthy discussion with him about the importance of weight control as well as tobacco cessation. All questions were answered. Over 30 minutes spent on discussion. · Cholesterol:  Target LDL<70. Lipitor 80. · Tobacco:    Unfortunately, he still smokes about a pack a day. · Anticoagulant:  Remains on ASA, and Plavix a highly.

## 2021-05-31 ENCOUNTER — HOSPITAL ENCOUNTER (EMERGENCY)
Age: 61
Discharge: HOME | End: 2021-06-01
Attending: STUDENT IN AN ORGANIZED HEALTH CARE EDUCATION/TRAINING PROGRAM
Payer: MEDICARE

## 2021-05-31 ENCOUNTER — APPOINTMENT (OUTPATIENT)
Dept: GENERAL RADIOLOGY | Age: 61
End: 2021-05-31
Attending: STUDENT IN AN ORGANIZED HEALTH CARE EDUCATION/TRAINING PROGRAM
Payer: MEDICARE

## 2021-05-31 VITALS
WEIGHT: 247 LBS | DIASTOLIC BLOOD PRESSURE: 112 MMHG | HEART RATE: 91 BPM | BODY MASS INDEX: 34.58 KG/M2 | TEMPERATURE: 98 F | HEIGHT: 71 IN | SYSTOLIC BLOOD PRESSURE: 162 MMHG | OXYGEN SATURATION: 93 %

## 2021-05-31 DIAGNOSIS — J44.9 CHRONIC OBSTRUCTIVE PULMONARY DISEASE, UNSPECIFIED COPD TYPE (HCC): ICD-10-CM

## 2021-05-31 DIAGNOSIS — I10 ESSENTIAL HYPERTENSION: ICD-10-CM

## 2021-05-31 DIAGNOSIS — R73.9 HYPERGLYCEMIA: ICD-10-CM

## 2021-05-31 DIAGNOSIS — R06.02 SOB (SHORTNESS OF BREATH): Primary | ICD-10-CM

## 2021-05-31 DIAGNOSIS — I25.2 HISTORY OF MI (MYOCARDIAL INFARCTION): ICD-10-CM

## 2021-05-31 LAB
ANION GAP SERPL CALC-SCNC: 2 MMOL/L (ref 3–18)
BASOPHILS # BLD: 0 K/UL (ref 0–0.1)
BASOPHILS NFR BLD: 0 % (ref 0–2)
BNP SERPL-MCNC: 129 PG/ML (ref 0–900)
BUN SERPL-MCNC: 8 MG/DL (ref 7–18)
BUN/CREAT SERPL: 11 (ref 12–20)
CALCIUM SERPL-MCNC: 8.7 MG/DL (ref 8.5–10.1)
CHLORIDE SERPL-SCNC: 105 MMOL/L (ref 100–111)
CO2 SERPL-SCNC: 35 MMOL/L (ref 21–32)
CREAT SERPL-MCNC: 0.72 MG/DL (ref 0.6–1.3)
DIFFERENTIAL METHOD BLD: NORMAL
EOSINOPHIL # BLD: 0.2 K/UL (ref 0–0.4)
EOSINOPHIL NFR BLD: 2 % (ref 0–5)
ERYTHROCYTE [DISTWIDTH] IN BLOOD BY AUTOMATED COUNT: 12.9 % (ref 11.6–14.5)
GLUCOSE SERPL-MCNC: 227 MG/DL (ref 74–99)
HCT VFR BLD AUTO: 42.7 % (ref 36–48)
HGB BLD-MCNC: 13.6 G/DL (ref 13–16)
LYMPHOCYTES # BLD: 2.5 K/UL (ref 0.9–3.6)
LYMPHOCYTES NFR BLD: 26 % (ref 21–52)
MCH RBC QN AUTO: 28.5 PG (ref 24–34)
MCHC RBC AUTO-ENTMCNC: 31.9 G/DL (ref 31–37)
MCV RBC AUTO: 89.3 FL (ref 74–97)
MONOCYTES # BLD: 0.6 K/UL (ref 0.05–1.2)
MONOCYTES NFR BLD: 6 % (ref 3–10)
NEUTS SEG # BLD: 6.2 K/UL (ref 1.8–8)
NEUTS SEG NFR BLD: 65 % (ref 40–73)
PLATELET # BLD AUTO: 186 K/UL (ref 135–420)
PMV BLD AUTO: 10.9 FL (ref 9.2–11.8)
POTASSIUM SERPL-SCNC: 4.2 MMOL/L (ref 3.5–5.5)
RBC # BLD AUTO: 4.78 M/UL (ref 4.35–5.65)
SODIUM SERPL-SCNC: 142 MMOL/L (ref 136–145)
TROPONIN I SERPL-MCNC: <0.02 NG/ML (ref 0–0.04)
TROPONIN I SERPL-MCNC: <0.02 NG/ML (ref 0–0.04)
WBC # BLD AUTO: 9.5 K/UL (ref 4.6–13.2)

## 2021-05-31 PROCEDURE — 94640 AIRWAY INHALATION TREATMENT: CPT

## 2021-05-31 PROCEDURE — 85025 COMPLETE CBC W/AUTO DIFF WBC: CPT

## 2021-05-31 PROCEDURE — 74011000258 HC RX REV CODE- 258: Performed by: STUDENT IN AN ORGANIZED HEALTH CARE EDUCATION/TRAINING PROGRAM

## 2021-05-31 PROCEDURE — 96365 THER/PROPH/DIAG IV INF INIT: CPT

## 2021-05-31 PROCEDURE — 84484 ASSAY OF TROPONIN QUANT: CPT

## 2021-05-31 PROCEDURE — 74011000250 HC RX REV CODE- 250: Performed by: STUDENT IN AN ORGANIZED HEALTH CARE EDUCATION/TRAINING PROGRAM

## 2021-05-31 PROCEDURE — 96375 TX/PRO/DX INJ NEW DRUG ADDON: CPT

## 2021-05-31 PROCEDURE — 74011250636 HC RX REV CODE- 250/636: Performed by: STUDENT IN AN ORGANIZED HEALTH CARE EDUCATION/TRAINING PROGRAM

## 2021-05-31 PROCEDURE — 80048 BASIC METABOLIC PNL TOTAL CA: CPT

## 2021-05-31 PROCEDURE — 74011250637 HC RX REV CODE- 250/637: Performed by: STUDENT IN AN ORGANIZED HEALTH CARE EDUCATION/TRAINING PROGRAM

## 2021-05-31 PROCEDURE — 71045 X-RAY EXAM CHEST 1 VIEW: CPT

## 2021-05-31 PROCEDURE — 83880 ASSAY OF NATRIURETIC PEPTIDE: CPT

## 2021-05-31 PROCEDURE — 93005 ELECTROCARDIOGRAM TRACING: CPT

## 2021-05-31 PROCEDURE — 99284 EMERGENCY DEPT VISIT MOD MDM: CPT

## 2021-05-31 RX ORDER — IPRATROPIUM BROMIDE AND ALBUTEROL SULFATE 2.5; .5 MG/3ML; MG/3ML
3 SOLUTION RESPIRATORY (INHALATION)
Status: COMPLETED | OUTPATIENT
Start: 2021-05-31 | End: 2021-05-31

## 2021-05-31 RX ORDER — GUAIFENESIN 100 MG/5ML
324 LIQUID (ML) ORAL
Status: COMPLETED | OUTPATIENT
Start: 2021-05-31 | End: 2021-05-31

## 2021-05-31 RX ORDER — ALBUTEROL SULFATE 0.83 MG/ML
2.5 SOLUTION RESPIRATORY (INHALATION) ONCE
Status: COMPLETED | OUTPATIENT
Start: 2021-05-31 | End: 2021-05-31

## 2021-05-31 RX ORDER — DOXYCYCLINE 100 MG/1
100 CAPSULE ORAL 2 TIMES DAILY
Qty: 14 CAPSULE | Refills: 0 | Status: SHIPPED | OUTPATIENT
Start: 2021-05-31 | End: 2021-06-07

## 2021-05-31 RX ORDER — PREDNISONE 20 MG/1
60 TABLET ORAL DAILY
Qty: 15 TABLET | Refills: 0 | Status: SHIPPED | OUTPATIENT
Start: 2021-05-31 | End: 2021-06-05

## 2021-05-31 RX ADMIN — IPRATROPIUM BROMIDE AND ALBUTEROL SULFATE 3 ML: .5; 3 SOLUTION RESPIRATORY (INHALATION) at 20:44

## 2021-05-31 RX ADMIN — ASPIRIN 324 MG: 81 TABLET, CHEWABLE ORAL at 20:10

## 2021-05-31 RX ADMIN — IPRATROPIUM BROMIDE AND ALBUTEROL SULFATE 3 ML: .5; 3 SOLUTION RESPIRATORY (INHALATION) at 21:47

## 2021-05-31 RX ADMIN — METHYLPREDNISOLONE SODIUM SUCCINATE 125 MG: 125 INJECTION, POWDER, FOR SOLUTION INTRAMUSCULAR; INTRAVENOUS at 20:10

## 2021-05-31 RX ADMIN — SODIUM CHLORIDE 500 ML: 900 INJECTION, SOLUTION INTRAVENOUS at 20:44

## 2021-05-31 RX ADMIN — DOXYCYCLINE 100 MG: 100 INJECTION, POWDER, LYOPHILIZED, FOR SOLUTION INTRAVENOUS at 20:13

## 2021-05-31 RX ADMIN — IPRATROPIUM BROMIDE AND ALBUTEROL SULFATE 3 ML: .5; 3 SOLUTION RESPIRATORY (INHALATION) at 20:08

## 2021-05-31 RX ADMIN — ALBUTEROL SULFATE 2.5 MG: 2.5 SOLUTION RESPIRATORY (INHALATION) at 23:39

## 2021-05-31 NOTE — ED PROVIDER NOTES
EMERGENCY DEPARTMENT HISTORY AND PHYSICAL EXAM      Date: 5/31/2021  Patient Name: Danielle Whyte    History of Presenting Illness     Chief Complaint   Patient presents with    Shortness of Breath    Chest Pain       History Provided By: Patient and Patient's Wife    HPI:  Danielle Whyte is a 62 y.o. male with history of COPD who complains of shortness of breath, chest tightness. History of MI last march per patient. He states it has been progressively worse for the past 2 days, is frequently using albuterol at home with no significant relief. States it has got worse today after taking some nighttime cold medicine to try to help. Not diaphoretic, denies nausea, vomiting, chills, fever. History of hypertension, low O2 sats in the 80s at home. On arrival here, desats to [de-identified]    The patient denies any aggravating or alleviating factors     PMH, PSH, family history, social history, allergies reviewed with the patient with significant items noted above. PCP: Morales Dominguez MD    Current Outpatient Medications   Medication Sig Dispense Refill    doxycycline (MONODOX) 100 mg capsule Take 1 Capsule by mouth two (2) times a day for 7 days. 14 Capsule 0    predniSONE (DELTASONE) 20 mg tablet Take 60 mg by mouth daily for 5 days. With Breakfast 15 Tablet 0    varenicline (CHANTIX STARTER HUMPHREY) 0.5 mg (11)- 1 mg (42) DsPk 0.5 mg tab daily x 3 days, 0.5 mg tab bid x 4 days 1 Dose Pack 1    albuterol sulfate 90 mcg/actuation aebs Take 1 Puff by inhalation every four to six (4-6) hours as needed for Wheezing. 1 Each 0    atorvastatin (LIPITOR) 80 mg tablet Take 1 Tab by mouth nightly. 30 Tab 6    metoprolol tartrate (LOPRESSOR) 25 mg tablet Take 1 Tab by mouth every twelve (12) hours. 60 Tab 6    clopidogreL (Plavix) 75 mg tab Take four tablets day 1, then 1 tablet daily starting day 2. 33 Tab 11    aspirin delayed-release 81 mg tablet Take 1 Tab by mouth daily.  30 Tab 0    glucagon (GLUCAGEN) 1 mg injection 1 mL by IntraMUSCular route as needed for Hypoglycemia. 1 Vial 0    insulin lispro (HUMALOG) 100 unit/mL injection Very Insulin Resistant  For Blood Sugar (mg/dL) of:              Less than 150 =   0 units  150 -199 =   3 units  200 -249 =   6 units  250 -299 =   9 units  300 -349 =   12 units  350 and above =   15 units  Initiate Hypoglycemic protocol if blood glucose is <70 mg/dL. Fast Acting - Administer Immediately - or within 15 minutes of start of meal, if mealtime coverage. 1 Vial 0    nitroglycerin (NITROLINGUAL) 400 mcg/spray spray 1 Spray by SubLINGual route every five (5) minutes as needed for Chest Pain (do not exceed more than 3 sprays in one day, seek acute medical care if chest pain continues). 1 Bottle 0    Insulin Syringe-Needle U-100 0.3 mL 31 gauge x 5/16\" syrg TID as needed 100 Syringe 0    lancets misc As directed 100 Each 0    Blood-Glucose Meter monitoring kit As directed 1 Kit 0    glucose blood VI test strips (blood glucose test) strip As directed 60 Strip 0    OTHER CMP in 2 week  Dx: elevated AST  Fax results to Dr. Hannah Essex 1 Each 0    metFORMIN (GLUCOPHAGE) 1,000 mg tablet Take 1 Tab by mouth two (2) times daily (with meals). 60 Tab 0    lisinopril (PRINIVIL, ZESTRIL) 10 mg tablet Take 1 Tab by mouth daily. 30 Tab 0    tiotropium (SPIRIVA WITH HANDIHALER) 18 mcg inhalation capsule Take 1 Cap by inhalation daily. 30 Cap 0    mometasone-formoterol (DULERA) 100-5 mcg/actuation HFAA HFA inhaler Take 2 Puffs by inhalation two (2) times a day. 2 Inhaler 0       Past History     Past Medical History:  Past Medical History:   Diagnosis Date    [facility] [facility]     Diabetes ([facility] [facility]     Emphysema     H/O cardiovascular stress test 2012    negative    Hypertension     MI (myocardial infarction) ([facility] [address]        Past Surgical History:  No past surgical history on file.     Family History:  Family History   Problem Relation Age of Onset    Diabetes Mother        Social History:  Social History     Tobacco Use    Smoking status: Current Every Day Smoker     Packs/day: 0.50     Types: Cigarettes    Smokeless tobacco: Never Used   Substance Use Topics    Alcohol use: Not Currently    Drug use: Yes     Types: Marijuana       Allergies:  No Known Allergies    Review of Systems   Review of Systems    In addition to that documented in the HPI above  All other review of systems negative    Constitutional: Denies fevers or chills  Eyes: Denies vision changes  ENMT: Denies sore throat  CV: Denies chest pain  GI: Denies vomiting or diarrhea  : Denies painful urination  MSK: Denies recent trauma  Skin: Denies new rashes  Neuro: Denies new numbness or tingling or weakness  Endocrine: Denies polyuria  Heme: Denies bleeding disorders    Physical Exam     Vitals:    05/31/21 1935   BP: (!) 162/112   Pulse: 91   Temp: 98 °F (36.7 °C)   SpO2: 93%   Weight: 112 kg (247 lb)   Height: 5' 11\" (1.803 m)     Physical Exam    Nursing notes and vital signs reviewed    General: Patient is awake and alert, mild distress, increased work of breathing, increased exhalation phase. Head: Normocephalic and atraumatic  Eyes: Extraocular muscles intact, no conjunctival pallor  Ear, nose, throat: Normal external exam  Neck: Normal range of motion  Cardiovascular: RRR, no murmur auscultated, warm, well-perfused extremities  Respiratory: Patient is using accessory muscle, pursed lip breathing, long wheeze in all fields. GI: soft, non-tender, non-distended  MSK: No gross deformities appreciated  Extremities: pulses intact with good cap refills, no LE pitting edema or calf tenderness  Skin: Warm, dry, and intact  Neuro: The patient is alert and oriented, no gross motor or sensory defects noted. Psych: Appropriate mood and affect.     Constitutional: Non toxic appearing, moderate distress  Head: Normocephalic, Atraumatic  Eyes: EOMI, no conjunctival pallor  Neck: Supple  Cardiovascular: Regular rate and rhythm, no murmurs, rubs, or gallops  Chest: Normal work of breathing and chest excursion bilaterally  Lungs: Clear to ausculation bilaterally  Abdomen: Soft, non tender, non distended, normoactive bowel sounds  Back: No evidence of trauma or deformity  Extremities: No evidence of trauma or deformity, no LE edema  Skin: Warm and dry, normal cap refill  Neuro: Alert and appropriate, CN intact, normal speech, strength and sensation full and symmetric bilaterally, normal gait, normal coordination  Psychiatric: Normal mood and affect    Medical Decision Making   I am the first provider for this patient. I reviewed the vital signs, available nursing notes, past medical history, past surgical history, family history and social history. Vital Signs-Reviewed the patient's vital signs. Visit Vitals  BP (!) 162/112   Pulse 91   Temp 98 °F (36.7 °C)   Ht 5' 11\" (1.803 m)   Wt 112 kg (247 lb)   SpO2 93%   BMI 34.45 kg/m²       Pulse Oximetry Analysis - 93% on room air     Cardiac Monitor:  Rate: 91 bpm  Rhythm: ST    EKG interpretation: (Preliminary)  Interpreted by the EP. Time Interpreted: 7:22 PM   Rate: 89   Rhythm: Normal sinus   Interpretation: ST segment elevation in lead III ST segment depression in 1 aVL elevation also noted in V2 and aVR. Comparison: 2020    Provider Notes (Medical Decision Making):   Danielle Whyte is a 62 y.o. male with signs and symptoms of COPD exacerbation, also with some chest pressure. Doubt acs, suspect maybe type 2. Will give asa nonetheless as he had an MI last year. Considered PE, pneumonia, ACS. Most likely COPD exacerbation upper respiratory viral infection. No systemic signs of pneumonia, will not give long course of antibiotics, will treat COPD exacerbation with serial nebs, steroids, doxycycline, observation emergency department. We will keep for 2 sets of troponin and ekg.       Procedures:  Critical Care  Performed by: Joshua Gutierrez MD  Authorized by: Joshua Gutierrez MD     Critical care provider statement:     Critical care time (minutes):  35    Critical care start time:  5/31/2021 7:45 PM    Critical care end time:  5/31/2021 9:00 PM    Critical care time was exclusive of:  Separately billable procedures and treating other patients and teaching time    Critical care was necessary to treat or prevent imminent or life-threatening deterioration of the following conditions:  Respiratory failure    Critical care was time spent personally by me on the following activities:  Development of treatment plan with patient or surrogate, evaluation of patient's response to treatment, examination of patient, re-evaluation of patient's condition, pulse oximetry, ordering and review of radiographic studies, ordering and review of laboratory studies, ordering and performing treatments and interventions and review of old charts        ED Course:   ED Course as of Jun 02 1409   Mon May 31, 2021   1950 CBC without leukocytosis or anemia. [DM]   2012 Troponin negative   Troponin-I, Qt.: <0.02 [DM]   2012 BNP negative      [DM]   2019 Hyperglycemia noted, no gap   Glucose(!): 227 [DM]   2155 9:55 PM   Feeling much better at this time. [DM]      ED Course User Index  [DM] Joshua Gutierrez MD        Multiple visits to patients bedside for evalation. Patient made an excellent recovery in the ED and I do not believe he will benefit from admission. Now with very faint end expiratory wheeze. He and his wife are in agreement. Suggested he follow up with pulmonology and investigate a sleep study and home oxygen. The patient had a prolonged stay for extensive diagnostics, interventions, observation, serial exams and treatment that were necessary for the diagnosis, treatment and safety of the patient due to their extensive chronic illness.       Vitals Review/addressed -     Diagnostic Study Results     Orders Placed This Encounter    XR CHEST PORT     Standing Status:   Standing     Number of Occurrences:   1     Order Specific Question:   Reason for Exam     Answer:   shortness of breath    CBC WITH AUTOMATED DIFF     Standing Status:   Standing     Number of Occurrences:   1    BASIC METABOLIC PANEL     Standing Status:   Standing     Number of Occurrences:   1    TROPONIN I     Standing Status:   Standing     Number of Occurrences:   1    PRO-BNP     Standing Status:   Standing     Number of Occurrences:   1    TROPONIN I     Standing Status:   Standing     Number of Occurrences:   1    CRITICAL CARE (ASAP ONLY)     This order was created via procedure documentation     Standing Status:   Standing     Number of Occurrences:   1    EKG, 12 LEAD, INITIAL     Standing Status:   Standing     Number of Occurrences:   1     Order Specific Question:   Reason for Exam:     Answer:   chest pain    EKG, 12 LEAD, SUBSEQUENT     Standing Status:   Standing     Number of Occurrences:   1     Order Specific Question:   Reason for Exam:     Answer:   shortness of breath    aspirin chewable tablet 324 mg    methylPREDNISolone (PF) (Solu-MEDROL) injection 125 mg    albuterol-ipratropium (DUO-NEB) 2.5 MG-0.5 MG/3 ML     Order Specific Question:   MODE OF DELIVERY     Answer:   Nebulizer    DISCONTD: doxycycline (VIBRAMYCIN) 100 mg in 0.9% sodium chloride (MBP/ADV) 100 mL MBP     Order Specific Question:   Antibiotic Indications     Answer:   COPD Exacerbation    sodium chloride 0.9 % bolus infusion 500 mL    albuterol-ipratropium (DUO-NEB) 2.5 MG-0.5 MG/3 ML     Order Specific Question:   MODE OF DELIVERY     Answer:   Nebulizer    doxycycline (MONODOX) 100 mg capsule     Sig: Take 1 Capsule by mouth two (2) times a day for 7 days. Dispense:  14 Capsule     Refill:  0    predniSONE (DELTASONE) 20 mg tablet     Sig: Take 60 mg by mouth daily for 5 days.  With Breakfast     Dispense:  15 Tablet     Refill:  0    albuterol (PROVENTIL VENTOLIN) nebulizer solution 2.5 mg     Order Specific Question:   MODE OF DELIVERY     Answer:   Nebulizer       Labs -     No results found for this or any previous visit (from the past 12 hour(s)). Radiologic Studies -   XR CHEST PORT   Final Result      Negative chest.           CT Results  (Last 48 hours)    None        CXR Results  (Last 48 hours)               05/31/21 2018  XR CHEST PORT Final result    Impression:      Negative chest.           Narrative:  EXAM: CHEST ONE VIEW  portable 2005 hours       CLINICAL HISTORY/INDICATION: shortness of breath       COMPARISON: Chest x-ray 8/24/2020. TECHNIQUE: One view obtained. FINDINGS:        The cardiac and mediastinal silhouette is normal. The lungs are clear. Pulmonary   vascularity is normal. The costophrenic angles are sharply defined. No bony   abnormalities are seen. Disposition     11:27 PM  No acute pathology necessitating further emergent workup or hospital admission is suspected or found. Will discharge home with follow up and copd treatment. He is comfortable with the plan and discharge at this time. Expressed the importance of follow up for current symptoms and he agrees and was advised on what signs/symptoms to return immediately to the ER. Ivoyr Laenport  results have been reviewed with him. He has been counseled regarding his diagnosis, treatment, and plan. He verbally conveys understanding and agreement of the signs, symptoms, diagnosis, treatment and prognosis and additionally agrees to follow up as discussed. He also agrees with the care-plan and conveys that all of his questions have been answered. I have also provided discharge instructions for him that include: educational information regarding their diagnosis and treatment, and list of reasons why they would want to return to the ED prior to their follow-up appointment, should his condition change. Disposition:  Home    CLINICAL IMPRESSION:    1. SOB (shortness of breath)    2. Chronic obstructive pulmonary disease, unspecified COPD type ([address]    3. Hyperglycemia    4. Essential hypertension    5. History of MI (myocardial infarction)        It should be noted that I will be the provider of record for this patient  Indu Hunter MD    Follow-up Information     Follow up With Specialties Details Why [facility]    [facility] EMERGENCY DEPT Emergency Medicine Go to  If symptoms worsen [address] [facility]    Vin Katz MD Internal Medicine   [facility]      Luc Buenrostro MD Pulmonary Disease, Internal Medicine Call in 1 day  Haroldo Souza  [phone]            Discharge Medication List as of 5/31/2021 11:28 PM      START taking these medications    Details   doxycycline (MONODOX) 100 mg capsule Take 1 Capsule by mouth two (2) times a day for 7 days. , Normal, Disp-14 Capsule, R-0      predniSONE (DELTASONE) 20 mg tablet Take 60 mg by mouth daily for 5 days. With Breakfast, Normal, Disp-15 Tablet, R-0         CONTINUE these medications which have NOT CHANGED    Details   varenicline (CHANTIX STARTER HUMPHREY) 0.5 mg (11)- 1 mg (42) DsPk 0.5 mg tab daily x 3 days, 0.5 mg tab bid x 4 days, Normal, Disp-1 Dose Pack, R-1      albuterol sulfate 90 mcg/actuation aebs Take 1 Puff by inhalation every four to six (4-6) hours as needed for Wheezing., Normal, Disp-1 Each,R-0      atorvastatin (LIPITOR) 80 mg tablet Take 1 Tab by mouth nightly., Normal, Disp-30 Tab, R-6      metoprolol tartrate (LOPRESSOR) 25 mg tablet Take 1 Tab by mouth every twelve (12) hours. , Normal, Disp-60 Tab, R-6      clopidogreL (Plavix) 75 mg tab Take four tablets day 1, then 1 tablet daily starting day 2., Normal, Disp-33 Tab, R-11      aspirin delayed-release 81 mg tablet Take 1 Tab by mouth daily. , Print, Disp-30 Tab, R-0      glucagon (GLUCAGEN) 1 mg injection 1 mL by IntraMUSCular route as needed for Hypoglycemia., Print, Disp-1 Vial, R-0      insulin lispro (HUMALOG) 100 unit/mL injection Very Insulin Resistant  For Blood Sugar (mg/dL) of:              Less than 150 =   0 units  150 -199 =   3 units  200 -249 =   6 units  250 -299 =   9 units  300 -349 =   12 units  350 and above =   15 units  Initiate Hypoglycemic protocol if blood gluco se is <70 mg/dL. Fast Acting - Administer Immediately - or within 15 minutes of start of meal, if mealtime coverage. , Print, Disp-1 Vial, R-0      nitroglycerin (NITROLINGUAL) 400 mcg/spray spray 1 Spray by SubLINGual route every five (5) minutes as needed for Chest Pain (do not exceed more than 3 sprays in one day, seek acute medical care if chest pain continues). , Print, Disp-1 Bottle, R-0      Insulin Syringe-Needle U-100 0.3 mL 31 gauge x 5/16\" syrg TID as needed, Print, Disp-100 Syringe, R-0      lancets misc As directed, Print, Disp-100 Each, R-0      Blood-Glucose Meter monitoring kit As directed, Print, Disp-1 Kit, R-0      glucose blood VI test strips (blood glucose test) strip As directed, Print, Disp-60 Strip, R-0      OTHER CMP in 2 week  Dx: elevated AST  Fax results to Dr. Enma Hernandez, Print, Disp-1 Each, R-0      metFORMIN (GLUCOPHAGE) 1,000 mg tablet Take 1 Tab by mouth two (2) times daily (with meals). Normal, 1,000 mg, Disp-60 Tab, R-0      lisinopril (PRINIVIL, ZESTRIL) 10 mg tablet Take 1 Tab by mouth daily. Normal, 10 mg, Disp-30 Tab, R-0      tiotropium (SPIRIVA WITH HANDIHALER) 18 mcg inhalation capsule Not intended for PRN useTake 1 Cap by inhalation daily. Sample, 1 Cap = 18 mcg, Disp-30 Cap, R-0      mometasone-formoterol (DULERA) 100-5 mcg/actuation HFAA HFA inhaler Take 2 Puffs by inhalation two (2) times a day. Sample, 2 Puff = 0.2 mg, Disp-2 Inhaler, R-0             Please note that this dictation was completed with [employer], the computer voice recognition software. Quite often unanticipated grammatical, syntax, homophones, and other interpretive errors are inadvertently transcribed by the computer software. Please disregard these errors. Please excuse any errors that have escaped final proofreading.

## 2021-06-01 LAB
ATRIAL RATE: 102 BPM
ATRIAL RATE: 89 BPM
CALCULATED P AXIS, ECG09: -25 DEGREES
CALCULATED P AXIS, ECG09: 83 DEGREES
CALCULATED R AXIS, ECG10: -17 DEGREES
CALCULATED R AXIS, ECG10: 78 DEGREES
CALCULATED T AXIS, ECG11: -13 DEGREES
CALCULATED T AXIS, ECG11: 43 DEGREES
DIAGNOSIS, 93000: NORMAL
DIAGNOSIS, 93000: NORMAL
P-R INTERVAL, ECG05: 164 MS
P-R INTERVAL, ECG05: 180 MS
Q-T INTERVAL, ECG07: 336 MS
Q-T INTERVAL, ECG07: 364 MS
QRS DURATION, ECG06: 104 MS
QRS DURATION, ECG06: 114 MS
QTC CALCULATION (BEZET), ECG08: 437 MS
QTC CALCULATION (BEZET), ECG08: 442 MS
VENTRICULAR RATE, ECG03: 102 BPM
VENTRICULAR RATE, ECG03: 89 BPM

## 2021-06-01 NOTE — DISCHARGE INSTRUCTIONS
Please return to the ER with any new or worsening symptoms or any other concerns. Please return to the Emergency Department if you develop a fever, chills, cannot eat or drink due to nausea or vomiting, or if any of your symptoms worsen. Please follow up with PCP    Also, It is extremely important that you follow-up with a primary care physician and if you do not have one currently use the contact information provided to obtain an appointment. If none was provided please call the number on the back of your insurance card to locate a Primary care doctor. Many offices have \"cancellation lists\" that you can ask to be placed on; should a patient with an earlier appointment cancel you will be notified to take their place. Please return to the Emergency Room immediately if your symptoms worsen. Please carefully read all discharge instructions    [email or web address]    What are GoodRx coupons? GoodRx coupons will help you pay less than the cash price for your prescription. Fernie Ohm free to use and are accepted at virtually every [place] pharmacy. Your pharmacist will know how to enter the codes on the coupon to pull up the lowest discount available.

## 2021-06-24 ENCOUNTER — HOSPITAL ENCOUNTER (EMERGENCY)
Age: 61
Discharge: HOME | End: 2021-06-24
Attending: EMERGENCY MEDICINE
Payer: MEDICARE

## 2021-06-24 ENCOUNTER — APPOINTMENT (OUTPATIENT)
Dept: GENERAL RADIOLOGY | Age: 61
End: 2021-06-24
Attending: EMERGENCY MEDICINE
Payer: MEDICARE

## 2021-06-24 VITALS
SYSTOLIC BLOOD PRESSURE: 147 MMHG | OXYGEN SATURATION: 96 % | HEART RATE: 79 BPM | TEMPERATURE: 98.7 F | DIASTOLIC BLOOD PRESSURE: 80 MMHG | HEIGHT: 71 IN | WEIGHT: 232 LBS | BODY MASS INDEX: 32.48 KG/M2 | RESPIRATION RATE: 18 BRPM

## 2021-06-24 DIAGNOSIS — M79.641 PAIN OF RIGHT HAND: ICD-10-CM

## 2021-06-24 DIAGNOSIS — M79.89 SWELLING OF RIGHT HAND: Primary | ICD-10-CM

## 2021-06-24 PROCEDURE — 75810000053 HC SPLINT APPLICATION

## 2021-06-24 PROCEDURE — 74011250637 HC RX REV CODE- 250/637: Performed by: EMERGENCY MEDICINE

## 2021-06-24 PROCEDURE — 99282 EMERGENCY DEPT VISIT SF MDM: CPT

## 2021-06-24 PROCEDURE — 73130 X-RAY EXAM OF HAND: CPT

## 2021-06-24 RX ORDER — IBUPROFEN 600 MG/1
600 TABLET ORAL
Status: COMPLETED | OUTPATIENT
Start: 2021-06-24 | End: 2021-06-24

## 2021-06-24 RX ORDER — OXYCODONE AND ACETAMINOPHEN 5; 325 MG/1; MG/1
1 TABLET ORAL
Qty: 10 TABLET | Refills: 0 | Status: SHIPPED | OUTPATIENT
Start: 2021-06-24 | End: 2021-07-01

## 2021-06-24 RX ADMIN — IBUPROFEN 600 MG: 600 TABLET ORAL at 12:04

## 2021-06-24 NOTE — ED TRIAGE NOTES
Pt reports right hand pain timed 10 days. Pt states he had a rope on it cutting a tree and the rope tightened on his hand a drug him 2 feet. Pt reports pain ans swelling since.

## 2021-06-24 NOTE — ED PROVIDER NOTES
EMERGENCY DEPARTMENT HISTORY AND PHYSICAL EXAM  This was created with voice recognition software and transcription errors may be present. 11:44 AM  Date: 6/24/2021  Patient Name: Marti Arora    History of Presenting Illness     Chief Complaint:    History Provided By:     HPI: Marti Arora is a 62 y.o. male   Past medical history of COPD diabetes emphysema hypertension who presents with right hand pain. Patient states is been going on for about 10 days he was cutting down a tree he had a rope wrapped around history and the tree did not fall unanticipated direction causing the rope to pull on his hands the rope was wrapped so it did not release causing the hand to be squeezed and causing severe pain since then has had persistent pain unable to really use that hand he does landscaping work and it has been causing him severe pain at work. He does note that he had a history of a laceration across his palmar surface and has been unable to extend his fingers for the past 18 years. PCP: Amy Gonzales MD      Past History     Past Medical History:  Past Medical History:   Diagnosis Date    [facility]ch[facility] obstructive [facility]) [facility] [address]     Diabetes ([facility] [address]     Emphysema     H/O cardiovascular stress test 2012    negative    Hypertension     MI (myocardial infarction) ([facility] [address]        Past Surgical History:  No past surgical history on file. Family History:  Family History   Problem Relation Age of Onset    Diabetes Mother        Social History:  Social History     Tobacco Use    Smoking status: Current Every Day Smoker     Packs/day: 0.50     Types: Cigarettes    Smokeless tobacco: Never Used   Substance Use Topics    Alcohol use: Not Currently    Drug use: Yes     Types: Marijuana       Allergies:  No Known Allergies    Review of Systems     Review of Systems   All other systems reviewed and are negative. 10 point review of systems otherwise negative unless noted in HPI.     Physical Exam Physical Exam  Constitutional:       Appearance: Normal appearance. HENT:      Head: Normocephalic. Mouth/Throat:      Mouth: Mucous membranes are moist.   Pulmonary:      Effort: Pulmonary effort is normal.   Musculoskeletal:      Comments: Pain over the right hand essentially the second and third metacarpal joints. He does have contractures to predominantly the third fourth and fifth fingers which are flexed at about 90 degrees second fingers flexed at about 70 degrees thumb is mobile. No significant ecchymosis however there is definitely swelling to the second and third metacarpal joints. Neurological:      Mental Status: He is alert. Diagnostic Study Results     Vital Signs   Visit Vitals  BP (!) 147/80   Pulse 79   Temp 98.7 °F (37.1 °C)   Resp 18   Ht 5' 11\" (1.803 m)   Wt 105.2 kg (232 lb)   SpO2 96%   BMI 32.36 kg/m²      EKG:  Labs:   Imaging:     Medical Decision Making     ED Course: Progress Notes, Reevaluation, and Consults:    I will be the provider of record for this patient. Provider Notes (Medical Decision Making): FINDINGS:     Scapholunate and lunotriquetral intervals preserved. Ulnar neutral variance. No  osseous coalition. No visualized acute fracture. Mild osseous degenerative  changes noted. Chronic appearing ossific density at the fifth [facility] which is  presumably degenerative given adjacent subchondral cystic change. Degenerative  cysts noted within the capitate. There are flexion deformities of the second  through fifth digits presumably positioning. Correlate clinically. Soft tissue  swelling noted primarily along the dorsal hand. Cystic change noted along the  metacarpal heads. This is primarily seen along the second and fourth digits.     IMPRESSION     No definite acute fracture.     Chronic appearing ossific density at the fifth [facility].      Osseous degenerative changes.     Atherosclerosis.     Soft tissue swelling. Negative for fracture.   Will place in a splint and referred to Ortho hand. Discussed with the patient       Diagnosis     Clinical Impression: No diagnosis found. Disposition:        Patient's Medications   Start Taking    No medications on file   Continue Taking    ALBUTEROL SULFATE 90 MCG/ACTUATION AEBS    Take 1 Puff by inhalation every four to six (4-6) hours as needed for Wheezing. ASPIRIN DELAYED-RELEASE 81 MG TABLET    Take 1 Tab by mouth daily. ATORVASTATIN (LIPITOR) 80 MG TABLET    TAKE ONE TABLET BY MOUTH EVERY EVENING    BLOOD-GLUCOSE METER MONITORING KIT    As directed    CLOPIDOGREL (PLAVIX) 75 MG TAB    TAKE ONE TABLET BY MOUTH DAILY    GLUCAGON (GLUCAGEN) 1 MG INJECTION    1 mL by IntraMUSCular route as needed for Hypoglycemia. GLUCOSE BLOOD VI TEST STRIPS (BLOOD GLUCOSE TEST) STRIP    As directed    INSULIN LISPRO (HUMALOG) 100 UNIT/ML INJECTION    Very Insulin Resistant  For Blood Sugar (mg/dL) of:              Less than 150 =   0 units  150 -199 =   3 units  200 -249 =   6 units  250 -299 =   9 units  300 -349 =   12 units  350 and above =   15 units  Initiate Hypoglycemic protocol if blood glucose is <70 mg/dL. Fast Acting - Administer Immediately - or within 15 minutes of start of meal, if mealtime coverage. INSULIN SYRINGE-NEEDLE U-100 0.3 ML 31 GAUGE X 5/16\" SYRG    TID as needed    LANCETS MISC    As directed    LISINOPRIL (PRINIVIL, ZESTRIL) 10 MG TABLET    Take 1 Tab by mouth daily. METFORMIN (GLUCOPHAGE) 1,000 MG TABLET    Take 1 Tab by mouth two (2) times daily (with meals). METOPROLOL TARTRATE (LOPRESSOR) 25 MG TABLET    Take 1 Tab by mouth every twelve (12) hours. MOMETASONE-FORMOTEROL (DULERA) 100-5 MCG/ACTUATION HFAA HFA INHALER    Take 2 Puffs by inhalation two (2) times a day.     NITROGLYCERIN (NITROLINGUAL) 400 MCG/SPRAY SPRAY    1 Spray by SubLINGual route every five (5) minutes as needed for Chest Pain (do not exceed more than 3 sprays in one day, seek acute medical care if chest pain continues). OTHER    CMP in 2 week  Dx: elevated AST  Fax results to Dr. Jamila Abdalla    TIOTROPIUM (SPIRIVA WITH HANDIHALER) 18 MCG INHALATION CAPSULE    Take 1 Cap by inhalation daily.     VARENICLINE (CHANTIX STARTER HUMPHREY) 0.5 MG (11)- 1 MG (42) DSPK    0.5 mg tab daily x 3 days, 0.5 mg tab bid x 4 days   These Medications have changed    No medications on file   Stop Taking    No medications on file

## 2021-10-05 ENCOUNTER — HOSPITAL ENCOUNTER (EMERGENCY)
Age: 61
Discharge: LEFT BEFORE DISCHARGE | End: 2021-10-05
Attending: STUDENT IN AN ORGANIZED HEALTH CARE EDUCATION/TRAINING PROGRAM
Payer: MEDICARE

## 2021-10-05 ENCOUNTER — APPOINTMENT (OUTPATIENT)
Dept: CT IMAGING | Age: 61
End: 2021-10-05
Attending: PHYSICIAN ASSISTANT
Payer: MEDICARE

## 2021-10-05 VITALS
RESPIRATION RATE: 18 BRPM | HEART RATE: 88 BPM | SYSTOLIC BLOOD PRESSURE: 154 MMHG | TEMPERATURE: 98.2 F | DIASTOLIC BLOOD PRESSURE: 88 MMHG | OXYGEN SATURATION: 98 %

## 2021-10-05 DIAGNOSIS — R07.81 RIB PAIN: Primary | ICD-10-CM
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
## 2021-10-05 LAB
ALBUMIN SERPL-MCNC: 3.6 G/DL (ref 3.4–5)
ALBUMIN/GLOB SERPL: 1 {RATIO} (ref 0.8–1.7)
ALP SERPL-CCNC: 85 U/L (ref 45–117)
ALT SERPL-CCNC: 19 U/L (ref 16–61)
ANION GAP SERPL CALC-SCNC: 6 MMOL/L (ref 3–18)
AST SERPL-CCNC: 13 U/L (ref 10–38)
BASOPHILS # BLD: 0 K/UL (ref 0–0.1)
BASOPHILS NFR BLD: 0 % (ref 0–2)
BILIRUB SERPL-MCNC: 0.6 MG/DL (ref 0.2–1)
BUN SERPL-MCNC: 16 MG/DL (ref 7–18)
BUN/CREAT SERPL: 15 (ref 12–20)
CALCIUM SERPL-MCNC: 9.2 MG/DL (ref 8.5–10.1)
CHLORIDE SERPL-SCNC: 103 MMOL/L (ref 100–111)
CO2 SERPL-SCNC: 33 MMOL/L (ref 21–32)
CREAT SERPL-MCNC: 1.07 MG/DL (ref 0.6–1.3)
DIFFERENTIAL METHOD BLD: NORMAL
EOSINOPHIL # BLD: 0.4 K/UL (ref 0–0.4)
EOSINOPHIL NFR BLD: 5 % (ref 0–5)
ERYTHROCYTE [DISTWIDTH] IN BLOOD BY AUTOMATED COUNT: 12.8 % (ref 11.6–14.5)
GLOBULIN SER CALC-MCNC: 3.5 G/DL (ref 2–4)
GLUCOSE SERPL-MCNC: 201 MG/DL (ref 74–99)
HCT VFR BLD AUTO: 43 % (ref 36–48)
HGB BLD-MCNC: 14.1 G/DL (ref 13–16)
INR PPP: 0.9 (ref 0.8–1.2)
LYMPHOCYTES # BLD: 2.3 K/UL (ref 0.9–3.6)
LYMPHOCYTES NFR BLD: 26 % (ref 21–52)
MCH RBC QN AUTO: 29 PG (ref 24–34)
MCHC RBC AUTO-ENTMCNC: 32.8 G/DL (ref 31–37)
MCV RBC AUTO: 88.5 FL (ref 78–100)
MONOCYTES # BLD: 0.6 K/UL (ref 0.05–1.2)
MONOCYTES NFR BLD: 7 % (ref 3–10)
NEUTS SEG # BLD: 5.3 K/UL (ref 1.8–8)
NEUTS SEG NFR BLD: 61 % (ref 40–73)
PLATELET # BLD AUTO: 223 K/UL (ref 135–420)
PMV BLD AUTO: 10.3 FL (ref 9.2–11.8)
POTASSIUM SERPL-SCNC: 4.3 MMOL/L (ref 3.5–5.5)
PROT SERPL-MCNC: 7.1 G/DL (ref 6.4–8.2)
PROTHROMBIN TIME: 12 SEC (ref 11.5–15.2)
RBC # BLD AUTO: 4.86 M/UL (ref 4.35–5.65)
SODIUM SERPL-SCNC: 142 MMOL/L (ref 136–145)
WBC # BLD AUTO: 8.7 K/UL (ref 4.6–13.2)

## 2021-10-05 PROCEDURE — 85610 PROTHROMBIN TIME: CPT

## 2021-10-05 PROCEDURE — 99281 EMR DPT VST MAYX REQ PHY/QHP: CPT

## 2021-10-05 PROCEDURE — 99282 EMERGENCY DEPT VISIT SF MDM: CPT

## 2021-10-05 PROCEDURE — 96375 TX/PRO/DX INJ NEW DRUG ADDON: CPT

## 2021-10-05 PROCEDURE — 80053 COMPREHEN METABOLIC PANEL: CPT

## 2021-10-05 PROCEDURE — 74011250636 HC RX REV CODE- 250/636: Performed by: PHYSICIAN ASSISTANT

## 2021-10-05 PROCEDURE — 85025 COMPLETE CBC W/AUTO DIFF WBC: CPT

## 2021-10-05 PROCEDURE — 96374 THER/PROPH/DIAG INJ IV PUSH: CPT

## 2021-10-05 RX ORDER — HYDROMORPHONE HYDROCHLORIDE 1 MG/ML
0.5 INJECTION, SOLUTION INTRAMUSCULAR; INTRAVENOUS; SUBCUTANEOUS ONCE
Status: COMPLETED | OUTPATIENT
Start: 2021-10-05 | End: 2021-10-05

## 2021-10-05 RX ORDER — ONDANSETRON 2 MG/ML
4 INJECTION INTRAMUSCULAR; INTRAVENOUS
Status: COMPLETED | OUTPATIENT
Start: 2021-10-05 | End: 2021-10-05

## 2021-10-05 RX ADMIN — ONDANSETRON 4 MG: 2 INJECTION INTRAMUSCULAR; INTRAVENOUS at 13:58

## 2021-10-05 RX ADMIN — HYDROMORPHONE HYDROCHLORIDE 0.5 MG: 1 INJECTION, SOLUTION INTRAMUSCULAR; INTRAVENOUS; SUBCUTANEOUS at 13:58

## 2021-10-05 NOTE — ED PROVIDER NOTES
EMERGENCY DEPARTMENT HISTORY AND PHYSICAL EXAM    2:53 PM      Date: 10/5/2021  Patient Name: Jaden Gil    History of Presenting Illness     Chief Complaint   Patient presents with    Flank Pain       History Provided By: Patient    Additional History (Context): Jaden Gil is a 62 y.o. male with hx of CAD, COPD, DM, and other noted PMH who presents with complaint of right posterior rib and flank pain after injury that occurred 1 week ago. Patient notes he fell ~9 feet off a ladder and hit his back against a stump. Patient denies head injury, loss of consciousness, neck pain, chest pain, shortness of breath, cough, abdominal pain, low back pain, nausea or vomiting, dysuria, hematuria, numbness/tingling, extremity weakness, inability to ambulate. Patient notes he has tried over-the-counter medication for symptoms without relief. PCP: Leo Long MD    Current Facility-Administered Medications   Medication Dose Route Frequency Provider Last Rate Last Admin    iopamidoL (ISOVUE 300) 61 % contrast injection  mL   mL IntraVENous RAD ONCE Micaela Burlington, DO         Current Outpatient Medications   Medication Sig Dispense Refill    clopidogreL (PLAVIX) 75 mg tab TAKE ONE TABLET BY MOUTH DAILY 90 Tablet 3    atorvastatin (LIPITOR) 80 mg tablet TAKE ONE TABLET BY MOUTH EVERY EVENING 90 Tablet 3    varenicline (CHANTIX STARTER HUMPHREY) 0.5 mg (11)- 1 mg (42) DsPk 0.5 mg tab daily x 3 days, 0.5 mg tab bid x 4 days 1 Dose Pack 1    albuterol sulfate 90 mcg/actuation aebs Take 1 Puff by inhalation every four to six (4-6) hours as needed for Wheezing. 1 Each 0    metoprolol tartrate (LOPRESSOR) 25 mg tablet Take 1 Tab by mouth every twelve (12) hours. 60 Tab 6    aspirin delayed-release 81 mg tablet Take 1 Tab by mouth daily. 30 Tab 0    glucagon (GLUCAGEN) 1 mg injection 1 mL by IntraMUSCular route as needed for Hypoglycemia.  1 Vial 0    insulin lispro (HUMALOG) 100 unit/mL injection Very Insulin Resistant  For Blood Sugar (mg/dL) of:              Less than 150 =   0 units  150 -199 =   3 units  200 -249 =   6 units  250 -299 =   9 units  300 -349 =   12 units  350 and above =   15 units  Initiate Hypoglycemic protocol if blood glucose is <70 mg/dL. Fast Acting - Administer Immediately - or within 15 minutes of start of meal, if mealtime coverage. 1 Vial 0    nitroglycerin (NITROLINGUAL) 400 mcg/spray spray 1 Spray by SubLINGual route every five (5) minutes as needed for Chest Pain (do not exceed more than 3 sprays in one day, seek acute medical care if chest pain continues). 1 Bottle 0    Insulin Syringe-Needle U-100 0.3 mL 31 gauge x 5/16\" syrg TID as needed 100 Syringe 0    lancets misc As directed 100 Each 0    Blood-Glucose Meter monitoring kit As directed 1 Kit 0    glucose blood VI test strips (blood glucose test) strip As directed 60 Strip 0    OTHER CMP in 2 week  Dx: elevated AST  Fax results to Dr. Levi Borja 1 Each 0    metFORMIN (GLUCOPHAGE) 1,000 mg tablet Take 1 Tab by mouth two (2) times daily (with meals). 60 Tab 0    lisinopril (PRINIVIL, ZESTRIL) 10 mg tablet Take 1 Tab by mouth daily. 30 Tab 0    tiotropium (SPIRIVA WITH HANDIHALER) 18 mcg inhalation capsule Take 1 Cap by inhalation daily. 30 Cap 0    mometasone-formoterol (DULERA) 100-5 mcg/actuation HFAA HFA inhaler Take 2 Puffs by inhalation two (2) times a day. 2 Inhaler 0       Past History     Past Medical History:  Past Medical History:   Diagnosis Date    COPD [facility]chronic obstructive [facility]) [facility] [address]     Diabetes ([facility] [address]     Emphysema     H/O cardiovascular stress test 2012    negative    Hypertension     MI (myocardial infarction) ([facility] [address]        Past Surgical History:  No past surgical history on file.     Family History:  Family History   Problem Relation Age of Onset    Diabetes Mother        Social History:  Social History     Tobacco Use    Smoking status: Current Every Day Smoker     Packs/day: 0.50     Types: Cigarettes    Smokeless tobacco: Never Used   Substance Use Topics    Alcohol use: Not Currently    Drug use: Yes     Types: Marijuana       Allergies:  No Known Allergies      Review of Systems       Review of Systems   Constitutional: Negative for chills and fever. Respiratory: Negative for shortness of breath. Cardiovascular: Negative for chest pain. Gastrointestinal: Negative for abdominal pain, nausea and vomiting. Genitourinary: Positive for flank pain. Skin: Negative for rash. Neurological: Negative for weakness. All other systems reviewed and are negative. Physical Exam     Visit Vitals  BP (!) 154/88   Pulse 88   Temp 98.2 °F (36.8 °C)   Resp 18   SpO2 98%         Physical Exam  Vitals and nursing note reviewed. Constitutional:       General: He is not in acute distress. Appearance: Normal appearance. He is well-developed. He is not ill-appearing, toxic-appearing or diaphoretic. HENT:      Head: Normocephalic and atraumatic. Cardiovascular:      Rate and Rhythm: Normal rate and regular rhythm. Heart sounds: Normal heart sounds. No murmur heard. No friction rub. No gallop. Pulmonary:      Effort: Pulmonary effort is normal. No respiratory distress. Breath sounds: Normal breath sounds. No wheezing or rales. Abdominal:      General: Abdomen is flat. There is no distension. Palpations: Abdomen is soft. Tenderness: There is no abdominal tenderness. There is no guarding or rebound. Musculoskeletal:         General: Normal range of motion. Arms:       Cervical back: Normal, normal range of motion and neck supple. Thoracic back: Normal.      Lumbar back: Normal.      Comments: Gait intact    Skin:     General: Skin is warm. Findings: No rash. Neurological:      General: No focal deficit present. Mental Status: He is alert and oriented to person, place, and time. Cranial Nerves: No cranial nerve deficit. Sensory: No sensory deficit. Motor: No weakness. Gait: Gait normal.           Diagnostic Study Results     Labs -  Recent Results (from the past 12 hour(s))   CBC WITH AUTOMATED DIFF    Collection Time: 10/05/21  1:26 PM   Result Value Ref Range    WBC 8.7 4.6 - 13.2 K/uL    RBC 4.86 4.35 - 5.65 M/uL    HGB 14.1 13.0 - 16.0 g/dL    HCT 43.0 36.0 - 48.0 %    MCV 88.5 78.0 - 100.0 FL    MCH 29.0 24.0 - 34.0 PG    MCHC 32.8 31.0 - 37.0 g/dL    RDW 12.8 11.6 - 14.5 %    PLATELET 018 857 - 608 K/uL    MPV 10.3 9.2 - 11.8 FL    NEUTROPHILS 61 40 - 73 %    LYMPHOCYTES 26 21 - 52 %    MONOCYTES 7 3 - 10 %    EOSINOPHILS 5 0 - 5 %    BASOPHILS 0 0 - 2 %    ABS. NEUTROPHILS 5.3 1.8 - 8.0 K/UL    ABS. LYMPHOCYTES 2.3 0.9 - 3.6 K/UL    ABS. MONOCYTES 0.6 0.05 - 1.2 K/UL    ABS. EOSINOPHILS 0.4 0.0 - 0.4 K/UL    ABS. BASOPHILS 0.0 0.0 - 0.1 K/UL    DF AUTOMATED     METABOLIC PANEL, COMPREHENSIVE    Collection Time: 10/05/21  1:26 PM   Result Value Ref Range    Sodium 142 136 - 145 mmol/L    Potassium 4.3 3.5 - 5.5 mmol/L    Chloride 103 100 - 111 mmol/L    CO2 33 (H) 21 - 32 mmol/L    Anion gap 6 3.0 - 18 mmol/L    Glucose 201 (H) 74 - 99 mg/dL    BUN 16 7.0 - 18 MG/DL    Creatinine 1.07 0.6 - 1.3 MG/DL    BUN/Creatinine ratio 15 12 - 20      GFR est AA >60 >60 ml/min/1.73m2    GFR est non-AA >60 >60 ml/min/1.73m2    Calcium 9.2 8.5 - 10.1 MG/DL    Bilirubin, total 0.6 0.2 - 1.0 MG/DL    ALT (SGPT) 19 16 - 61 U/L    AST (SGOT) 13 10 - 38 U/L    Alk.  phosphatase 85 45 - 117 U/L    Protein, total 7.1 6.4 - 8.2 g/dL    Albumin 3.6 3.4 - 5.0 g/dL    Globulin 3.5 2.0 - 4.0 g/dL    A-G Ratio 1.0 0.8 - 1.7     PROTHROMBIN TIME + INR    Collection Time: 10/05/21  1:26 PM   Result Value Ref Range    Prothrombin time 12.0 11.5 - 15.2 sec    INR 0.9 0.8 - 1.2         Radiologic Studies -   CT HEAD WO CONT    (Results Pending)   CT SPINE CERV WO CONT    (Results Pending)   CT CHEST ABD PELV W CONT    (Results Pending) Medical Decision Making   I am the first provider for this patient. I reviewed the vital signs, available nursing notes, past medical history, past surgical history, family history and social history. Vital Signs-Reviewed the patient's vital signs. Pulse Oximetry Analysis -  98% on room air     Records Reviewed: Nursing Notes and Old Medical Records (Time of Review: 2:53 PM)    ED Course: Progress Notes, Reevaluation, and Consults:  2:45 PM: Updated patient we are pending CT. Notes pain has improved, resting comfortably. 3:40 PM: Called CT, notes they will send transport. Updated patient. 4:20 PM: Pt notified nurse that he cannot wait any longer. Left prior to CT scans and further assessment. Diagnosis     Clinical Impression:   1. Rib pain    2. Fall, initial encounter        Disposition: eloped     Follow-up Information    None          Patient's Medications   Start Taking    No medications on file   Continue Taking    ALBUTEROL SULFATE 90 MCG/ACTUATION AEBS    Take 1 Puff by inhalation every four to six (4-6) hours as needed for Wheezing. ASPIRIN DELAYED-RELEASE 81 MG TABLET    Take 1 Tab by mouth daily. ATORVASTATIN (LIPITOR) 80 MG TABLET    TAKE ONE TABLET BY MOUTH EVERY EVENING    BLOOD-GLUCOSE METER MONITORING KIT    As directed    CLOPIDOGREL (PLAVIX) 75 MG TAB    TAKE ONE TABLET BY MOUTH DAILY    GLUCAGON (GLUCAGEN) 1 MG INJECTION    1 mL by IntraMUSCular route as needed for Hypoglycemia. GLUCOSE BLOOD VI TEST STRIPS (BLOOD GLUCOSE TEST) STRIP    As directed    INSULIN LISPRO (HUMALOG) 100 UNIT/ML INJECTION    Very Insulin Resistant  For Blood Sugar (mg/dL) of:              Less than 150 =   0 units  150 -199 =   3 units  200 -249 =   6 units  250 -299 =   9 units  300 -349 =   12 units  350 and above =   15 units  Initiate Hypoglycemic protocol if blood glucose is <70 mg/dL.  Fast Acting - Administer Immediately - or within 15 minutes of start of meal, if mealtime coverage. INSULIN SYRINGE-NEEDLE U-100 0.3 ML 31 GAUGE X 5/16\" SYRG    TID as needed    LANCETS MISC    As directed    LISINOPRIL (PRINIVIL, ZESTRIL) 10 MG TABLET    Take 1 Tab by mouth daily. METFORMIN (GLUCOPHAGE) 1,000 MG TABLET    Take 1 Tab by mouth two (2) times daily (with meals). METOPROLOL TARTRATE (LOPRESSOR) 25 MG TABLET    Take 1 Tab by mouth every twelve (12) hours. MOMETASONE-FORMOTEROL (DULERA) 100-5 MCG/ACTUATION HFAA HFA INHALER    Take 2 Puffs by inhalation two (2) times a day. NITROGLYCERIN (NITROLINGUAL) 400 MCG/SPRAY SPRAY    1 Spray by SubLINGual route every five (5) minutes as needed for Chest Pain (do not exceed more than 3 sprays in one day, seek acute medical care if chest pain continues). OTHER    CMP in 2 week  Dx: elevated AST  Fax results to Dr. Karla Nino    TIOTROPIUM (SPIRIVA WITH HANDIHALER) 18 MCG INHALATION CAPSULE    Take 1 Cap by inhalation daily. VARENICLINE (CHANTIX STARTER HUMPHREY) 0.5 MG (11)- 1 MG (42) DSPK    0.5 mg tab daily x 3 days, 0.5 mg tab bid x 4 days   These Medications have changed    No medications on file   Stop Taking    No medications on file       Dictation disclaimer:  Please note that this dictation was completed with Xfire, the [employer] voice recognition software. Quite often unanticipated grammatical, syntax, homophones, and other interpretive errors are inadvertently transcribed by the computer software. Please disregard these errors. Please excuse any errors that have escaped final proofreading.

## 2021-10-05 NOTE — ED TRIAGE NOTES
Patient reports right ribs and flank pain x 7 days since he fell 9ft  off a ladder and hit a stump. Reports recent back surgery.  Denies any urination changes

## 2021-10-06 ENCOUNTER — HOSPITAL ENCOUNTER (EMERGENCY)
Age: 61
Discharge: HOSPICE | End: 2021-10-06
Attending: STUDENT IN AN ORGANIZED HEALTH CARE EDUCATION/TRAINING PROGRAM
Payer: MEDICARE

## 2021-10-06 ENCOUNTER — APPOINTMENT (OUTPATIENT)
Dept: CT IMAGING | Age: 61
End: 2021-10-06
Attending: STUDENT IN AN ORGANIZED HEALTH CARE EDUCATION/TRAINING PROGRAM
Payer: MEDICARE

## 2021-10-06 VITALS
OXYGEN SATURATION: 93 % | TEMPERATURE: 98.7 F | SYSTOLIC BLOOD PRESSURE: 136 MMHG | BODY MASS INDEX: 34.36 KG/M2 | HEIGHT: 69 IN | HEART RATE: 88 BPM | DIASTOLIC BLOOD PRESSURE: 71 MMHG | WEIGHT: 232 LBS | RESPIRATION RATE: 20 BRPM
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
## 2021-10-06 DIAGNOSIS — R07.81 RIB PAIN ON RIGHT SIDE: Primary | ICD-10-CM

## 2021-10-06 LAB
ALBUMIN SERPL-MCNC: 3.5 G/DL (ref 3.4–5)
ALBUMIN/GLOB SERPL: 1 {RATIO} (ref 0.8–1.7)
ALP SERPL-CCNC: 75 U/L (ref 45–117)
ALT SERPL-CCNC: 16 U/L (ref 16–61)
ANION GAP SERPL CALC-SCNC: 3 MMOL/L (ref 3–18)
AST SERPL-CCNC: 18 U/L (ref 10–38)
BASOPHILS # BLD: 0 K/UL (ref 0–0.1)
BASOPHILS NFR BLD: 0 % (ref 0–2)
BILIRUB SERPL-MCNC: 0.5 MG/DL (ref 0.2–1)
BUN SERPL-MCNC: 12 MG/DL (ref 7–18)
BUN/CREAT SERPL: 14 (ref 12–20)
CALCIUM SERPL-MCNC: 9.3 MG/DL (ref 8.5–10.1)
CHLORIDE SERPL-SCNC: 103 MMOL/L (ref 100–111)
CO2 SERPL-SCNC: 35 MMOL/L (ref 21–32)
CREAT SERPL-MCNC: 0.86 MG/DL (ref 0.6–1.3)
DIFFERENTIAL METHOD BLD: NORMAL
EOSINOPHIL # BLD: 0.3 K/UL (ref 0–0.4)
EOSINOPHIL NFR BLD: 3 % (ref 0–5)
ERYTHROCYTE [DISTWIDTH] IN BLOOD BY AUTOMATED COUNT: 12.9 % (ref 11.6–14.5)
GLOBULIN SER CALC-MCNC: 3.4 G/DL (ref 2–4)
GLUCOSE SERPL-MCNC: 121 MG/DL (ref 74–99)
HCT VFR BLD AUTO: 43.8 % (ref 36–48)
HGB BLD-MCNC: 14.2 G/DL (ref 13–16)
LYMPHOCYTES # BLD: 2.5 K/UL (ref 0.9–3.6)
LYMPHOCYTES NFR BLD: 26 % (ref 21–52)
MCH RBC QN AUTO: 28.7 PG (ref 24–34)
MCHC RBC AUTO-ENTMCNC: 32.4 G/DL (ref 31–37)
MCV RBC AUTO: 88.7 FL (ref 78–100)
MONOCYTES # BLD: 0.7 K/UL (ref 0.05–1.2)
MONOCYTES NFR BLD: 7 % (ref 3–10)
NEUTS SEG # BLD: 6 K/UL (ref 1.8–8)
NEUTS SEG NFR BLD: 63 % (ref 40–73)
PLATELET # BLD AUTO: 233 K/UL (ref 135–420)
PMV BLD AUTO: 10.7 FL (ref 9.2–11.8)
POTASSIUM SERPL-SCNC: 3.9 MMOL/L (ref 3.5–5.5)
PROT SERPL-MCNC: 6.9 G/DL (ref 6.4–8.2)
RBC # BLD AUTO: 4.94 M/UL (ref 4.35–5.65)
SODIUM SERPL-SCNC: 141 MMOL/L (ref 136–145)
WBC # BLD AUTO: 9.5 K/UL (ref 4.6–13.2)

## 2021-10-06 PROCEDURE — 96374 THER/PROPH/DIAG INJ IV PUSH: CPT

## 2021-10-06 PROCEDURE — 71260 CT THORAX DX C+: CPT

## 2021-10-06 PROCEDURE — 74011250636 HC RX REV CODE- 250/636: Performed by: STUDENT IN AN ORGANIZED HEALTH CARE EDUCATION/TRAINING PROGRAM

## 2021-10-06 PROCEDURE — 85025 COMPLETE CBC W/AUTO DIFF WBC: CPT

## 2021-10-06 PROCEDURE — 74011000636 HC RX REV CODE- 636: Performed by: STUDENT IN AN ORGANIZED HEALTH CARE EDUCATION/TRAINING PROGRAM

## 2021-10-06 PROCEDURE — 96375 TX/PRO/DX INJ NEW DRUG ADDON: CPT

## 2021-10-06 PROCEDURE — 70450 CT HEAD/BRAIN W/O DYE: CPT

## 2021-10-06 PROCEDURE — 99283 EMERGENCY DEPT VISIT LOW MDM: CPT

## 2021-10-06 PROCEDURE — 72125 CT NECK SPINE W/O DYE: CPT

## 2021-10-06 PROCEDURE — 80053 COMPREHEN METABOLIC PANEL: CPT

## 2021-10-06 RX ORDER — LIDOCAINE 50 MG/G
PATCH TOPICAL
Qty: 10 EACH | Refills: 0 | Status: SHIPPED | OUTPATIENT
Start: 2021-10-06 | End: 2022-10-27

## 2021-10-06 RX ORDER — ONDANSETRON 2 MG/ML
4 INJECTION INTRAMUSCULAR; INTRAVENOUS ONCE
Status: COMPLETED | OUTPATIENT
Start: 2021-10-06 | End: 2021-10-06

## 2021-10-06 RX ORDER — CYCLOBENZAPRINE HCL 10 MG
10 TABLET ORAL
Qty: 20 TABLET | Refills: 0 | Status: SHIPPED | OUTPATIENT
Start: 2021-10-06 | End: 2022-02-14

## 2021-10-06 RX ORDER — CYCLOBENZAPRINE HCL 10 MG
10 TABLET ORAL
Qty: 20 TABLET | Refills: 0 | Status: SHIPPED | OUTPATIENT
Start: 2021-10-06 | End: 2021-10-06 | Stop reason: SDUPTHER

## 2021-10-06 RX ORDER — HYDROMORPHONE HYDROCHLORIDE 1 MG/ML
1 INJECTION, SOLUTION INTRAMUSCULAR; INTRAVENOUS; SUBCUTANEOUS ONCE
Status: COMPLETED | OUTPATIENT
Start: 2021-10-06 | End: 2021-10-06

## 2021-10-06 RX ADMIN — IOPAMIDOL 100 ML: 612 INJECTION, SOLUTION INTRAVENOUS at 17:02

## 2021-10-06 RX ADMIN — ONDANSETRON 4 MG: 2 INJECTION INTRAMUSCULAR; INTRAVENOUS at 16:18

## 2021-10-06 RX ADMIN — HYDROMORPHONE HYDROCHLORIDE 1 MG: 1 INJECTION, SOLUTION INTRAMUSCULAR; INTRAVENOUS; SUBCUTANEOUS at 16:19

## 2021-10-06 NOTE — ED NOTES
EMERGENCY DEPARTMENT HISTORY AND PHYSICAL EXAM    3:30 PM    Date: 10/6/2021  Patient Name: Brandon Sanders    History of Presenting Illness     Fall from ladder    History Provided By: Patient  Location/Duration/Severity/Modifying factors   HPI   Brandon Sanders is a 62 y.o. male with asthma history of COPD presenting for right-sided posterior rib and back pain. Patient was doing tree work 9 days ago when a tree fell on a ladder that he was standing on. Was approximately 8 feet high, the tree knocked down the ladder. He fell landing on a stump that was about 4 feet tall, hitting his right posterior back. He did not hit his head or lose consciousness. He complains of pain on that area, throbbing improved by Dilaudid that he received yesterday in the emergency department. He left without getting his CAT scans performed yesterday because it was taking too long. He reports mild nausea but no vomiting. Mild intermittent headaches but no neurologic complaints. No C-spine pain or midline back pain. No other medical complaints. PCP: Christina Blake MD    Current Outpatient Medications   Medication Sig Dispense Refill    lidocaine (Lidoderm) 5 % Apply patch to the affected area for 12 hours a day and remove for 12 hours a day. 10 Each 0    cyclobenzaprine (FLEXERIL) 10 mg tablet Take 1 Tablet by mouth three (3) times daily as needed for Muscle Spasm(s). 20 Tablet 0    clopidogreL (PLAVIX) 75 mg tab TAKE ONE TABLET BY MOUTH DAILY 90 Tablet 3    atorvastatin (LIPITOR) 80 mg tablet TAKE ONE TABLET BY MOUTH EVERY EVENING 90 Tablet 3    varenicline (CHANTIX STARTER HUMPHREY) 0.5 mg (11)- 1 mg (42) DsPk 0.5 mg tab daily x 3 days, 0.5 mg tab bid x 4 days 1 Dose Pack 1    albuterol sulfate 90 mcg/actuation aebs Take 1 Puff by inhalation every four to six (4-6) hours as needed for Wheezing. 1 Each 0    metoprolol tartrate (LOPRESSOR) 25 mg tablet Take 1 Tab by mouth every twelve (12) hours.  60 Tab 6    aspirin delayed-release 81 mg tablet Take 1 Tab by mouth daily. 30 Tab 0    glucagon (GLUCAGEN) 1 mg injection 1 mL by IntraMUSCular route as needed for Hypoglycemia. 1 Vial 0    insulin lispro (HUMALOG) 100 unit/mL injection Very Insulin Resistant  For Blood Sugar (mg/dL) of:              Less than 150 =   0 units  150 -199 =   3 units  200 -249 =   6 units  250 -299 =   9 units  300 -349 =   12 units  350 and above =   15 units  Initiate Hypoglycemic protocol if blood glucose is <70 mg/dL. Fast Acting - Administer Immediately - or within 15 minutes of start of meal, if mealtime coverage. 1 Vial 0    nitroglycerin (NITROLINGUAL) 400 mcg/spray spray 1 Spray by SubLINGual route every five (5) minutes as needed for Chest Pain (do not exceed more than 3 sprays in one day, seek acute medical care if chest pain continues). 1 Bottle 0    Insulin Syringe-Needle U-100 0.3 mL 31 gauge x 5/16\" syrg TID as needed 100 Syringe 0    lancets misc As directed 100 Each 0    Blood-Glucose Meter monitoring kit As directed 1 Kit 0    glucose blood VI test strips (blood glucose test) strip As directed 60 Strip 0    OTHER CMP in 2 week  Dx: elevated AST  Fax results to Dr. Brianda Tiwari 1 Each 0    metFORMIN (GLUCOPHAGE) 1,000 mg tablet Take 1 Tab by mouth two (2) times daily (with meals). 60 Tab 0    lisinopril (PRINIVIL, ZESTRIL) 10 mg tablet Take 1 Tab by mouth daily. 30 Tab 0    tiotropium (SPIRIVA WITH HANDIHALER) 18 mcg inhalation capsule Take 1 Cap by inhalation daily. 30 Cap 0    mometasone-formoterol (DULERA) 100-5 mcg/actuation HFAA HFA inhaler Take 2 Puffs by inhalation two (2) times a day.  2 Inhaler 0       Past History     Past Medical History:  Past Medical History:   Diagnosis Date    [facility]     Diabetes [facility]     Emphysema     H/O cardiovascular stress test 2012    negative    Hypertension     MI (myocardial infarction) ([facility]        Past Surgical History:  No past surgical history on file. Family History:  Family History   Problem Relation Age of Onset    Diabetes Mother        Social History:  Social History     Tobacco Use    Smoking status: Current Every Day Smoker     Packs/day: 0.50     Types: Cigarettes    Smokeless tobacco: Never Used   Substance Use Topics    Alcohol use: Not Currently    Drug use: Yes     Types: Marijuana       Allergies:  No Known Allergies    I reviewed and confirmed the above information with patient and updated as necessary. Review of Systems     Review of Systems   Constitutional: Negative for diaphoresis and fever. HENT: Negative for ear pain and sore throat. Eyes:        No acute change in vision   Respiratory: Negative for cough and shortness of breath. Cardiovascular: Negative for chest pain and leg swelling. Gastrointestinal: Negative for abdominal pain and vomiting. Genitourinary: Negative for dysuria. Musculoskeletal: Positive for arthralgias and back pain. Negative for neck pain. Skin: Negative for wound. Neurological: Negative for weakness and headaches. Physical Exam     Visit Vitals  /71 (BP 1 Location: Left upper arm, BP Patient Position: At rest)   Pulse 88   Temp 98.7 °F (37.1 °C)   Resp 20   Ht 5' 9\" (1.753 m)   Wt 105.2 kg (232 lb)   SpO2 93%   BMI 34.26 kg/m²       Physical Exam  Vitals and nursing note reviewed. Constitutional:       Appearance: Normal appearance. He is not ill-appearing. Comments: Adult male sitting comfortably in a hospital chair. Independently ambulatory. HENT:      Mouth/Throat:      Mouth: Mucous membranes are moist.   Eyes:      Pupils: Pupils are equal, round, and reactive to light. Cardiovascular:      Rate and Rhythm: Normal rate and regular rhythm. Pulses: Normal pulses. Heart sounds: Normal heart sounds. Pulmonary:      Effort: Pulmonary effort is normal.      Breath sounds: Normal breath sounds.    Abdominal:      General: Abdomen is flat.      Tenderness: There is no abdominal tenderness. Musculoskeletal:         General: No swelling. Normal range of motion. Cervical back: Normal range of motion. No swelling or tenderness. Thoracic back: Tenderness present. Lumbar back: No tenderness or bony tenderness. Back:    Skin:     General: Skin is warm. Neurological:      General: No focal deficit present. Mental Status: He is alert and oriented to person, place, and time. Cranial Nerves: No cranial nerve deficit. Motor: No weakness. Diagnostic Study Results     Labs -  Recent Results (from the past 24 hour(s))   CBC WITH AUTOMATED DIFF    Collection Time: 10/06/21  3:40 PM   Result Value Ref Range    WBC 9.5 4.6 - 13.2 K/uL    RBC 4.94 4.35 - 5.65 M/uL    HGB 14.2 13.0 - 16.0 g/dL    HCT 43.8 36.0 - 48.0 %    MCV 88.7 78.0 - 100.0 FL    MCH 28.7 24.0 - 34.0 PG    MCHC 32.4 31.0 - 37.0 g/dL    RDW 12.9 11.6 - 14.5 %    PLATELET 250 662 - 513 K/uL    MPV 10.7 9.2 - 11.8 FL    NEUTROPHILS 63 40 - 73 %    LYMPHOCYTES 26 21 - 52 %    MONOCYTES 7 3 - 10 %    EOSINOPHILS 3 0 - 5 %    BASOPHILS 0 0 - 2 %    ABS. NEUTROPHILS 6.0 1.8 - 8.0 K/UL    ABS. LYMPHOCYTES 2.5 0.9 - 3.6 K/UL    ABS. MONOCYTES 0.7 0.05 - 1.2 K/UL    ABS. EOSINOPHILS 0.3 0.0 - 0.4 K/UL    ABS. BASOPHILS 0.0 0.0 - 0.1 K/UL    DF AUTOMATED     METABOLIC PANEL, COMPREHENSIVE    Collection Time: 10/06/21  3:40 PM   Result Value Ref Range    Sodium 141 136 - 145 mmol/L    Potassium 3.9 3.5 - 5.5 mmol/L    Chloride 103 100 - 111 mmol/L    CO2 35 (H) 21 - 32 mmol/L    Anion gap 3 3.0 - 18 mmol/L    Glucose 121 (H) 74 - 99 mg/dL    BUN 12 7.0 - 18 MG/DL    Creatinine 0.86 0.6 - 1.3 MG/DL    BUN/Creatinine ratio 14 12 - 20      GFR est AA >60 >60 ml/min/1.73m2    GFR est non-AA >60 >60 ml/min/1.73m2    Calcium 9.3 8.5 - 10.1 MG/DL    Bilirubin, total 0.5 0.2 - 1.0 MG/DL    ALT (SGPT) 16 16 - 61 U/L    AST (SGOT) 18 10 - 38 U/L    Alk.  phosphatase 75 45 - 117 U/L    Protein, total 6.9 6.4 - 8.2 g/dL    Albumin 3.5 3.4 - 5.0 g/dL    Globulin 3.4 2.0 - 4.0 g/dL    A-G Ratio 1.0 0.8 - 1.7           Radiologic Studies -   CT HEAD WO CONT   Final Result                  No acute intracranial abnormalities. CT SPINE CERV WO CONT   Final Result      1. No acute fracture or subluxation. 2.  Degenerative changes in the cervical spine. CT CHEST ABD PELV W CONT   Final Result               1.  No acute abnormalities in the chest, abdomen and pelvis. 2.  Per query, no convincing evidence for acute rib fracture is detected. 3. L4-L5 posterior lumbar interbody fusion. Lucent rim around the L5 pedicle   screws. Potentially suggestive of hardware loosening. 4.  4 mm intrarenal stone in the left kidney. Medical Decision Making   I am the first provider for this patient. I reviewed the vital signs, available nursing notes, past medical history, past surgical history, family history and social history. Vital Signs-Reviewed the patient's vital signs. Records Reviewed: Nursing Notes, Old Medical Records, Previous electrocardiograms, Previous Radiology Studies and Previous Laboratory Studies (Time of Review: 3:30 PM)    Provider Notes (Medical Decision Making):   MDM  81-year-old male 9 days after mechanical fall from a ladder here with right posterior back and rib pain. Consider traumatic injury such as fractured rib, traumatic pneumothorax, hemothorax, solid organ injury, splenic lack or rupture    ED Course: Progress Notes, Reevaluation, and Consults:  Patient is afebrile hemodynamically normal  Resting comfortably, not distressed  Physical exam as above    We will treat pain, obtain basic labs and trauma CT scans. Screening labs unremarkable  CT scans show no acute injuries, incidentals discussed with the patient    Patient appropriate for outpatient management by his PCP.   Discharged home in stable condition with Flexeril and Lidoderm patches to help manage his pain. He will use Motrin and Tylenol as well. Return precautions were given. Discharged home in stable condition. Procedures    Diagnosis     Clinical Impression:   1. Rib pain on right side    2. Fall from ladder, initial encounter        Disposition: Home    Follow-up Information     Follow up With Specialties Details Why Contact Info    Leeds MD [facility] Schedule an appointment as soon as possible for a visit   [address] [place] [facility] EMERGENCY DEPT Emergency Medicine  As needed, If symptoms worsen [address] [place] [id number]  [phone]           Discharge Medication List as of 10/6/2021  6:18 PM      START taking these medications    Details   lidocaine (Lidoderm) 5 % Apply patch to the affected area for 12 hours a day and remove for 12 hours a day., Normal, Disp-10 Each, R-0         CONTINUE these medications which have CHANGED    Details   cyclobenzaprine (FLEXERIL) 10 mg tablet Take 1 Tablet by mouth three (3) times daily as needed for Muscle Spasm(s). , Print, Disp-20 Tablet, R-0         CONTINUE these medications which have NOT CHANGED    Details   clopidogreL (PLAVIX) 75 mg tab TAKE ONE TABLET BY MOUTH DAILY, Normal, Disp-90 Tablet, R-3      atorvastatin (LIPITOR) 80 mg tablet TAKE ONE TABLET BY MOUTH EVERY EVENING, Normal, Disp-90 Tablet, R-3      varenicline (CHANTIX STARTER HUMPHREY) 0.5 mg (11)- 1 mg (42) DsPk 0.5 mg tab daily x 3 days, 0.5 mg tab bid x 4 days, Normal, Disp-1 Dose Pack, R-1      albuterol sulfate 90 mcg/actuation aebs Take 1 Puff by inhalation every four to six (4-6) hours as needed for Wheezing., Normal, Disp-1 Each,R-0      metoprolol tartrate (LOPRESSOR) 25 mg tablet Take 1 Tab by mouth every twelve (12) hours. , Normal, Disp-60 Tab, R-6      aspirin delayed-release 81 mg tablet Take 1 Tab by mouth daily. , Print, Disp-30 Tab, R-0      glucagon (GLUCAGEN) 1 mg injection 1 mL by IntraMUSCular route as needed for Hypoglycemia., Print, Disp-1 Vial, R-0      insulin lispro (HUMALOG) 100 unit/mL injection Very Insulin Resistant  For Blood Sugar (mg/dL) of:              Less than 150 =   0 units  150 -199 =   3 units  200 -249 =   6 units  250 -299 =   9 units  300 -349 =   12 units  350 and above =   15 units  Initiate Hypoglycemic protocol if blood gluco se is <70 mg/dL. Fast Acting - Administer Immediately - or within 15 minutes of start of meal, if mealtime coverage. , Print, Disp-1 Vial, R-0      nitroglycerin (NITROLINGUAL) 400 mcg/spray spray 1 Spray by SubLINGual route every five (5) minutes as needed for Chest Pain (do not exceed more than 3 sprays in one day, seek acute medical care if chest pain continues). , Print, Disp-1 Bottle, R-0      Insulin Syringe-Needle U-100 0.3 mL 31 gauge x 5/16\" syrg TID as needed, Print, Disp-100 Syringe, R-0      lancets misc As directed, Print, Disp-100 Each, R-0      Blood-Glucose Meter monitoring kit As directed, Print, Disp-1 Kit, R-0      glucose blood VI test strips (blood glucose test) strip As directed, Print, Disp-60 Strip, R-0      OTHER CMP in 2 week  Dx: elevated AST  Fax results to Dr. Geetha Miller, Print, Disp-1 Each, R-0      metFORMIN (GLUCOPHAGE) 1,000 mg tablet Take 1 Tab by mouth two (2) times daily (with meals). Normal, 1,000 mg, Disp-60 Tab, R-0      lisinopril (PRINIVIL, ZESTRIL) 10 mg tablet Take 1 Tab by mouth daily. Normal, 10 mg, Disp-30 Tab, R-0      tiotropium (SPIRIVA WITH HANDIHALER) 18 mcg inhalation capsule Not intended for PRN useTake 1 Cap by inhalation daily. Sample, 1 Cap = 18 mcg, Disp-30 Cap, R-0      mometasone-formoterol (DULERA) 100-5 mcg/actuation HFAA HFA inhaler Take 2 Puffs by inhalation two (2) times a day. Sample, 2 Puff = 0.2 mg, Disp-2 Inhaler, R-0             Lauretha Code, MD   Emergency Medicine   October 6, 2021, 3:30 PM     This note is dictated utilizing Dragon voice recognition software. Unfortunately this leads to occasional typographical errors using the voice recognition. I apologize in advance if the situation occurs. If questions occur please do not hesitate to contact me directly.     Kar Bullock MD

## 2021-10-06 NOTE — DISCHARGE INSTRUCTIONS
Use Flexeril and Lidoderm patches in addition to Motrin and Tylenol to help manage her symptoms at home. Call your primary care doctor to schedule a follow-up.

## 2021-10-06 NOTE — ED TRIAGE NOTES
Client fall onto tree stump from about 9ft jennifer. Landing on r. Side and flank. Limited ROM. Worse with ambulation.  Pain 7/10

## 2021-10-13 ENCOUNTER — OFFICE VISIT (OUTPATIENT)
Dept: CARDIOLOGY CLINIC | Age: 61
End: 2021-10-13
Payer: MEDICARE

## 2021-10-13 VITALS
SYSTOLIC BLOOD PRESSURE: 132 MMHG | HEART RATE: 81 BPM | HEIGHT: 69 IN | OXYGEN SATURATION: 94 % | BODY MASS INDEX: 35.25 KG/M2 | WEIGHT: 238 LBS | DIASTOLIC BLOOD PRESSURE: 80 MMHG

## 2021-10-13 DIAGNOSIS — I25.10 CORONARY ARTERY DISEASE INVOLVING NATIVE CORONARY ARTERY OF NATIVE HEART WITHOUT ANGINA PECTORIS: Primary | ICD-10-CM

## 2021-10-13 PROCEDURE — G8427 DOCREV CUR MEDS BY ELIG CLIN: HCPCS | Performed by: INTERNAL MEDICINE

## 2021-10-13 PROCEDURE — G8754 DIAS BP LESS 90: HCPCS | Performed by: INTERNAL MEDICINE

## 2021-10-13 PROCEDURE — G8417 CALC BMI ABV UP PARAM F/U: HCPCS | Performed by: INTERNAL MEDICINE

## 2021-10-13 PROCEDURE — 3017F COLORECTAL CA SCREEN DOC REV: CPT | Performed by: INTERNAL MEDICINE

## 2021-10-13 PROCEDURE — G8510 SCR DEP NEG, NO PLAN REQD: HCPCS | Performed by: INTERNAL MEDICINE

## 2021-10-13 PROCEDURE — 93000 ELECTROCARDIOGRAM COMPLETE: CPT | Performed by: INTERNAL MEDICINE

## 2021-10-13 PROCEDURE — G8752 SYS BP LESS 140: HCPCS | Performed by: INTERNAL MEDICINE

## 2021-10-13 PROCEDURE — 99214 OFFICE O/P EST MOD 30 MIN: CPT | Performed by: INTERNAL MEDICINE

## 2021-10-13 NOTE — PROGRESS NOTES
HISTORY OF PRESENT ILLNESS  Myesha Carrion is a 62 y.o. male. ASSESSMENT and PLAN    Mr. Ant Dawson presented in March 2020 with chest pains.  He was diagnosed with non-STEMI.  You underwent emergent heart catheterization coronary angiography.  His EF was noted to be 45-50% with anteroapical hypokinesis.  He had long mid 95% LAD stenosis which was successfully stented to residual 0%.  He also had diagonal branch lesion which was balloon dilated.  Unfortunately, he has smoked for over 40 years and continued to smoke at the time of his non-STEMI. He has COPD as well as insulin-dependent diabetes mellitus. Karan Bach has hypertension and hyperlipidemia. 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  He has no employees. Karan Bach does all the physical manual labor without difficulty.  When he gets tired, he slows down. Karan Bach has not had any recurrent episodes of chest pain despite vigorous physical activity.  He was seen in the emergency room on 8/24/2020 for atypical chest pain.  He was evaluated and discharged home. His echocardiogram in April 2021 revealed EF 50-55%. PA pressure was noted to be 35 mmHg. · CAD:    Clinically stable. · BP:    Well controlled. · Rhythm:    Stable sinus. · CHF:    There is no evidence of decompensated CHF noted. · Weight:     His weight today is 238 pounds. In early 2020, his baseline weight is 210 pounds. 6 months ago, he weighed 250 pounds. I have encouraged him to try to get back down about 210 pounds. · Cholesterol:   Target LDL <70. Lipitor 80. · Tobacco:    Unfortunately, he still smokes about a pack a day. · Anti-platelet:   Remains on ASA, and Plavix. I will see him back in 6 months. Thank you      Encounter Diagnoses   Name Primary?     Coronary artery disease involving native coronary artery of native heart without angina pectoris Yes     current treatment plan is effective, no change in therapy  lab results and schedule of future lab studies reviewed with patient  reviewed diet, exercise and weight control  very strongly urged to quit smoking to reduce cardiovascular risk  cardiovascular risk and specific lipid/LDL goals reviewed  use of aspirin to prevent MI and TIA's discussed      HPI   Today, Mr. Renna Schwab has no complaints of chest pains. He denies any worsening dyspnea on exertion. Unfortunately, he still smokes about a pack a day. He denies any orthopnea or PND. He denies any palpitations or dizziness. 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 He also does tree trimming. He recently fell off about 10 feet and injured his back. Review of Systems   Respiratory: Negative for shortness of breath. Cardiovascular: Negative for chest pain, palpitations, orthopnea, claudication, leg swelling and PND. Musculoskeletal: Positive for back pain. All other systems reviewed and are negative. Physical Exam  Vitals and nursing note reviewed. Constitutional:       Appearance: He is obese. HENT:      Head: Normocephalic. Eyes:      Conjunctiva/sclera: Conjunctivae normal.   Neck:      Vascular: No carotid bruit. Cardiovascular:      Rate and Rhythm: Normal rate and regular rhythm. Pulmonary:      Breath sounds: Normal breath sounds. Abdominal:      Palpations: Abdomen is soft. Musculoskeletal:         General: No swelling. Cervical back: No rigidity. Skin:     General: Skin is warm and dry. Neurological:      General: No focal deficit present. Mental Status: He is alert and oriented to person, place, and time.    Psychiatric:         Mood and Affect: Mood normal.         Behavior: Behavior normal.         PCP: Mary Alice Clemente MD    Past Medical History:   Diagnosis Date    [facility]     [facility]     Emphysema     H/O cardiovascular stress test 2012    negative    Hypertension     MI [facility] infarction) ([facility]        No past surgical history on file. Current Outpatient Medications   Medication Sig Dispense Refill    lidocaine (Lidoderm) 5 % Apply patch to the affected area for 12 hours a day and remove for 12 hours a day. 10 Each 0    cyclobenzaprine (FLEXERIL) 10 mg tablet Take 1 Tablet by mouth three (3) times daily as needed for Muscle Spasm(s). 20 Tablet 0    clopidogreL (PLAVIX) 75 mg tab TAKE ONE TABLET BY MOUTH DAILY 90 Tablet 3    atorvastatin (LIPITOR) 80 mg tablet TAKE ONE TABLET BY MOUTH EVERY EVENING 90 Tablet 3    varenicline (CHANTIX STARTER HUMPHREY) 0.5 mg (11)- 1 mg (42) DsPk 0.5 mg tab daily x 3 days, 0.5 mg tab bid x 4 days 1 Dose Pack 1    albuterol sulfate 90 mcg/actuation aebs Take 1 Puff by inhalation every four to six (4-6) hours as needed for Wheezing. 1 Each 0    metoprolol tartrate (LOPRESSOR) 25 mg tablet Take 1 Tab by mouth every twelve (12) hours. 60 Tab 6    aspirin delayed-release 81 mg tablet Take 1 Tab by mouth daily. 30 Tab 0    glucagon (GLUCAGEN) 1 mg injection 1 mL by IntraMUSCular route as needed for Hypoglycemia. 1 Vial 0    insulin lispro (HUMALOG) 100 unit/mL injection Very Insulin Resistant  For Blood Sugar (mg/dL) of:              Less than 150 =   0 units  150 -199 =   3 units  200 -249 =   6 units  250 -299 =   9 units  300 -349 =   12 units  350 and above =   15 units  Initiate Hypoglycemic protocol if blood glucose is <70 mg/dL. Fast Acting - Administer Immediately - or within 15 minutes of start of meal, if mealtime coverage. 1 Vial 0    nitroglycerin (NITROLINGUAL) 400 mcg/spray spray 1 Spray by SubLINGual route every five (5) minutes as needed for Chest Pain (do not exceed more than 3 sprays in one day, seek acute medical care if chest pain continues).  1 Bottle 0    Insulin Syringe-Needle U-100 0.3 mL 31 gauge x 5/16\" syrg TID as needed 100 Syringe 0    lancets misc As directed 100 Each 0    Blood-Glucose Meter monitoring kit As directed 1 Kit 0    glucose blood VI test strips (blood glucose test) strip As directed 60 Strip 0    OTHER CMP in 2 week  Dx: elevated AST  Fax results to Dr. Kareem Salomon 1 Each 0    metFORMIN (GLUCOPHAGE) 1,000 mg tablet Take 1 Tab by mouth two (2) times daily (with meals). 60 Tab 0    lisinopril (PRINIVIL, ZESTRIL) 10 mg tablet Take 1 Tab by mouth daily. 30 Tab 0    tiotropium (SPIRIVA WITH HANDIHALER) 18 mcg inhalation capsule Take 1 Cap by inhalation daily. 30 Cap 0    mometasone-formoterol (DULERA) 100-5 mcg/actuation HFAA HFA inhaler Take 2 Puffs by inhalation two (2) times a day. 2 Inhaler 0       The patient has a family history of    Social History     Tobacco Use    Smoking status: Current Every Day Smoker     Packs/day: 0.50     Types: Cigarettes    Smokeless tobacco: Never Used   Substance Use Topics    Alcohol use: Not Currently    Drug use: Yes     Types: Marijuana       Lab Results   Component Value Date/Time    Cholesterol, total 118 (L) 04/01/2021 09:40 AM    HDL Cholesterol 35 (L) 04/01/2021 09:40 AM    LDL, calculated 58 04/01/2021 09:40 AM    Triglyceride 124 04/01/2021 09:40 AM    CHOL/HDL Ratio 3.4 04/01/2021 09:40 AM        BP Readings from Last 3 Encounters:   10/13/21 132/80   10/06/21 136/71   10/05/21 (!) 154/88        Pulse Readings from Last 3 Encounters:   10/13/21 81   10/06/21 88   10/05/21 88       Wt Readings from Last 3 Encounters:   10/13/21 108 kg (238 lb)   10/06/21 105.2 kg (232 lb)   06/24/21 105.2 kg (232 lb)         EKG: unchanged from previous tracings, normal sinus rhythm, nonspecific ST and T waves changes.

## 2021-10-13 NOTE — PROGRESS NOTES
Earnest Mcdonald presents today for   Chief Complaint   Patient presents with    Follow-up     6 month follow up     Shortness of Breath     with exertion        Earnest Mcdonald preferred language for health care discussion is english/other. Is someone accompanying this pt? no    Is the patient using any DME equipment during [address]? no    Depression Screening:  3 most recent PHQ Screens 10/13/2021   Little interest or pleasure in doing things Not at all   Feeling down, depressed, irritable, or hopeless Not at all   Total Score PHQ 2 0       Learning Assessment:  Learning Assessment 4/2/2020   PRIMARY LEARNER Patient   HIGHEST LEVEL OF EDUCATION - PRIMARY LEARNER  GRADUATED HIGH SCHOOL OR GED   BARRIERS PRIMARY LEARNER NONE   CO-LEARNER CAREGIVER No   CO-LEARNER NAME no   PRIMARY LANGUAGE ENGLISH   LEARNER PREFERENCE PRIMARY DEMONSTRATION   ANSWERED BY patient   RELATIONSHIP SELF       Abuse Screening:  Abuse Screening Questionnaire 10/13/2021   Do you ever feel afraid of your partner? N   Are you in a relationship with someone who physically or mentally threatens you? N   Is it safe for you to go home? Y       Fall Risk  Fall Risk Assessment, last 12 mths 4/2/2020   Able to walk? Yes   Fall in past 12 months? No       Pt currently taking Anticoagulant therapy? no    Coordination of Care:  1. Have you been to the ER, urgent care clinic since your last visit? Hospitalized since your last visit? no    2. Have you seen or consulted any other health care providers outside of the [address] [place] [address] since your last visit? Include any pap smears or colon screening.  no

## 2021-11-08 ENCOUNTER — HOSPITAL ENCOUNTER (EMERGENCY)
Age: 61
Discharge: HOME | End: 2021-11-08
Attending: EMERGENCY MEDICINE
Payer: MEDICARE

## 2021-11-08 ENCOUNTER — APPOINTMENT (OUTPATIENT)
Dept: CT IMAGING | Age: 61
End: 2021-11-08
Attending: PHYSICIAN ASSISTANT
Payer: MEDICARE

## 2021-11-08 VITALS
WEIGHT: 233 LBS | RESPIRATION RATE: 18 BRPM | SYSTOLIC BLOOD PRESSURE: 137 MMHG | TEMPERATURE: 98.6 F | BODY MASS INDEX: 33.36 KG/M2 | OXYGEN SATURATION: 96 % | HEART RATE: 95 BPM | DIASTOLIC BLOOD PRESSURE: 84 MMHG | HEIGHT: 70 IN
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
## 2021-11-08 DIAGNOSIS — M25.559 HIP PAIN: ICD-10-CM

## 2021-11-08 PROCEDURE — 73700 CT LOWER EXTREMITY W/O DYE: CPT

## 2021-11-08 PROCEDURE — 74011250637 HC RX REV CODE- 250/637: Performed by: PHYSICIAN ASSISTANT

## 2021-11-08 PROCEDURE — 99282 EMERGENCY DEPT VISIT SF MDM: CPT

## 2021-11-08 RX ORDER — HYDROCODONE BITARTRATE AND ACETAMINOPHEN 5; 325 MG/1; MG/1
1 TABLET ORAL
Status: COMPLETED | OUTPATIENT
Start: 2021-11-08 | End: 2021-11-08

## 2021-11-08 RX ORDER — METHOCARBAMOL 500 MG/1
500 TABLET, FILM COATED ORAL 4 TIMES DAILY
Qty: 16 TABLET | Refills: 0 | Status: SHIPPED
Start: 2021-11-08 | End: 2022-02-17

## 2021-11-08 RX ORDER — HYDROCODONE BITARTRATE AND ACETAMINOPHEN 5; 325 MG/1; MG/1
1 TABLET ORAL
Qty: 14 TABLET | Refills: 0 | Status: SHIPPED | OUTPATIENT
Start: 2021-11-08 | End: 2021-11-09 | Stop reason: SDUPTHER

## 2021-11-08 RX ADMIN — HYDROCODONE BITARTRATE AND ACETAMINOPHEN 1 TABLET: 5; 325 TABLET ORAL at 18:28

## 2021-11-08 NOTE — ED PROVIDER NOTES
EMERGENCY DEPARTMENT HISTORY AND PHYSICAL EXAM      Date: 11/8/2021  Patient Name: Clifford Nettles    History of Presenting Illness     Chief Complaint   Patient presents with    Back Pain       History Provided By: Patient    HPI: Clifford Nettles, 62 y.o. male PMHx significant for COPD, htn, DM presents ambulatory to the ED. Pt reports right hip pain continues after trauma 1 month ago. Pt reports increased pain while laying on hip. Denies numbness/tingling, radiating, weakness, saddles anesthesia, loss of bowel or bladder function. Pt has been taking OTC medication without relief of sx. Pt has not f/u with ortho. There are no other complaints, changes, or physical findings at this time. PCP: Hannah Shaw MD    No current facility-administered medications on file prior to encounter. Current Outpatient Medications on File Prior to Encounter   Medication Sig Dispense Refill    lidocaine (Lidoderm) 5 % Apply patch to the affected area for 12 hours a day and remove for 12 hours a day. 10 Each 0    cyclobenzaprine (FLEXERIL) 10 mg tablet Take 1 Tablet by mouth three (3) times daily as needed for Muscle Spasm(s). 20 Tablet 0    clopidogreL (PLAVIX) 75 mg tab TAKE ONE TABLET BY MOUTH DAILY 90 Tablet 3    atorvastatin (LIPITOR) 80 mg tablet TAKE ONE TABLET BY MOUTH EVERY EVENING 90 Tablet 3    varenicline (CHANTIX STARTER HUMPHREY) 0.5 mg (11)- 1 mg (42) DsPk 0.5 mg tab daily x 3 days, 0.5 mg tab bid x 4 days 1 Dose Pack 1    albuterol sulfate 90 mcg/actuation aebs Take 1 Puff by inhalation every four to six (4-6) hours as needed for Wheezing. 1 Each 0    metoprolol tartrate (LOPRESSOR) 25 mg tablet Take 1 Tab by mouth every twelve (12) hours. 60 Tab 6    aspirin delayed-release 81 mg tablet Take 1 Tab by mouth daily. 30 Tab 0    glucagon (GLUCAGEN) 1 mg injection 1 mL by IntraMUSCular route as needed for Hypoglycemia.  1 Vial 0    insulin lispro (HUMALOG) 100 unit/mL injection Very Insulin Resistant  For Blood Sugar (mg/dL) of:              Less than 150 =   0 units  150 -199 =   3 units  200 -249 =   6 units  250 -299 =   9 units  300 -349 =   12 units  350 and above =   15 units  Initiate Hypoglycemic protocol if blood glucose is <70 mg/dL. Fast Acting - Administer Immediately - or within 15 minutes of start of meal, if mealtime coverage. 1 Vial 0    nitroglycerin (NITROLINGUAL) 400 mcg/spray spray 1 Spray by SubLINGual route every five (5) minutes as needed for Chest Pain (do not exceed more than 3 sprays in one day, seek acute medical care if chest pain continues). 1 Bottle 0    Insulin Syringe-Needle U-100 0.3 mL 31 gauge x 5/16\" syrg TID as needed 100 Syringe 0    lancets misc As directed 100 Each 0    Blood-Glucose Meter monitoring kit As directed 1 Kit 0    glucose blood VI test strips (blood glucose test) strip As directed 60 Strip 0    OTHER CMP in 2 week  Dx: elevated AST  Fax results to Dr. Ignacio Hdez 1 Each 0    metFORMIN (GLUCOPHAGE) 1,000 mg tablet Take 1 Tab by mouth two (2) times daily (with meals). 60 Tab 0    lisinopril (PRINIVIL, ZESTRIL) 10 mg tablet Take 1 Tab by mouth daily. 30 Tab 0    tiotropium (SPIRIVA WITH HANDIHALER) 18 mcg inhalation capsule Take 1 Cap by inhalation daily. 30 Cap 0    mometasone-formoterol (DULERA) 100-5 mcg/actuation HFAA HFA inhaler Take 2 Puffs by inhalation two (2) times a day. 2 Inhaler 0       Past History     Past Medical History:  Past Medical History:   Diagnosis Date    [facility]     Diabetes ([facility]     Emphysema     H/O cardiovascular stress test 2012    negative    Hypertension     MI (myocardial infarction) ([facility]        Past Surgical History:  History reviewed. No pertinent surgical history.     Family History:  Family History   Problem Relation Age of Onset    Diabetes Mother        Social History:  Social History     Tobacco Use    Smoking status: Current Every Day Smoker Packs/day: 0.50     Types: Cigarettes    Smokeless tobacco: Never Used   Substance Use Topics    Alcohol use: Not Currently    Drug use: Yes     Types: Marijuana       Allergies:  No Known Allergies      Review of Systems   Review of Systems   Constitutional: Negative for chills and fever. HENT: Negative for facial swelling. Eyes: Negative for photophobia and visual disturbance. Respiratory: Negative for shortness of breath. Cardiovascular: Negative for chest pain. Gastrointestinal: Negative for abdominal pain, nausea and vomiting. Genitourinary: Negative for flank pain. Musculoskeletal: Positive for arthralgias (right hip pain). Skin: Negative for color change, pallor, rash and wound. Neurological: Negative for dizziness, weakness, light-headedness and headaches. All other systems reviewed and are negative. Physical Exam   Physical Exam  Vitals and nursing note reviewed. Constitutional:       General: He is not in acute distress. Appearance: He is well-developed. Comments: Pt in NAD   HENT:      Head: Normocephalic and atraumatic. Eyes:      Conjunctiva/sclera: Conjunctivae normal.   Cardiovascular:      Rate and Rhythm: Normal rate and regular rhythm. Heart sounds: Normal heart sounds. Pulmonary:      Effort: Pulmonary effort is normal. No respiratory distress. Breath sounds: Normal breath sounds. Abdominal:      General: Bowel sounds are normal.      Palpations: Abdomen is soft. Tenderness: There is no abdominal tenderness. Musculoskeletal:         General: Normal range of motion. Right hip: Tenderness and bony tenderness present. Comments: DP pulses strong and equal b/l  Sensation equal and intact to lower extremities b/l  Full AROM intact   Skin:     General: Skin is warm. Findings: No rash. Neurological:      Mental Status: He is alert and oriented to person, place, and time. Cranial Nerves: No cranial nerve deficit. Psychiatric:         Behavior: Behavior normal.         Diagnostic Study Results     Labs -   No results found for this or any previous visit (from the past 12 hour(s)). Radiologic Studies -   CT HIP RT WO CONT    (Results Pending)     CT Results  (Last 48 hours)    None        CXR Results  (Last 48 hours)    None          Medical Decision Making   I am the first provider for this patient. I reviewed the vital signs, available nursing notes, past medical history, past surgical history, family history and social history. Vital Signs-Reviewed the patient's vital signs. Patient Vitals for the past 12 hrs:   Temp Pulse Resp BP SpO2   11/08/21 1624 98.6 °F (37 °C) 95 18 137/84 96 %       Records Reviewed: Nursing Notes and Old Medical Records    Provider Notes (Medical Decision Making):   DDx: Hip strain vs sprain vs fracture    61 yo M who presents with right hip pain x 1 month after imaging. Previous imaging showed no fracture. On exam, TTP to iliac crest. NVI. ED Course:   Initial assessment performed. The patients presenting problems have been discussed, and they are in agreement with the care plan formulated and outlined with them. I have encouraged them to ask questions as they arise throughout their visit. 1915: [facility] at shift change. Plan: Awaiting CT scan. Attestations:    GIORGI Torres    Please note that this dictation was completed with [employer], the computer voice recognition software. Quite often unanticipated grammatical, syntax, homophones, and other interpretive errors are inadvertently transcribed by the computer software. Please disregard these errors. Please excuse any errors that have escaped final proofreading. Thank you.

## 2021-11-09 RX ORDER — HYDROCODONE BITARTRATE AND ACETAMINOPHEN 5; 325 MG/1; MG/1
1 TABLET ORAL
Qty: 9 TABLET | Refills: 0 | Status: SHIPPED | OUTPATIENT
Start: 2021-11-09 | End: 2021-11-12

## 2021-11-09 NOTE — ED NOTES
7:00 PM Assumed care of the pt at this time. Discussed with GIORGI Red concerning patient Zakia Jones, standard discussion of reason for visit, HPI, ROS, PE, and current results available. Recommendation for obtaining pending CT results and bedside re-evaluation to dispo the pt. Audrey Lui PA-C     7:59 PM CT resulted, loosening of the L5 pedicle screw, degenerative changes, otherwise negative. Pt made aware, will d/c with pain control and ortho follow-up. Return precautions given. Pt agrees. Audrey Lui PA-C     Disposition: d/c    Dictation disclaimer:  Please note that this dictation was completed with [employer], the computer voice recognition software. Quite often unanticipated grammatical, syntax, homophones, and other interpretive errors are inadvertently transcribed by the computer software. Please disregard these errors. Please excuse any errors that have escaped final proofreading.

## 2021-11-09 NOTE — DISCHARGE INSTRUCTIONS
[employer] Activation    Thank you for requesting access to [employer]. Please follow the instructions below to securely access and download your online medical record. [employer] allows you to send messages to your doctor, view your test results, renew your prescriptions, schedule appointments, and more. How Do I Sign Up? In your internet browser, go to [email or web address]  Click on the First Time User? Click Here link in the Sign In box. You will be redirect to the New Member Sign Up page. Enter your [employer] Access Code exactly as it appears below. You will not need to use this code after youve completed the sign-up process. If you do not sign up before the expiration date, you must request a new code. [employer] Access Code: [unfilled] (This is the date your [employer] access code will )    Enter the last four digits of your Social Security Number (xxxx) and Date of Birth (mm/dd/yyyy) as indicated and click Submit. You will be taken to the next sign-up page. Create a [employer] ID. This will be your [employer] login ID and cannot be changed, so think of one that is secure and easy to remember. Create a [employer] password. You can change your password at any time. Enter your Password Reset Question and Answer. This can be used at a later time if you forget your password. Enter your e-mail address. You will receive e-mail notification when new information is available in [address] Click Sign Up. You can now view and download portions of your medical record. Click the [place] link to download a portable copy of your medical information. Additional Information    If you have questions, please visit the Frequently Asked Questions section of the [employer] website at [email or web address] Remember, [employer] is NOT to be used for urgent needs. For medical emergencies, dial 911.

## 2021-11-09 NOTE — CALL BACK NOTE
[address] [place] [address] called and stated that the patient's Terr[place] M was not sent over. I resent it for him.     GIORGI Gunter 1:51 PM

## 2021-11-11 ENCOUNTER — TRANSCRIBE ORDER (OUTPATIENT)
Dept: SCHEDULING | Age: 61
End: 2021-11-11

## 2021-11-11 DIAGNOSIS — R10.9 STOMACH ACHE: Primary | ICD-10-CM

## 2021-11-15 ENCOUNTER — HOSPITAL ENCOUNTER (OUTPATIENT)
Dept: ULTRASOUND IMAGING | Age: 61
Discharge: HOME | End: 2021-11-15
Attending: INTERNAL MEDICINE
Payer: MEDICARE

## 2021-11-15 DIAGNOSIS — R10.9 STOMACH ACHE: ICD-10-CM

## 2021-11-15 PROCEDURE — 76700 US EXAM ABDOM COMPLETE: CPT

## 2021-12-05 ENCOUNTER — HOSPITAL ENCOUNTER (EMERGENCY)
Age: 61
Discharge: HOME | End: 2021-12-05
Attending: EMERGENCY MEDICINE
Payer: MEDICARE

## 2021-12-05 ENCOUNTER — APPOINTMENT (OUTPATIENT)
Dept: GENERAL RADIOLOGY | Age: 61
End: 2021-12-05
Attending: PHYSICIAN ASSISTANT
Payer: MEDICARE

## 2021-12-05 VITALS
RESPIRATION RATE: 20 BRPM | BODY MASS INDEX: 32.48 KG/M2 | TEMPERATURE: 98.8 F | SYSTOLIC BLOOD PRESSURE: 160 MMHG | OXYGEN SATURATION: 96 % | HEIGHT: 71 IN | WEIGHT: 232 LBS | DIASTOLIC BLOOD PRESSURE: 92 MMHG | HEART RATE: 82 BPM

## 2021-12-05 DIAGNOSIS — J44.1 COPD EXACERBATION (HCC): Primary | ICD-10-CM

## 2021-12-05 LAB
ALBUMIN SERPL-MCNC: 3.2 G/DL (ref 3.4–5)
ALBUMIN/GLOB SERPL: 0.9 {RATIO} (ref 0.8–1.7)
ALP SERPL-CCNC: 113 U/L (ref 45–117)
ALT SERPL-CCNC: 23 U/L (ref 16–61)
ANION GAP SERPL CALC-SCNC: 2 MMOL/L (ref 3–18)
AST SERPL-CCNC: 16 U/L (ref 10–38)
BASOPHILS # BLD: 0 K/UL (ref 0–0.1)
BASOPHILS NFR BLD: 0 % (ref 0–2)
BILIRUB SERPL-MCNC: 0.3 MG/DL (ref 0.2–1)
BNP SERPL-MCNC: 94 PG/ML (ref 0–900)
BUN SERPL-MCNC: 11 MG/DL (ref 7–18)
BUN/CREAT SERPL: 15 (ref 12–20)
CALCIUM SERPL-MCNC: 8.8 MG/DL (ref 8.5–10.1)
CHLORIDE SERPL-SCNC: 103 MMOL/L (ref 100–111)
CO2 SERPL-SCNC: 35 MMOL/L (ref 21–32)
CREAT SERPL-MCNC: 0.74 MG/DL (ref 0.6–1.3)
DIFFERENTIAL METHOD BLD: NORMAL
EOSINOPHIL # BLD: 0.2 K/UL (ref 0–0.4)
EOSINOPHIL NFR BLD: 2 % (ref 0–5)
ERYTHROCYTE [DISTWIDTH] IN BLOOD BY AUTOMATED COUNT: 12.7 % (ref 11.6–14.5)
FLUAV RNA SPEC QL NAA+PROBE: NOT DETECTED
FLUBV RNA SPEC QL NAA+PROBE: NOT DETECTED
GLOBULIN SER CALC-MCNC: 3.7 G/DL (ref 2–4)
GLUCOSE SERPL-MCNC: 259 MG/DL (ref 74–99)
HCT VFR BLD AUTO: 40.5 % (ref 36–48)
HGB BLD-MCNC: 13 G/DL (ref 13–16)
IMM GRANULOCYTES # BLD AUTO: 0 K/UL (ref 0–0.04)
IMM GRANULOCYTES NFR BLD AUTO: 0 % (ref 0–0.5)
LACTATE BLD-SCNC: 1.41 MMOL/L (ref 0.4–2)
LYMPHOCYTES # BLD: 2 K/UL (ref 0.9–3.6)
LYMPHOCYTES NFR BLD: 27 % (ref 21–52)
MCH RBC QN AUTO: 28.8 PG (ref 24–34)
MCHC RBC AUTO-ENTMCNC: 32.1 G/DL (ref 31–37)
MCV RBC AUTO: 89.8 FL (ref 78–100)
MONOCYTES # BLD: 0.7 K/UL (ref 0.05–1.2)
MONOCYTES NFR BLD: 9 % (ref 3–10)
NEUTS SEG # BLD: 4.7 K/UL (ref 1.8–8)
NEUTS SEG NFR BLD: 62 % (ref 40–73)
NRBC # BLD: 0 K/UL (ref 0–0.01)
NRBC BLD-RTO: 0 PER 100 WBC
PLATELET # BLD AUTO: 182 K/UL (ref 135–420)
PMV BLD AUTO: 10.8 FL (ref 9.2–11.8)
POTASSIUM SERPL-SCNC: 3.7 MMOL/L (ref 3.5–5.5)
PROT SERPL-MCNC: 6.9 G/DL (ref 6.4–8.2)
RBC # BLD AUTO: 4.51 M/UL (ref 4.35–5.65)
SARS-COV-2, COV2: NOT DETECTED
SODIUM SERPL-SCNC: 140 MMOL/L (ref 136–145)
TROPONIN I SERPL-MCNC: <0.02 NG/ML (ref 0–0.04)
WBC # BLD AUTO: 7.6 K/UL (ref 4.6–13.2)

## 2021-12-05 PROCEDURE — 96374 THER/PROPH/DIAG INJ IV PUSH: CPT

## 2021-12-05 PROCEDURE — 93005 ELECTROCARDIOGRAM TRACING: CPT

## 2021-12-05 PROCEDURE — 85025 COMPLETE CBC W/AUTO DIFF WBC: CPT

## 2021-12-05 PROCEDURE — 87636 SARSCOV2 & INF A&B AMP PRB: CPT

## 2021-12-05 PROCEDURE — 71046 X-RAY EXAM CHEST 2 VIEWS: CPT

## 2021-12-05 PROCEDURE — 80053 COMPREHEN METABOLIC PANEL: CPT

## 2021-12-05 PROCEDURE — 74011250636 HC RX REV CODE- 250/636: Performed by: EMERGENCY MEDICINE

## 2021-12-05 PROCEDURE — 83605 ASSAY OF LACTIC ACID: CPT

## 2021-12-05 PROCEDURE — 99283 EMERGENCY DEPT VISIT LOW MDM: CPT

## 2021-12-05 PROCEDURE — 83880 ASSAY OF NATRIURETIC PEPTIDE: CPT

## 2021-12-05 PROCEDURE — 84484 ASSAY OF TROPONIN QUANT: CPT

## 2021-12-05 RX ORDER — ALBUTEROL SULFATE 2.5 MG/.5ML
2.5 SOLUTION RESPIRATORY (INHALATION)
Status: DISCONTINUED | OUTPATIENT
Start: 2021-12-05 | End: 2021-12-05 | Stop reason: HOSPADM

## 2021-12-05 RX ORDER — PREDNISONE 50 MG/1
50 TABLET ORAL DAILY
Qty: 5 TABLET | Refills: 0 | Status: SHIPPED | OUTPATIENT
Start: 2021-12-05 | End: 2021-12-10

## 2021-12-05 RX ORDER — DOXYCYCLINE HYCLATE 100 MG
100 TABLET ORAL 2 TIMES DAILY
Qty: 14 TABLET | Refills: 0 | Status: SHIPPED | OUTPATIENT
Start: 2021-12-05 | End: 2021-12-12

## 2021-12-05 RX ADMIN — METHYLPREDNISOLONE SODIUM SUCCINATE 125 MG: 125 INJECTION, POWDER, FOR SOLUTION INTRAMUSCULAR; INTRAVENOUS at 19:06

## 2021-12-05 NOTE — ED PROVIDER NOTES
The patient is a 73-year-old male with past medical history significant for COPD, diabetes, and a recent MI, who presents to the ED today with shortness of breath. He states that he felt like he was getting a cold a couple days ago. His symptoms have been present for the past 3 days. He does have a productive cough with thick white mucus. He states that he has been trying nebulizer treatments at home but tends to get worse when the weather changes. He believes that he may need to have steroids. He is up-to-date on his Covid shots. He does not get flu shots because he does not get the flu but he does get pneumonia so he gets pneumonia shots. He is unsure when his last pneumonia shot was. He also states that he does not currently have a pulmonologist.    Patient states that he does continue to smoke but has cut back on the number of cigarettes because he has not been feeling well. Past Medical History:   Diagnosis Date    [facility]     Diabetes ([facility]     Emphysema     H/O cardiovascular stress test 2012    negative    Hypertension     [facility]        No past surgical history on file.       Family History:   Problem Relation Age of Onset    Diabetes Mother        Social History     Socioeconomic History    Marital status: SINGLE     Spouse name: Not on file    Number of children: Not on file    Years of education: Not on file    Highest education level: Not on file   Occupational History    Not on file   Tobacco Use    Smoking status: Current Every Day Smoker     Packs/day: 0.50     Types: Cigarettes    Smokeless tobacco: Never Used   Substance and Sexual Activity    Alcohol use: Not Currently    Drug use: Yes     Types: Marijuana    Sexual activity: Not on file   Other Topics Concern    Not on file   Social History Narrative    Not on file     Social Determinants of Health     Financial Resource Strain:     Difficulty of Paying Living Expenses: Not on file   Food Insecurity:     Worried About Running Out of Food in the Last Year: Not on file    Ran Out of Food in the Last Year: Not on file   Transportation Needs:     Lack of Transportation (Medical): Not on file    Lack of Transportation (Non-Medical): Not on file   Physical Activity:     Days of Exercise per Week: Not on file    Minutes of Exercise per Session: Not on file   Stress:     Feeling of Stress : Not on file   Social Connections:     Frequency of Communication with Friends and Family: Not on file    Frequency of Social Gatherings with Friends and Family: Not on file    Attends [employer] Services: Not on file    Active Member of [address] [place] [address] [employer] or Organizations: Not on file    Attends Club or Organization Meetings: Not on file    Marital Status: Not on file   Intimate Partner Violence:     Fear of Current or Ex-Partner: Not on file    Emotionally Abused: Not on file    Physically Abused: Not on file    Sexually Abused: Not on file   Housing Stability:     Unable to Pay for Housing in the Last Year: Not on file    Number of [place] in the Last Year: Not on file    Unstable Housing in the Last Year: Not on file         ALLERGIES: Patient has no known allergies. Review of Systems   All other systems reviewed and are negative. Vitals:    12/05/21 1611   BP: (!) 160/92   Pulse: 82   Resp: 20   Temp: 98.8 °F (37.1 °C)   SpO2: 96%   Weight: 105.2 kg (232 lb)   Height: 5' 11\" (1.803 m)            Physical Exam  Vitals and nursing note reviewed. Constitutional:       Appearance: He is obese. HENT:      Head: Normocephalic and atraumatic. Mouth/Throat:      Mouth: Mucous membranes are moist.      Pharynx: Oropharynx is clear. Eyes:      Extraocular Movements: Extraocular movements intact. Pupils: Pupils are equal, round, and reactive to light. Cardiovascular:      Rate and Rhythm: Normal rate and regular rhythm. Pulses: Normal pulses. Heart sounds: Normal heart sounds. Pulmonary:      Effort: Tachypnea present. Breath sounds: Examination of the right-upper field reveals wheezing. Examination of the left-upper field reveals wheezing. Examination of the right-middle field reveals wheezing. Examination of the left-middle field reveals wheezing. Examination of the right-lower field reveals wheezing. Examination of the left-lower field reveals wheezing. Wheezing present. Abdominal:      General: Bowel sounds are normal.      Palpations: Abdomen is soft. Musculoskeletal:         General: Normal range of motion. Cervical back: Normal range of motion and neck supple. Right lower leg: No tenderness. No edema. Left lower leg: No tenderness. No edema. Skin:     General: Skin is warm and dry. Capillary Refill: Capillary refill takes less than 2 seconds. Neurological:      General: No focal deficit present. Mental Status: He is alert and oriented to person, place, and time.    Psychiatric:         Mood and Affect: Mood normal.         Behavior: Behavior normal.        Recent Results (from the past 12 hour(s))   EKG, 12 LEAD, INITIAL    Collection Time: 12/05/21  4:28 PM   Result Value Ref Range    Ventricular Rate 83 BPM    Atrial Rate 83 BPM    P-R Interval 168 ms    QRS Duration 106 ms    Q-T Interval 350 ms    QTC Calculation (Bezet) 411 ms    Calculated P Axis 86 degrees    Calculated R Axis 77 degrees    Calculated T Axis -46 degrees    Diagnosis       Poor data quality, interpretation may be adversely affected  Normal sinus rhythm  Incomplete right bundle branch block  Lateral infarct (cited on or before 10-JUL-2020)  Abnormal ECG  When compared with ECG of 31-MAY-2021 22:32,  Nonspecific T wave abnormality now evident in Lateral leads     CBC WITH AUTOMATED DIFF    Collection Time: 12/05/21  4:30 PM   Result Value Ref Range    WBC 7.6 4.6 - 13.2 K/uL    RBC 4.51 4.35 - 5.65 M/uL    HGB 13.0 13.0 - 16.0 g/dL HCT 40.5 36.0 - 48.0 %    MCV 89.8 78.0 - 100.0 FL    MCH 28.8 24.0 - 34.0 PG    MCHC 32.1 31.0 - 37.0 g/dL    RDW 12.7 11.6 - 14.5 %    PLATELET 547 240 - 229 K/uL    MPV 10.8 9.2 - 11.8 FL    NRBC 0.0 0  WBC    ABSOLUTE NRBC 0.00 0.00 - 0.01 K/uL    NEUTROPHILS 62 40 - 73 %    LYMPHOCYTES 27 21 - 52 %    MONOCYTES 9 3 - 10 %    EOSINOPHILS 2 0 - 5 %    BASOPHILS 0 0 - 2 %    IMMATURE GRANULOCYTES 0 0.0 - 0.5 %    ABS. NEUTROPHILS 4.7 1.8 - 8.0 K/UL    ABS. LYMPHOCYTES 2.0 0.9 - 3.6 K/UL    ABS. MONOCYTES 0.7 0.05 - 1.2 K/UL    ABS. EOSINOPHILS 0.2 0.0 - 0.4 K/UL    ABS. BASOPHILS 0.0 0.0 - 0.1 K/UL    ABS. IMM. GRANS. 0.0 0.00 - 0.04 K/UL    DF AUTOMATED     METABOLIC PANEL, COMPREHENSIVE    Collection Time: 12/05/21  4:30 PM   Result Value Ref Range    Sodium 140 136 - 145 mmol/L    Potassium 3.7 3.5 - 5.5 mmol/L    Chloride 103 100 - 111 mmol/L    CO2 35 (H) 21 - 32 mmol/L    Anion gap 2 (L) 3.0 - 18 mmol/L    Glucose 259 (H) 74 - 99 mg/dL    BUN 11 7.0 - 18 MG/DL    Creatinine 0.74 0.6 - 1.3 MG/DL    BUN/Creatinine ratio 15 12 - 20      GFR est AA >60 >60 ml/min/1.73m2    GFR est non-AA >60 >60 ml/min/1.73m2    Calcium 8.8 8.5 - 10.1 MG/DL    Bilirubin, total 0.3 0.2 - 1.0 MG/DL    ALT (SGPT) 23 16 - 61 U/L    AST (SGOT) 16 10 - 38 U/L    Alk.  phosphatase 113 45 - 117 U/L    Protein, total 6.9 6.4 - 8.2 g/dL    Albumin 3.2 (L) 3.4 - 5.0 g/dL    Globulin 3.7 2.0 - 4.0 g/dL    A-G Ratio 0.9 0.8 - 1.7     NT-PRO BNP    Collection Time: 12/05/21  4:30 PM   Result Value Ref Range    NT pro-BNP 94 0 - 900 PG/ML   TROPONIN I    Collection Time: 12/05/21  4:30 PM   Result Value Ref Range    Troponin-I, QT <0.02 0.0 - 0.045 NG/ML   POC LACTIC ACID    Collection Time: 12/05/21  4:31 PM   Result Value Ref Range    Lactic Acid (POC) 1.41 0.40 - 2.00 mmol/L   COVID-19 WITH INFLUENZA A/B    Collection Time: 12/05/21  4:40 PM   Result Value Ref Range    SARS-CoV-2 Not detected NOTD      Influenza A by PCR Not detected NOTD      Influenza B by PCR Not detected NOTD       XR CHEST PA LAT    (Results Pending)     CXR: No acute cardiopulmonary abnormalities    MDM  Number of Diagnoses or Management Options  Diagnosis management comments: The patient is a 44-year-old male with a history of COPD, diabetes, and recent MI, who presents to the ED today with shortness of breath after 3 days of cold-like symptoms. He does have his Covid immunizations. His rapid Covid and influenza tests are negative. His chest x-ray is negative for any acute cardiopulmonary abnormalities. He has received nebulizer treatments in the ED. I have also ordered 125 mg of IV Solu-Medrol. He will be discharged home with prescription for prednisone as well as doxycycline and will be advised to continue his breathing treatments as prescribed. He will also be advised to follow-up with a pulmonologist as soon as possible. Return precautions have been given.          Procedures

## 2021-12-05 NOTE — ED NOTES
Patient presents for evaluation of shortness of breath for 3 days. Has been tracking his O2 sat at home, and it with low reading in the 80s. Has history of COPD, diabetes. States he usually only has to use his nebulizer maybe 4 times a month, has been using it 4 or more times a day in the last several days. Wheezing on exam.  O2 sat in triage is 91%    I performed a brief evaluation, including history and physical, of the patient here in triage and I have determined that pt will need further treatment and evaluation from the main side ER physician. I have placed initial orders to help in expediting patients care.

## 2021-12-05 NOTE — ED TRIAGE NOTES
Pt comes in with complaints of shortness of breath x3 days. Pt did a home oxygen check at home at got a reading of 81%, usually his readings are 91%. Pt has been using his home nebulizers much more frequently than usual at home. Also endorses dizziness. Requesting steroids.

## 2021-12-06 LAB
ATRIAL RATE: 83 BPM
CALCULATED P AXIS, ECG09: 86 DEGREES
CALCULATED R AXIS, ECG10: 77 DEGREES
CALCULATED T AXIS, ECG11: -46 DEGREES
DIAGNOSIS, 93000: NORMAL
P-R INTERVAL, ECG05: 168 MS
Q-T INTERVAL, ECG07: 350 MS
QRS DURATION, ECG06: 106 MS
QTC CALCULATION (BEZET), ECG08: 411 MS
VENTRICULAR RATE, ECG03: 83 BPM

## 2021-12-06 NOTE — ED NOTES
Pt discharged w/ d/c paperwork, IV removed, no further questions or complaints. Pt ambulates out of ED without difficulty, no further questions or complaints at this time.

## 2022-02-14 ENCOUNTER — APPOINTMENT (OUTPATIENT)
Dept: GENERAL RADIOLOGY | Age: 62
DRG: 177 | End: 2022-02-14
Attending: EMERGENCY MEDICINE
Payer: MEDICARE

## 2022-02-14 ENCOUNTER — HOSPITAL ENCOUNTER (INPATIENT)
Age: 62
LOS: 3 days | Discharge: HOME | DRG: 177 | End: 2022-02-17
Attending: STUDENT IN AN ORGANIZED HEALTH CARE EDUCATION/TRAINING PROGRAM | Admitting: HOSPITALIST
Payer: MEDICARE

## 2022-02-14 DIAGNOSIS — J44.1 CHRONIC OBSTRUCTIVE PULMONARY DISEASE WITH ACUTE EXACERBATION (HCC): ICD-10-CM

## 2022-02-14 DIAGNOSIS — I10 HYPERTENSION, UNSPECIFIED TYPE: ICD-10-CM

## 2022-02-14 DIAGNOSIS — J12.82 PNEUMONIA DUE TO COVID-19 VIRUS: ICD-10-CM

## 2022-02-14 DIAGNOSIS — U07.1 PNEUMONIA DUE TO COVID-19 VIRUS: ICD-10-CM

## 2022-02-14 DIAGNOSIS — R09.02 HYPOXIA: ICD-10-CM

## 2022-02-14 DIAGNOSIS — I25.10 CORONARY ARTERY DISEASE INVOLVING NATIVE CORONARY ARTERY OF NATIVE HEART WITHOUT ANGINA PECTORIS: ICD-10-CM

## 2022-02-14 LAB
ANION GAP SERPL CALC-SCNC: 4 MMOL/L (ref 3–18)
BASOPHILS # BLD: 0 K/UL (ref 0–0.1)
BASOPHILS NFR BLD: 0 % (ref 0–2)
BUN SERPL-MCNC: 9 MG/DL (ref 7–18)
BUN/CREAT SERPL: 13 (ref 12–20)
CALCIUM SERPL-MCNC: 8.8 MG/DL (ref 8.5–10.1)
CHLORIDE SERPL-SCNC: 100 MMOL/L (ref 100–111)
CO2 SERPL-SCNC: 33 MMOL/L (ref 21–32)
COVID-19 RAPID TEST, COVR: DETECTED
CREAT SERPL-MCNC: 0.68 MG/DL (ref 0.6–1.3)
CRP SERPL-MCNC: 1.9 MG/DL (ref 0–0.3)
D DIMER PPP FEU-MCNC: 0.45 UG/ML(FEU)
DIFFERENTIAL METHOD BLD: ABNORMAL
EOSINOPHIL # BLD: 0.1 K/UL (ref 0–0.4)
EOSINOPHIL NFR BLD: 1 % (ref 0–5)
ERYTHROCYTE [DISTWIDTH] IN BLOOD BY AUTOMATED COUNT: 12.8 % (ref 11.6–14.5)
EST. AVERAGE GLUCOSE BLD GHB EST-MCNC: 206 MG/DL
GLUCOSE BLD STRIP.AUTO-MCNC: 162 MG/DL (ref 70–110)
GLUCOSE BLD STRIP.AUTO-MCNC: 243 MG/DL (ref 70–110)
GLUCOSE BLD STRIP.AUTO-MCNC: 259 MG/DL (ref 70–110)
GLUCOSE SERPL-MCNC: 152 MG/DL (ref 74–99)
HBA1C MFR BLD: 8.8 % (ref 4.2–5.6)
HCT VFR BLD AUTO: 43.6 % (ref 36–48)
HGB BLD-MCNC: 13.8 G/DL (ref 13–16)
IMM GRANULOCYTES # BLD AUTO: 0 K/UL (ref 0–0.04)
IMM GRANULOCYTES NFR BLD AUTO: 0 % (ref 0–0.5)
LACTATE BLD-SCNC: 1.78 MMOL/L (ref 0.4–2)
LYMPHOCYTES # BLD: 1.1 K/UL (ref 0.9–3.6)
LYMPHOCYTES NFR BLD: 13 % (ref 21–52)
MCH RBC QN AUTO: 28.1 PG (ref 24–34)
MCHC RBC AUTO-ENTMCNC: 31.7 G/DL (ref 31–37)
MCV RBC AUTO: 88.8 FL (ref 78–100)
MONOCYTES # BLD: 0.7 K/UL (ref 0.05–1.2)
MONOCYTES NFR BLD: 8 % (ref 3–10)
NEUTS SEG # BLD: 6.8 K/UL (ref 1.8–8)
NEUTS SEG NFR BLD: 78 % (ref 40–73)
NRBC # BLD: 0 K/UL (ref 0–0.01)
NRBC BLD-RTO: 0 PER 100 WBC
PLATELET # BLD AUTO: 173 K/UL (ref 135–420)
PMV BLD AUTO: 10 FL (ref 9.2–11.8)
POTASSIUM SERPL-SCNC: 4.4 MMOL/L (ref 3.5–5.5)
PROCALCITONIN SERPL-MCNC: <0.05 NG/ML
RBC # BLD AUTO: 4.91 M/UL (ref 4.35–5.65)
SODIUM SERPL-SCNC: 137 MMOL/L (ref 136–145)
SOURCE, COVRS: ABNORMAL
TROPONIN-HIGH SENSITIVITY: 13 NG/L (ref 0–78)
WBC # BLD AUTO: 8.8 K/UL (ref 4.6–13.2)

## 2022-02-14 PROCEDURE — 74011250636 HC RX REV CODE- 250/636: Performed by: STUDENT IN AN ORGANIZED HEALTH CARE EDUCATION/TRAINING PROGRAM

## 2022-02-14 PROCEDURE — 99223 1ST HOSP IP/OBS HIGH 75: CPT | Performed by: HOSPITALIST

## 2022-02-14 PROCEDURE — 74011000250 HC RX REV CODE- 250: Performed by: STUDENT IN AN ORGANIZED HEALTH CARE EDUCATION/TRAINING PROGRAM

## 2022-02-14 PROCEDURE — 86140 C-REACTIVE PROTEIN: CPT

## 2022-02-14 PROCEDURE — 83036 HEMOGLOBIN GLYCOSYLATED A1C: CPT

## 2022-02-14 PROCEDURE — APPSS60 APP SPLIT SHARED TIME 46-60 MINUTES: Performed by: NURSE PRACTITIONER

## 2022-02-14 PROCEDURE — 74011250636 HC RX REV CODE- 250/636: Performed by: NURSE PRACTITIONER

## 2022-02-14 PROCEDURE — 82962 GLUCOSE BLOOD TEST: CPT

## 2022-02-14 PROCEDURE — 80048 BASIC METABOLIC PNL TOTAL CA: CPT

## 2022-02-14 PROCEDURE — 87635 SARS-COV-2 COVID-19 AMP PRB: CPT

## 2022-02-14 PROCEDURE — 99285 EMERGENCY DEPT VISIT HI MDM: CPT

## 2022-02-14 PROCEDURE — 87040 BLOOD CULTURE FOR BACTERIA: CPT

## 2022-02-14 PROCEDURE — 74011250637 HC RX REV CODE- 250/637: Performed by: NURSE PRACTITIONER

## 2022-02-14 PROCEDURE — 65660000000 HC RM CCU STEPDOWN

## 2022-02-14 PROCEDURE — 83605 ASSAY OF LACTIC ACID: CPT

## 2022-02-14 PROCEDURE — 71045 X-RAY EXAM CHEST 1 VIEW: CPT

## 2022-02-14 PROCEDURE — 84484 ASSAY OF TROPONIN QUANT: CPT

## 2022-02-14 PROCEDURE — 74011636637 HC RX REV CODE- 636/637: Performed by: NURSE PRACTITIONER

## 2022-02-14 PROCEDURE — 85025 COMPLETE CBC W/AUTO DIFF WBC: CPT

## 2022-02-14 PROCEDURE — 85379 FIBRIN DEGRADATION QUANT: CPT

## 2022-02-14 PROCEDURE — 84145 PROCALCITONIN (PCT): CPT

## 2022-02-14 RX ORDER — MAGNESIUM SULFATE 100 %
4 CRYSTALS MISCELLANEOUS AS NEEDED
Status: DISCONTINUED | OUTPATIENT
Start: 2022-02-14 | End: 2022-02-17 | Stop reason: HOSPADM

## 2022-02-14 RX ORDER — INSULIN LISPRO 100 [IU]/ML
INJECTION, SOLUTION INTRAVENOUS; SUBCUTANEOUS
Status: DISCONTINUED | OUTPATIENT
Start: 2022-02-14 | End: 2022-02-17 | Stop reason: HOSPADM

## 2022-02-14 RX ORDER — BUDESONIDE AND FORMOTEROL FUMARATE DIHYDRATE 160; 4.5 UG/1; UG/1
2 AEROSOL RESPIRATORY (INHALATION)
Status: DISCONTINUED | OUTPATIENT
Start: 2022-02-14 | End: 2022-02-17 | Stop reason: HOSPADM

## 2022-02-14 RX ORDER — INSULIN GLARGINE 100 [IU]/ML
10 INJECTION, SOLUTION SUBCUTANEOUS DAILY
Status: DISCONTINUED | OUTPATIENT
Start: 2022-02-14 | End: 2022-02-15

## 2022-02-14 RX ORDER — ACETAMINOPHEN 325 MG/1
650 TABLET ORAL
Status: DISCONTINUED | OUTPATIENT
Start: 2022-02-14 | End: 2022-02-17 | Stop reason: HOSPADM

## 2022-02-14 RX ORDER — GUAIFENESIN/DEXTROMETHORPHAN 100-10MG/5
5 SYRUP ORAL
Status: DISCONTINUED | OUTPATIENT
Start: 2022-02-14 | End: 2022-02-17 | Stop reason: HOSPADM

## 2022-02-14 RX ORDER — HYDRALAZINE HYDROCHLORIDE 20 MG/ML
10 INJECTION INTRAMUSCULAR; INTRAVENOUS
Status: DISCONTINUED | OUTPATIENT
Start: 2022-02-14 | End: 2022-02-17 | Stop reason: HOSPADM

## 2022-02-14 RX ORDER — ENOXAPARIN SODIUM 100 MG/ML
30 INJECTION SUBCUTANEOUS EVERY 12 HOURS
Status: DISCONTINUED | OUTPATIENT
Start: 2022-02-14 | End: 2022-02-14

## 2022-02-14 RX ORDER — LANOLIN ALCOHOL/MO/W.PET/CERES
3 CREAM (GRAM) TOPICAL
Status: DISCONTINUED | OUTPATIENT
Start: 2022-02-14 | End: 2022-02-17 | Stop reason: HOSPADM

## 2022-02-14 RX ORDER — ASPIRIN 81 MG/1
81 TABLET ORAL DAILY
Status: DISCONTINUED | OUTPATIENT
Start: 2022-02-15 | End: 2022-02-17 | Stop reason: HOSPADM

## 2022-02-14 RX ORDER — MELATONIN
2000 DAILY
Status: DISCONTINUED | OUTPATIENT
Start: 2022-02-15 | End: 2022-02-17 | Stop reason: HOSPADM

## 2022-02-14 RX ORDER — DEXTROSE MONOHYDRATE 100 MG/ML
125-250 INJECTION, SOLUTION INTRAVENOUS AS NEEDED
Status: DISCONTINUED | OUTPATIENT
Start: 2022-02-14 | End: 2022-02-17 | Stop reason: HOSPADM

## 2022-02-14 RX ORDER — IPRATROPIUM BROMIDE AND ALBUTEROL SULFATE 2.5; .5 MG/3ML; MG/3ML
3 SOLUTION RESPIRATORY (INHALATION)
Status: DISCONTINUED | OUTPATIENT
Start: 2022-02-14 | End: 2022-02-14

## 2022-02-14 RX ORDER — METOPROLOL TARTRATE 25 MG/1
12.5 TABLET, FILM COATED ORAL EVERY 12 HOURS
Status: DISCONTINUED | OUTPATIENT
Start: 2022-02-14 | End: 2022-02-17 | Stop reason: HOSPADM

## 2022-02-14 RX ORDER — GLYBURIDE 5 MG/1
5 TABLET ORAL
COMMUNITY

## 2022-02-14 RX ORDER — ACETAMINOPHEN 650 MG/1
650 SUPPOSITORY RECTAL
Status: DISCONTINUED | OUTPATIENT
Start: 2022-02-14 | End: 2022-02-17 | Stop reason: HOSPADM

## 2022-02-14 RX ORDER — ATORVASTATIN CALCIUM 40 MG/1
80 TABLET, FILM COATED ORAL EVERY EVENING
Status: DISCONTINUED | OUTPATIENT
Start: 2022-02-14 | End: 2022-02-17 | Stop reason: HOSPADM

## 2022-02-14 RX ORDER — FAMOTIDINE 20 MG/1
20 TABLET, FILM COATED ORAL DAILY
Status: DISCONTINUED | OUTPATIENT
Start: 2022-02-15 | End: 2022-02-14

## 2022-02-14 RX ORDER — ASCORBIC ACID 250 MG
500 TABLET ORAL 2 TIMES DAILY
Status: DISCONTINUED | OUTPATIENT
Start: 2022-02-14 | End: 2022-02-17 | Stop reason: HOSPADM

## 2022-02-14 RX ORDER — ZINC SULFATE 50(220)MG
1 CAPSULE ORAL DAILY
Status: DISCONTINUED | OUTPATIENT
Start: 2022-02-15 | End: 2022-02-17 | Stop reason: HOSPADM

## 2022-02-14 RX ORDER — CLOPIDOGREL BISULFATE 75 MG/1
75 TABLET ORAL DAILY
Status: DISCONTINUED | OUTPATIENT
Start: 2022-02-15 | End: 2022-02-17 | Stop reason: HOSPADM

## 2022-02-14 RX ORDER — ALBUTEROL SULFATE 90 UG/1
2 AEROSOL, METERED RESPIRATORY (INHALATION)
Status: DISCONTINUED | OUTPATIENT
Start: 2022-02-14 | End: 2022-02-16

## 2022-02-14 RX ORDER — FAMOTIDINE 20 MG/1
20 TABLET, FILM COATED ORAL EVERY 12 HOURS
Status: DISCONTINUED | OUTPATIENT
Start: 2022-02-14 | End: 2022-02-17 | Stop reason: HOSPADM

## 2022-02-14 RX ORDER — DEXAMETHASONE SODIUM PHOSPHATE 4 MG/ML
6 INJECTION, SOLUTION INTRA-ARTICULAR; INTRALESIONAL; INTRAMUSCULAR; INTRAVENOUS; SOFT TISSUE EVERY 12 HOURS
Status: DISCONTINUED | OUTPATIENT
Start: 2022-02-14 | End: 2022-02-17 | Stop reason: HOSPADM

## 2022-02-14 RX ORDER — ENOXAPARIN SODIUM 100 MG/ML
40 INJECTION SUBCUTANEOUS EVERY 24 HOURS
Status: DISCONTINUED | OUTPATIENT
Start: 2022-02-15 | End: 2022-02-17 | Stop reason: HOSPADM

## 2022-02-14 RX ORDER — DEXAMETHASONE SODIUM PHOSPHATE 4 MG/ML
6 INJECTION, SOLUTION INTRA-ARTICULAR; INTRALESIONAL; INTRAMUSCULAR; INTRAVENOUS; SOFT TISSUE
Status: COMPLETED | OUTPATIENT
Start: 2022-02-14 | End: 2022-02-14

## 2022-02-14 RX ORDER — DEXAMETHASONE SODIUM PHOSPHATE 4 MG/ML
6 INJECTION, SOLUTION INTRA-ARTICULAR; INTRALESIONAL; INTRAMUSCULAR; INTRAVENOUS; SOFT TISSUE EVERY 24 HOURS
Status: DISCONTINUED | OUTPATIENT
Start: 2022-02-15 | End: 2022-02-14

## 2022-02-14 RX ORDER — IBUPROFEN 200 MG
1 TABLET ORAL DAILY
Status: DISCONTINUED | OUTPATIENT
Start: 2022-02-14 | End: 2022-02-17 | Stop reason: HOSPADM

## 2022-02-14 RX ADMIN — AZITHROMYCIN MONOHYDRATE 500 MG: 500 INJECTION, POWDER, LYOPHILIZED, FOR SOLUTION INTRAVENOUS at 14:10

## 2022-02-14 RX ADMIN — Medication 10 UNITS: at 19:00

## 2022-02-14 RX ADMIN — DEXAMETHASONE SODIUM PHOSPHATE 6 MG: 4 INJECTION, SOLUTION INTRAMUSCULAR; INTRAVENOUS at 14:09

## 2022-02-14 RX ADMIN — ATORVASTATIN CALCIUM 80 MG: 40 TABLET, FILM COATED ORAL at 19:00

## 2022-02-14 RX ADMIN — Medication 4 UNITS: at 19:07

## 2022-02-14 RX ADMIN — Medication 500 MG: at 20:50

## 2022-02-14 RX ADMIN — BUDESONIDE AND FORMOTEROL FUMARATE DIHYDRATE 2 PUFF: 160; 4.5 AEROSOL RESPIRATORY (INHALATION) at 23:05

## 2022-02-14 RX ADMIN — Medication 6 UNITS: at 23:05

## 2022-02-14 RX ADMIN — DEXAMETHASONE SODIUM PHOSPHATE 6 MG: 4 INJECTION, SOLUTION INTRAMUSCULAR; INTRAVENOUS at 23:05

## 2022-02-14 RX ADMIN — FAMOTIDINE 20 MG: 20 TABLET ORAL at 23:04

## 2022-02-14 RX ADMIN — WATER 1 G: 1 INJECTION INTRAMUSCULAR; INTRAVENOUS; SUBCUTANEOUS at 14:09

## 2022-02-14 RX ADMIN — ENOXAPARIN SODIUM 30 MG: 30 INJECTION SUBCUTANEOUS at 14:34

## 2022-02-14 NOTE — PROGRESS NOTES
conducted an initial consultation and Spiritual Assessment for Tony Hilliard, who is a 62 y.o.,male. Patient's Primary Language is: [place]. According to the patient's EMR Yazidi Affiliation is: No Nondenominational. The reason the Patient came to the hospital is:   Patient Active Problem List    Diagnosis Date Noted    CAD (coronary artery disease) 03/12/2020    Pneumonia due to COVID-19 virus 02/14/2022    NSTEMI (non-ST elevated myocardial infarction) ([facility] 03/12/2020    COPD (chronic obstructive [facility] disease) ([facility]     Hypertension         The  provided the following Interventions:  Initiated a relationship of care and support through supportive listening and dialogue. Explored for spiritual needs while hospitalized. Provided information about Spiritual Care Services and continuing availablity of chaplains for spiritual or emotional support throughout his hospitalization. Chart reviewed. The following outcomes where achieved:  Patient expressed concerns about current health status. Patient expressed gratitude for 's visit. Assessment:  Patient does not have any Episcopal/cultural needs that will affect patient's preferences in health care. There are no spiritual or Episcopal issues which require intervention at this time. Plan:  Chaplains will continue to follow and will provide pastoral care on an as needed/requested basis.  recommends bedside caregivers page  on duty if patient shows signs of acute spiritual or emotional distress.     [address] [place]   [phone]

## 2022-02-14 NOTE — H&P
Hospitalist Admission History and Physical    NAME:  Clydene Sandhoff   :   1960   MRN:   [id number]     PCP:  Marleny Devries MD  Date/Time:  2022 2:20 PM    Subjective:   CHIEF COMPLAINT:  Shortness of breath    HISTORY OF PRESENT ILLNESS:     Mary Muhammad is a 62 y.o.  male with PMH of COPD, tobacco abuse, diabetes type 2, CAD with history of stent placement in , hypertension who presents with shortness of breath progressive over the past 1 week. Patient reports he was in his normal state of health until approximately 1 week ago when he began having some shortness of breath which has gradually progressed and significantly worsening today resulting in him presenting to [facility]. He also has cough productive of white mucus only. Denies fever or chills. He does note his girlfriend had contracted Covid approximately 1 month ago, he received his Covid vaccines including booster approximately 2 to 3 weeks ago. Patient does continue to smoke approximately 10 cigs daily. Patient has been continuing to try to quit. He does not use oxygen at home but states he has been trying to get oxygen as well. Patient does not have a pulmonologist.  Does have some mild nausea intermittently without vomiting in setting of coughing. Has been having poor sleep pattern over the past week due to shortness of breath and cough. He was supposed to have tooth extracted on  for painful tooth which he has been holding plavix under guidance of cardiology (there was delay as pt just received insurance). Patient is without other complaints including no chest pain, + anxiety.      Patient Active Problem List   Diagnosis Code    COPD (chronic obstructive pulmonary disease) ([facility] J44.9    Hypertension I10    NSTEMI (non-ST elevated myocardial infarction) ([facility] I21.4    CAD (coronary artery disease) I25.10    Pneumonia due to COVID-19 virus U07.1, J12.82       Past Medical History:   Diagnosis Date    COPD (chronic obstructive pulmonary disease) (HCC)     Diabetes ([facility]     Emphysema     H/O cardiovascular stress test 2012    negative    Hypertension     MI (myocardial infarction) ([facility]        Past Surgical History:   Procedure Laterality Date    HX CORONARY STENT PLACEMENT         Social History     Tobacco Use    Smoking status: Current Every Day Smoker     Packs/day: 0.50     Types: Cigarettes    Smokeless tobacco: Never Used   Substance Use Topics    Alcohol use: Yes     Comment: social only        Family History   Problem Relation Age of Onset    Diabetes Mother         No Known Allergies     Prior to Admission Medications   Prescriptions Last Dose Informant Patient Reported? Taking? Blood-Glucose Meter monitoring kit 2022  No Yes   Sig: As directed   Insulin Syringe-Needle U-100 0.3 mL 31 gauge x 16\" syrg 2022  No Yes   Sig: TID as needed   albuterol sulfate 90 mcg/actuation aebs 2022  No Yes   Sig: Take 1 Puff by inhalation every four to six (4-6) hours as needed for Wheezing. aspirin delayed-release 81 mg tablet 2022  No Yes   Sig: Take 1 Tab by mouth daily. atorvastatin (LIPITOR) 80 mg tablet 2022  No Yes   Sig: TAKE ONE TABLET BY MOUTH EVERY EVENING   clopidogreL (PLAVIX) 75 mg tab 2022 at Unknown time  No Yes   Sig: TAKE ONE TABLET BY MOUTH DAILY   glucagon (GLUCAGEN) 1 mg injection 2022  No Yes   Si mL by IntraMUSCular route as needed for Hypoglycemia.   glucose blood VI test strips (blood glucose test) strip 2022  No Yes   Sig: As directed   glyBURIDE (DIABETA) 5 mg tablet   Yes Yes   Sig: Take 5 mg by mouth Daily (before breakfast).    insulin lispro (HUMALOG) 100 unit/mL injection 2022  No Yes   Sig: Very Insulin Resistant  For Blood Sugar (mg/dL) of:              Less than 150 =   0 units  150 -199 =   3 units  200 -249 =   6 units  250 -299 =   9 units  300 -349 =   12 units  350 and above =   15 units  Initiate Hypoglycemic protocol if blood glucose is <70 mg/dL. Fast Acting - Administer Immediately - or within 15 minutes of start of meal, if mealtime coverage. lancets misc 2022  No Yes   Sig: As directed   lidocaine (Lidoderm) 5 % 2022  No Yes   Sig: Apply patch to the affected area for 12 hours a day and remove for 12 hours a day. lisinopril (PRINIVIL, ZESTRIL) 10 mg tablet 2022  No Yes   Sig: Take 1 Tab by mouth daily. metFORMIN (GLUCOPHAGE) 1,000 mg tablet 2022  No Yes   Sig: Take 1 Tab by mouth two (2) times daily (with meals). methocarbamoL (Robaxin) 500 mg tablet 2022  No No   Sig: Take 1 Tablet by mouth four (4) times daily. Patient not taking: Reported on 2022   metoprolol tartrate (LOPRESSOR) 25 mg tablet 2022  No No   Sig: Take 1 Tab by mouth every twelve (12) hours. Patient taking differently: Take 25 mg by mouth every twelve (12) hours. mometasone-formoterol (DULERA) 100-5 mcg/actuation HFAA HFA inhaler 2022  No Yes   Sig: Take 2 Puffs by inhalation two (2) times a day. nitroglycerin (NITROLINGUAL) 400 mcg/spray spray 2022  No No   Si Spray by SubLINGual route every five (5) minutes as needed for Chest Pain (do not exceed more than 3 sprays in one day, seek acute medical care if chest pain continues). Patient not taking: Reported on 2022   tiotropium (SPIRIVA WITH HANDIHALER) 18 mcg inhalation capsule 2022  No Yes   Sig: Take 1 Cap by inhalation daily. Facility-Administered Medications: None     REVIEW OF SYSTEMS:     [] Unable to obtain  ROS due to  []mental status change  []sedated   []intubated   [x]Total of 12 systems reviewed as follows:    GENERAL: no fever or chills; + anxiety, poor sleep from ongoing SOB and cough  HEENT: no sinus congestion, hearing / vision changes  NECK: No pain or stiffness.    PULMONARY: + Shortness of breath and cough productive of white mucus only  Cardiovascular: denies palpitations or chest pain; no lower extremity edema  GASTROINTESTINAL: mild nausea w/o vomiting with cough, Denies abdominal pain, constipation, diarrhea or melena / bloody stools  GENITOURINARY: No urinary frequency, urgency, hesitancy or dysuria. MUSCULOSKELETAL: no back / joint or muscle pain, no recent trauma. DERMATOLOGIC: denies pruritis, rashes or open areas   ENDOCRINE: No polyuria, polydipsia, no heat or cold intolerance. HEMATOLOGICAL: No easy bruising or bleeding. no anemia  NEUROLOGIC:  no focal weakness,  no speech changes     Objective:   VITALS:    Visit Vitals  /63   Pulse 100   Temp 99.5 °F (37.5 °C)   Resp 25   Ht 5' 11\" (1.803 m)   Wt 104.3 kg (230 lb)   SpO2 96%   BMI 32.08 kg/m²     Temp (24hrs), Av.6 °F (37.6 °C), Min:99.5 °F (37.5 °C), Max:99.9 °F (37.7 °C)    PHYSICAL EXAM:   General:          Alert, + anxiety, restless  HEENT:          Sclera anicteric. Conjunctiva pink. Mucous membranes                          Moist, no ear or nasal discharge  Neck:               Supple. Trachea midline. No accessory muscle use. no jugular venous distention, no carotid bruit  CV:                   Mild tachycardia, regular rhythm. S1S2+  Lungs:             poor air mov't no wheezing  Abdomen:        Soft, non-tender. Not distended. Bowel sounds normal.   Extremities:     No cyanosis. No edema. Neurologic:      Alert and oriented X 4. Follows commands, responds appropriately. No focal neurological deficit was noted  Skin:                Warm and dry. No rashes.      LAB DATA REVIEWED:    No components found for: [place]  Recent Labs     22  1201      K 4.4      CO2 33*   BUN 9   CREA 0.68   *   CA 8.8   WBC 8.8   HGB 13.8   HCT 43.6        IMAGING RESULTS:     [x]  I have personally reviewed the actual   [x]CXR  []CT scan    Assessment/Plan:      Active Problems:    Pneumonia due to COVID-19 virus (2022)     ___________________________________________________  PLAN: 1. COVID PNA -continue Decadron, ID consulted, follow inflammatory markers, continue azithromycin/Rocephin for now await formal ID input. Start COVID supplements. Of note patient is fully vaccinated  2. Acute COPD exacerbation - IV decadron Q12 H; Symbicort, albuterol/duo nebs  3. Acute hypoxia -O2 sats on room air at 86% on admission, continue 5 LNC, wean as able to avoid over oxygenation d/t Covid. Ambulatory O2 prior to discharge  4. CAD with history of PCI 2020 -continue aspirin Plavix, statin, hold beta-blocker in setting of low normal blood pressure  5. Hypertension with relative hypotension -hold beta-blocker and lisinopril for now. Appears not taking beta-blocker at home prior to admission  6. Tobacco abuse -counseled on cessation, continues to smoke 0.5 PPD, nicotine patch provided  7. Type 2 diabetes with hyperglycemia -SSI, A1c 8.8, follow for need to add long-acting insulin  8.  Tooth pain w/ plan for upcoming extraction - do not suspect abscess, do not anticipate being able to proceed w/ surgery on 02/18 in setting of #1, resume plavix    Total time spent coordinating admission: 60 minutes    Continue full code in discussion with patient  Consults called / follow up - Infectious Disease    Prophylaxis:  [x]Lovenox  []Coumadin  []Hep SQ  []SCDs  []H2B/PPI    Discussed Code Status:    [x]Full Code      []DNR     ___________________________________________________  Admitting Provider: Suzette Soto NP

## 2022-02-14 NOTE — ED PROVIDER NOTES
Patient is a 72-year-old male with a history of COPD, coronary artery disease (stent x1), hypertension, and diabetes. Patient presents with primary complaint of 1 week of gradually worsening productive cough with sudden onset of shortness of breath starting yesterday shortly after he used a vape device. Patient reports some associated chills but denies any known fever. Patient denies any chest pain, abdominal pain, nausea/vomiting, or diarrhea. Past Medical History:   Diagnosis Date    [facility]     Diabetes ([facility]     Emphysema     H/O cardiovascular stress test 2012    negative    Hypertension     MI (myocardial infarction) ([facility]        No past surgical history on file. Family History:   Problem Relation Age of Onset    Diabetes Mother        Social History     Socioeconomic History    Marital status: SINGLE     Spouse name: Not on file    Number of children: Not on file    Years of education: Not on file    Highest education level: Not on file   Occupational History    Not on file   Tobacco Use    Smoking status: Current Every Day Smoker     Packs/day: 0.50     Types: Cigarettes    Smokeless tobacco: Never Used   Substance and Sexual Activity    Alcohol use: Not Currently    Drug use: Yes     Types: Marijuana    Sexual activity: Not on file   Other Topics Concern    Not on file   Social History Narrative    Not on file     Social Determinants of Health     Financial Resource Strain:     Difficulty of Paying Living Expenses: Not on file   Food Insecurity:     Worried About Running Out of Food in the Last Year: Not on file    Guy of Food in the Last Year: Not on file   Transportation Needs:     Lack of Transportation (Medical): Not on file    Lack of Transportation (Non-Medical):  Not on file   Physical Activity:     Days of Exercise per Week: Not on file    Minutes of Exercise per Session: Not on file   Stress:     Feeling of Stress : Not on file   Social Connections:     Frequency of Communication with Friends and Family: Not on file    Frequency of Social Gatherings with Friends and Family: Not on file    Attends [employer] Services: Not on file    Active Member of Clubs or Organizations: Not on file    Attends Club or Organization Meetings: Not on file    Marital Status: Not on file   Intimate Partner Violence:     Fear of Current or Ex-Partner: Not on file    Emotionally Abused: Not on file    Physically Abused: Not on file    Sexually Abused: Not on file   Housing Stability:     Unable to Pay for Housing in the Last Year: Not on file    Number of [place] in the Last Year: Not on file    Unstable Housing in the Last Year: Not on file         ALLERGIES: Patient has no known allergies. Review of Systems   Constitutional: Negative for chills and fever. HENT: Negative for rhinorrhea and sore throat. Eyes: Negative for discharge and redness. Respiratory: Positive for cough and shortness of breath. Cardiovascular: Negative for chest pain and leg swelling. Gastrointestinal: Negative for abdominal pain, diarrhea, nausea and vomiting. Genitourinary: Negative for difficulty urinating and dysuria. Musculoskeletal: Negative for back pain and neck pain. Skin: Negative for rash and wound. Neurological: Negative for syncope, light-headedness and headaches. Vitals:    02/14/22 1120 02/14/22 1146 02/14/22 1201 02/14/22 1205   BP:  (!) 156/144 (!) 201/176    Pulse:   95    Resp:   19    Temp:    99.5 °F (37.5 °C)   SpO2: 97% 96% 100% 100%   Weight:       Height:                Physical Exam  Constitutional:       General: He is not in acute distress. Appearance: He is not ill-appearing, toxic-appearing or diaphoretic. HENT:      Head: Normocephalic and atraumatic. Right Ear: External ear normal.      Left Ear: External ear normal.      Nose: No congestion or rhinorrhea.       Mouth/Throat: Mouth: Mucous membranes are moist.      Pharynx: No oropharyngeal exudate or posterior oropharyngeal erythema. Eyes:      General:         Right eye: No discharge. Left eye: No discharge. Pupils: Pupils are equal, round, and reactive to light. Neck:      Vascular: No carotid bruit. Cardiovascular:      Rate and Rhythm: Normal rate and regular rhythm. Heart sounds: No murmur heard. No friction rub. No gallop. Pulmonary:      Effort: Pulmonary effort is normal. No tachypnea, accessory muscle usage or respiratory distress. Breath sounds: No stridor. Examination of the left-middle field reveals rhonchi. Examination of the left-lower field reveals rhonchi. Rhonchi present. No decreased breath sounds, wheezing or rales. Chest:      Chest wall: No tenderness. Abdominal:      General: Abdomen is flat. There is no distension. Tenderness: There is no right CVA tenderness, left CVA tenderness, guarding or rebound. Musculoskeletal:         General: No swelling, tenderness, deformity or signs of injury. Cervical back: No rigidity or tenderness. Right lower leg: No tenderness. No edema. Left lower leg: No tenderness. No edema. Lymphadenopathy:      Cervical: No cervical adenopathy. Skin:     General: Skin is warm. Capillary Refill: Capillary refill takes less than 2 seconds. Coloration: Skin is not jaundiced or pale. Findings: No bruising, erythema, lesion or rash. Neurological:      General: No focal deficit present. Mental Status: He is alert and oriented to person, place, and time. Sensory: No sensory deficit. Motor: No weakness.    Psychiatric:         Mood and Affect: Mood normal.          MDM  Number of Diagnoses or Management Options  Diagnosis management comments: Patient presents primary complaint of productive cough x1 week with sudden onset of shortness of breath, patient's chest x-ray and clinical presentation are consistent with a bacterial pneumonia. We will proceed with IV antibiotics and given hypoxia upon arrival at the emergency department we will plan for admission.        Amount and/or Complexity of Data Reviewed  Tests in the radiology section of CPT®: reviewed           Procedures

## 2022-02-14 NOTE — CONSULTS
Infectious Disease Consultation Note        Reason: Acute hypoxic respiratory failure, COVID-19 infection    Current abx Prior abx   Ceftriaxone, azithromycin since 2/14/22      Lines:       Assessment :     61-year-old male with a history of COPD, coronary artery disease (stent x1), hypertension, uncontrolled type 2 DM presented to [facility] on 2/14/22 with primary complaint of 1 week of gradually worsening productive cough with sudden onset of shortness of breath shortly after he used a vape device. Fully vaccinated for COVID-19. Status post booster vaccine January 2022    Clinical presentation consistent with acute hypoxic respiratory failure-present on admission due to COPD exacerbation/vaping associated lung injury superimposed on recent COVID-19 infection    Low CRP could be due to recent outpatient prednisone    Low procalcitonin argues against superimposed bacterial pneumonia    Low D-dimer argues against COVID-19 associated hypercoagulability    Recommendations:    1. Discontinue ceftriaxone. Continue azithromycin  2. Recommend increasing Decadron to 6 mg every 12 hours  3. Titrate oxygen as tolerated  4. Monitor CRP, D-dimer, procalcitonin, oxygenation      Thank you for consultation request. Above plan was discussed in details with patient, Dariela Turpin and dr Barnes . Please call me if any further questions or concerns. Will continue to participate in the care of this patient. HPI:     61-year-old male with a history of COPD, coronary artery disease (stent x1), hypertension, uncontrolled type 2 DM presented to [facility] on 2/14/22 with primary complaint of 1 week of gradually worsening productive cough with sudden onset of shortness of breath shortly after he used a vape device. Patient states that he took booster COVID vaccine in January 2022. His girlfriend had COVID infection in January. However patient states that he did not have any symptoms at that time.   He recollects taking prednisone about 2 weeks ago prescribed to him by emergency room. I could not find any records in [facility] to suggest that. He continues to smoke. Has smoked for about 45 years. Apparently he tried to use a vaping device about a week ago and subsequently started having increasing shortness of breath. He came to emergency room with these complaints. He was noted to be hypoxic and placed on 5 L oxygen. Received dose of Decadron, antibiotics. I have been consulted for further recommendations      Patient states that he has cough with whitish sputum. Denies pleuritic chest pain. Patient denies any  abdominal pain, nausea/vomiting, or diarrhea.         Past Medical History:   Diagnosis Date    [facility]     Diabetes ([facility]     Emphysema     H/O cardiovascular stress test 2012    negative    Hypertension     MI (myocardial infarction) ([facility]        Past Surgical History:   Procedure Laterality Date    HX CORONARY STENT PLACEMENT         Patient's Medications   Start Taking    No medications on file   Continue Taking    ALBUTEROL SULFATE 90 MCG/ACTUATION AEBS    Take 1 Puff by inhalation every four to six (4-6) hours as needed for Wheezing. ASPIRIN DELAYED-RELEASE 81 MG TABLET    Take 1 Tab by mouth daily. ATORVASTATIN (LIPITOR) 80 MG TABLET    TAKE ONE TABLET BY MOUTH EVERY EVENING    BLOOD-GLUCOSE METER MONITORING KIT    As directed    CLOPIDOGREL (PLAVIX) 75 MG TAB    TAKE ONE TABLET BY MOUTH DAILY    CYCLOBENZAPRINE (FLEXERIL) 10 MG TABLET    Take 1 Tablet by mouth three (3) times daily as needed for Muscle Spasm(s). EMPAGLIFLOZIN (JARDIANCE) 10 MG TABLET    Take 10 mg by mouth daily. GLUCAGON (GLUCAGEN) 1 MG INJECTION    1 mL by IntraMUSCular route as needed for Hypoglycemia.     GLUCOSE BLOOD VI TEST STRIPS (BLOOD GLUCOSE TEST) STRIP    As directed    INSULIN LISPRO (HUMALOG) 100 UNIT/ML INJECTION    Very Insulin Resistant  For Blood Sugar (mg/dL) of:              Less than 150 =   0 units  150 -199 =   3 units  200 -249 =   6 units  250 -299 =   9 units  300 -349 =   12 units  350 and above =   15 units  Initiate Hypoglycemic protocol if blood glucose is <70 mg/dL. Fast Acting - Administer Immediately - or within 15 minutes of start of meal, if mealtime coverage. INSULIN SYRINGE-NEEDLE U-100 0.3 ML 31 GAUGE X 5/16\" SYRG    TID as needed    LANCETS MISC    As directed    LIDOCAINE (LIDODERM) 5 %    Apply patch to the affected area for 12 hours a day and remove for 12 hours a day. LISINOPRIL (PRINIVIL, ZESTRIL) 10 MG TABLET    Take 1 Tab by mouth daily. METFORMIN (GLUCOPHAGE) 1,000 MG TABLET    Take 1 Tab by mouth two (2) times daily (with meals). METHOCARBAMOL (ROBAXIN) 500 MG TABLET    Take 1 Tablet by mouth four (4) times daily. METOPROLOL TARTRATE (LOPRESSOR) 25 MG TABLET    Take 1 Tab by mouth every twelve (12) hours. MOMETASONE-FORMOTEROL (DULERA) 100-5 MCG/ACTUATION HFAA HFA INHALER    Take 2 Puffs by inhalation two (2) times a day. NITROGLYCERIN (NITROLINGUAL) 400 MCG/SPRAY SPRAY    1 Spray by SubLINGual route every five (5) minutes as needed for Chest Pain (do not exceed more than 3 sprays in one day, seek acute medical care if chest pain continues). OTHER    CMP in 2 week  Dx: elevated AST  Fax results to Dr. Kong De La Cruz    TIOTROPIUM (SPIRIVA WITH HANDIHALER) 18 MCG INHALATION CAPSULE    Take 1 Cap by inhalation daily.     VARENICLINE (CHANTIX STARTER HUMPHREY) 0.5 MG (11)- 1 MG (42) DSPK    0.5 mg tab daily x 3 days, 0.5 mg tab bid x 4 days   These Medications have changed    No medications on file   Stop Taking    No medications on file       Current Facility-Administered Medications   Medication Dose Route Frequency    cefTRIAXone (ROCEPHIN) 1 g in sterile water (preservative free) 10 mL IV syringe  1 g IntraVENous Q24H    azithromycin (ZITHROMAX) 500 mg in 0.9% sodium chloride 250 mL (VIAL-MATE)  500 mg IntraVENous Q24H    lactated ringers bolus infusion 1,000 mL  1,000 mL IntraVENous ONCE    dexamethasone (DECADRON) 4 mg/mL injection 6 mg  6 mg IntraVENous NOW    insulin lispro (HUMALOG) injection   SubCUTAneous AC&HS    glucose chewable tablet 16 g  4 Tablet Oral PRN    glucagon (GLUCAGEN) injection 1 mg  1 mg IntraMUSCular PRN    dextrose 10% infusion 125-250 mL  125-250 mL IntraVENous PRN     Current Outpatient Medications   Medication Sig    empagliflozin (JARDIANCE) 10 mg tablet Take 10 mg by mouth daily.  methocarbamoL (Robaxin) 500 mg tablet Take 1 Tablet by mouth four (4) times daily.  lidocaine (Lidoderm) 5 % Apply patch to the affected area for 12 hours a day and remove for 12 hours a day.  cyclobenzaprine (FLEXERIL) 10 mg tablet Take 1 Tablet by mouth three (3) times daily as needed for Muscle Spasm(s).  clopidogreL (PLAVIX) 75 mg tab TAKE ONE TABLET BY MOUTH DAILY    atorvastatin (LIPITOR) 80 mg tablet TAKE ONE TABLET BY MOUTH EVERY EVENING    varenicline (CHANTIX STARTER HUMPHREY) 0.5 mg (11)- 1 mg (42) DsPk 0.5 mg tab daily x 3 days, 0.5 mg tab bid x 4 days    albuterol sulfate 90 mcg/actuation aebs Take 1 Puff by inhalation every four to six (4-6) hours as needed for Wheezing.  metoprolol tartrate (LOPRESSOR) 25 mg tablet Take 1 Tab by mouth every twelve (12) hours.  aspirin delayed-release 81 mg tablet Take 1 Tab by mouth daily.  glucagon (GLUCAGEN) 1 mg injection 1 mL by IntraMUSCular route as needed for Hypoglycemia.  insulin lispro (HUMALOG) 100 unit/mL injection Very Insulin Resistant  For Blood Sugar (mg/dL) of:              Less than 150 =   0 units  150 -199 =   3 units  200 -249 =   6 units  250 -299 =   9 units  300 -349 =   12 units  350 and above =   15 units  Initiate Hypoglycemic protocol if blood glucose is <70 mg/dL. Fast Acting - Administer Immediately - or within 15 minutes of start of meal, if mealtime coverage.     nitroglycerin (NITROLINGUAL) 400 mcg/spray spray 1 Spray by SubLINGual route every five (5) minutes as needed for Chest Pain (do not exceed more than 3 sprays in one day, seek acute medical care if chest pain continues).  Insulin Syringe-Needle U-100 0.3 mL 31 gauge x 5/16\" syrg TID as needed    lancets misc As directed    Blood-Glucose Meter monitoring kit As directed    glucose blood VI test strips (blood glucose test) strip As directed    OTHER CMP in 2 week  Dx: elevated AST  Fax results to Dr. Willian Camarena metFORMIN (GLUCOPHAGE) 1,000 mg tablet Take 1 Tab by mouth two (2) times daily (with meals).  lisinopril (PRINIVIL, ZESTRIL) 10 mg tablet Take 1 Tab by mouth daily.  tiotropium (SPIRIVA WITH HANDIHALER) 18 mcg inhalation capsule Take 1 Cap by inhalation daily.  mometasone-formoterol (DULERA) 100-5 mcg/actuation HFAA HFA inhaler Take 2 Puffs by inhalation two (2) times a day. Allergies: Patient has no known allergies. Family History   Problem Relation Age of Onset    Diabetes Mother      Social History     Socioeconomic History    Marital status: SINGLE     Spouse name: Not on file    Number of children: Not on file    Years of education: Not on file    Highest education level: Not on file   Occupational History    Not on file   Tobacco Use    Smoking status: Current Every Day Smoker     Packs/day: 0.50     Types: Cigarettes    Smokeless tobacco: Never Used   Substance and Sexual Activity    Alcohol use: Not Currently    Drug use: Yes     Types: Marijuana    Sexual activity: Not on file   Other Topics Concern    Not on file   Social History Narrative    Not on file     Social Determinants of Health     Financial Resource Strain:     Difficulty of Paying Living Expenses: Not on file   Food Insecurity:     Worried About Running Out of Food in the Last Year: Not on file    Guy of Food in the Last Year: Not on file   Transportation Needs:     Lack of Transportation (Medical): Not on file    Lack of Transportation (Non-Medical):  Not on file Physical Activity:     Days of Exercise per Week: Not on file    Minutes of Exercise per Session: Not on file   Stress:     Feeling of Stress : Not on file   Social Connections:     Frequency of Communication with Friends and Family: Not on file    Frequency of Social Gatherings with Friends and Family: Not on file    Attends Rastafarian Services: Not on file    Active Member of [address] [place] [address] or Organizations: Not on file    Attends Club or Organization Meetings: Not on file    Marital Status: Not on file   Intimate Partner Violence:     Fear of Current or Ex-Partner: Not on file    Emotionally Abused: Not on file    Physically Abused: Not on file    Sexually Abused: Not on file   Housing Stability:     Unable to Pay for Housing in the Last Year: Not on file    Number of [place] in the Last Year: Not on file    Unstable Housing in the Last Year: Not on file     Social History     Tobacco Use   Smoking Status Current Every Day Smoker    Packs/day: 0.50    Types: Cigarettes   Smokeless Tobacco Never Used        Temp (24hrs), Av.7 °F (37.6 °C), Min:99.5 °F (37.5 °C), Max:99.9 °F (37.7 °C)    Visit Vitals  BP (!) 201/176   Pulse 95   Temp 99.5 °F (37.5 °C)   Resp 19   Ht 5' 11\" (1.803 m)   Wt 104.3 kg (230 lb)   SpO2 100%   BMI 32.08 kg/m²       ROS: 12 point ROS obtained in details. Pertinent positives as mentioned in HPI,   otherwise negative    Physical Exam:    Vitals signs and nursing note reviewed. Constitutional:       General: He is not in acute distress. Appearance: He is well-developed. Appears mildly dyspneic,  HENT:      Head: Normocephalic. Eyes:      Conjunctiva/sclera: Conjunctivae normal.      Neck:      Musculoskeletal: Normal range of motion and neck supple.    Cardiovascular:      Rate and Rhythm: Normal rate and regular rhythm on monitor  Chest:      Bilateral chest movements equal.  Auscultation deferred due to Covid positive. + Audible wheezing  Abdominal:      General: There is no distension. Palpations: Abdomen is soft. Tenderness: There is no abdominal tenderness. There is no rebound. Musculoskeletal: Normal range of motion. General: No tenderness. Skin:     General: Skin is warm and dry. Findings: No rash. Neurological:      Mental Status: He is alert and oriented to person, place, and time. Cranial Nerves: No cranial nerve deficit. Motor: No abnormal muscle tone. Coordination: Coordination normal.   Psychiatric:         Behavior: Behavior normal.         Thought Content: Thought content normal.         Judgment: Judgment normal.     Labs: Results:   Chemistry Recent Labs     02/14/22  1201   *      K 4.4      CO2 33*   BUN 9   CREA 0.68   CA 8.8   AGAP 4   BUCR 13      CBC w/Diff Recent Labs     02/14/22  1201   WBC 8.8   RBC 4.91   HGB 13.8   HCT 43.6      GRANS 78*   LYMPH 13*   EOS 1      Microbiology No results for input(s): CULT in the last 72 hours. RADIOLOGY:    All available imaging studies/reports in [facility] for this admission were reviewed      Disclaimer: Sections of this note are dictated utilizing voice recognition software, which may have resulted in some phonetic based errors in grammar and contents. Even though attempts were made to correct all the mistakes, some may have been missed, and remained in the body of the document. If questions arise, please contact our department.     Dr. Demario Martinez, Infectious Disease Specialist  [phone]  February 14, 2022  1:27 PM

## 2022-02-15 LAB
ALBUMIN SERPL-MCNC: 3.3 G/DL (ref 3.4–5)
ALBUMIN/GLOB SERPL: 0.9 {RATIO} (ref 0.8–1.7)
ALP SERPL-CCNC: 71 U/L (ref 45–117)
ALT SERPL-CCNC: 16 U/L (ref 16–61)
ANION GAP SERPL CALC-SCNC: 2 MMOL/L (ref 3–18)
AST SERPL-CCNC: 13 U/L (ref 10–38)
BASOPHILS # BLD: 0 K/UL (ref 0–0.1)
BASOPHILS NFR BLD: 0 % (ref 0–2)
BILIRUB SERPL-MCNC: 0.6 MG/DL (ref 0.2–1)
BUN SERPL-MCNC: 13 MG/DL (ref 7–18)
BUN/CREAT SERPL: 18 (ref 12–20)
CALCIUM SERPL-MCNC: 8.9 MG/DL (ref 8.5–10.1)
CHLORIDE SERPL-SCNC: 100 MMOL/L (ref 100–111)
CO2 SERPL-SCNC: 33 MMOL/L (ref 21–32)
CREAT SERPL-MCNC: 0.73 MG/DL (ref 0.6–1.3)
CRP SERPL-MCNC: 4.8 MG/DL (ref 0–0.3)
D DIMER PPP FEU-MCNC: 0.32 UG/ML(FEU)
DIFFERENTIAL METHOD BLD: ABNORMAL
EOSINOPHIL # BLD: 0 K/UL (ref 0–0.4)
EOSINOPHIL NFR BLD: 0 % (ref 0–5)
ERYTHROCYTE [DISTWIDTH] IN BLOOD BY AUTOMATED COUNT: 12.7 % (ref 11.6–14.5)
GLOBULIN SER CALC-MCNC: 3.7 G/DL (ref 2–4)
GLUCOSE BLD STRIP.AUTO-MCNC: 221 MG/DL (ref 70–110)
GLUCOSE BLD STRIP.AUTO-MCNC: 235 MG/DL (ref 70–110)
GLUCOSE BLD STRIP.AUTO-MCNC: 248 MG/DL (ref 70–110)
GLUCOSE BLD STRIP.AUTO-MCNC: 260 MG/DL (ref 70–110)
GLUCOSE SERPL-MCNC: 274 MG/DL (ref 74–99)
HCT VFR BLD AUTO: 41.1 % (ref 36–48)
HGB BLD-MCNC: 13.3 G/DL (ref 13–16)
IMM GRANULOCYTES # BLD AUTO: 0 K/UL (ref 0–0.04)
IMM GRANULOCYTES NFR BLD AUTO: 0 % (ref 0–0.5)
LYMPHOCYTES # BLD: 0.6 K/UL (ref 0.9–3.6)
LYMPHOCYTES NFR BLD: 9 % (ref 21–52)
MCH RBC QN AUTO: 28.7 PG (ref 24–34)
MCHC RBC AUTO-ENTMCNC: 32.4 G/DL (ref 31–37)
MCV RBC AUTO: 88.6 FL (ref 78–100)
MONOCYTES # BLD: 0.3 K/UL (ref 0.05–1.2)
MONOCYTES NFR BLD: 5 % (ref 3–10)
NEUTS SEG # BLD: 5.2 K/UL (ref 1.8–8)
NEUTS SEG NFR BLD: 86 % (ref 40–73)
NRBC # BLD: 0 K/UL (ref 0–0.01)
NRBC BLD-RTO: 0 PER 100 WBC
PLATELET # BLD AUTO: 170 K/UL (ref 135–420)
PMV BLD AUTO: 10.6 FL (ref 9.2–11.8)
POTASSIUM SERPL-SCNC: 4.4 MMOL/L (ref 3.5–5.5)
PROCALCITONIN SERPL-MCNC: <0.05 NG/ML
PROT SERPL-MCNC: 7 G/DL (ref 6.4–8.2)
RBC # BLD AUTO: 4.64 M/UL (ref 4.35–5.65)
SODIUM SERPL-SCNC: 135 MMOL/L (ref 136–145)
WBC # BLD AUTO: 6.1 K/UL (ref 4.6–13.2)

## 2022-02-15 PROCEDURE — 77010033678 HC OXYGEN DAILY

## 2022-02-15 PROCEDURE — 77030027138 HC INCENT SPIROMETER -A

## 2022-02-15 PROCEDURE — 2709999900 HC NON-CHARGEABLE SUPPLY

## 2022-02-15 PROCEDURE — 99232 SBSQ HOSP IP/OBS MODERATE 35: CPT | Performed by: HOSPITALIST

## 2022-02-15 PROCEDURE — 85025 COMPLETE CBC W/AUTO DIFF WBC: CPT

## 2022-02-15 PROCEDURE — 74011250636 HC RX REV CODE- 250/636: Performed by: NURSE PRACTITIONER

## 2022-02-15 PROCEDURE — 86140 C-REACTIVE PROTEIN: CPT

## 2022-02-15 PROCEDURE — 80053 COMPREHEN METABOLIC PANEL: CPT

## 2022-02-15 PROCEDURE — 65660000000 HC RM CCU STEPDOWN

## 2022-02-15 PROCEDURE — 84145 PROCALCITONIN (PCT): CPT

## 2022-02-15 PROCEDURE — 82962 GLUCOSE BLOOD TEST: CPT

## 2022-02-15 PROCEDURE — 74011636637 HC RX REV CODE- 636/637: Performed by: HOSPITALIST

## 2022-02-15 PROCEDURE — 74011250636 HC RX REV CODE- 250/636: Performed by: STUDENT IN AN ORGANIZED HEALTH CARE EDUCATION/TRAINING PROGRAM

## 2022-02-15 PROCEDURE — 74011636637 HC RX REV CODE- 636/637: Performed by: NURSE PRACTITIONER

## 2022-02-15 PROCEDURE — 74011250637 HC RX REV CODE- 250/637: Performed by: NURSE PRACTITIONER

## 2022-02-15 PROCEDURE — 85379 FIBRIN DEGRADATION QUANT: CPT

## 2022-02-15 PROCEDURE — 36415 COLL VENOUS BLD VENIPUNCTURE: CPT

## 2022-02-15 RX ORDER — INSULIN GLARGINE 100 [IU]/ML
15 INJECTION, SOLUTION SUBCUTANEOUS DAILY
Status: DISCONTINUED | OUTPATIENT
Start: 2022-02-16 | End: 2022-02-16

## 2022-02-15 RX ADMIN — CLOPIDOGREL BISULFATE 75 MG: 75 TABLET, FILM COATED ORAL at 09:43

## 2022-02-15 RX ADMIN — Medication 9 UNITS: at 09:46

## 2022-02-15 RX ADMIN — Medication 6 UNITS: at 18:02

## 2022-02-15 RX ADMIN — Medication 3 MG: at 05:23

## 2022-02-15 RX ADMIN — ATORVASTATIN CALCIUM 80 MG: 40 TABLET, FILM COATED ORAL at 18:02

## 2022-02-15 RX ADMIN — Medication 500 MG: at 18:02

## 2022-02-15 RX ADMIN — AZITHROMYCIN MONOHYDRATE 500 MG: 500 INJECTION, POWDER, LYOPHILIZED, FOR SOLUTION INTRAVENOUS at 12:08

## 2022-02-15 RX ADMIN — ENOXAPARIN SODIUM 40 MG: 100 INJECTION SUBCUTANEOUS at 18:02

## 2022-02-15 RX ADMIN — Medication 6 UNITS: at 12:07

## 2022-02-15 RX ADMIN — Medication 2000 UNITS: at 09:43

## 2022-02-15 RX ADMIN — ZINC SULFATE 220 MG (50 MG) CAPSULE 1 CAPSULE: CAPSULE at 09:44

## 2022-02-15 RX ADMIN — FAMOTIDINE 20 MG: 20 TABLET ORAL at 09:44

## 2022-02-15 RX ADMIN — FAMOTIDINE 20 MG: 20 TABLET ORAL at 21:53

## 2022-02-15 RX ADMIN — DEXAMETHASONE SODIUM PHOSPHATE 6 MG: 4 INJECTION, SOLUTION INTRAMUSCULAR; INTRAVENOUS at 09:43

## 2022-02-15 RX ADMIN — ASPIRIN 81 MG: 81 TABLET, COATED ORAL at 09:44

## 2022-02-15 RX ADMIN — Medication 10 UNITS: at 09:45

## 2022-02-15 RX ADMIN — Medication 6 UNITS: at 22:19

## 2022-02-15 RX ADMIN — Medication 3 MG: at 21:53

## 2022-02-15 RX ADMIN — BUDESONIDE AND FORMOTEROL FUMARATE DIHYDRATE 2 PUFF: 160; 4.5 AEROSOL RESPIRATORY (INHALATION) at 09:51

## 2022-02-15 RX ADMIN — Medication 500 MG: at 09:44

## 2022-02-15 RX ADMIN — DEXAMETHASONE SODIUM PHOSPHATE 6 MG: 4 INJECTION, SOLUTION INTRAMUSCULAR; INTRAVENOUS at 21:53

## 2022-02-15 NOTE — PROGRESS NOTES
Infectious Disease progress Note        Reason: Acute hypoxic respiratory failure, COVID-19 infection    Current abx Prior abx   azithromycin since 2/14/22 Ceftriaxone 2/14     Lines:       Assessment :     76-year-old male with a history of COPD, coronary artery disease (stent x1), hypertension, uncontrolled type 2 DM presented to  [facility] on 2/14/22 with primary complaint of 1 week of gradually worsening productive cough with sudden onset of shortness of breath shortly after he used a vape device. Fully vaccinated for COVID-19. Status post booster vaccine January 2022    Clinical presentation consistent with acute hypoxic respiratory failure-present on admission due to COPD exacerbation/vaping associated lung injury superimposed on recent COVID-19 infection    Low CRP could be due to recent outpatient prednisone    Low procalcitonin argues against superimposed bacterial pneumonia    Low D-dimer argues against COVID-19 associated hypercoagulability    Clinically better. Improving oxygenation. Appeared comfortable on 4L    Recommendations:    1. Continue azithromycin  2. Continue decadron 6 mg every 12 hours  3. Titrate oxygen as tolerated  4. Monitor CRP, D-dimer, procalcitonin, oxygenation     Above plan was discussed in details with patient, primary team. Please call me if any further questions or concerns. Will continue to participate in the care of this patient. HPI:    Feels better. Patient states that he has cough with whitish sputum. Denies pleuritic chest pain.   Patient denies any  abdominal pain, nausea/vomiting, or diarrhea.         Past Medical History:   Diagnosis Date    COPD (chronic obstructive [facility] disease) ([facility]     Diabetes ([facility]     Emphysema     H/O cardiovascular stress test 2012    negative    Hypertension     MI (myocardial infarction) ([facility]        Past Surgical History:   Procedure Laterality Date    HX CORONARY STENT PLACEMENT         Current Discharge Medication List CONTINUE these medications which have NOT CHANGED    Details   glyBURIDE (DIABETA) 5 mg tablet Take 5 mg by mouth Daily (before breakfast). lidocaine (Lidoderm) 5 % Apply patch to the affected area for 12 hours a day and remove for 12 hours a day. Qty: 10 Each, Refills: 0      clopidogreL (PLAVIX) 75 mg tab TAKE ONE TABLET BY MOUTH DAILY  Qty: 90 Tablet, Refills: 3      atorvastatin (LIPITOR) 80 mg tablet TAKE ONE TABLET BY MOUTH EVERY EVENING  Qty: 90 Tablet, Refills: 3      albuterol sulfate 90 mcg/actuation aebs Take 1 Puff by inhalation every four to six (4-6) hours as needed for Wheezing. Qty: 1 Each, Refills: 0      aspirin delayed-release 81 mg tablet Take 1 Tab by mouth daily. Qty: 30 Tab, Refills: 0      glucagon (GLUCAGEN) 1 mg injection 1 mL by IntraMUSCular route as needed for Hypoglycemia. Qty: 1 Vial, Refills: 0      insulin lispro (HUMALOG) 100 unit/mL injection Very Insulin Resistant  For Blood Sugar (mg/dL) of:              Less than 150 =   0 units  150 -199 =   3 units  200 -249 =   6 units  250 -299 =   9 units  300 -349 =   12 units  350 and above =   15 units  Initiate Hypoglycemic protocol if blood glucose is <70 mg/dL. Fast Acting - Administer Immediately - or within 15 minutes of start of meal, if mealtime coverage. Qty: 1 Vial, Refills: 0      Insulin Syringe-Needle U-100 0.3 mL 31 gauge x 5/16\" syrg TID as needed  Qty: 100 Syringe, Refills: 0      lancets misc As directed  Qty: 100 Each, Refills: 0      Blood-Glucose Meter monitoring kit As directed  Qty: 1 Kit, Refills: 0      glucose blood VI test strips (blood glucose test) strip As directed  Qty: 60 Strip, Refills: 0      metFORMIN (GLUCOPHAGE) 1,000 mg tablet Take 1 Tab by mouth two (2) times daily (with meals).   Qty: 60 Tab, Refills: 0    Associated Diagnoses: Type II or unspecified type diabetes mellitus without mention of complication, uncontrolled      lisinopril (PRINIVIL, ZESTRIL) 10 mg tablet Take 1 Tab by mouth daily.  Qty: 30 Tab, Refills: 0    Associated Diagnoses: Hypertension      tiotropium (SPIRIVA WITH HANDIHALER) 18 mcg inhalation capsule Take 1 Cap by inhalation daily. Qty: 30 Cap, Refills: 0    Associated Diagnoses: COPD exacerbation ([address]      mometasone-formoterol (DULERA) 100-5 mcg/actuation HFAA HFA inhaler Take 2 Puffs by inhalation two (2) times a day. Qty: 2 Inhaler, Refills: 0    Associated Diagnoses: COPD exacerbation (HCC)      methocarbamoL (Robaxin) 500 mg tablet Take 1 Tablet by mouth four (4) times daily. Qty: 16 Tablet, Refills: 0      metoprolol tartrate (LOPRESSOR) 25 mg tablet Take 1 Tab by mouth every twelve (12) hours. Qty: 60 Tab, Refills: 6      nitroglycerin (NITROLINGUAL) 400 mcg/spray spray 1 Spray by SubLINGual route every five (5) minutes as needed for Chest Pain (do not exceed more than 3 sprays in one day, seek acute medical care if chest pain continues).   Qty: 1 Bottle, Refills: 0             Current Facility-Administered Medications   Medication Dose Route Frequency    azithromycin (ZITHROMAX) 500 mg in 0.9% sodium chloride 250 mL (VIAL-MATE)  500 mg IntraVENous Q24H    insulin lispro (HUMALOG) injection   SubCUTAneous AC&HS    glucose chewable tablet 16 g  4 Tablet Oral PRN    glucagon (GLUCAGEN) injection 1 mg  1 mg IntraMUSCular PRN    dextrose 10% infusion 125-250 mL  125-250 mL IntraVENous PRN    aspirin delayed-release tablet 81 mg  81 mg Oral DAILY    atorvastatin (LIPITOR) tablet 80 mg  80 mg Oral QPM    clopidogreL (PLAVIX) tablet 75 mg  75 mg Oral DAILY    budesonide-formoteroL (SYMBICORT) 160-4.5 mcg/actuation HFA inhaler 2 Puff  2 Puff Inhalation BID RT    albuterol (PROVENTIL HFA, VENTOLIN HFA, PROAIR HFA) inhaler 2 Puff  2 Puff Inhalation Q4H PRN    acetaminophen (TYLENOL) tablet 650 mg  650 mg Oral Q6H PRN    Or    acetaminophen (TYLENOL) suppository 650 mg  650 mg Rectal Q6H PRN    guaiFENesin-dextromethorphan (ROBITUSSIN DM) 100-10 mg/5 mL syrup 5 mL  5 mL Oral Q4H PRN    cholecalciferol (VITAMIN D3) (1000 Units /25 mcg) tablet 2,000 Units  2,000 Units Oral DAILY    melatonin tablet 3 mg  3 mg Oral QHS    ascorbic acid (vitamin C) (VITAMIN C) tablet 500 mg  500 mg Oral BID    nicotine (NICODERM CQ) 14 mg/24 hr patch 1 Patch  1 Patch TransDERmal DAILY    [Held by provider] metoprolol tartrate (LOPRESSOR) tablet 12.5 mg  12.5 mg Oral Q12H    ipratropium-albuterol (COMBIVENT RESPIMAT) 20 mcg-100 mcg inhalation spray  1 Puff Inhalation Q4H PRN    zinc sulfate (ZINCATE) 50 mg zinc (220 mg) capsule 1 Capsule  1 Capsule Oral DAILY    dexamethasone (DECADRON) 4 mg/mL injection 6 mg  6 mg IntraVENous Q12H    famotidine (PEPCID) tablet 20 mg  20 mg Oral Q12H    enoxaparin (LOVENOX) injection 40 mg  40 mg SubCUTAneous Q24H    insulin glargine (LANTUS) injection 10 Units  10 Units SubCUTAneous DAILY    hydrALAZINE (APRESOLINE) 20 mg/mL injection 10 mg  10 mg IntraVENous Q6H PRN       Allergies: Patient has no known allergies.     Family History   Problem Relation Age of Onset    Diabetes Mother      Social History     Socioeconomic History    Marital status: SINGLE     Spouse name: Not on file    Number of children: Not on file    Years of education: Not on file    Highest education level: Not on file   Occupational History    Not on file   Tobacco Use    Smoking status: Current Every Day Smoker     Packs/day: 0.50     Types: Cigarettes    Smokeless tobacco: Never Used   Substance and Sexual Activity    Alcohol use: Yes     Comment: social only    Drug use: Yes     Types: Marijuana    Sexual activity: Not on file   Other Topics Concern    Not on file   Social History Narrative    Not on file     Social Determinants of Health     Financial Resource Strain:     Difficulty of Paying Living Expenses: Not on file   Food Insecurity:     Worried About Running Out of Food in the Last Year: Not on file    Guy of Food in the Last Year: Not on file Transportation Needs:     Lack of Transportation (Medical): Not on file    Lack of Transportation (Non-Medical): Not on file   Physical Activity:     Days of Exercise per Week: Not on file    Minutes of Exercise per Session: Not on file   Stress:     Feeling of Stress : Not on file   Social Connections:     Frequency of Communication with Friends and Family: Not on file    Frequency of Social Gatherings with Friends and Family: Not on file    Attends Scientologist Services: Not on file    Active Member of [address] [place] [address] [employer] or Organizations: Not on file    Attends Club or Organization Meetings: Not on file    Marital Status: Not on file   Intimate Partner Violence:     Fear of Current or Ex-Partner: Not on file    Emotionally Abused: Not on file    Physically Abused: Not on file    Sexually Abused: Not on file   Housing Stability:     Unable to Pay for Housing in the Last Year: Not on file    Number of [place] in the Last Year: Not on file    Unstable Housing in the Last Year: Not on file     Social History     Tobacco Use   Smoking Status Current Every Day Smoker    Packs/day: 0.50    Types: Cigarettes   Smokeless Tobacco Never Used        Temp (24hrs), Av °F (37.2 °C), Min:97.8 °F (36.6 °C), Max:99.9 °F (37.7 °C)    Visit Vitals  /67 (BP 1 Location: Right upper arm, BP Patient Position: At rest)   Pulse 92   Temp 97.8 °F (36.6 °C)   Resp 22   Ht 5' 11\" (1.803 m)   Wt 107.2 kg (236 lb 6.4 oz)   SpO2 95%   BMI 32.97 kg/m²       ROS: 12 point ROS obtained in details. Pertinent positives as mentioned in HPI,   otherwise negative    Physical Exam:    Vitals signs and nursing note reviewed. Constitutional:       General: He is not in acute distress. Appearance: He is well-developed. Appears more comfortable than prior exam.   HENT:      Head: Normocephalic. Eyes:      Conjunctiva/sclera: Conjunctivae normal.      Neck:      Musculoskeletal: Normal range of motion and neck supple. Cardiovascular:      Rate and Rhythm: Normal rate and regular rhythm on monitor  Chest:      Bilateral chest movements equal.  Auscultation deferred due to Covid positive. + Audible wheezing  Abdominal:      General: There is no distension. Palpations: Abdomen is soft. Tenderness: There is no abdominal tenderness. There is no rebound. Musculoskeletal: Normal range of motion. General: No tenderness. Skin:     General: Skin is warm and dry. Findings: No rash. Neurological:      Mental Status: He is alert and oriented to person, place, and time. Cranial Nerves: No cranial nerve deficit. Motor: No abnormal muscle tone. Coordination: Coordination normal.   Psychiatric:         Behavior: Behavior normal.         Thought Content: Thought content normal.         Judgment: Judgment normal.     Labs: Results:   Chemistry Recent Labs     02/15/22  0501 02/14/22  1201   * 152*   * 137   K 4.4 4.4    100   CO2 33* 33*   BUN 13 9   CREA 0.73 0.68   CA 8.9 8.8   AGAP 2* 4   BUCR 18 13   AP 71  --    TP 7.0  --    ALB 3.3*  --    GLOB 3.7  --    AGRAT 0.9  --       CBC w/Diff Recent Labs     02/15/22  0501 02/14/22  1201   WBC 6.1 8.8   RBC 4.64 4.91   HGB 13.3 13.8   HCT 41.1 43.6    173   GRANS 86* 78*   LYMPH 9* 13*   EOS 0 1      Microbiology Recent Labs     02/14/22  1430 02/14/22  1201   CULT NO GROWTH AFTER 16 HOURS NO GROWTH AFTER 18 HOURS          RADIOLOGY:    All available imaging studies/reports in [facility] for this admission were reviewed      Disclaimer: Sections of this note are dictated utilizing voice recognition software, which may have resulted in some phonetic based errors in grammar and contents. Even though attempts were made to correct all the mistakes, some may have been missed, and remained in the body of the document. If questions arise, please contact our department.     Dr. Dre Meyer, Infectious Disease Specialist  [phone]  February 15, 2022  1:27 PM

## 2022-02-15 NOTE — DIABETES MGMT
Diabetes/ Glycemic Control Plan of Care    Reviewed admission notes: he presented to ED on 2/14/2022 with report of gradually worsening productive cough with sudden onset of shortness of breath and associated with chills. COVID detected. Recommendations:   1.) basal lantus insulin as ordered. Noted dose increased by provider. 2.) modify correctional lispro insulin to very resistant dose per protocol. Done. 3.) monitor for timing of fasting BG for accuracy. 2/15:  Patient seen in 2 [facility] unit this morning in [facility] plus isolation. He is currently on steroids every 12 hours. Discussed glycemic monitoring and interventions. Noted POC BG of 260 at 09:35 this morning. Encouraged patient to ring the nurse's bell for assistance when meal tray delivered but his blood sugar is not checked yet. Completed assessment of home diabetes management and education. He verbalized understanding that his current A1c level of 8.8 indicated diabetes not controlled and what he can do to achieve the recommended A1c of 7% of lower. Emphasized nutrition compliance and regular monitoring of fasting BG. Assessment:   DX:   1. Coronary artery disease involving native coronary artery of native heart without angina pectoris     2. Chronic obstructive pulmonary disease with acute exacerbation ([facility]     3. Hypertension, unspecified type     4. Pneumonia due to COVID-19 virus     5.  Hypoxia        Fasting/ Morning blood glucose:   Lab Results   Component Value Date/Time    Glucose 274 (H) 02/15/2022 05:01 AM    Glucose (POC) 260 (H) 02/15/2022 09:35 AM    Glucose (POC) 185 (H) 07/19/2014 05:28 PM     IV Fluids containing dextrose: None    Steroids:   Rx Glucocorticoids (24h ago, onward)             Start     Dose Route Frequency Ordered Stop    02/14/22 2100  dexamethasone (DECADRON) 4 mg/mL injection 6 mg         6 mg IV EVERY 12 HOURS 02/14/22 1506 --               Rx Glucocorticoids (24h ago, onward)             Start     Dose Route Frequency Ordered Stop    02/14/22 2100  dexamethasone (DECADRON) 4 mg/mL injection 6 mg         6 mg IV EVERY 12 HOURS 02/14/22 1506 --                 Blood glucose values: Within target range (70-180mg/dL): No    Current insulin orders:   Basal lantus insulin 15 units daily  Correctional lispro insulin. Modified to very resistant dose    Total Daily Dose previous 24 hours: 20 units of insulin  Lantus: 10 units  Lispro: 10 units    Current A1c:   Lab Results   Component Value Date/Time    Hemoglobin A1c 8.8 (H) 02/14/2022 12:01 PM      equivalent  to ave Blood Glucose of 206 mg/dl for 2-3 months prior to admission    Adequate glycemic control PTA: No    Nutrition/Diet:   Active Orders   Diet    ADULT DIET Regular; 4 carb choices (60 gm/meal); Low Sodium (2 gm)      Meal Intake:  No data found. Supplement Intake:  No data found. Home diabetes medications:  2/15/2022:  Patient reported:  Glipizide 5 mg daily with breakfast.  Patient reported this medicine was changed from Glyburide  Metformin 1000 mg 2 times daily with meals  Patient also reported no history of taking insulin. Key Antihyperglycemic Medications             glyBURIDE (DIABETA) 5 mg tablet (Taking) Take 5 mg by mouth Daily (before breakfast). insulin lispro (HUMALOG) 100 unit/mL injection (Taking) Very Insulin Resistant  For Blood Sugar (mg/dL) of:              Less than 150 =   0 units  150 -199 =   3 units  200 -249 =   6 units  250 -299 =   9 units  300 -349 =   12 units  350 and above =   15 units  Initiate Hypoglycemic protocol if blood glucose is <70 mg/dL. Fast Acting - Administer Immediately - or within 15 minutes of start of meal, if mealtime coverage. metFORMIN (GLUCOPHAGE) 1,000 mg tablet (Taking) Take 1 Tab by mouth two (2) times daily (with meals). Plan/Goals:   Blood glucose will be within target of 70 - 180 mg/dl within 72 hours  Reinforce dietary and medication compliance at home.           Education: [x] Refer to Diabetes Education Record: 2/15/2022                       [] Education not indicated at this time     Kristie Correa RN [facility]  Pager: [phone]

## 2022-02-15 NOTE — ROUTINE PROCESS
TRANSFER - IN REPORT:    Verbal report received from Betty (name) on Mary Ilya  being received from ED (unit) for routine progression of care      Report consisted of patients Situation, Background, Assessment and   Recommendations(SBAR). Information from the following report(s) SBAR, Kardex, ED Summary, Intake/Output, MAR, Recent Results and Med Rec Status was reviewed with the receiving nurse. Opportunity for questions and clarification will be provided. Assessment will be completed upon patients arrival to unit and care assumed. Bedside and Verbal shift change report given to Cata RN (oncoming nurse) by Helene Dejesus RN (offgoing nurse). Report included the following information SBAR, Kardex, ED Summary, Intake/Output, MAR and Recent Results. Patient talking on the phone. Urinal in reach.

## 2022-02-15 NOTE — DIABETES MGMT
Diabetes Patient/Family Education Record    Factors That May Influence Patients Ability to Learn or Comply with Recommendations   []   Language barrier    []   Cultural needs   []   Motivation    []   Cognitive limitation    []   Physical   [x]   Education    []   Physiological factors   []   Hearing/vision/speaking impairment   []   Restorationist beliefs    []   Financial factors   []  Other:   []  No factors identified at this time. Person Instructed:   [x]   Patient   []   Family   []  Other     Preference for Learning:   [x]   Verbal   [x]   Written: diab educ packet; contents discussed/explained to patient     []  Demonstration     Level of Comprehension & Competence:    []  Good                                      [] Fair                                     []  Poor                             [x]  Needs Reinforcement   [x]  Teach back completed    Education Component:   [x]  Medication management, including how to administer insulin (if appropriate) and potential medication interactions: Patient reported taking the following diabetes medications at home as prescribed:  Glipizide 5 mg daily with breakfast. Patient reported this was changed from Glyburide. Metformin 1000 mg twice daily with meals (breafkast and dinner)     [x]  Nutritional management - [x] Obtained usual meal pattern: \"I eat a lot. I also eat sweet snacks. \" Patient is receptive to the following education:   [x]   Basic carbohydrate counting: 3-4 carbs per meal  [x]  Plate method  [x]  Limit concentrated sweets and avoid sweetened beverages: Encouraged patient to limit intake of concentrated sweets. [x]  Portion control  [x]    Avoid skipping meals     [x]  Exercise: Walk 30 minutes 5 days a week as tolerated. [x]  Signs, symptoms, and treatment of hyperglycemia and hypoglycemia: Discussed high blood glucose in detail with patient.  Patient is not monitoring his blood glucose at home regularly and reported that his readings have been in the 200's. (See education notes below under \"blood glucose monitoring. \"     [x] Prevention, recognition and treatment of hyperglycemia and hypoglycemia: Discussed low blood glucose less than 70 in detail with patient and he reported prior episodes of low blood glucose in the past. Reviewed signs/symptoms of blood glucose less than 70, appropriate treatment, prevention and when to notify his doctor or seek emergency medical care. [x]  Importance of blood glucose monitoring: Patient reported not checking his blood sugar at home regularly. Patient receptive to education:   [x] Blood Glucose targets: Patient did not know the recommended fasting BG  Range of    []   Provided patient with blood glucose meter  [x]  Has glucometer and supplies at home   []  Instruction on use of the blood glucose meter and recommended monitoring schedule   [x]  Discuss the importance of HbA1C monitoring. Patients A1c is 8.8% (2/14/2022). This is equivalent to average glucose of 206 mg/dl for the past 2-3 months. Patient stated that he has never been told before about his A1c. Explained his current A1c level and the recommended less than 7%. Encouraged patient to follow his diabetes treatment plan to help lower his A1c to less than 7%. Discussed with patient that his current A1c level indicates his diabetes is not controlled.      []  Sick day guidelines   []  Proper use and disposal of lancets, needles, syringes or insulin pens (if appropriate)   []  Potential long-term complications (retinopathy, kidney disease, neuropathy, foot care)   [x] Information about whom to contact in case of emergency or for more information    [x]  Goal:  Patient/family will demonstrate understanding of Diabetes Self- Management Skills by: 2/22/2022  Plan for post-discharge education or self-management support:    [] Outpatient class schedule provided            [] Patient Declined    [] Scheduled for outpatient classes (date) _______    [x] Written information provided  Verify: [x] Prior to admission Diabetes medications    Does patient understand how diabetes medications work? Yes. Does patient have difficulty obtaining diabetes medications and testing supplies?  No.    Dawson Greene RN [facility]  Pager: [phone]

## 2022-02-15 NOTE — PROGRESS NOTES
[facility]  Progress Note    Patient: Lizz Aragon Age: 62 y.o. : 1960 MR#: [id number] SSN: [id number]  Date/Time: 2/15/2022     Subjective: Patient feels much better than yesterday, still feels felt short of breath on exertion. Sitting in the chair. Assessment/Plan:     1. Acute respiratory distress, improved  2. COVID-19 pneumonia   3. Acute COPD exacerbation   4. Hypoxia   5. History of CAD and PCI in   6. Hypertension, very labile BP  7. Active tobacco abuse  8. Diabetes mellitus type 2 with hyperglycemia     Plan  Continue steroids IV given COPD and COVID-19  We will monitor inflammatory markers, continue droplet plus isolation  ID input noted, continue steroid and antibiotic. Continue bronchodilators, continue O2 supplement as tolerated  hold hypertensive medications and monitor blood pressure  BS elevated, will increase Lantus and continue SSI  Continue aspirin, Plavix, statin  DVT prophylaxis with Lovenox     Discussed with the patient at the bedside and explained in detail about my above plan care. Offered to talk to family, patient states he will update his family. Case discussed with:  [x]Patient  []Family  [x]Nursing  []Case Management  DVT Prophylaxis:  [x]Lovenox  []Hep SQ  []SCDs  []Coumadin   []Eliquis/Xarelto     Objective:   VS:   Visit Vitals  /84   Pulse 89   Temp 97.5 °F (36.4 °C)   Resp 22   Ht 5' 11\" (1.803 m)   Wt 107.2 kg (236 lb 6.4 oz)   SpO2 93%   BMI 32.97 kg/m²      Tmax/24hrs: Temp (24hrs), Av.1 °F (36.7 °C), Min:97.4 °F (36.3 °C), Max:99 °F (37.2 °C)  IOBRIEF    Intake/Output Summary (Last 24 hours) at 2/15/2022 1608  Last data filed at 2022 1830  Gross per 24 hour   Intake --   Output 700 ml   Net -700 ml       General:  Alert, cooperative, no acute distress    HEENT: PERRLA, anicteric sclerae. Pulmonary: Bilateral air entry much better, minimal wheezing  Cardiovascular: Regular rate and Rhythm.   GI: Soft, Non distended, Non tender. + Bowel sounds. Extremities:  No edema. No calf tenderness. Psych: Good insight. Not anxious or agitated. Neurologic: Alert and oriented X 3. Moves all ext.   Additional:    Medications:   Current Facility-Administered Medications   Medication Dose Route Frequency    [START ON 2/16/2022] insulin glargine (LANTUS) injection 15 Units  15 Units SubCUTAneous DAILY    azithromycin (ZITHROMAX) 500 mg in 0.9% sodium chloride 250 mL (VIAL-MATE)  500 mg IntraVENous Q24H    insulin lispro (HUMALOG) injection   SubCUTAneous AC&HS    glucose chewable tablet 16 g  4 Tablet Oral PRN    glucagon (GLUCAGEN) injection 1 mg  1 mg IntraMUSCular PRN    dextrose 10% infusion 125-250 mL  125-250 mL IntraVENous PRN    aspirin delayed-release tablet 81 mg  81 mg Oral DAILY    atorvastatin (LIPITOR) tablet 80 mg  80 mg Oral QPM    clopidogreL (PLAVIX) tablet 75 mg  75 mg Oral DAILY    budesonide-formoteroL (SYMBICORT) 160-4.5 mcg/actuation HFA inhaler 2 Puff  2 Puff Inhalation BID RT    albuterol (PROVENTIL HFA, VENTOLIN HFA, PROAIR HFA) inhaler 2 Puff  2 Puff Inhalation Q4H PRN    acetaminophen (TYLENOL) tablet 650 mg  650 mg Oral Q6H PRN    Or    acetaminophen (TYLENOL) suppository 650 mg  650 mg Rectal Q6H PRN    guaiFENesin-dextromethorphan (ROBITUSSIN DM) 100-10 mg/5 mL syrup 5 mL  5 mL Oral Q4H PRN    cholecalciferol (VITAMIN D3) (1000 Units /25 mcg) tablet 2,000 Units  2,000 Units Oral DAILY    melatonin tablet 3 mg  3 mg Oral QHS    ascorbic acid (vitamin C) (VITAMIN C) tablet 500 mg  500 mg Oral BID    nicotine (NICODERM CQ) 14 mg/24 hr patch 1 Patch  1 Patch TransDERmal DAILY    [Held by provider] metoprolol tartrate (LOPRESSOR) tablet 12.5 mg  12.5 mg Oral Q12H    ipratropium-albuterol (COMBIVENT RESPIMAT) 20 mcg-100 mcg inhalation spray  1 Puff Inhalation Q4H PRN    zinc sulfate (ZINCATE) 50 mg zinc (220 mg) capsule 1 Capsule  1 Capsule Oral DAILY    dexamethasone (DECADRON) 4 mg/mL injection 6 mg  6 mg IntraVENous Q12H    famotidine (PEPCID) tablet 20 mg  20 mg Oral Q12H    enoxaparin (LOVENOX) injection 40 mg  40 mg SubCUTAneous Q24H    hydrALAZINE (APRESOLINE) 20 mg/mL injection 10 mg  10 mg IntraVENous Q6H PRN       Labs:    Recent Results (from the past 24 hour(s))   GLUCOSE, POC    Collection Time: 02/14/22  7:04 PM   Result Value Ref Range    Glucose (POC) 243 (H) 70 - 110 mg/dL   GLUCOSE, POC    Collection Time: 02/14/22 10:48 PM   Result Value Ref Range    Glucose (POC) 259 (H) 70 - 110 mg/dL   CBC WITH AUTOMATED DIFF    Collection Time: 02/15/22  5:01 AM   Result Value Ref Range    WBC 6.1 4.6 - 13.2 K/uL    RBC 4.64 4.35 - 5.65 M/uL    HGB 13.3 13.0 - 16.0 g/dL    HCT 41.1 36.0 - 48.0 %    MCV 88.6 78.0 - 100.0 FL    MCH 28.7 24.0 - 34.0 PG    MCHC 32.4 31.0 - 37.0 g/dL    RDW 12.7 11.6 - 14.5 %    PLATELET 286 684 - 249 K/uL    MPV 10.6 9.2 - 11.8 FL    NRBC 0.0 0  WBC    ABSOLUTE NRBC 0.00 0.00 - 0.01 K/uL    NEUTROPHILS 86 (H) 40 - 73 %    LYMPHOCYTES 9 (L) 21 - 52 %    MONOCYTES 5 3 - 10 %    EOSINOPHILS 0 0 - 5 %    BASOPHILS 0 0 - 2 %    IMMATURE GRANULOCYTES 0 0.0 - 0.5 %    ABS. NEUTROPHILS 5.2 1.8 - 8.0 K/UL    ABS. LYMPHOCYTES 0.6 (L) 0.9 - 3.6 K/UL    ABS. MONOCYTES 0.3 0.05 - 1.2 K/UL    ABS. EOSINOPHILS 0.0 0.0 - 0.4 K/UL    ABS. BASOPHILS 0.0 0.0 - 0.1 K/UL    ABS. IMM.  GRANS. 0.0 0.00 - 0.04 K/UL    DF AUTOMATED     METABOLIC PANEL, COMPREHENSIVE    Collection Time: 02/15/22  5:01 AM   Result Value Ref Range    Sodium 135 (L) 136 - 145 mmol/L    Potassium 4.4 3.5 - 5.5 mmol/L    Chloride 100 100 - 111 mmol/L    CO2 33 (H) 21 - 32 mmol/L    Anion gap 2 (L) 3.0 - 18 mmol/L    Glucose 274 (H) 74 - 99 mg/dL    BUN 13 7.0 - 18 MG/DL    Creatinine 0.73 0.6 - 1.3 MG/DL    BUN/Creatinine ratio 18 12 - 20      GFR est AA >60 >60 ml/min/1.73m2    GFR est non-AA >60 >60 ml/min/1.73m2    Calcium 8.9 8.5 - 10.1 MG/DL    Bilirubin, total 0.6 0.2 - 1.0 MG/DL    ALT (SGPT) 16 16 - 61 U/L    AST (SGOT) 13 10 - 38 U/L    Alk. phosphatase 71 45 - 117 U/L    Protein, total 7.0 6.4 - 8.2 g/dL    Albumin 3.3 (L) 3.4 - 5.0 g/dL    Globulin 3.7 2.0 - 4.0 g/dL    A-G Ratio 0.9 0.8 - 1.7     PROCALCITONIN    Collection Time: 02/15/22  5:01 AM   Result Value Ref Range    Procalcitonin <0.05 ng/mL   D DIMER    Collection Time: 02/15/22  5:01 AM   Result Value Ref Range    D DIMER 0.32 <0.46 ug/ml(FEU)   C REACTIVE PROTEIN, QT    Collection Time: 02/15/22  5:01 AM   Result Value Ref Range    C-Reactive protein 4.8 (H) 0 - 0.3 mg/dL   GLUCOSE, POC    Collection Time: 02/15/22  9:35 AM   Result Value Ref Range    Glucose (POC) 260 (H) 70 - 110 mg/dL   GLUCOSE, POC    Collection Time: 02/15/22 11:29 AM   Result Value Ref Range    Glucose (POC) 248 (H) 70 - 110 mg/dL       Signed By: Aisha Arriaza MD     February 15, 2022      Disclaimer: Sections of this note are dictated using utilizing voice recognition software. Minor typographical errors may be present. If questions arise, please do not hesitate to contact me or call our department.

## 2022-02-15 NOTE — ED NOTES
TRANSFER - OUT REPORT:    Verbal report given to Coretta on Obdulia Zapata  being transferred to [address] [place] [address](unit) for routine progression of care       Report consisted of patients Situation, Background, Assessment and   Recommendations(SBAR). Information from the following report(s) SBAR, ED Summary and MAR was reviewed with the receiving nurse. Lines:   Peripheral IV 02/14/22 Right Antecubital (Active)        Opportunity for questions and clarification was provided.       Patient transported with:   [employer]

## 2022-02-15 NOTE — PROGRESS NOTES
Patient states he was getting up to open the blinds in his room when he felt a sudden jolt through bilateral upper extremities that resulted in numbness in both hands and tingling from the tips of his fingers to his elbow. At this time, patient states the tingling has started to subside, however the numbness is still present in both hands, left greater than right. RN performed neuro assessment, pupils are equal round and reactive bilaterally with no deviation. A&OX4. Tongue midline, no facial weakness/numbess noted.  equal bilaterally with no drift present. No drift or weakness on bilateral lower extremities. Patient denies double vision or blurred vision that is abnormal from baseline. MD paged. VSS. Will continue to monitor.

## 2022-02-16 LAB
ALBUMIN SERPL-MCNC: 3.3 G/DL (ref 3.4–5)
ALBUMIN/GLOB SERPL: 1 {RATIO} (ref 0.8–1.7)
ALP SERPL-CCNC: 76 U/L (ref 45–117)
ALT SERPL-CCNC: 19 U/L (ref 16–61)
ANION GAP SERPL CALC-SCNC: 4 MMOL/L (ref 3–18)
AST SERPL-CCNC: 8 U/L (ref 10–38)
BASOPHILS # BLD: 0 K/UL (ref 0–0.1)
BASOPHILS NFR BLD: 0 % (ref 0–2)
BILIRUB SERPL-MCNC: 1.1 MG/DL (ref 0.2–1)
BUN SERPL-MCNC: 18 MG/DL (ref 7–18)
BUN/CREAT SERPL: 26 (ref 12–20)
CALCIUM SERPL-MCNC: 8.7 MG/DL (ref 8.5–10.1)
CHLORIDE SERPL-SCNC: 103 MMOL/L (ref 100–111)
CO2 SERPL-SCNC: 32 MMOL/L (ref 21–32)
CREAT SERPL-MCNC: 0.7 MG/DL (ref 0.6–1.3)
CRP SERPL-MCNC: 2.7 MG/DL (ref 0–0.3)
D DIMER PPP FEU-MCNC: <0.27 UG/ML(FEU)
DIFFERENTIAL METHOD BLD: ABNORMAL
EOSINOPHIL # BLD: 0 K/UL (ref 0–0.4)
EOSINOPHIL NFR BLD: 0 % (ref 0–5)
ERYTHROCYTE [DISTWIDTH] IN BLOOD BY AUTOMATED COUNT: 12.6 % (ref 11.6–14.5)
GLOBULIN SER CALC-MCNC: 3.2 G/DL (ref 2–4)
GLUCOSE BLD STRIP.AUTO-MCNC: 202 MG/DL (ref 70–110)
GLUCOSE BLD STRIP.AUTO-MCNC: 230 MG/DL (ref 70–110)
GLUCOSE BLD STRIP.AUTO-MCNC: 242 MG/DL (ref 70–110)
GLUCOSE BLD STRIP.AUTO-MCNC: 315 MG/DL (ref 70–110)
GLUCOSE SERPL-MCNC: 196 MG/DL (ref 74–99)
HCT VFR BLD AUTO: 42.8 % (ref 36–48)
HGB BLD-MCNC: 13.4 G/DL (ref 13–16)
IMM GRANULOCYTES # BLD AUTO: 0 K/UL (ref 0–0.04)
IMM GRANULOCYTES NFR BLD AUTO: 0 % (ref 0–0.5)
LYMPHOCYTES # BLD: 0.9 K/UL (ref 0.9–3.6)
LYMPHOCYTES NFR BLD: 12 % (ref 21–52)
MCH RBC QN AUTO: 27.9 PG (ref 24–34)
MCHC RBC AUTO-ENTMCNC: 31.3 G/DL (ref 31–37)
MCV RBC AUTO: 89 FL (ref 78–100)
MONOCYTES # BLD: 0.6 K/UL (ref 0.05–1.2)
MONOCYTES NFR BLD: 7 % (ref 3–10)
NEUTS SEG # BLD: 6.3 K/UL (ref 1.8–8)
NEUTS SEG NFR BLD: 80 % (ref 40–73)
NRBC # BLD: 0 K/UL (ref 0–0.01)
NRBC BLD-RTO: 0 PER 100 WBC
PLATELET # BLD AUTO: 185 K/UL (ref 135–420)
PMV BLD AUTO: 10.8 FL (ref 9.2–11.8)
POTASSIUM SERPL-SCNC: 4.9 MMOL/L (ref 3.5–5.5)
PROCALCITONIN SERPL-MCNC: <0.05 NG/ML
PROT SERPL-MCNC: 6.5 G/DL (ref 6.4–8.2)
RBC # BLD AUTO: 4.81 M/UL (ref 4.35–5.65)
SODIUM SERPL-SCNC: 139 MMOL/L (ref 136–145)
WBC # BLD AUTO: 7.9 K/UL (ref 4.6–13.2)

## 2022-02-16 PROCEDURE — 2709999900 HC NON-CHARGEABLE SUPPLY

## 2022-02-16 PROCEDURE — 74011250637 HC RX REV CODE- 250/637: Performed by: NURSE PRACTITIONER

## 2022-02-16 PROCEDURE — 85025 COMPLETE CBC W/AUTO DIFF WBC: CPT

## 2022-02-16 PROCEDURE — 99232 SBSQ HOSP IP/OBS MODERATE 35: CPT | Performed by: HOSPITALIST

## 2022-02-16 PROCEDURE — 77030027138 HC INCENT SPIROMETER -A

## 2022-02-16 PROCEDURE — 36415 COLL VENOUS BLD VENIPUNCTURE: CPT

## 2022-02-16 PROCEDURE — 82962 GLUCOSE BLOOD TEST: CPT

## 2022-02-16 PROCEDURE — 74011250637 HC RX REV CODE- 250/637: Performed by: HOSPITALIST

## 2022-02-16 PROCEDURE — 65660000000 HC RM CCU STEPDOWN

## 2022-02-16 PROCEDURE — 94640 AIRWAY INHALATION TREATMENT: CPT

## 2022-02-16 PROCEDURE — 77010033678 HC OXYGEN DAILY

## 2022-02-16 PROCEDURE — 74011636637 HC RX REV CODE- 636/637: Performed by: HOSPITALIST

## 2022-02-16 PROCEDURE — 84145 PROCALCITONIN (PCT): CPT

## 2022-02-16 PROCEDURE — 87070 CULTURE OTHR SPECIMN AEROBIC: CPT

## 2022-02-16 PROCEDURE — 86140 C-REACTIVE PROTEIN: CPT

## 2022-02-16 PROCEDURE — 80053 COMPREHEN METABOLIC PANEL: CPT

## 2022-02-16 PROCEDURE — 85379 FIBRIN DEGRADATION QUANT: CPT

## 2022-02-16 PROCEDURE — 74011250636 HC RX REV CODE- 250/636: Performed by: NURSE PRACTITIONER

## 2022-02-16 PROCEDURE — 94760 N-INVAS EAR/PLS OXIMETRY 1: CPT

## 2022-02-16 PROCEDURE — 74011250636 HC RX REV CODE- 250/636: Performed by: STUDENT IN AN ORGANIZED HEALTH CARE EDUCATION/TRAINING PROGRAM

## 2022-02-16 RX ORDER — INSULIN GLARGINE 100 [IU]/ML
5 INJECTION, SOLUTION SUBCUTANEOUS
Status: COMPLETED | OUTPATIENT
Start: 2022-02-16 | End: 2022-02-16

## 2022-02-16 RX ORDER — INSULIN GLARGINE 100 [IU]/ML
20 INJECTION, SOLUTION SUBCUTANEOUS DAILY
Status: DISCONTINUED | OUTPATIENT
Start: 2022-02-17 | End: 2022-02-17 | Stop reason: HOSPADM

## 2022-02-16 RX ORDER — ALBUTEROL SULFATE 90 UG/1
2 AEROSOL, METERED RESPIRATORY (INHALATION)
Status: DISCONTINUED | OUTPATIENT
Start: 2022-02-16 | End: 2022-02-17 | Stop reason: HOSPADM

## 2022-02-16 RX ORDER — DIPHENHYDRAMINE HCL 25 MG
25 CAPSULE ORAL ONCE
Status: COMPLETED | OUTPATIENT
Start: 2022-02-16 | End: 2022-02-16

## 2022-02-16 RX ADMIN — INSULIN GLARGINE 15 UNITS: 100 INJECTION, SOLUTION SUBCUTANEOUS at 08:44

## 2022-02-16 RX ADMIN — Medication 500 MG: at 17:39

## 2022-02-16 RX ADMIN — Medication 6 UNITS: at 08:44

## 2022-02-16 RX ADMIN — Medication 25 MG: at 16:20

## 2022-02-16 RX ADMIN — INSULIN GLARGINE 5 UNITS: 100 INJECTION, SOLUTION SUBCUTANEOUS at 17:39

## 2022-02-16 RX ADMIN — ENOXAPARIN SODIUM 40 MG: 100 INJECTION SUBCUTANEOUS at 16:20

## 2022-02-16 RX ADMIN — FAMOTIDINE 20 MG: 20 TABLET ORAL at 11:52

## 2022-02-16 RX ADMIN — FAMOTIDINE 20 MG: 20 TABLET ORAL at 20:46

## 2022-02-16 RX ADMIN — ALBUTEROL SULFATE 2 PUFF: 108 INHALANT RESPIRATORY (INHALATION) at 19:36

## 2022-02-16 RX ADMIN — BUDESONIDE AND FORMOTEROL FUMARATE DIHYDRATE 2 PUFF: 160; 4.5 AEROSOL RESPIRATORY (INHALATION) at 07:37

## 2022-02-16 RX ADMIN — ALBUTEROL SULFATE 2 PUFF: 108 INHALANT RESPIRATORY (INHALATION) at 07:27

## 2022-02-16 RX ADMIN — Medication 12 UNITS: at 16:46

## 2022-02-16 RX ADMIN — Medication 2000 UNITS: at 11:52

## 2022-02-16 RX ADMIN — ZINC SULFATE 220 MG (50 MG) CAPSULE 1 CAPSULE: CAPSULE at 11:52

## 2022-02-16 RX ADMIN — Medication 3 MG: at 22:52

## 2022-02-16 RX ADMIN — DEXAMETHASONE SODIUM PHOSPHATE 6 MG: 4 INJECTION, SOLUTION INTRAMUSCULAR; INTRAVENOUS at 20:47

## 2022-02-16 RX ADMIN — Medication 6 UNITS: at 12:06

## 2022-02-16 RX ADMIN — Medication 500 MG: at 11:54

## 2022-02-16 RX ADMIN — ALBUTEROL SULFATE 2 PUFF: 108 INHALANT RESPIRATORY (INHALATION) at 12:00

## 2022-02-16 RX ADMIN — ASPIRIN 81 MG: 81 TABLET, COATED ORAL at 11:52

## 2022-02-16 RX ADMIN — CLOPIDOGREL BISULFATE 75 MG: 75 TABLET, FILM COATED ORAL at 11:53

## 2022-02-16 RX ADMIN — AZITHROMYCIN MONOHYDRATE 500 MG: 500 INJECTION, POWDER, LYOPHILIZED, FOR SOLUTION INTRAVENOUS at 14:20

## 2022-02-16 RX ADMIN — Medication 6 UNITS: at 22:48

## 2022-02-16 RX ADMIN — BUDESONIDE AND FORMOTEROL FUMARATE DIHYDRATE 2 PUFF: 160; 4.5 AEROSOL RESPIRATORY (INHALATION) at 19:37

## 2022-02-16 RX ADMIN — METOPROLOL TARTRATE 12.5 MG: 25 TABLET, FILM COATED ORAL at 20:47

## 2022-02-16 RX ADMIN — ALBUTEROL SULFATE 2 PUFF: 108 INHALANT RESPIRATORY (INHALATION) at 17:00

## 2022-02-16 RX ADMIN — ATORVASTATIN CALCIUM 80 MG: 40 TABLET, FILM COATED ORAL at 17:39

## 2022-02-16 RX ADMIN — DEXAMETHASONE SODIUM PHOSPHATE 6 MG: 4 INJECTION, SOLUTION INTRAMUSCULAR; INTRAVENOUS at 11:54

## 2022-02-16 NOTE — PROGRESS NOTES
Received report on pt.from off going RN. Resting quietly in bed on rounds. Denies c/o pain or SOB at this time. Call bell at side. bed alarm on. No acute distress noted. Will cont to monitor for any changes in status. 18  Pt sts he is having itching in his left arm, left hand and face since Zithromax was started and that its worsening. Unable to see a definite raised rash due to pts red, bean complexion. zithromax stopped and Talur paged. No facial or oral swelling . 1529 orders received from [address] [place] [address]. Will give benadryl, restart zithromax and monitor for reactions. 1630 antibiotic completed w/o further c/o itching. Or rash noted. 1730 02 trial done. Resting RA was 87%. Ambulating on RA was 86%. ambulating on 2 LPM  Was 90%. Ambulating on 3 L was 93 Percent. Pt left on 02 at 93% with 02 extension to reach BR.    2010 Bedside and Verbal shift change report given to Neida GARDNER (oncoming nurse) by Swapna Singh RN (offgoing nurse). Report given with FRANCISCO, Sue and MAR.

## 2022-02-16 NOTE — PROGRESS NOTES
Infectious Disease progress Note        Reason: Acute hypoxic respiratory failure, COVID-19 infection    Current abx Prior abx   azithromycin since 2/14/22 Ceftriaxone 2/14     Lines:       Assessment :     62-year-old male with a history of COPD, coronary artery disease (stent x1), hypertension, uncontrolled type 2 DM presented to  [facility] on 2/14/22 with primary complaint of 1 week of gradually worsening productive cough with sudden onset of shortness of breath shortly after he used a vape device. Fully vaccinated for COVID-19. Status post booster vaccine January 2022    Clinical presentation consistent with acute hypoxic respiratory failure-present on admission due to COPD exacerbation/vaping associated lung injury superimposed on recent COVID-19 infection    Low CRP on admission could be due to recent outpatient prednisone    Low procalcitonin argues against superimposed bacterial pneumonia    Low D-dimer argues against COVID-19 associated hypercoagulability    Clinically better. Improving oxygenation. Appeared comfortable on 4L    Recommendations:    1.  discontinue azithromycin  2. Continue decadron 6 mg every 12 hours  3. Titrate oxygen as tolerated  4. Monitor CRP, D-dimer, procalcitonin, oxygenation  5. Recommend to start making arrangements for home oxygen     Above plan was discussed in details with patient, primary team. Please call me if any further questions or concerns. Will continue to participate in the care of this patient. HPI:    Feels better. Patient states that he has cough with whitish sputum. Denies pleuritic chest pain.   Patient denies any  abdominal pain, nausea/vomiting, or diarrhea.         Past Medical History:   Diagnosis Date    [facility]     Diabetes [facility]     Emphysema     H/O cardiovascular stress test 2012    negative    Hypertension     MI (myocardial infarction) ([facility]        Past Surgical History:   Procedure Laterality Date  HX CORONARY STENT PLACEMENT         Current Discharge Medication List      CONTINUE these medications which have NOT CHANGED    Details   glyBURIDE (DIABETA) 5 mg tablet Take 5 mg by mouth Daily (before breakfast). lidocaine (Lidoderm) 5 % Apply patch to the affected area for 12 hours a day and remove for 12 hours a day. Qty: 10 Each, Refills: 0      clopidogreL (PLAVIX) 75 mg tab TAKE ONE TABLET BY MOUTH DAILY  Qty: 90 Tablet, Refills: 3      atorvastatin (LIPITOR) 80 mg tablet TAKE ONE TABLET BY MOUTH EVERY EVENING  Qty: 90 Tablet, Refills: 3      albuterol sulfate 90 mcg/actuation aebs Take 1 Puff by inhalation every four to six (4-6) hours as needed for Wheezing. Qty: 1 Each, Refills: 0      aspirin delayed-release 81 mg tablet Take 1 Tab by mouth daily. Qty: 30 Tab, Refills: 0      glucagon (GLUCAGEN) 1 mg injection 1 mL by IntraMUSCular route as needed for Hypoglycemia. Qty: 1 Vial, Refills: 0      insulin lispro (HUMALOG) 100 unit/mL injection Very Insulin Resistant  For Blood Sugar (mg/dL) of:              Less than 150 =   0 units  150 -199 =   3 units  200 -249 =   6 units  250 -299 =   9 units  300 -349 =   12 units  350 and above =   15 units  Initiate Hypoglycemic protocol if blood glucose is <70 mg/dL. Fast Acting - Administer Immediately - or within 15 minutes of start of meal, if mealtime coverage. Qty: 1 Vial, Refills: 0      Insulin Syringe-Needle U-100 0.3 mL 31 gauge x 5/16\" syrg TID as needed  Qty: 100 Syringe, Refills: 0      lancets misc As directed  Qty: 100 Each, Refills: 0      Blood-Glucose Meter monitoring kit As directed  Qty: 1 Kit, Refills: 0      glucose blood VI test strips (blood glucose test) strip As directed  Qty: 60 Strip, Refills: 0      metFORMIN (GLUCOPHAGE) 1,000 mg tablet Take 1 Tab by mouth two (2) times daily (with meals).   Qty: 60 Tab, Refills: 0    Associated Diagnoses: Type II or unspecified type diabetes mellitus without mention of complication, uncontrolled      lisinopril (PRINIVIL, ZESTRIL) 10 mg tablet Take 1 Tab by mouth daily. Qty: 30 Tab, Refills: 0    Associated Diagnoses: Hypertension      tiotropium (SPIRIVA WITH HANDIHALER) 18 mcg inhalation capsule Take 1 Cap by inhalation daily. Qty: 30 Cap, Refills: 0    Associated Diagnoses: COPD exacerbation ([address]      mometasone-formoterol (DULERA) 100-5 mcg/actuation HFAA HFA inhaler Take 2 Puffs by inhalation two (2) times a day. Qty: 2 Inhaler, Refills: 0    Associated Diagnoses: COPD exacerbation (HCC)      methocarbamoL (Robaxin) 500 mg tablet Take 1 Tablet by mouth four (4) times daily. Qty: 16 Tablet, Refills: 0      metoprolol tartrate (LOPRESSOR) 25 mg tablet Take 1 Tab by mouth every twelve (12) hours. Qty: 60 Tab, Refills: 6      nitroglycerin (NITROLINGUAL) 400 mcg/spray spray 1 Spray by SubLINGual route every five (5) minutes as needed for Chest Pain (do not exceed more than 3 sprays in one day, seek acute medical care if chest pain continues).   Qty: 1 Bottle, Refills: 0             Current Facility-Administered Medications   Medication Dose Route Frequency    insulin glargine (LANTUS) injection 15 Units  15 Units SubCUTAneous DAILY    azithromycin (ZITHROMAX) 500 mg in 0.9% sodium chloride 250 mL (VIAL-MATE)  500 mg IntraVENous Q24H    insulin lispro (HUMALOG) injection   SubCUTAneous AC&HS    glucose chewable tablet 16 g  4 Tablet Oral PRN    glucagon (GLUCAGEN) injection 1 mg  1 mg IntraMUSCular PRN    dextrose 10% infusion 125-250 mL  125-250 mL IntraVENous PRN    aspirin delayed-release tablet 81 mg  81 mg Oral DAILY    atorvastatin (LIPITOR) tablet 80 mg  80 mg Oral QPM    clopidogreL (PLAVIX) tablet 75 mg  75 mg Oral DAILY    budesonide-formoteroL (SYMBICORT) 160-4.5 mcg/actuation HFA inhaler 2 Puff  2 Puff Inhalation BID RT    albuterol (PROVENTIL HFA, VENTOLIN HFA, PROAIR HFA) inhaler 2 Puff  2 Puff Inhalation Q4H PRN    acetaminophen (TYLENOL) tablet 650 mg  650 mg Oral Q6H PRN    Or    acetaminophen (TYLENOL) suppository 650 mg  650 mg Rectal Q6H PRN    guaiFENesin-dextromethorphan (ROBITUSSIN DM) 100-10 mg/5 mL syrup 5 mL  5 mL Oral Q4H PRN    cholecalciferol (VITAMIN D3) (1000 Units /25 mcg) tablet 2,000 Units  2,000 Units Oral DAILY    melatonin tablet 3 mg  3 mg Oral QHS    ascorbic acid (vitamin C) (VITAMIN C) tablet 500 mg  500 mg Oral BID    nicotine (NICODERM CQ) 14 mg/24 hr patch 1 Patch  1 Patch TransDERmal DAILY    [Held by provider] metoprolol tartrate (LOPRESSOR) tablet 12.5 mg  12.5 mg Oral Q12H    ipratropium-albuterol (COMBIVENT RESPIMAT) 20 mcg-100 mcg inhalation spray  1 Puff Inhalation Q4H PRN    zinc sulfate (ZINCATE) 50 mg zinc (220 mg) capsule 1 Capsule  1 Capsule Oral DAILY    dexamethasone (DECADRON) 4 mg/mL injection 6 mg  6 mg IntraVENous Q12H    famotidine (PEPCID) tablet 20 mg  20 mg Oral Q12H    enoxaparin (LOVENOX) injection 40 mg  40 mg SubCUTAneous Q24H    hydrALAZINE (APRESOLINE) 20 mg/mL injection 10 mg  10 mg IntraVENous Q6H PRN       Allergies: Patient has no known allergies.     Family History   Problem Relation Age of Onset    Diabetes Mother      Social History     Socioeconomic History    Marital status: SINGLE     Spouse name: Not on file    Number of children: Not on file    Years of education: Not on file    Highest education level: Not on file   Occupational History    Not on file   Tobacco Use    Smoking status: Current Every Day Smoker     Packs/day: 0.50     Types: Cigarettes    Smokeless tobacco: Never Used   Substance and Sexual Activity    Alcohol use: Yes     Comment: social only    Drug use: Yes     Types: Marijuana    Sexual activity: Not on file   Other Topics Concern    Not on file   Social History Narrative    Not on file     Social Determinants of Health     Financial Resource Strain:     Difficulty of Paying Living Expenses: Not on file   Food Insecurity:     Worried About Running Out of [employer] in the Last Year: Not on file    Ran Out of Food in the Last Year: Not on file   Transportation Needs:     Lack of Transportation (Medical): Not on file    Lack of Transportation (Non-Medical): Not on file   Physical Activity:     Days of Exercise per Week: Not on file    Minutes of Exercise per Session: Not on file   Stress:     Feeling of Stress : Not on file   Social Connections:     Frequency of Communication with Friends and Family: Not on file    Frequency of Social Gatherings with Friends and Family: Not on file    Attends Taoism Services: Not on file    Active Member of [address] [place] [address] [employer] or Organizations: Not on file    Attends Club or Organization Meetings: Not on file    Marital Status: Not on file   Intimate Partner Violence:     Fear of Current or Ex-Partner: Not on file    Emotionally Abused: Not on file    Physically Abused: Not on file    Sexually Abused: Not on file   Housing Stability:     Unable to Pay for Housing in the Last Year: Not on file    Number of [place] in the Last Year: Not on file    Unstable Housing in the Last Year: Not on file     Social History     Tobacco Use   Smoking Status Current Every Day Smoker    Packs/day: 0.50    Types: Cigarettes   Smokeless Tobacco Never Used        Temp (24hrs), Av.6 °F (36.4 °C), Min:97.4 °F (36.3 °C), Max:98 °F (36.7 °C)    Visit Vitals  BP (!) 140/75 (BP 1 Location: Right upper arm, BP Patient Position: Sitting)   Pulse 83   Temp 97.5 °F (36.4 °C)   Resp 20   Ht 5' 11\" (1.803 m)   Wt 107.2 kg (236 lb 6.4 oz)   SpO2 97%   BMI 32.97 kg/m²       ROS: 12 point ROS obtained in details. Pertinent positives as mentioned in HPI,   otherwise negative    Physical Exam:    Vitals signs and nursing note reviewed. Constitutional:       General: He is not in acute distress. Appearance: He is well-developed. Appears more comfortable than prior exam.   HENT:      Head: Normocephalic.    Eyes:      Conjunctiva/sclera: Conjunctivae normal. Neck:      Musculoskeletal: Normal range of motion and neck supple. Cardiovascular:      Rate and Rhythm: Normal rate and regular rhythm on monitor  Chest:      Bilateral chest movements equal.  Auscultation deferred due to Covid positive. + Audible wheezing  Abdominal:      General: There is no distension. Palpations: Abdomen is soft. Tenderness: There is no abdominal tenderness. There is no rebound. Musculoskeletal: Normal range of motion. General: No tenderness. Skin:     General: Skin is warm and dry. Findings: No rash. Neurological:      Mental Status: He is alert and oriented to person, place, and time. Cranial Nerves: No cranial nerve deficit. Motor: No abnormal muscle tone. Coordination: Coordination normal.   Psychiatric:         Behavior: Behavior normal.         Thought Content: Thought content normal.         Judgment: Judgment normal.     Labs: Results:   Chemistry Recent Labs     02/16/22  0130 02/15/22  0501 02/14/22  1201   * 274* 152*    135* 137   K 4.9 4.4 4.4    100 100   CO2 32 33* 33*   BUN 18 13 9   CREA 0.70 0.73 0.68   CA 8.7 8.9 8.8   AGAP 4 2* 4   BUCR 26* 18 13   AP 76 71  --    TP 6.5 7.0  --    ALB 3.3* 3.3*  --    GLOB 3.2 3.7  --    AGRAT 1.0 0.9  --       CBC w/Diff Recent Labs     02/16/22  0130 02/15/22  0501 02/14/22  1201   WBC 7.9 6.1 8.8   RBC 4.81 4.64 4.91   HGB 13.4 13.3 13.8   HCT 42.8 41.1 43.6    170 173   GRANS 80* 86* 78*   LYMPH 12* 9* 13*   EOS 0 0 1      Microbiology Recent Labs     02/14/22  1430 02/14/22  1201   CULT NO GROWTH AFTER 16 HOURS NO GROWTH AFTER 18 HOURS          RADIOLOGY:    All available imaging studies/reports in [facility] for this admission were reviewed      Disclaimer: Sections of this note are dictated utilizing voice recognition software, which may have resulted in some phonetic based errors in grammar and contents.  Even though attempts were made to correct all the mistakes, some may have been missed, and remained in the body of the document. If questions arise, please contact our department.     Dr. Antonio Yung, Infectious Disease Specialist  [phone]  February 16, 2022  1:27 PM

## 2022-02-16 NOTE — PROGRESS NOTES
[facility]  Progress Note    Patient: Lizz Aragon Age: 62 y.o. : 1960 MR#: [id number] SSN: [id number]  Date/Time: 2022     Subjective: Patient complains of shortness of breath on exertion, patient states he took off his oxygen went to bathroom after that he felt very short of breath. No chest pain, no dizziness. Advised patient not to take off O2 supplement, advised him to use bedside commode and also will add extension to the O2 tubings. Assessment/Plan:     1. Acute respiratory distress, improving   2. COVID-19 pneumonia   3. Acute COPD exacerbation   4. Hypoxia   5. History of CAD and PCI in   6. Hypertension, very labile BP  7. Active tobacco abuse  8. Diabetes mellitus type 2 with hyperglycemia     Plan  Continue steroids IV given COPD and COVID-19  We will change albuterol to schedule  We will monitor inflammatory markers, continue droplet plus isolation  ID input noted, continue steroid and antibiotic. Continue bronchodilators, continue O2 supplement as tolerated  We will resume beta-blocker and monitor  BS elevated, will increase Lantus and continue SSI  Continue aspirin, Plavix, statin  DVT prophylaxis with Lovenox     Discussed with the patient at the bedside and explained in detail about my above plan care. Offered to talk to family, patient states he will update his family. Discussed with RN to put extra tubings for O2 tube so patient can go to bathroom.   Will obtain home oxygen testing    Disposition: Anticipate discharge in 1 to 2 days        Case discussed with:  [x]Patient  []Family  [x]Nursing  []Case Management  DVT Prophylaxis:  [x]Lovenox  []Hep SQ  []SCDs  []Coumadin   []Eliquis/Xarelto     Objective:   VS:   Visit Vitals  BP (!) 140/75 (BP 1 Location: Right upper arm, BP Patient Position: Sitting)   Pulse 83   Temp 97.5 °F (36.4 °C)   Resp 20   Ht 5' 11\" (1.803 m)   Wt 107.2 kg (236 lb 6.4 oz)   SpO2 94%   BMI 32.97 kg/m² Tmax/24hrs: Temp (24hrs), Av.7 °F (36.5 °C), Min:97.5 °F (36.4 °C), Max:98 °F (36.7 °C)  IOBRIEF  No intake or output data in the 24 hours ending 22 1130    General:  Alert, cooperative, no acute distress    HEENT: PERRLA, anicteric sclerae. Pulmonary: Bilateral expiratory wheezing  Cardiovascular: Regular rate and Rhythm. GI:  Soft, Non distended, Non tender. + Bowel sounds. Extremities:  No edema. No calf tenderness. Psych: Good insight. Not anxious or agitated. Neurologic: Alert and oriented X 3. Moves all ext.   Additional:    Medications:   Current Facility-Administered Medications   Medication Dose Route Frequency    albuterol (PROVENTIL HFA, VENTOLIN HFA, PROAIR HFA) inhaler 2 Puff  2 Puff Inhalation Q4H RT    insulin glargine (LANTUS) injection 15 Units  15 Units SubCUTAneous DAILY    azithromycin (ZITHROMAX) 500 mg in 0.9% sodium chloride 250 mL (VIAL-MATE)  500 mg IntraVENous Q24H    insulin lispro (HUMALOG) injection   SubCUTAneous AC&HS    glucose chewable tablet 16 g  4 Tablet Oral PRN    glucagon (GLUCAGEN) injection 1 mg  1 mg IntraMUSCular PRN    dextrose 10% infusion 125-250 mL  125-250 mL IntraVENous PRN    aspirin delayed-release tablet 81 mg  81 mg Oral DAILY    atorvastatin (LIPITOR) tablet 80 mg  80 mg Oral QPM    clopidogreL (PLAVIX) tablet 75 mg  75 mg Oral DAILY    budesonide-formoteroL (SYMBICORT) 160-4.5 mcg/actuation HFA inhaler 2 Puff  2 Puff Inhalation BID RT    acetaminophen (TYLENOL) tablet 650 mg  650 mg Oral Q6H PRN    Or    acetaminophen (TYLENOL) suppository 650 mg  650 mg Rectal Q6H PRN    guaiFENesin-dextromethorphan (ROBITUSSIN DM) 100-10 mg/5 mL syrup 5 mL  5 mL Oral Q4H PRN    cholecalciferol (VITAMIN D3) (1000 Units /25 mcg) tablet 2,000 Units  2,000 Units Oral DAILY    melatonin tablet 3 mg  3 mg Oral QHS    ascorbic acid (vitamin C) (VITAMIN C) tablet 500 mg  500 mg Oral BID    nicotine (NICODERM CQ) 14 mg/24 hr patch 1 Patch  1 Patch TransDERmal DAILY    [Held by provider] metoprolol tartrate (LOPRESSOR) tablet 12.5 mg  12.5 mg Oral Q12H    ipratropium-albuterol (COMBIVENT RESPIMAT) 20 mcg-100 mcg inhalation spray  1 Puff Inhalation Q4H PRN    zinc sulfate (ZINCATE) 50 mg zinc (220 mg) capsule 1 Capsule  1 Capsule Oral DAILY    dexamethasone (DECADRON) 4 mg/mL injection 6 mg  6 mg IntraVENous Q12H    famotidine (PEPCID) tablet 20 mg  20 mg Oral Q12H    enoxaparin (LOVENOX) injection 40 mg  40 mg SubCUTAneous Q24H    hydrALAZINE (APRESOLINE) 20 mg/mL injection 10 mg  10 mg IntraVENous Q6H PRN       Labs:    Recent Results (from the past 24 hour(s))   GLUCOSE, POC    Collection Time: 02/15/22  5:21 PM   Result Value Ref Range    Glucose (POC) 221 (H) 70 - 110 mg/dL   GLUCOSE, POC    Collection Time: 02/15/22 10:15 PM   Result Value Ref Range    Glucose (POC) 235 (H) 70 - 110 mg/dL   CBC WITH AUTOMATED DIFF    Collection Time: 02/16/22  1:30 AM   Result Value Ref Range    WBC 7.9 4.6 - 13.2 K/uL    RBC 4.81 4.35 - 5.65 M/uL    HGB 13.4 13.0 - 16.0 g/dL    HCT 42.8 36.0 - 48.0 %    MCV 89.0 78.0 - 100.0 FL    MCH 27.9 24.0 - 34.0 PG    MCHC 31.3 31.0 - 37.0 g/dL    RDW 12.6 11.6 - 14.5 %    PLATELET 027 923 - 549 K/uL    MPV 10.8 9.2 - 11.8 FL    NRBC 0.0 0  WBC    ABSOLUTE NRBC 0.00 0.00 - 0.01 K/uL    NEUTROPHILS 80 (H) 40 - 73 %    LYMPHOCYTES 12 (L) 21 - 52 %    MONOCYTES 7 3 - 10 %    EOSINOPHILS 0 0 - 5 %    BASOPHILS 0 0 - 2 %    IMMATURE GRANULOCYTES 0 0.0 - 0.5 %    ABS. NEUTROPHILS 6.3 1.8 - 8.0 K/UL    ABS. LYMPHOCYTES 0.9 0.9 - 3.6 K/UL    ABS. MONOCYTES 0.6 0.05 - 1.2 K/UL    ABS. EOSINOPHILS 0.0 0.0 - 0.4 K/UL    ABS. BASOPHILS 0.0 0.0 - 0.1 K/UL    ABS. IMM.  GRANS. 0.0 0.00 - 0.04 K/UL    DF AUTOMATED     METABOLIC PANEL, COMPREHENSIVE    Collection Time: 02/16/22  1:30 AM   Result Value Ref Range    Sodium 139 136 - 145 mmol/L    Potassium 4.9 3.5 - 5.5 mmol/L    Chloride 103 100 - 111 mmol/L    CO2 32 21 - 32 mmol/L Anion gap 4 3.0 - 18 mmol/L    Glucose 196 (H) 74 - 99 mg/dL    BUN 18 7.0 - 18 MG/DL    Creatinine 0.70 0.6 - 1.3 MG/DL    BUN/Creatinine ratio 26 (H) 12 - 20      GFR est AA >60 >60 ml/min/1.73m2    GFR est non-AA >60 >60 ml/min/1.73m2    Calcium 8.7 8.5 - 10.1 MG/DL    Bilirubin, total 1.1 (H) 0.2 - 1.0 MG/DL    ALT (SGPT) 19 16 - 61 U/L    AST (SGOT) 8 (L) 10 - 38 U/L    Alk. phosphatase 76 45 - 117 U/L    Protein, total 6.5 6.4 - 8.2 g/dL    Albumin 3.3 (L) 3.4 - 5.0 g/dL    Globulin 3.2 2.0 - 4.0 g/dL    A-G Ratio 1.0 0.8 - 1.7     PROCALCITONIN    Collection Time: 02/16/22  1:30 AM   Result Value Ref Range    Procalcitonin <0.05 ng/mL   D DIMER    Collection Time: 02/16/22  1:30 AM   Result Value Ref Range    D DIMER <0.27 <0.46 ug/ml(FEU)   C REACTIVE PROTEIN, QT    Collection Time: 02/16/22  1:30 AM   Result Value Ref Range    C-Reactive protein 2.7 (H) 0 - 0.3 mg/dL   GLUCOSE, POC    Collection Time: 02/16/22  8:37 AM   Result Value Ref Range    Glucose (POC) 242 (H) 70 - 110 mg/dL       Signed By: Heri Godinez MD     February 16, 2022      Disclaimer: Sections of this note are dictated using utilizing voice recognition software. Minor typographical errors may be present. If questions arise, please do not hesitate to contact me or call our department.

## 2022-02-16 NOTE — DIABETES MGMT
Diabetes/ Glycemic Control Plan of Care    Recommendations: increase lantus to 20 units daily. Assessment:   Hyperglycemia in setting of COVID-19 pneumonia, T2DM, steroids every 12 hours, obesity  lantus was increased this morning to 15 units - recommend further increasing to 20 units  May also require mealtime dose -   Will continue to monitor    DX:   1. Coronary artery disease involving native coronary artery of native heart without angina pectoris     2. Chronic obstructive pulmonary disease with acute exacerbation ([facility] [address]     3. Hypertension, unspecified type     4. Pneumonia due to COVID-19 virus     5. Hypoxia        Fasting/ Morning blood glucose:   Lab Results   Component Value Date/Time    Glucose 196 (H) 02/16/2022 01:30 AM    Glucose (POC) 230 (H) 02/16/2022 11:59 AM    Glucose (POC) 185 (H) 07/19/2014 05:28 PM     IV Fluids containing dextrose: none  Steroids:   Rx Glucocorticoids (24h ago, onward)             Start     Dose Route Frequency Ordered Stop    02/14/22 2100  dexamethasone (DECADRON) 4 mg/mL injection 6 mg         6 mg IV EVERY 12 HOURS 02/14/22 1506 --           Blood glucose values: Within target range (70-180mg/dL):  no    Current insulin orders:   lantus 15 daily  Corrective humalog, very insulin resistant, 4 times daily    Total Daily Dose previous 24 hours = 37 units  10 units lantus  27 units humlog    Current A1c:   Lab Results   Component Value Date/Time    Hemoglobin A1c 8.8 (H) 02/14/2022 12:01 PM      equivalent  to ave Blood Glucose of 206 mg/dl for 2-3 months prior to admission  Adequate glycemic control PTA: no    Nutrition/Diet:   Active Orders   Diet    ADULT DIET Regular; 4 carb choices (60 gm/meal); Low Sodium (2 gm); No Concentrated Sweets      Meal Intake:  No data found. Supplement Intake:  No data found.     Home diabetes medications:   Key Antihyperglycemic Medications             glyBURIDE (DIABETA) 5 mg tablet (Taking) Take 5 mg by mouth Daily (before breakfast). insulin lispro (HUMALOG) 100 unit/mL injection (Taking) Very Insulin Resistant  For Blood Sugar (mg/dL) of:              Less than 150 =   0 units  150 -199 =   3 units  200 -249 =   6 units  250 -299 =   9 units  300 -349 =   12 units  350 and above =   15 units  Initiate Hypoglycemic protocol if blood glucose is <70 mg/dL. Fast Acting - Administer Immediately - or within 15 minutes of start of meal, if mealtime coverage. metFORMIN (GLUCOPHAGE) 1,000 mg tablet (Taking) Take 1 Tab by mouth two (2) times daily (with meals). Plan/Goals:   Blood glucose will be within target of 70 - 180 mg/dl within 72 hours  Reinforce dietary and medication compliance at home.        Education:  [x] Refer to Diabetes Education Record                       [] Education not indicated at this time     Darien Fox MPH RN [facility] [employer]  Pager [phone]  Office [phone]

## 2022-02-16 NOTE — PROGRESS NOTES
Bedside and verbal report received from [id number] [place] (offgoing nurse). Report included the following information; SBAR, MAR, LABS, Intake/output, Kardex, and summary of care      Patient states he was lying in bed when he felt he could not catch his breath. (observing the patient in the bed, the bed was flat). Patient sob. Stat was taken (97%) on 3 liters of 02. Patient at this time relaxing in the chair. Bedside and Verbal shift change report given to 62 [address] Gonsalves (oncoming nurse) by Evelin Stallings RN (offgoing nurse). Report included the following information SBAR, Kardex, Intake/Output, MAR and Recent Results. Patient resting in bed. Bathed patient, tolerated well. FMS and wound vac in place. No complaints at this time. Call bell and phone in reach.

## 2022-02-17 ENCOUNTER — APPOINTMENT (OUTPATIENT)
Dept: GENERAL RADIOLOGY | Age: 62
DRG: 177 | End: 2022-02-17
Attending: STUDENT IN AN ORGANIZED HEALTH CARE EDUCATION/TRAINING PROGRAM
Payer: MEDICARE

## 2022-02-17 VITALS
RESPIRATION RATE: 20 BRPM | TEMPERATURE: 98 F | WEIGHT: 236.4 LBS | BODY MASS INDEX: 33.1 KG/M2 | DIASTOLIC BLOOD PRESSURE: 86 MMHG | SYSTOLIC BLOOD PRESSURE: 148 MMHG | HEIGHT: 71 IN | OXYGEN SATURATION: 93 % | HEART RATE: 85 BPM

## 2022-02-17 LAB
ALBUMIN SERPL-MCNC: 3.3 G/DL (ref 3.4–5)
ALBUMIN/GLOB SERPL: 0.9 {RATIO} (ref 0.8–1.7)
ALP SERPL-CCNC: 69 U/L (ref 45–117)
ALT SERPL-CCNC: 17 U/L (ref 16–61)
ANION GAP SERPL CALC-SCNC: 3 MMOL/L (ref 3–18)
AST SERPL-CCNC: 8 U/L (ref 10–38)
BASOPHILS # BLD: 0 K/UL (ref 0–0.1)
BASOPHILS NFR BLD: 0 % (ref 0–2)
BILIRUB SERPL-MCNC: 0.7 MG/DL (ref 0.2–1)
BUN SERPL-MCNC: 20 MG/DL (ref 7–18)
BUN/CREAT SERPL: 27 (ref 12–20)
CALCIUM SERPL-MCNC: 8.9 MG/DL (ref 8.5–10.1)
CHLORIDE SERPL-SCNC: 101 MMOL/L (ref 100–111)
CO2 SERPL-SCNC: 33 MMOL/L (ref 21–32)
CREAT SERPL-MCNC: 0.74 MG/DL (ref 0.6–1.3)
CRP SERPL-MCNC: 1.3 MG/DL (ref 0–0.3)
D DIMER PPP FEU-MCNC: <0.27 UG/ML(FEU)
DIFFERENTIAL METHOD BLD: ABNORMAL
EOSINOPHIL # BLD: 0 K/UL (ref 0–0.4)
EOSINOPHIL NFR BLD: 0 % (ref 0–5)
ERYTHROCYTE [DISTWIDTH] IN BLOOD BY AUTOMATED COUNT: 12.8 % (ref 11.6–14.5)
GLOBULIN SER CALC-MCNC: 3.6 G/DL (ref 2–4)
GLUCOSE BLD STRIP.AUTO-MCNC: 168 MG/DL (ref 70–110)
GLUCOSE BLD STRIP.AUTO-MCNC: 223 MG/DL (ref 70–110)
GLUCOSE SERPL-MCNC: 234 MG/DL (ref 74–99)
HCT VFR BLD AUTO: 41.8 % (ref 36–48)
HGB BLD-MCNC: 13.1 G/DL (ref 13–16)
IMM GRANULOCYTES # BLD AUTO: 0 K/UL (ref 0–0.04)
IMM GRANULOCYTES NFR BLD AUTO: 0 % (ref 0–0.5)
LYMPHOCYTES # BLD: 1 K/UL (ref 0.9–3.6)
LYMPHOCYTES NFR BLD: 12 % (ref 21–52)
MCH RBC QN AUTO: 28.2 PG (ref 24–34)
MCHC RBC AUTO-ENTMCNC: 31.3 G/DL (ref 31–37)
MCV RBC AUTO: 90.1 FL (ref 78–100)
MONOCYTES # BLD: 0.4 K/UL (ref 0.05–1.2)
MONOCYTES NFR BLD: 5 % (ref 3–10)
NEUTS SEG # BLD: 6.8 K/UL (ref 1.8–8)
NEUTS SEG NFR BLD: 83 % (ref 40–73)
NRBC # BLD: 0 K/UL (ref 0–0.01)
NRBC BLD-RTO: 0 PER 100 WBC
PLATELET # BLD AUTO: 198 K/UL (ref 135–420)
PMV BLD AUTO: 10.7 FL (ref 9.2–11.8)
POTASSIUM SERPL-SCNC: 4.5 MMOL/L (ref 3.5–5.5)
PROCALCITONIN SERPL-MCNC: <0.05 NG/ML
PROT SERPL-MCNC: 6.9 G/DL (ref 6.4–8.2)
RBC # BLD AUTO: 4.64 M/UL (ref 4.35–5.65)
SODIUM SERPL-SCNC: 137 MMOL/L (ref 136–145)
WBC # BLD AUTO: 8.2 K/UL (ref 4.6–13.2)

## 2022-02-17 PROCEDURE — 86140 C-REACTIVE PROTEIN: CPT

## 2022-02-17 PROCEDURE — 84145 PROCALCITONIN (PCT): CPT

## 2022-02-17 PROCEDURE — 94640 AIRWAY INHALATION TREATMENT: CPT

## 2022-02-17 PROCEDURE — 94761 N-INVAS EAR/PLS OXIMETRY MLT: CPT

## 2022-02-17 PROCEDURE — 99239 HOSP IP/OBS DSCHRG MGMT >30: CPT | Performed by: HOSPITALIST

## 2022-02-17 PROCEDURE — 74011636637 HC RX REV CODE- 636/637: Performed by: HOSPITALIST

## 2022-02-17 PROCEDURE — 80053 COMPREHEN METABOLIC PANEL: CPT

## 2022-02-17 PROCEDURE — 74011250637 HC RX REV CODE- 250/637: Performed by: NURSE PRACTITIONER

## 2022-02-17 PROCEDURE — 74011250637 HC RX REV CODE- 250/637: Performed by: HOSPITALIST

## 2022-02-17 PROCEDURE — 36415 COLL VENOUS BLD VENIPUNCTURE: CPT

## 2022-02-17 PROCEDURE — 77010033678 HC OXYGEN DAILY

## 2022-02-17 PROCEDURE — 85025 COMPLETE CBC W/AUTO DIFF WBC: CPT

## 2022-02-17 PROCEDURE — 82962 GLUCOSE BLOOD TEST: CPT

## 2022-02-17 PROCEDURE — 74011250636 HC RX REV CODE- 250/636: Performed by: NURSE PRACTITIONER

## 2022-02-17 PROCEDURE — 85379 FIBRIN DEGRADATION QUANT: CPT

## 2022-02-17 RX ORDER — INSULIN GLARGINE 100 [IU]/ML
15 INJECTION, SOLUTION SUBCUTANEOUS DAILY
Qty: 1 PEN | Refills: 0 | Status: SHIPPED | OUTPATIENT
Start: 2022-02-17 | End: 2022-10-27

## 2022-02-17 RX ORDER — GUAIFENESIN/DEXTROMETHORPHAN 100-10MG/5
5 SYRUP ORAL
Qty: 100 ML | Refills: 0 | Status: SHIPPED | OUTPATIENT
Start: 2022-02-17 | End: 2022-02-27

## 2022-02-17 RX ORDER — FAMOTIDINE 20 MG/1
20 TABLET, FILM COATED ORAL EVERY 12 HOURS
Qty: 60 TABLET | Refills: 0 | Status: SHIPPED | OUTPATIENT
Start: 2022-02-17 | End: 2022-03-19

## 2022-02-17 RX ORDER — INSULIN LISPRO 100 [IU]/ML
INJECTION, SOLUTION INTRAVENOUS; SUBCUTANEOUS
Qty: 1 EACH | Refills: 0 | Status: SHIPPED | OUTPATIENT
Start: 2022-02-17

## 2022-02-17 RX ORDER — DEXAMETHASONE 6 MG/1
TABLET ORAL
Qty: 10 TABLET | Refills: 0 | Status: SHIPPED | OUTPATIENT
Start: 2022-02-17 | End: 2022-02-24

## 2022-02-17 RX ADMIN — ALBUTEROL SULFATE 2 PUFF: 108 INHALANT RESPIRATORY (INHALATION) at 15:16

## 2022-02-17 RX ADMIN — ZINC SULFATE 220 MG (50 MG) CAPSULE 1 CAPSULE: CAPSULE at 11:28

## 2022-02-17 RX ADMIN — Medication 500 MG: at 11:28

## 2022-02-17 RX ADMIN — Medication 2000 UNITS: at 11:28

## 2022-02-17 RX ADMIN — Medication 6 UNITS: at 08:28

## 2022-02-17 RX ADMIN — ALBUTEROL SULFATE 2 PUFF: 108 INHALANT RESPIRATORY (INHALATION) at 08:22

## 2022-02-17 RX ADMIN — ALBUTEROL SULFATE 2 PUFF: 108 INHALANT RESPIRATORY (INHALATION) at 00:00

## 2022-02-17 RX ADMIN — ASPIRIN 81 MG: 81 TABLET, COATED ORAL at 11:28

## 2022-02-17 RX ADMIN — Medication 3 UNITS: at 11:35

## 2022-02-17 RX ADMIN — CLOPIDOGREL BISULFATE 75 MG: 75 TABLET, FILM COATED ORAL at 11:29

## 2022-02-17 RX ADMIN — DEXAMETHASONE SODIUM PHOSPHATE 6 MG: 4 INJECTION, SOLUTION INTRAMUSCULAR; INTRAVENOUS at 11:28

## 2022-02-17 RX ADMIN — ALBUTEROL SULFATE 2 PUFF: 108 INHALANT RESPIRATORY (INHALATION) at 12:05

## 2022-02-17 RX ADMIN — FAMOTIDINE 20 MG: 20 TABLET ORAL at 11:28

## 2022-02-17 RX ADMIN — INSULIN GLARGINE 20 UNITS: 100 INJECTION, SOLUTION SUBCUTANEOUS at 08:28

## 2022-02-17 RX ADMIN — ALBUTEROL SULFATE 2 PUFF: 108 INHALANT RESPIRATORY (INHALATION) at 04:00

## 2022-02-17 RX ADMIN — BUDESONIDE AND FORMOTEROL FUMARATE DIHYDRATE 2 PUFF: 160; 4.5 AEROSOL RESPIRATORY (INHALATION) at 08:22

## 2022-02-17 RX ADMIN — METOPROLOL TARTRATE 12.5 MG: 25 TABLET, FILM COATED ORAL at 11:29

## 2022-02-17 NOTE — PROGRESS NOTES
Discharge/Transition Planning    0930: Faxed Home o2 orders with required documentation to [facility] [phone]    1315: Oxygen approved and dispatched from [facility] . Should be here very shortly.  Updated unit and sent perfect serve to Dr Samira Estrada

## 2022-02-17 NOTE — DISCHARGE INSTRUCTIONS
DISCHARGE SUMMARY from Nurse    PATIENT INSTRUCTIONS:    After general anesthesia or intravenous sedation, for 24 hours or while taking prescription Narcotics:  · Limit your activities  · Do not drive and operate hazardous machinery  · Do not make important personal or business decisions  · Do  not drink alcoholic beverages  · If you have not urinated within 8 hours after discharge, please contact your surgeon on call. Report the following to your surgeon:  · Excessive pain, swelling, redness or odor of or around the surgical area  · Temperature over 100.5  · Nausea and vomiting lasting longer than 4 hours or if unable to take medications  · Any signs of decreased circulation or nerve impairment to extremity: change in color, persistent  numbness, tingling, coldness or increase pain  · Any questions    What to do at Home:  Recommended activity: Activity as tolerated, don't over exert    If you experience any of the following symptoms chest pain, increased shortness of breath, increased weakness or dizziness, temps greater than 100.4, please follow up with PCP or call 911/go to ER if an emergency. .    *  Please give a list of your current medications to your Primary Care Provider. *  Please update this list whenever your medications are discontinued, doses are      changed, or new medications (including over-the-counter products) are added. *  Please carry medication information at all times in case of emergency situations. These are general instructions for a healthy lifestyle:    No smoking/ No tobacco products/ Avoid exposure to second hand smoke  Surgeon General's Warning:  Quitting smoking now greatly reduces serious risk to your health.     Obesity, smoking, and sedentary lifestyle greatly increases your risk for illness    A healthy diet, regular physical exercise & weight monitoring are important for maintaining a healthy lifestyle    You may be retaining fluid if you have a history of heart failure or if you experience any of the following symptoms:  Weight gain of 3 pounds or more overnight or 5 pounds in a week, increased swelling in our hands or feet or shortness of breath while lying flat in bed. Please call your doctor as soon as you notice any of these symptoms; do not wait until your next office visit. Patient armband removed and shredded      The discharge information has been reviewed with the patient. The patient verbalized understanding. Discharge medications reviewed with the patient and appropriate educational materials and side effects teaching were provided. ___________________________________________________________________________________________________________________________________Discharge Instructions    Patient: Lisandro Moffett MRN: [id number]  CSN: [id number]    YOB: 1960  Age: 62 y.o. Sex: male    DOA: 2/14/2022 LOS:  LOS: 3 days   Discharge Date:      DIET:  Cardiac Diet and Diabetic Diet    ACTIVITY: Activity as tolerated  · Home health care for Home O2     ADDITIONAL INFORMATION: If you experience any of the following symptoms but not limited to Fever, chills, nausea, vomiting, diarrhea, change in mentation, falling, bleeding, shortness of breath, chest pain, please call your primary care physician or return to the emergency room if you cannot get hold of your doctor:     FOLLOW UP CARE:  Dr. Amie Newman MD in 5-7 days. Please call and set up an appointment.     Aaron Mckeon MD  2/17/2022 2:47 PM

## 2022-02-17 NOTE — PROGRESS NOTES
Pulse oximetry assessment   87% at rest on room air (if 88% or less, skip next steps)  86% while ambulating on room air  93% at rest on2LPM  93% while ambulating on 3LPM    Patient required 3LPM  While ambulating

## 2022-02-17 NOTE — DIABETES MGMT
Diabetes/ Glycemic Control Plan of Care    Reviewed admission notes: he presented to ED on 2/14/2022 with report of gradually worsening productive cough with sudden onset of shortness of breath and associated with chills. COVID detected. 2/15:    Completed assessment of home diabetes management and education. He verbalized understanding that his current A1c level of 8.8 indicated diabetes not controlled and what he can do to achieve the recommended A1c of 7% of lower. Emphasized nutrition compliance and regular monitoring of fasting BG.    2/17: Patient seen at noon today and anticipate disch to home soon. Elevated fasting BG values. Recommendations:   1.) if not disch today, consider increasing lantus insulin dose from 20 units to 25 units daily and add mealtime lispro 3 units TID AC. Assessment:   DX:   1. Coronary artery disease involving native coronary artery of native heart without angina pectoris     2. Chronic obstructive pulmonary disease with acute exacerbation ([facility] [address]     3. Hypertension, unspecified type     4. Pneumonia due to COVID-19 virus     5. Hypoxia        Fasting/ Morning blood glucose:   Lab Results   Component Value Date/Time    Glucose 234 (H) 02/17/2022 12:42 AM    Glucose (POC) 168 (H) 02/17/2022 11:29 AM    Glucose (POC) 185 (H) 07/19/2014 05:28 PM     IV Fluids containing dextrose: None    Steroids:   Rx Glucocorticoids (24h ago, onward)             Start     Dose Route Frequency Ordered Stop    02/14/22 2100  dexamethasone (DECADRON) 4 mg/mL injection 6 mg         6 mg IV EVERY 12 HOURS 02/14/22 1506 --               Rx Glucocorticoids (24h ago, onward)             Start     Dose Route Frequency Ordered Stop    02/14/22 2100  dexamethasone (DECADRON) 4 mg/mL injection 6 mg         6 mg IV EVERY 12 HOURS 02/14/22 1506 --                 Blood glucose values: Within target range (70-180mg/dL):   No    Current insulin orders:   Basal lantus insulin 20 units daily  Correctional lispro insulin. Very resistant dose    Total Daily Dose previous 24 hours: 50 units of insulin  Lantus: 20 units  Lispro: 30 units    Current A1c:   Lab Results   Component Value Date/Time    Hemoglobin A1c 8.8 (H) 02/14/2022 12:01 PM      equivalent  to ave Blood Glucose of 206 mg/dl for 2-3 months prior to admission    Adequate glycemic control PTA: No    Nutrition/Diet:   Active Orders   Diet    ADULT DIET Regular; 4 carb choices (60 gm/meal); Low Sodium (2 gm); No Concentrated Sweets      Meal Intake:  No data found. Supplement Intake:  No data found. Home diabetes medications:  2/15/2022:  Patient reported:  Glipizide 5 mg daily with breakfast.  Patient reported this medicine was changed from Glyburide  Metformin 1000 mg 2 times daily with meals  Patient also reported no history of taking insulin. Key Antihyperglycemic Medications             glyBURIDE (DIABETA) 5 mg tablet (Taking) Take 5 mg by mouth Daily (before breakfast). insulin lispro (HUMALOG) 100 unit/mL injection (Taking) Very Insulin Resistant  For Blood Sugar (mg/dL) of:              Less than 150 =   0 units  150 -199 =   3 units  200 -249 =   6 units  250 -299 =   9 units  300 -349 =   12 units  350 and above =   15 units  Initiate Hypoglycemic protocol if blood glucose is <70 mg/dL. Fast Acting - Administer Immediately - or within 15 minutes of start of meal, if mealtime coverage. metFORMIN (GLUCOPHAGE) 1,000 mg tablet (Taking) Take 1 Tab by mouth two (2) times daily (with meals). Plan/Goals:   Blood glucose will be within target of 70 - 180 mg/dl within 72 hours  Reinforce dietary and medication compliance at home.           Education:  [x] Refer to Diabetes Education Record: 2/15/2022                       [] Education not indicated at this time     Sharri Lee RN [facility]  Pager: [phone]

## 2022-02-17 NOTE — DISCHARGE SUMMARY
Discharge Summary    Patient: Lee Ormond MRN: [id number]  CSN: [id number]    YOB: 1960  Age: 62 y.o. Sex: male    DOA: 2/14/2022 LOS:  LOS: 3 days        Disposition: Home with [facility] AT [facility]    Discharge Date: 2/17/2022    Admission Diagnosis: Pneumonia due to COVID-19 virus [U07.1, J12.82]    Discharge Diagnosis:    1. Acute respiratory distress, improving   2. COVID-19 pneumonia   3. Acute COPD exacerbation   4. Hypoxia   5. History of CAD and PCI in 2020  6. Hypertension, very labile BP  7. Active tobacco abuse  8. Diabetes mellitus type 2 with hyperglycemia      Discharge Condition: Stable      PHYSICAL EXAM  Visit Vitals  BP (!) 148/86 (BP 1 Location: Left upper arm, BP Patient Position: Sitting)   Pulse 85   Temp 98 °F (36.7 °C)   Resp 20   Ht 5' 11\" (1.803 m)   Wt 107.2 kg (236 lb 6.4 oz)   SpO2 95%   BMI 32.97 kg/m²       General: Alert, cooperative, no acute distress    HEENT: PERRLA, EOMI. Anicteric sclerae. Lungs:  Minimal expiratory wheezing, no rales or rhonchi. Heart:             Regular rate and Rhythm. Abdomen: Soft, Non distended, Non tender. + Bowel sounds. Extremities: No edema. Psych:   Good insight. Not anxious or agitated. Neurologic:  AA, oriented X 3. Moves all ext                                 Hospital Course:   Alfonzo Kaplan is a 62 y.o.  male with PMH of COPD, tobacco abuse, diabetes type 2, CAD with history of stent placement in 2020, hypertension who presents with shortness of breath progressive over the past 1 week. Patient reports he was in his normal state of health until approximately 1 week ago when he began having some shortness of breath which has gradually progressed and significantly worsening today resulting in him presenting to [facility] ED. He also has cough productive of white mucus only. Denies fever or chills.   He does note his girlfriend had contracted Covid approximately 1 month ago, he received his Covid vaccines including booster approximately 2 to 3 weeks ago. In emergency room patient was noted to have acute respiratory distress along with hypoxia, Covid testing was done which came back positive. Patient was started on IV steroids and was admitted to hospital.  ID was consulted and inflammatory markers were monitored. Initially inflammatory markers were high but slowly started coming down. Patient also noted to have active wheezing, concern for acute COPD exacerbation. Steroids were increased and monitor. Patient's wheezing slowly improved. Patient was initially placed on antibiotics concerning for superinfection but were discontinued by ID since there is no signs of bacterial infection. Patient responded well with steroids, bronchodilators and O2 supplement. Patient was assessed for home O2 supplement, patient qualified for home oxygen. Patient is currently feeling much better, is asymptomatic and is ambulating in the room with oxygen. He is desperately wanting to go home. Discussed with infectious disease, okay for discharge from their standpoint. Patient will be discharged home with home oxygen today. I discussed with the patient about starting insulin for blood sugar control but patient very reluctant. He wants to continue his p.o. diabetic medications. Since patient on steroids I have advised patient to use Lantus along with p.o. diabetic medications for the next 7 days since he is on steroids and after that he can discontinue Lantus but continue with p.o. diabetic medications. I also discussed with the patient about discharge plan, follow-up appointments, new medications and side effects. Patient understood and agreed with the plan.       Procedures: None     Consults:     Imaging studies:       Discharge Medications:     Current Discharge Medication List      START taking these medications    Details   dexAMETHasone (Decadron) 6 mg tablet Take 1 Tablet by mouth two (2) times daily (with meals) for 3 days, THEN 1 Tablet Daily (before breakfast) for 4 days. Qty: 10 Tablet, Refills: 0      famotidine (PEPCID) 20 mg tablet Take 1 Tablet by mouth every twelve (12) hours for 30 days. Qty: 60 Tablet, Refills: 0      guaiFENesin-dextromethorphan (ROBITUSSIN DM) 100-10 mg/5 mL syrup Take 5 mL by mouth every four (4) hours as needed for Cough for up to 10 days. Qty: 100 mL, Refills: 0      insulin glargine (LANTUS,BASAGLAR) 100 unit/mL (3 mL) inpn 15 Units by SubCUTAneous route daily. Qty: 1 Pen, Refills: 0         CONTINUE these medications which have CHANGED    Details   insulin lispro (HUMALOG) 100 unit/mL injection Very Insulin Resistant For Blood Sugar (mg/dL) of:            Less than 150 =   0 units 150 -199 =   3 units 200 -249 =   6 units 250 -299 =   9 units 300 -349 =   12 units 350 and above =   15 units Initiate Hypoglycemic protocol if blood glucose is <70 mg/dL. Fast Acting - Administer Immediately - or within 15 minutes of start of meal, if mealtime coverage. Qty: 1 Each, Refills: 0         CONTINUE these medications which have NOT CHANGED    Details   glyBURIDE (DIABETA) 5 mg tablet Take 5 mg by mouth Daily (before breakfast). lidocaine (Lidoderm) 5 % Apply patch to the affected area for 12 hours a day and remove for 12 hours a day. Qty: 10 Each, Refills: 0      clopidogreL (PLAVIX) 75 mg tab TAKE ONE TABLET BY MOUTH DAILY  Qty: 90 Tablet, Refills: 3      atorvastatin (LIPITOR) 80 mg tablet TAKE ONE TABLET BY MOUTH EVERY EVENING  Qty: 90 Tablet, Refills: 3      albuterol sulfate 90 mcg/actuation aebs Take 1 Puff by inhalation every four to six (4-6) hours as needed for Wheezing. Qty: 1 Each, Refills: 0      aspirin delayed-release 81 mg tablet Take 1 Tab by mouth daily. Qty: 30 Tab, Refills: 0      glucagon (GLUCAGEN) 1 mg injection 1 mL by IntraMUSCular route as needed for Hypoglycemia.   Qty: 1 Vial, Refills: 0      Insulin Syringe-Needle U-100 0.3 mL 31 gauge x 5/16\" syrg TID as needed  Qty: 100 Syringe, Refills: 0 lancets misc As directed  Qty: 100 Each, Refills: 0      Blood-Glucose Meter monitoring kit As directed  Qty: 1 Kit, Refills: 0      glucose blood VI test strips (blood glucose test) strip As directed  Qty: 60 Strip, Refills: 0      metFORMIN (GLUCOPHAGE) 1,000 mg tablet Take 1 Tab by mouth two (2) times daily (with meals). Qty: 60 Tab, Refills: 0    Associated Diagnoses: Type II or unspecified type diabetes mellitus without mention of complication, uncontrolled      tiotropium (SPIRIVA WITH HANDIHALER) 18 mcg inhalation capsule Take 1 Cap by inhalation daily. Qty: 30 Cap, Refills: 0    Associated Diagnoses: COPD exacerbation ([address]      mometasone-formoterol (DULERA) 100-5 mcg/actuation HFAA HFA inhaler Take 2 Puffs by inhalation two (2) times a day. Qty: 2 Inhaler, Refills: 0    Associated Diagnoses: COPD exacerbation (HCC)      metoprolol tartrate (LOPRESSOR) 25 mg tablet Take 1 Tab by mouth every twelve (12) hours. Qty: 60 Tab, Refills: 6         STOP taking these medications       lisinopril (PRINIVIL, ZESTRIL) 10 mg tablet Comments:   Reason for Stopping:         methocarbamoL (Robaxin) 500 mg tablet Comments:   Reason for Stopping:         nitroglycerin (NITROLINGUAL) 400 mcg/spray spray Comments:   Reason for Stopping:             · It is important that you take the medication exactly as they are prescribed. · Keep your medication in the bottles provided by the pharmacist and keep a list of the medication names, dosages, and times to be taken in your wallet. · Do not take other medications without consulting your doctor.      DIET:  Cardiac Diet and Diabetic Diet    ACTIVITY: Activity as tolerated  · Home health care for Home O2     ADDITIONAL INFORMATION: If you experience any of the following symptoms but not limited to Fever, chills, nausea, vomiting, diarrhea, change in mentation, falling, bleeding, shortness of breath, chest pain, please call your primary care physician or return to the emergency room if you cannot get hold of your doctor:     FOLLOW UP CARE:  Dr. Jimmie Lassiter MD in 5-7 days. Please call and set up an appointment. Minutes spent on discharge: 40 minutes spent coordinating this discharge (review instructions/follow-up, prescriptions, preparing report for sign off)    Rosina Castle MD  2/17/2022 2:47 PM    Disclaimer: Sections of this note are dictated using utilizing voice recognition software. Minor typographical errors may be present. If questions arise, please do not hesitate to contact me or call our department.

## 2022-02-17 NOTE — PROGRESS NOTES
Received report on pt.from off going RN. Resting quietly in bed on rounds. Denies c/o pain or SOB at this time. Call bell at side. bed alarm on. No acute distress noted. Will cont to monitor for any changes in status. 1600 discharge instructions given to pt. Instructed to  meds from [facility]. Specific instructions given verbally to pt in regards to lantus insulin that is to be taken for a week while on decadron and then stopped when decadron completed . Specific verbal instructions also given on decadron dosing . All other med instructions given to pt. Verbally and by copy of med list with highlighted med times and next doses due. Stressed to pt the importance of taking meds exactly as ordered. Patient stated that he knew how to given self insulin but that if he forgot anything he would \" figure it out\". Instructed pt to call MD offices for any questions. 1615 discharged to exit per w/c with portable 02 delivered for home use. No distress noted at time of discharge.

## 2022-02-17 NOTE — PROGRESS NOTES
Infectious Disease progress Note        Reason: Acute hypoxic respiratory failure, COVID-19 infection    Current abx Prior abx   azithromycin  2/14/22-2/16 Ceftriaxone 2/14     Lines:       Assessment :     72-year-old male with a history of COPD, coronary artery disease (stent x1), hypertension, uncontrolled type 2 DM presented to  [facility] on 2/14/22 with primary complaint of 1 week of gradually worsening productive cough with sudden onset of shortness of breath shortly after he used a vape device. Fully vaccinated for COVID-19. Status post booster vaccine January 2022    Clinical presentation consistent with acute hypoxic respiratory failure-present on admission due to COPD exacerbation/vaping associated lung injury superimposed on recent COVID-19 infection    Low CRP on admission could be due to recent outpatient prednisone    Low procalcitonin argues against superimposed bacterial pneumonia    Low D-dimer argues against COVID-19 associated hypercoagulability    Clinically better. Improving oxygenation. Appeared comfortable on 4L    Recommendations:    1. Hold further antibiotic  2. Taper steroids per primary team.  Recommend total course of 10 days  3. Discharge planning per primary  4. Patient was advised to stay away from vaping/attempt to quit smoking     Above plan was discussed in details with patient, primary team. Please call me if any further questions or concerns. Will continue to participate in the care of this patient. HPI:    Feels better. [place] to go home patient denies any  abdominal pain, nausea/vomiting, or diarrhea.          Past Medical History:   Diagnosis Date    COPD (chronic obstructive pulmonary disease) [address] [address]     Diabetes ([facility]     Emphysema     H/O cardiovascular stress test 2012    negative    Hypertension     MI (myocardial infarction) ([facility] [address]        Past Surgical History:   Procedure Laterality Date    HX CORONARY STENT PLACEMENT         Current Discharge Medication List CONTINUE these medications which have NOT CHANGED    Details   glyBURIDE (DIABETA) 5 mg tablet Take 5 mg by mouth Daily (before breakfast). lidocaine (Lidoderm) 5 % Apply patch to the affected area for 12 hours a day and remove for 12 hours a day. Qty: 10 Each, Refills: 0      clopidogreL (PLAVIX) 75 mg tab TAKE ONE TABLET BY MOUTH DAILY  Qty: 90 Tablet, Refills: 3      atorvastatin (LIPITOR) 80 mg tablet TAKE ONE TABLET BY MOUTH EVERY EVENING  Qty: 90 Tablet, Refills: 3      albuterol sulfate 90 mcg/actuation aebs Take 1 Puff by inhalation every four to six (4-6) hours as needed for Wheezing. Qty: 1 Each, Refills: 0      aspirin delayed-release 81 mg tablet Take 1 Tab by mouth daily. Qty: 30 Tab, Refills: 0      glucagon (GLUCAGEN) 1 mg injection 1 mL by IntraMUSCular route as needed for Hypoglycemia. Qty: 1 Vial, Refills: 0      insulin lispro (HUMALOG) 100 unit/mL injection Very Insulin Resistant  For Blood Sugar (mg/dL) of:              Less than 150 =   0 units  150 -199 =   3 units  200 -249 =   6 units  250 -299 =   9 units  300 -349 =   12 units  350 and above =   15 units  Initiate Hypoglycemic protocol if blood glucose is <70 mg/dL. Fast Acting - Administer Immediately - or within 15 minutes of start of meal, if mealtime coverage. Qty: 1 Vial, Refills: 0      Insulin Syringe-Needle U-100 0.3 mL 31 gauge x 5/16\" syrg TID as needed  Qty: 100 Syringe, Refills: 0      lancets misc As directed  Qty: 100 Each, Refills: 0      Blood-Glucose Meter monitoring kit As directed  Qty: 1 Kit, Refills: 0      glucose blood VI test strips (blood glucose test) strip As directed  Qty: 60 Strip, Refills: 0      metFORMIN (GLUCOPHAGE) 1,000 mg tablet Take 1 Tab by mouth two (2) times daily (with meals).   Qty: 60 Tab, Refills: 0    Associated Diagnoses: Type II or unspecified type diabetes mellitus without mention of complication, uncontrolled      lisinopril (PRINIVIL, ZESTRIL) 10 mg tablet Take 1 Tab by mouth daily.  Qty: 30 Tab, Refills: 0    Associated Diagnoses: Hypertension      tiotropium (SPIRIVA WITH HANDIHALER) 18 mcg inhalation capsule Take 1 Cap by inhalation daily. Qty: 30 Cap, Refills: 0    Associated Diagnoses: COPD exacerbation ([address]      mometasone-formoterol (DULERA) 100-5 mcg/actuation HFAA HFA inhaler Take 2 Puffs by inhalation two (2) times a day. Qty: 2 Inhaler, Refills: 0    Associated Diagnoses: COPD exacerbation (HCC)      methocarbamoL (Robaxin) 500 mg tablet Take 1 Tablet by mouth four (4) times daily. Qty: 16 Tablet, Refills: 0      metoprolol tartrate (LOPRESSOR) 25 mg tablet Take 1 Tab by mouth every twelve (12) hours. Qty: 60 Tab, Refills: 6      nitroglycerin (NITROLINGUAL) 400 mcg/spray spray 1 Spray by SubLINGual route every five (5) minutes as needed for Chest Pain (do not exceed more than 3 sprays in one day, seek acute medical care if chest pain continues).   Qty: 1 Bottle, Refills: 0             Current Facility-Administered Medications   Medication Dose Route Frequency    albuterol (PROVENTIL HFA, VENTOLIN HFA, PROAIR HFA) inhaler 2 Puff  2 Puff Inhalation Q4H RT    insulin glargine (LANTUS) injection 20 Units  20 Units SubCUTAneous DAILY    insulin lispro (HUMALOG) injection   SubCUTAneous AC&HS    glucose chewable tablet 16 g  4 Tablet Oral PRN    glucagon (GLUCAGEN) injection 1 mg  1 mg IntraMUSCular PRN    dextrose 10% infusion 125-250 mL  125-250 mL IntraVENous PRN    aspirin delayed-release tablet 81 mg  81 mg Oral DAILY    atorvastatin (LIPITOR) tablet 80 mg  80 mg Oral QPM    clopidogreL (PLAVIX) tablet 75 mg  75 mg Oral DAILY    budesonide-formoteroL (SYMBICORT) 160-4.5 mcg/actuation HFA inhaler 2 Puff  2 Puff Inhalation BID RT    acetaminophen (TYLENOL) tablet 650 mg  650 mg Oral Q6H PRN    Or    acetaminophen (TYLENOL) suppository 650 mg  650 mg Rectal Q6H PRN    guaiFENesin-dextromethorphan (ROBITUSSIN DM) 100-10 mg/5 mL syrup 5 mL  5 mL Oral Q4H PRN    cholecalciferol (VITAMIN D3) (1000 Units /25 mcg) tablet 2,000 Units  2,000 Units Oral DAILY    melatonin tablet 3 mg  3 mg Oral QHS    ascorbic acid (vitamin C) (VITAMIN C) tablet 500 mg  500 mg Oral BID    nicotine (NICODERM CQ) 14 mg/24 hr patch 1 Patch  1 Patch TransDERmal DAILY    metoprolol tartrate (LOPRESSOR) tablet 12.5 mg  12.5 mg Oral Q12H    ipratropium-albuterol (COMBIVENT RESPIMAT) 20 mcg-100 mcg inhalation spray  1 Puff Inhalation Q4H PRN    zinc sulfate (ZINCATE) 50 mg zinc (220 mg) capsule 1 Capsule  1 Capsule Oral DAILY    dexamethasone (DECADRON) 4 mg/mL injection 6 mg  6 mg IntraVENous Q12H    famotidine (PEPCID) tablet 20 mg  20 mg Oral Q12H    enoxaparin (LOVENOX) injection 40 mg  40 mg SubCUTAneous Q24H    hydrALAZINE (APRESOLINE) 20 mg/mL injection 10 mg  10 mg IntraVENous Q6H PRN       Allergies: Patient has no known allergies. Family History   Problem Relation Age of Onset    Diabetes Mother      Social History     Socioeconomic History    Marital status: SINGLE     Spouse name: Not on file    Number of children: Not on file    Years of education: Not on file    Highest education level: Not on file   Occupational History    Not on file   Tobacco Use    Smoking status: Current Every Day Smoker     Packs/day: 0.50     Types: Cigarettes    Smokeless tobacco: Never Used   Substance and Sexual Activity    Alcohol use: Yes     Comment: social only    Drug use: Yes     Types: Marijuana    Sexual activity: Not on file   Other Topics Concern    Not on file   Social History Narrative    Not on file     Social Determinants of Health     Financial Resource Strain:     Difficulty of Paying Living Expenses: Not on file   Food Insecurity:     Worried About [address] [employer] in the Last Year: Not on file    Ran Out of Food in the Last Year: Not on file   Transportation Needs:     Lack of Transportation (Medical): Not on file    Lack of Transportation (Non-Medical):  Not on file   Physical Activity:     Days of Exercise per Week: Not on file    Minutes of Exercise per Session: Not on file   Stress:     Feeling of Stress : Not on file   Social Connections:     Frequency of Communication with Friends and Family: Not on file    Frequency of Social Gatherings with Friends and Family: Not on file    Attends Faith Services: Not on file    Active Member of Clubs or Organizations: Not on file    Attends Club or Organization Meetings: Not on file    Marital Status: Not on file   Intimate Partner Violence:     Fear of Current or Ex-Partner: Not on file    Emotionally Abused: Not on file    Physically Abused: Not on file    Sexually Abused: Not on file   Housing Stability:     Unable to Pay for Housing in the Last Year: Not on file    Number of [place] in the Last Year: Not on file    Unstable Housing in the Last Year: Not on file     Social History     Tobacco Use   Smoking Status Current Every Day Smoker    Packs/day: 0.50    Types: Cigarettes   Smokeless Tobacco Never Used        Temp (24hrs), Av.9 °F (36.6 °C), Min:97.5 °F (36.4 °C), Max:98.5 °F (36.9 °C)    Visit Vitals  BP (!) 149/80 (BP 1 Location: Right upper arm, BP Patient Position: At rest)   Pulse 78   Temp 97.6 °F (36.4 °C)   Resp 21   Ht 5' 11\" (1.803 m)   Wt 107.2 kg (236 lb 6.4 oz)   SpO2 95%   BMI 32.97 kg/m²       ROS: 12 point ROS obtained in details. Pertinent positives as mentioned in HPI,   otherwise negative    Physical Exam:    Vitals signs and nursing note reviewed. Constitutional:       General: He is not in acute distress. Appearance: He is well-developed. Appears more comfortable than prior exam.   HENT:      Head: Normocephalic. Eyes:      Conjunctiva/sclera: Conjunctivae normal.      Neck:      Musculoskeletal: Normal range of motion and neck supple.    Cardiovascular:      Rate and Rhythm: Normal rate and regular rhythm on monitor  Chest:      Bilateral chest movements equal.  Auscultation deferred due to Covid positive. + Audible wheezing  Abdominal:      General: There is no distension. Palpations: Abdomen is soft. Tenderness: There is no abdominal tenderness. There is no rebound. Musculoskeletal: Normal range of motion. General: No tenderness. Skin:     General: Skin is warm and dry. Findings: No rash. Neurological:      Mental Status: He is alert and oriented to person, place, and time. Cranial Nerves: No cranial nerve deficit. Motor: No abnormal muscle tone. Coordination: Coordination normal.   Psychiatric:         Behavior: Behavior normal.         Thought Content: Thought content normal.         Judgment: Judgment normal.     Labs: Results:   Chemistry Recent Labs     02/17/22  0042 02/16/22  0130 02/15/22  0501   * 196* 274*    139 135*   K 4.5 4.9 4.4    103 100   CO2 33* 32 33*   BUN 20* 18 13   CREA 0.74 0.70 0.73   CA 8.9 8.7 8.9   AGAP 3 4 2*   BUCR 27* 26* 18   AP 69 76 71   TP 6.9 6.5 7.0   ALB 3.3* 3.3* 3.3*   GLOB 3.6 3.2 3.7   AGRAT 0.9 1.0 0.9      CBC w/Diff Recent Labs     02/17/22  0042 02/16/22  0130 02/15/22  0501   WBC 8.2 7.9 6.1   RBC 4.64 4.81 4.64   HGB 13.1 13.4 13.3   HCT 41.8 42.8 41.1    185 170   GRANS 83* 80* 86*   LYMPH 12* 12* 9*   EOS 0 0 0      Microbiology Recent Labs     02/16/22  1220 02/14/22  1430 02/14/22  1201   CULT PENDING NO GROWTH 3 DAYS NO GROWTH 3 DAYS          RADIOLOGY:    All available imaging studies/reports in [facility] for this admission were reviewed      Disclaimer: Sections of this note are dictated utilizing voice recognition software, which may have resulted in some phonetic based errors in grammar and contents. Even though attempts were made to correct all the mistakes, some may have been missed, and remained in the body of the document. If questions arise, please contact our department.     Dr. Napoleon Hassan, Infectious Disease Specialist  [phone]  February 17, 2022  1:27 PM

## 2022-02-18 ENCOUNTER — PATIENT OUTREACH (OUTPATIENT)
Dept: CASE MANAGEMENT | Age: 62
End: 2022-02-18

## 2022-02-18 LAB
BACTERIA SPEC CULT: NORMAL
GRAM STN SPEC: NORMAL
SERVICE CMNT-IMP: NORMAL

## 2022-02-18 NOTE — PROGRESS NOTES
Transitions of Care Call  Call within 2 business days of discharge: Yes     Patient: Flaquita Whiting Patient : 1960 MRN: [id number]    [facility]       Complaint Diagnosis Description Type Department Provider    22 Shortness of Breath; Cough Coronary artery disease involving native coronary artery of native heart without angina pectoris . .. ED to Hosp-Admission (Discharged) (ADMIT) Seb Lorenzana MD;... Was this an external facility discharge? No Discharge Facility:   [address] [place] [address] Transitions Nurse ( CTN) attempted to contact patient via telephone call. There was no response. A voicemail message was left requesting a non-emergency return telephone call. CTN  contact information provided.

## 2022-02-20 LAB
BACTERIA SPEC CULT: NORMAL
BACTERIA SPEC CULT: NORMAL
SERVICE CMNT-IMP: NORMAL
SERVICE CMNT-IMP: NORMAL

## 2022-02-21 ENCOUNTER — PATIENT OUTREACH (OUTPATIENT)
Dept: CASE MANAGEMENT | Age: 62
End: 2022-02-21

## 2022-02-21 NOTE — PROGRESS NOTES
Transitions of Care Call  Call within 2 business days of discharge: Yes     Patient: Pancho Lanier Patient : 1960 MRN: [id number]    [facility]charge [facility]       Complaint Diagnosis Description Type Department Provider    22 Shortness of Breath; Cough Coronary artery disease involving native coronary artery of native heart without angina pectoris . .. ED to Hosp-Admission (Discharged) (ADMIT) Dianne Lou MD;... Was this an external facility discharge? No Discharge Facility: [address] Transitions Nurse ( CTN) attempted to contact patient via telephone call. There was no response. A voicemail message was left advising this was not an emergency phone call. CTN contact information was provided.

## 2022-03-03 ENCOUNTER — PATIENT OUTREACH (OUTPATIENT)
Dept: CASE MANAGEMENT | Age: 62
End: 2022-03-03

## 2022-03-03 NOTE — PROGRESS NOTES
Transitions of Care     Care Transitions Nurse ( CTN) unable to reach patient for follow up     Episode closed.

## 2022-03-19 PROBLEM — I21.4 NSTEMI (NON-ST ELEVATED MYOCARDIAL INFARCTION) (HCC): Status: ACTIVE | Noted: 2020-03-12

## 2022-03-19 PROBLEM — U07.1 PNEUMONIA DUE TO COVID-19 VIRUS: Status: ACTIVE | Noted: 2022-02-14

## 2022-03-19 PROBLEM — J12.82 PNEUMONIA DUE TO COVID-19 VIRUS: Status: ACTIVE | Noted: 2022-02-14

## 2022-03-19 PROBLEM — I25.10 CAD (CORONARY ARTERY DISEASE): Status: ACTIVE | Noted: 2020-03-12

## 2022-04-12 ENCOUNTER — OFFICE VISIT (OUTPATIENT)
Dept: CARDIOLOGY CLINIC | Age: 62
End: 2022-04-12
Payer: MEDICARE

## 2022-04-12 VITALS
DIASTOLIC BLOOD PRESSURE: 92 MMHG | SYSTOLIC BLOOD PRESSURE: 162 MMHG | HEIGHT: 71 IN | BODY MASS INDEX: 33.88 KG/M2 | OXYGEN SATURATION: 95 % | HEART RATE: 95 BPM | WEIGHT: 242 LBS

## 2022-04-12 DIAGNOSIS — I25.10 CORONARY ARTERY DISEASE INVOLVING NATIVE CORONARY ARTERY OF NATIVE HEART WITHOUT ANGINA PECTORIS: Primary | ICD-10-CM

## 2022-04-12 PROCEDURE — G8510 SCR DEP NEG, NO PLAN REQD: HCPCS | Performed by: INTERNAL MEDICINE

## 2022-04-12 PROCEDURE — G8754 DIAS BP LESS 90: HCPCS | Performed by: INTERNAL MEDICINE

## 2022-04-12 PROCEDURE — 3017F COLORECTAL CA SCREEN DOC REV: CPT | Performed by: INTERNAL MEDICINE

## 2022-04-12 PROCEDURE — 93000 ELECTROCARDIOGRAM COMPLETE: CPT | Performed by: INTERNAL MEDICINE

## 2022-04-12 PROCEDURE — G8427 DOCREV CUR MEDS BY ELIG CLIN: HCPCS | Performed by: INTERNAL MEDICINE

## 2022-04-12 PROCEDURE — 99214 OFFICE O/P EST MOD 30 MIN: CPT | Performed by: INTERNAL MEDICINE

## 2022-04-12 PROCEDURE — G8417 CALC BMI ABV UP PARAM F/U: HCPCS | Performed by: INTERNAL MEDICINE

## 2022-04-12 PROCEDURE — G8753 SYS BP > OR = 140: HCPCS | Performed by: INTERNAL MEDICINE

## 2022-04-12 NOTE — PROGRESS NOTES
HISTORY OF PRESENT ILLNESS  Ray Munoz is a 62 y.o. male. ASSESSMENT and PLAN    Mr. Heidy Jain presented in March 2020 with chest pains.  He was diagnosed with non-STEMI. Michelle Pickett underwent emergent heart catheterization, coronary angiography.  His EF was noted to be 45-50% with anteroapical hypokinesis.  He had long mid 95% LAD stenosis which was successfully stented to residual 0%.  He also had diagonal branch lesion which was balloon dilated.  Unfortunately, he has smoked for over 40 years and continued to smoke at the time of his non-STEMI. He has COPD as well as insulin-dependent diabetes mellitus. Michelle Pickett has hypertension and hyperlipidemia. 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  He has no employees. 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  When he gets tired, he slows down. Michelle Pickett has not had any recurrent episodes of chest pain despite vigorous physical activity.  He was seen in the emergency room on 8/24/2020 for atypical chest pain.  He was evaluated and discharged home. His echocardiogram in April 2021 revealed EF 50-55%. PA pressure was noted to be 35 mmHg. In February 2022, he was admitted with COVID pneumonia. He had acute respiratory distress. He was diagnosed with COPD exacerbation. Despite this, he continues to smoke about a pack a day. · CAD:    Symptomatically stable. · BP:    Elevated today. It is at initially 157/78. · Rhythm:    Stable sinus at 88 bpm.  · CHF:    There is no evidence of decompensated CHF noted. · Weight:     His weight today is 242 pounds. His target weight is 210 pounds, which was his weight back in 2020. · Cholesterol:   Target LDL <70. Lipitor 80. · Tobacco:    Unfortunately, he still smokes about 1 pack daily. · Anti-platelet:   Remains on ASA, and Plavix.     I will see him back in 6 months. Thank you      Encounter Diagnoses   Name Primary?     Coronary artery disease involving native coronary artery of native heart without angina pectoris Yes     current treatment plan is effective, no change in therapy  lab results and schedule of future lab studies reviewed with patient  reviewed diet, exercise and weight control  very strongly urged to quit smoking to reduce cardiovascular risk  cardiovascular risk and specific lipid/LDL goals reviewed  use of aspirin to prevent MI and TIA's discussed      HPI   Today, Mr. Lacinda Schwab denies any episodes of chest pains. He remains active physically. He still works in care is on lawn care and tree trimming service. He has not had any exertional chest pains. However, he does feel that his breathing has gotten more difficult. He denies any orthopnea or PND. He denies any palpitation sensation or dizziness. Review of Systems   Respiratory: Positive for shortness of breath and wheezing. Cardiovascular: Negative for chest pain, palpitations, orthopnea, claudication, leg swelling and PND. All other systems reviewed and are negative. Physical Exam  Vitals and nursing note reviewed. Constitutional:       Appearance: He is obese. HENT:      Head: Normocephalic. Eyes:      Conjunctiva/sclera: Conjunctivae normal.   Neck:      Vascular: No carotid bruit. Cardiovascular:      Rate and Rhythm: Normal rate and regular rhythm. Pulmonary:      Breath sounds: Normal breath sounds. Abdominal:      Palpations: Abdomen is soft. Musculoskeletal:         General: No swelling. Cervical back: No rigidity. Skin:     General: Skin is warm and dry. Neurological:      General: No focal deficit present. Mental Status: He is alert and oriented to person, place, and time.    Psychiatric:         Mood and Affect: Mood normal.         Behavior: Behavior normal.         PCP: Angelita Anderson MD    Past Medical History:   Diagnosis Date    [facility]     Diabetes ([facility]     Emphysema     H/O cardiovascular stress test 2012 negative    Hypertension     [facility]ocardial infarction) [facility]        Past Surgical History:   Procedure Laterality Date    HX CORONARY STENT PLACEMENT         Current Outpatient Medications   Medication Sig Dispense Refill    insulin lispro (HUMALOG) 100 unit/mL injection Very Insulin Resistant For Blood Sugar (mg/dL) of:            Less than 150 =   0 units 150 -199 =   3 units 200 -249 =   6 units 250 -299 =   9 units 300 -349 =   12 units 350 and above =   15 units Initiate Hypoglycemic protocol if blood glucose is <70 mg/dL. Fast Acting - Administer Immediately - or within 15 minutes of start of meal, if mealtime coverage. 1 Each 0    insulin glargine (LANTUS,BASAGLAR) 100 unit/mL (3 mL) inpn 15 Units by SubCUTAneous route daily. 1 Pen 0    glyBURIDE (DIABETA) 5 mg tablet Take 5 mg by mouth Daily (before breakfast).  lidocaine (Lidoderm) 5 % Apply patch to the affected area for 12 hours a day and remove for 12 hours a day. 10 Each 0    clopidogreL (PLAVIX) 75 mg tab TAKE ONE TABLET BY MOUTH DAILY 90 Tablet 3    atorvastatin (LIPITOR) 80 mg tablet TAKE ONE TABLET BY MOUTH EVERY EVENING 90 Tablet 3    albuterol sulfate 90 mcg/actuation aebs Take 1 Puff by inhalation every four to six (4-6) hours as needed for Wheezing. 1 Each 0    metoprolol tartrate (LOPRESSOR) 25 mg tablet Take 1 Tab by mouth every twelve (12) hours. (Patient taking differently: Take 25 mg by mouth every twelve (12) hours.) 60 Tab 6    aspirin delayed-release 81 mg tablet Take 1 Tab by mouth daily. 30 Tab 0    glucagon (GLUCAGEN) 1 mg injection 1 mL by IntraMUSCular route as needed for Hypoglycemia.  1 Vial 0    Insulin Syringe-Needle U-100 0.3 mL 31 gauge x 5/16\" syrg TID as needed 100 Syringe 0    lancets misc As directed 100 Each 0    Blood-Glucose Meter monitoring kit As directed 1 Kit 0    glucose blood VI test strips (blood glucose test) strip As directed 60 Strip 0    metFORMIN (GLUCOPHAGE) 1,000 mg tablet Take 1 Tab by mouth two (2) times daily (with meals). 60 Tab 0    tiotropium (SPIRIVA WITH HANDIHALER) 18 mcg inhalation capsule Take 1 Cap by inhalation daily. 30 Cap 0    mometasone-formoterol (DULERA) 100-5 mcg/actuation HFAA HFA inhaler Take 2 Puffs by inhalation two (2) times a day. 2 Inhaler 0       The patient has a family history of    Social History     Tobacco Use    Smoking status: Current Every Day Smoker     Packs/day: 0.50     Types: Cigarettes    Smokeless tobacco: Never Used   Vaping Use    Vaping Use: Never used   Substance Use Topics    Alcohol use: Yes     Comment: social only    Drug use: Yes     Types: Marijuana       Lab Results   Component Value Date/Time    Cholesterol, total 118 (L) 04/01/2021 09:40 AM    HDL Cholesterol 35 (L) 04/01/2021 09:40 AM    LDL, calculated 58 04/01/2021 09:40 AM    Triglyceride 124 04/01/2021 09:40 AM    CHOL/HDL Ratio 3.4 04/01/2021 09:40 AM        BP Readings from Last 3 Encounters:   04/12/22 (!) 150/78   02/17/22 (!) 148/86   12/05/21 (!) 160/92        Pulse Readings from Last 3 Encounters:   04/12/22 95   02/17/22 85   12/05/21 82       Wt Readings from Last 3 Encounters:   04/12/22 109.8 kg (242 lb)   02/15/22 107.2 kg (236 lb 6.4 oz)   12/05/21 105.2 kg (232 lb)         EKG: unchanged from previous tracings, normal sinus rhythm, nonspecific ST and T waves changes.

## 2022-04-12 NOTE — PROGRESS NOTES
Windy Garcia presents today for   Chief Complaint   Patient presents with    Follow-up     6 month       Windy Garcia preferred language for health care discussion is english/other. Is someone accompanying this pt? no    Is the patient using any DME equipment during [address]? no    Depression Screening:  3 most recent PHQ Screens 4/12/2022   Little interest or pleasure in doing things Not at all   Feeling down, depressed, irritable, or hopeless Not at all   Total Score PHQ 2 0       Learning Assessment:  Learning Assessment 4/12/2022   PRIMARY LEARNER Patient   HIGHEST LEVEL OF EDUCATION - PRIMARY LEARNER  -   BARRIERS PRIMARY LEARNER -   [address] [place] [address] NAME -   PRIMARY LANGUAGE ENGLISH   LEARNER PREFERENCE PRIMARY DEMONSTRATION   ANSWERED BY patient   RELATIONSHIP SELF       Abuse Screening:  Abuse Screening Questionnaire 4/12/2022   Do you ever feel afraid of your partner? N   Are you in a relationship with someone who physically or mentally threatens you? N   Is it safe for you to go home? Y       Fall Risk  Fall Risk Assessment, last 12 mths 4/2/2020   Able to walk? Yes   Fall in past 12 months? No           Pt currently taking Anticoagulant therapy? no    Pt currently taking Antiplatelet therapy ? plavix 75 mg daily and aspirin 81 mg daily      Coordination of Care:  1. Have you been to the ER, urgent care clinic since your last visit? Hospitalized since your last visit? no    2. Have you seen or consulted any other health care providers outside of the [address] [place] [address] since your last visit? Include any pap smears or colon screening.  no

## 2022-05-03 ENCOUNTER — APPOINTMENT (OUTPATIENT)
Dept: CT IMAGING | Age: 62
End: 2022-05-03
Attending: PHYSICIAN ASSISTANT
Payer: MEDICARE

## 2022-05-03 ENCOUNTER — HOSPITAL ENCOUNTER (EMERGENCY)
Age: 62
Discharge: HOME | End: 2022-05-03
Attending: EMERGENCY MEDICINE
Payer: MEDICARE

## 2022-05-03 VITALS
SYSTOLIC BLOOD PRESSURE: 134 MMHG | DIASTOLIC BLOOD PRESSURE: 74 MMHG | HEART RATE: 89 BPM | BODY MASS INDEX: 33.88 KG/M2 | RESPIRATION RATE: 18 BRPM | WEIGHT: 242 LBS | TEMPERATURE: 98.4 F | OXYGEN SATURATION: 96 % | HEIGHT: 71 IN

## 2022-05-03 DIAGNOSIS — M54.50 ACUTE RIGHT-SIDED LOW BACK PAIN WITHOUT SCIATICA: ICD-10-CM

## 2022-05-03 DIAGNOSIS — S39.012A STRAIN OF LUMBAR REGION, INITIAL ENCOUNTER: Primary | ICD-10-CM

## 2022-05-03 LAB
ALBUMIN SERPL-MCNC: 3.2 G/DL (ref 3.4–5)
ALBUMIN/GLOB SERPL: 1 {RATIO} (ref 0.8–1.7)
ALP SERPL-CCNC: 75 U/L (ref 45–117)
ALT SERPL-CCNC: 20 U/L (ref 16–61)
ANION GAP SERPL CALC-SCNC: 4 MMOL/L (ref 3–18)
APPEARANCE UR: CLEAR
AST SERPL-CCNC: 14 U/L (ref 10–38)
BACTERIA URNS QL MICRO: ABNORMAL /HPF
BASOPHILS # BLD: 0 K/UL (ref 0–0.1)
BASOPHILS NFR BLD: 0 % (ref 0–2)
BILIRUB SERPL-MCNC: 0.3 MG/DL (ref 0.2–1)
BILIRUB UR QL: NEGATIVE
BUN SERPL-MCNC: 16 MG/DL (ref 7–18)
BUN/CREAT SERPL: 22 (ref 12–20)
CALCIUM SERPL-MCNC: 8.5 MG/DL (ref 8.5–10.1)
CHLORIDE SERPL-SCNC: 110 MMOL/L (ref 100–111)
CO2 SERPL-SCNC: 30 MMOL/L (ref 21–32)
COLOR UR: YELLOW
CREAT SERPL-MCNC: 0.74 MG/DL (ref 0.6–1.3)
DIFFERENTIAL METHOD BLD: ABNORMAL
EOSINOPHIL # BLD: 0.4 K/UL (ref 0–0.4)
EOSINOPHIL NFR BLD: 4 % (ref 0–5)
EPITH CASTS URNS QL MICRO: ABNORMAL /LPF (ref 0–5)
ERYTHROCYTE [DISTWIDTH] IN BLOOD BY AUTOMATED COUNT: 13.1 % (ref 11.6–14.5)
GLOBULIN SER CALC-MCNC: 3.1 G/DL (ref 2–4)
GLUCOSE SERPL-MCNC: 163 MG/DL (ref 74–99)
GLUCOSE UR STRIP.AUTO-MCNC: >1000 MG/DL
HCT VFR BLD AUTO: 38 % (ref 36–48)
HGB BLD-MCNC: 11.8 G/DL (ref 13–16)
HGB UR QL STRIP: NEGATIVE
IMM GRANULOCYTES # BLD AUTO: 0 K/UL (ref 0–0.04)
IMM GRANULOCYTES NFR BLD AUTO: 0 % (ref 0–0.5)
KETONES UR QL STRIP.AUTO: ABNORMAL MG/DL
LEUKOCYTE ESTERASE UR QL STRIP.AUTO: NEGATIVE
LIPASE SERPL-CCNC: 240 U/L (ref 73–393)
LYMPHOCYTES # BLD: 2.8 K/UL (ref 0.9–3.6)
LYMPHOCYTES NFR BLD: 35 % (ref 21–52)
MCH RBC QN AUTO: 27.4 PG (ref 24–34)
MCHC RBC AUTO-ENTMCNC: 31.1 G/DL (ref 31–37)
MCV RBC AUTO: 88.2 FL (ref 78–100)
MONOCYTES # BLD: 0.7 K/UL (ref 0.05–1.2)
MONOCYTES NFR BLD: 8 % (ref 3–10)
MUCOUS THREADS URNS QL MICRO: ABNORMAL /LPF
NEUTS SEG # BLD: 4.3 K/UL (ref 1.8–8)
NEUTS SEG NFR BLD: 52 % (ref 40–73)
NITRITE UR QL STRIP.AUTO: NEGATIVE
NRBC # BLD: 0 K/UL (ref 0–0.01)
NRBC BLD-RTO: 0 PER 100 WBC
PH UR STRIP: 5 [PH] (ref 5–8)
PLATELET # BLD AUTO: 226 K/UL (ref 135–420)
PMV BLD AUTO: 11 FL (ref 9.2–11.8)
POTASSIUM SERPL-SCNC: 4 MMOL/L (ref 3.5–5.5)
PROT SERPL-MCNC: 6.3 G/DL (ref 6.4–8.2)
PROT UR STRIP-MCNC: 100 MG/DL
RBC # BLD AUTO: 4.31 M/UL (ref 4.35–5.65)
RBC #/AREA URNS HPF: ABNORMAL /HPF (ref 0–5)
SODIUM SERPL-SCNC: 144 MMOL/L (ref 136–145)
SP GR UR REFRACTOMETRY: >1.03 (ref 1–1.03)
UROBILINOGEN UR QL STRIP.AUTO: 1 EU/DL (ref 0.2–1)
WBC # BLD AUTO: 8.2 K/UL (ref 4.6–13.2)
WBC URNS QL MICRO: ABNORMAL /HPF (ref 0–4)

## 2022-05-03 PROCEDURE — 74011250636 HC RX REV CODE- 250/636: Performed by: PHYSICIAN ASSISTANT

## 2022-05-03 PROCEDURE — 80053 COMPREHEN METABOLIC PANEL: CPT

## 2022-05-03 PROCEDURE — 83690 ASSAY OF LIPASE: CPT

## 2022-05-03 PROCEDURE — 74176 CT ABD & PELVIS W/O CONTRAST: CPT

## 2022-05-03 PROCEDURE — 74011250636 HC RX REV CODE- 250/636: Performed by: EMERGENCY MEDICINE

## 2022-05-03 PROCEDURE — 81001 URINALYSIS AUTO W/SCOPE: CPT

## 2022-05-03 PROCEDURE — 99284 EMERGENCY DEPT VISIT MOD MDM: CPT

## 2022-05-03 PROCEDURE — 96374 THER/PROPH/DIAG INJ IV PUSH: CPT

## 2022-05-03 PROCEDURE — 96375 TX/PRO/DX INJ NEW DRUG ADDON: CPT

## 2022-05-03 PROCEDURE — 85025 COMPLETE CBC W/AUTO DIFF WBC: CPT

## 2022-05-03 RX ORDER — MORPHINE SULFATE 4 MG/ML
4 INJECTION INTRAVENOUS
Status: COMPLETED | OUTPATIENT
Start: 2022-05-03 | End: 2022-05-03

## 2022-05-03 RX ORDER — CYCLOBENZAPRINE HCL 10 MG
10 TABLET ORAL
Qty: 15 TABLET | Refills: 0 | Status: SHIPPED
Start: 2022-05-03 | End: 2022-10-27

## 2022-05-03 RX ORDER — NAPROXEN 500 MG/1
500 TABLET ORAL 2 TIMES DAILY WITH MEALS
Qty: 20 TABLET | Refills: 0 | Status: SHIPPED | OUTPATIENT
Start: 2022-05-03 | End: 2022-05-13

## 2022-05-03 RX ORDER — ONDANSETRON 2 MG/ML
4 INJECTION INTRAMUSCULAR; INTRAVENOUS
Status: COMPLETED | OUTPATIENT
Start: 2022-05-03 | End: 2022-05-03

## 2022-05-03 RX ORDER — HYDROMORPHONE HYDROCHLORIDE 1 MG/ML
0.5 INJECTION, SOLUTION INTRAMUSCULAR; INTRAVENOUS; SUBCUTANEOUS ONCE
Status: COMPLETED | OUTPATIENT
Start: 2022-05-03 | End: 2022-05-03

## 2022-05-03 RX ADMIN — MORPHINE SULFATE 4 MG: 4 INJECTION, SOLUTION INTRAMUSCULAR; INTRAVENOUS at 14:21

## 2022-05-03 RX ADMIN — SODIUM CHLORIDE 1000 ML: 9 INJECTION, SOLUTION INTRAVENOUS at 12:20

## 2022-05-03 RX ADMIN — HYDROMORPHONE HYDROCHLORIDE 0.5 MG: 1 INJECTION, SOLUTION INTRAMUSCULAR; INTRAVENOUS; SUBCUTANEOUS at 12:21

## 2022-05-03 RX ADMIN — ONDANSETRON 4 MG: 2 INJECTION INTRAMUSCULAR; INTRAVENOUS at 12:20

## 2022-05-03 NOTE — ED NOTES
R flank pain x 2 weeks. I performed a brief evaluation, including history and physical, of the patient here in triage and I have determined that pt will need further treatment and evaluation from the main side ER physician. I have placed initial orders to help in expediting patients care.      May 03, 2022 at 11:58 AM - GIORGI Foster

## 2022-05-03 NOTE — ED PROVIDER NOTES
EMERGENCY DEPARTMENT HISTORY AND PHYSICAL EXAM    12:43 PM      Date: 5/3/2022  Patient Name: Ladan Stoddard    History of Presenting Illness     Chief Complaint   Patient presents with    Flank Pain         History Provided By: Patient  Location/Duration/Severity/Modifying factors   Patient is a 26-year-old male with a history of spinal surgery, COPD, diabetes, diabetic neuropathy, hypertension, heart disease with MI and stenting, presents emergency department complaint of right flank pain has been increasing for the last 2 weeks. 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 The patient's pain has been increasing and today while working he said he can tolerate pain so decided to come to the hospital.  The patient notes he has a history of kidney stones and has been a couple years since he had an issue with kidney stones and knows he has 1 big kidney stone in the right kidney. The patient is unsure if it is moved but has not had to have surgery for it. The patient denies any fevers or chills and denies any hematuria. Patient has had some constipation. Patient notes that he had a fall out of a tree in December and has been having some back pain since then. The patient was found to have loosening of his hardware in his lumbar spine. Patient admits smoking cigarettes, drinks alcohol regularly, and occasionally uses marijuana. The patient is retired from welding and says he feels like his respiratory illnesses likely from years of welding. Patient denies any fevers or chills, denies any radiation of his pain, denies any new numbness or tingling in his extremities, denies any difficulty urinating or holding his bowels. Patient has noted constipation. Patient rates the pain as moderate in nature. Patient's been taking ibuprofen for his pain and has hydrocodone at home but does not take it regularly.           PCP: Cortney Dalton, MD    Current Outpatient Medications   Medication Sig Dispense Refill    naproxen (Naprosyn) 500 mg tablet Take 1 Tablet by mouth two (2) times daily (with meals) for 10 days. 20 Tablet 0    cyclobenzaprine (FLEXERIL) 10 mg tablet Take 1 Tablet by mouth three (3) times daily as needed for Muscle Spasm(s) (causes sedation, do not take while working/driving). 15 Tablet 0    insulin lispro (HUMALOG) 100 unit/mL injection Very Insulin Resistant For Blood Sugar (mg/dL) of:            Less than 150 =   0 units 150 -199 =   3 units 200 -249 =   6 units 250 -299 =   9 units 300 -349 =   12 units 350 and above =   15 units Initiate Hypoglycemic protocol if blood glucose is <70 mg/dL. Fast Acting - Administer Immediately - or within 15 minutes of start of meal, if mealtime coverage. 1 Each 0    insulin glargine (LANTUS,BASAGLAR) 100 unit/mL (3 mL) inpn 15 Units by SubCUTAneous route daily. 1 Pen 0    glyBURIDE (DIABETA) 5 mg tablet Take 5 mg by mouth Daily (before breakfast).  lidocaine (Lidoderm) 5 % Apply patch to the affected area for 12 hours a day and remove for 12 hours a day. 10 Each 0    clopidogreL (PLAVIX) 75 mg tab TAKE ONE TABLET BY MOUTH DAILY 90 Tablet 3    atorvastatin (LIPITOR) 80 mg tablet TAKE ONE TABLET BY MOUTH EVERY EVENING 90 Tablet 3    albuterol sulfate 90 mcg/actuation aebs Take 1 Puff by inhalation every four to six (4-6) hours as needed for Wheezing. 1 Each 0    metoprolol tartrate (LOPRESSOR) 25 mg tablet Take 1 Tab by mouth every twelve (12) hours. (Patient taking differently: Take 25 mg by mouth every twelve (12) hours.) 60 Tab 6    aspirin delayed-release 81 mg tablet Take 1 Tab by mouth daily. 30 Tab 0    glucagon (GLUCAGEN) 1 mg injection 1 mL by IntraMUSCular route as needed for Hypoglycemia.  1 Vial 0    Insulin Syringe-Needle U-100 0.3 mL 31 gauge x 5/16\" syrg TID as needed 100 Syringe 0    lancets misc As directed 100 Each 0    Blood-Glucose Meter monitoring kit As directed 1 Kit 0    glucose blood VI test strips (blood glucose test) strip As directed 60 Strip 0    metFORMIN (GLUCOPHAGE) 1,000 mg tablet Take 1 Tab by mouth two (2) times daily (with meals). 60 Tab 0    tiotropium (SPIRIVA WITH HANDIHALER) 18 mcg inhalation capsule Take 1 Cap by inhalation daily. 30 Cap 0    mometasone-formoterol (DULERA) 100-5 mcg/actuation HFAA HFA inhaler Take 2 Puffs by inhalation two (2) times a day. 2 Inhaler 0       Past History     Past Medical History:  Past Medical History:   Diagnosis Date    [facility]     Diabetes [facility]     Emphysema     H/O cardiovascular stress test 2012    negative    Hypertension     MI (myocardial infarction) ([facility]        Past Surgical History:  Past Surgical History:   Procedure Laterality Date    HX CORONARY STENT PLACEMENT         Family History:  Family History   Problem Relation Age of Onset    Diabetes Mother        Social History:  Social History     Tobacco Use    Smoking status: Current Every Day Smoker     Packs/day: 0.50     Types: Cigarettes    Smokeless tobacco: Never Used   Vaping Use    Vaping Use: Never used   Substance Use Topics    Alcohol use: Yes     Comment: social only    Drug use: Yes     Types: Marijuana       Allergies:  No Known Allergies      Review of Systems       Review of Systems   Constitutional: Negative for activity change, fatigue and fever. HENT: Negative for congestion and rhinorrhea. Eyes: Negative for visual disturbance. Respiratory: Positive for cough. Negative for shortness of breath. Cardiovascular: Negative for chest pain and palpitations. Gastrointestinal: Negative for abdominal pain, diarrhea, nausea and vomiting. Genitourinary: Positive for flank pain. Negative for dysuria and hematuria. Musculoskeletal: Negative for back pain. Skin: Negative for rash. Neurological: Negative for dizziness, weakness and light-headedness.    All other systems reviewed and are negative. Physical Exam     Visit Vitals  /74 (BP 1 Location: Left upper arm, BP Patient Position: At rest)   Pulse 89   Temp 98.4 °F (36.9 °C)   Resp 18   Ht 5' 11\" (1.803 m)   Wt 109.8 kg (242 lb)   SpO2 96%   BMI 33.75 kg/m²         Physical Exam  Vitals and nursing note reviewed. Constitutional:       General: He is not in acute distress. Appearance: He is well-developed. HENT:      Head: Normocephalic and atraumatic. Right Ear: External ear normal.      Left Ear: External ear normal.      Nose: Nose normal.   Eyes:      General: No scleral icterus. Conjunctiva/sclera: Conjunctivae normal.      Pupils: Pupils are equal, round, and reactive to light. Neck:      Thyroid: No thyromegaly. Vascular: No JVD. Trachea: No tracheal deviation. Cardiovascular:      Rate and Rhythm: Normal rate and regular rhythm. Heart sounds: Normal heart sounds. No murmur heard. No friction rub. No gallop. Pulmonary:      Effort: Pulmonary effort is normal.      Breath sounds: Wheezing and rhonchi present. Chest:      Chest wall: No tenderness. Abdominal:      General: Bowel sounds are normal. There is no distension. Palpations: Abdomen is soft. Tenderness: There is abdominal tenderness. There is no guarding or rebound. Comments: The right flank pain, mild distention, mild right upper quadrant pain   Musculoskeletal:         General: No tenderness. Normal range of motion. Cervical back: Normal range of motion and neck supple. Comments: Right paraspinal lumbar tenderness, midline surgical scarring, chronic appearing irregular erythematous rash next to his surgical scar   Lymphadenopathy:      Cervical: No cervical adenopathy. Skin:     General: Skin is warm and dry. Neurological:      Mental Status: He is alert and oriented to person, place, and time. Cranial Nerves: No cranial nerve deficit.       Coordination: Coordination normal. Comments: No sensory loss, Gait normal, Motor 5/5   Psychiatric:         Behavior: Behavior normal.         Thought Content: Thought content normal.         Judgment: Judgment normal.           Diagnostic Study Results     Labs -  Recent Results (from the past 12 hour(s))   URINALYSIS W/ RFLX MICROSCOPIC    Collection Time: 05/03/22 12:05 PM   Result Value Ref Range    Color YELLOW      Appearance CLEAR      Specific gravity >1.030 (H) 1.005 - 1.030    pH (UA) 5.0 5.0 - 8.0      Protein 100 (A) NEG mg/dL    Glucose >1,000 (A) NEG mg/dL    Ketone TRACE (A) NEG mg/dL    Bilirubin Negative NEG      Blood Negative NEG      Urobilinogen 1.0 0.2 - 1.0 EU/dL    Nitrites Negative NEG      Leukocyte Esterase Negative NEG     URINE MICROSCOPIC ONLY    Collection Time: 05/03/22 12:05 PM   Result Value Ref Range    WBC 0 to 3 0 - 4 /hpf    RBC 0 to 3 0 - 5 /hpf    Epithelial cells FEW 0 - 5 /lpf    Bacteria FEW (A) NEG /hpf    Mucus 1+ (A) NEG /lpf   CBC WITH AUTOMATED DIFF    Collection Time: 05/03/22  2:20 PM   Result Value Ref Range    WBC 8.2 4.6 - 13.2 K/uL    RBC 4.31 (L) 4.35 - 5.65 M/uL    HGB 11.8 (L) 13.0 - 16.0 g/dL    HCT 38.0 36.0 - 48.0 %    MCV 88.2 78.0 - 100.0 FL    MCH 27.4 24.0 - 34.0 PG    MCHC 31.1 31.0 - 37.0 g/dL    RDW 13.1 11.6 - 14.5 %    PLATELET 909 097 - 448 K/uL    MPV 11.0 9.2 - 11.8 FL    NRBC 0.0 0  WBC    ABSOLUTE NRBC 0.00 0.00 - 0.01 K/uL    NEUTROPHILS 52 40 - 73 %    LYMPHOCYTES 35 21 - 52 %    MONOCYTES 8 3 - 10 %    EOSINOPHILS 4 0 - 5 %    BASOPHILS 0 0 - 2 %    IMMATURE GRANULOCYTES 0 0.0 - 0.5 %    ABS. NEUTROPHILS 4.3 1.8 - 8.0 K/UL    ABS. LYMPHOCYTES 2.8 0.9 - 3.6 K/UL    ABS. MONOCYTES 0.7 0.05 - 1.2 K/UL    ABS. EOSINOPHILS 0.4 0.0 - 0.4 K/UL    ABS. BASOPHILS 0.0 0.0 - 0.1 K/UL    ABS. IMM.  GRANS. 0.0 0.00 - 0.04 K/UL    DF AUTOMATED     LIPASE    Collection Time: 05/03/22  2:20 PM   Result Value Ref Range    Lipase 240 73 - 711 U/L   METABOLIC PANEL, COMPREHENSIVE Collection Time: 05/03/22  2:20 PM   Result Value Ref Range    Sodium 144 136 - 145 mmol/L    Potassium 4.0 3.5 - 5.5 mmol/L    Chloride 110 100 - 111 mmol/L    CO2 30 21 - 32 mmol/L    Anion gap 4 3.0 - 18 mmol/L    Glucose 163 (H) 74 - 99 mg/dL    BUN 16 7.0 - 18 MG/DL    Creatinine 0.74 0.6 - 1.3 MG/DL    BUN/Creatinine ratio 22 (H) 12 - 20      GFR est AA >60 >60 ml/min/1.73m2    GFR est non-AA >60 >60 ml/min/1.73m2    Calcium 8.5 8.5 - 10.1 MG/DL    Bilirubin, total 0.3 0.2 - 1.0 MG/DL    ALT (SGPT) 20 16 - 61 U/L    AST (SGOT) 14 10 - 38 U/L    Alk. phosphatase 75 45 - 117 U/L    Protein, total 6.3 (L) 6.4 - 8.2 g/dL    Albumin 3.2 (L) 3.4 - 5.0 g/dL    Globulin 3.1 2.0 - 4.0 g/dL    A-G Ratio 1.0 0.8 - 1.7         Radiologic Studies -   CT ABD PELV WO CONT   Final Result   No acute findings to explain right flank pain. No right nephrolithiasis. Nonobstructing stable left nephrolithiasis. Minimal diverticulosis. Stable L4-L5 fusion and lumbar degenerative changes, as above. Medical Decision Making   I am the first provider for this patient. I reviewed the vital signs, available nursing notes, past medical history, past surgical history, family history and social history. Vital Signs-Reviewed the patient's vital signs. Records Reviewed: Nursing Notes, Old Medical Records, Previous Radiology Studies and Previous Laboratory Studies (Time of Review: 12:43 PM)    ED Course: Progress Notes, Reevaluation, and Consults: The patient has reassuring CT scan especially with evaluation of right-sided pain. The patient has no kidney stones on that side and has no evidence of infection. The patient has improved with supportive care and will be getting a ride home. We will send the patient home on pain control with muscle relaxant and refer him to the spine center and follow-up with his primary doctor.   The patient has good insight to his care and will return if at all worsened or concerned. Workup and recommendations were reviewed with the patient and all questions were answered. The patient understands the plan and will proceed with close outpatient care. I have encouraged the patient to return if at all worsened or concerned. Vicente Flores DO 3:59 PM      Provider Notes (Medical Decision Making):   MDM  Number of Diagnoses or Management Options  Diagnosis management comments: Patient is a 71-year-old male with a history of diabetes, hypertension, coronary disease, smoking, kidney stones, chronic back pain, lumbar surgery in the past, the presents emergency department complaint of right flank pain that has been creasing for the last 2 weeks. We will proceed with a CT of the abdomen pelvis without contrast to evaluate for stone disease, basic labs including renal function, hydrate patient, supportive care, UA then reevaluate. Vicente Flores DO 12:47 PM        Procedures      Diagnosis     Clinical Impression:   1. Strain of lumbar region, initial encounter    2. Acute right-sided low back pain without sciatica        Disposition: DC    Follow-up Information     Follow up With Specialties Details Why Contact Info    [facility] Jenifer Barros  Schedule an appointment as soon as possible for a visit   [address]  [address] [place] [address],[address]  [facility] [address]    Anny Wadsworth MD Internal Medicine In 2 days  [address] [place] [facility] EMERGENCY DEPT Emergency Medicine  As needed, If symptoms worsen [address]  In 1 week  [address] [facility] [address]  [phone]           Patient's Medications   Start Taking    CYCLOBENZAPRINE (FLEXERIL) 10 MG TABLET    Take 1 Tablet by mouth three (3) times daily as needed for Muscle Spasm(s) (causes sedation, do not take while working/driving).     NAPROXEN (NAPROSYN) 500 MG TABLET    Take 1 Tablet by mouth two (2) times daily (with meals) for 10 days. Continue Taking    ALBUTEROL SULFATE 90 MCG/ACTUATION AEBS    Take 1 Puff by inhalation every four to six (4-6) hours as needed for Wheezing. ASPIRIN DELAYED-RELEASE 81 MG TABLET    Take 1 Tab by mouth daily. ATORVASTATIN (LIPITOR) 80 MG TABLET    TAKE ONE TABLET BY MOUTH EVERY EVENING    BLOOD-GLUCOSE METER MONITORING KIT    As directed    CLOPIDOGREL (PLAVIX) 75 MG TAB    TAKE ONE TABLET BY MOUTH DAILY    GLUCAGON (GLUCAGEN) 1 MG INJECTION    1 mL by IntraMUSCular route as needed for Hypoglycemia. GLUCOSE BLOOD VI TEST STRIPS (BLOOD GLUCOSE TEST) STRIP    As directed    GLYBURIDE (DIABETA) 5 MG TABLET    Take 5 mg by mouth Daily (before breakfast). INSULIN GLARGINE (LANTUS,BASAGLAR) 100 UNIT/ML (3 ML) INPN    15 Units by SubCUTAneous route daily. INSULIN LISPRO (HUMALOG) 100 UNIT/ML INJECTION    Very Insulin Resistant For Blood Sugar (mg/dL) of:            Less than 150 =   0 units 150 -199 =   3 units 200 -249 =   6 units 250 -299 =   9 units 300 -349 =   12 units 350 and above =   15 units Initiate Hypoglycemic protocol if blood glucose is <70 mg/dL. Fast Acting - Administer Immediately - or within 15 minutes of start of meal, if mealtime coverage. INSULIN SYRINGE-NEEDLE U-100 0.3 ML 31 GAUGE X 5/16\" SYRG    TID as needed    LANCETS MISC    As directed    LIDOCAINE (LIDODERM) 5 %    Apply patch to the affected area for 12 hours a day and remove for 12 hours a day. METFORMIN (GLUCOPHAGE) 1,000 MG TABLET    Take 1 Tab by mouth two (2) times daily (with meals). METOPROLOL TARTRATE (LOPRESSOR) 25 MG TABLET    Take 1 Tab by mouth every twelve (12) hours. MOMETASONE-FORMOTEROL (DULERA) 100-5 MCG/ACTUATION HFAA HFA INHALER    Take 2 Puffs by inhalation two (2) times a day. TIOTROPIUM (SPIRIVA WITH HANDIHALER) 18 MCG INHALATION CAPSULE    Take 1 Cap by inhalation daily.    These Medications have changed    No medications on file   Stop Taking    No medications on file     Disclaimer: Sections of this note are dictated using utilizing voice recognition software. Minor typographical errors may be present. If questions arise, please do not hesitate to contact me or call our department.

## 2022-05-16 ENCOUNTER — APPOINTMENT (OUTPATIENT)
Dept: GENERAL RADIOLOGY | Age: 62
DRG: 247 | End: 2022-05-16
Attending: STUDENT IN AN ORGANIZED HEALTH CARE EDUCATION/TRAINING PROGRAM
Payer: MEDICARE

## 2022-05-16 ENCOUNTER — HOSPITAL ENCOUNTER (INPATIENT)
Age: 62
LOS: 1 days | Discharge: HOME | DRG: 247 | End: 2022-05-17
Attending: STUDENT IN AN ORGANIZED HEALTH CARE EDUCATION/TRAINING PROGRAM | Admitting: EMERGENCY MEDICINE
Payer: MEDICARE

## 2022-05-16 DIAGNOSIS — R07.9 ACUTE CHEST PAIN: Primary | ICD-10-CM

## 2022-05-16 DIAGNOSIS — D64.9 ANEMIA, UNSPECIFIED TYPE: ICD-10-CM

## 2022-05-16 DIAGNOSIS — J44.1 CHRONIC OBSTRUCTIVE PULMONARY DISEASE WITH ACUTE EXACERBATION (HCC): ICD-10-CM

## 2022-05-16 DIAGNOSIS — I10 HYPERTENSION, UNSPECIFIED TYPE: ICD-10-CM

## 2022-05-16 DIAGNOSIS — I25.119 CORONARY ARTERY DISEASE INVOLVING NATIVE CORONARY ARTERY OF NATIVE HEART WITH ANGINA PECTORIS (HCC): ICD-10-CM

## 2022-05-16 DIAGNOSIS — I21.4 NSTEMI (NON-ST ELEVATED MYOCARDIAL INFARCTION) (HCC): ICD-10-CM

## 2022-05-16 DIAGNOSIS — R73.9 HYPERGLYCEMIA: ICD-10-CM

## 2022-05-16 LAB
ANION GAP SERPL CALC-SCNC: 5 MMOL/L (ref 3–18)
ATRIAL RATE: 88 BPM
BASOPHILS # BLD: 0 K/UL (ref 0–0.1)
BASOPHILS NFR BLD: 1 % (ref 0–2)
BUN SERPL-MCNC: 13 MG/DL (ref 7–18)
BUN/CREAT SERPL: 15 (ref 12–20)
CALCIUM SERPL-MCNC: 9.1 MG/DL (ref 8.5–10.1)
CALCULATED P AXIS, ECG09: 86 DEGREES
CALCULATED R AXIS, ECG10: 73 DEGREES
CALCULATED T AXIS, ECG11: -55 DEGREES
CHLORIDE SERPL-SCNC: 103 MMOL/L (ref 100–111)
CO2 SERPL-SCNC: 33 MMOL/L (ref 21–32)
CREAT SERPL-MCNC: 0.85 MG/DL (ref 0.6–1.3)
DIAGNOSIS, 93000: NORMAL
DIFFERENTIAL METHOD BLD: ABNORMAL
EOSINOPHIL # BLD: 0.4 K/UL (ref 0–0.4)
EOSINOPHIL NFR BLD: 6 % (ref 0–5)
ERYTHROCYTE [DISTWIDTH] IN BLOOD BY AUTOMATED COUNT: 13.2 % (ref 11.6–14.5)
EST. AVERAGE GLUCOSE BLD GHB EST-MCNC: 206 MG/DL
GLUCOSE BLD STRIP.AUTO-MCNC: 278 MG/DL (ref 70–110)
GLUCOSE BLD STRIP.AUTO-MCNC: 280 MG/DL (ref 70–110)
GLUCOSE BLD STRIP.AUTO-MCNC: 309 MG/DL (ref 70–110)
GLUCOSE SERPL-MCNC: 279 MG/DL (ref 74–99)
HBA1C MFR BLD: 8.8 % (ref 4.2–5.6)
HCT VFR BLD AUTO: 39.4 % (ref 36–48)
HGB BLD-MCNC: 12.3 G/DL (ref 13–16)
IMM GRANULOCYTES # BLD AUTO: 0 K/UL (ref 0–0.04)
IMM GRANULOCYTES NFR BLD AUTO: 0 % (ref 0–0.5)
LYMPHOCYTES # BLD: 2.8 K/UL (ref 0.9–3.6)
LYMPHOCYTES NFR BLD: 38 % (ref 21–52)
MAGNESIUM SERPL-MCNC: 1.6 MG/DL (ref 1.6–2.6)
MCH RBC QN AUTO: 27.3 PG (ref 24–34)
MCHC RBC AUTO-ENTMCNC: 31.2 G/DL (ref 31–37)
MCV RBC AUTO: 87.4 FL (ref 78–100)
MONOCYTES # BLD: 0.6 K/UL (ref 0.05–1.2)
MONOCYTES NFR BLD: 7 % (ref 3–10)
NEUTS SEG # BLD: 3.6 K/UL (ref 1.8–8)
NEUTS SEG NFR BLD: 48 % (ref 40–73)
NRBC # BLD: 0 K/UL (ref 0–0.01)
NRBC BLD-RTO: 0 PER 100 WBC
P-R INTERVAL, ECG05: 184 MS
PLATELET # BLD AUTO: 221 K/UL (ref 135–420)
PMV BLD AUTO: 10.6 FL (ref 9.2–11.8)
POTASSIUM SERPL-SCNC: 4.1 MMOL/L (ref 3.5–5.5)
Q-T INTERVAL, ECG07: 368 MS
QRS DURATION, ECG06: 102 MS
QTC CALCULATION (BEZET), ECG08: 445 MS
RBC # BLD AUTO: 4.51 M/UL (ref 4.35–5.65)
SODIUM SERPL-SCNC: 141 MMOL/L (ref 136–145)
TROPONIN-HIGH SENSITIVITY: 12 NG/L (ref 0–78)
TROPONIN-HIGH SENSITIVITY: 12 NG/L (ref 0–78)
VENTRICULAR RATE, ECG03: 88 BPM
WBC # BLD AUTO: 7.5 K/UL (ref 4.6–13.2)

## 2022-05-16 PROCEDURE — 77030027845 HC BND COM RDL D-STAT TELE -B: Performed by: INTERNAL MEDICINE

## 2022-05-16 PROCEDURE — 93571 IV DOP VEL&/PRESS C FLO 1ST: CPT | Performed by: INTERNAL MEDICINE

## 2022-05-16 PROCEDURE — 77030013797 HC KT TRNSDUC PRSSR EDWD -A: Performed by: INTERNAL MEDICINE

## 2022-05-16 PROCEDURE — C1769 GUIDE WIRE: HCPCS | Performed by: INTERNAL MEDICINE

## 2022-05-16 PROCEDURE — 84484 ASSAY OF TROPONIN QUANT: CPT

## 2022-05-16 PROCEDURE — 99152 MOD SED SAME PHYS/QHP 5/>YRS: CPT | Performed by: INTERNAL MEDICINE

## 2022-05-16 PROCEDURE — 74011000250 HC RX REV CODE- 250: Performed by: INTERNAL MEDICINE

## 2022-05-16 PROCEDURE — 99153 MOD SED SAME PHYS/QHP EA: CPT | Performed by: INTERNAL MEDICINE

## 2022-05-16 PROCEDURE — 96366 THER/PROPH/DIAG IV INF ADDON: CPT

## 2022-05-16 PROCEDURE — 83735 ASSAY OF MAGNESIUM: CPT

## 2022-05-16 PROCEDURE — 74011250637 HC RX REV CODE- 250/637: Performed by: STUDENT IN AN ORGANIZED HEALTH CARE EDUCATION/TRAINING PROGRAM

## 2022-05-16 PROCEDURE — 77030013519 HC DEV INFL BASIX MRTM -B: Performed by: INTERNAL MEDICINE

## 2022-05-16 PROCEDURE — 83036 HEMOGLOBIN GLYCOSYLATED A1C: CPT

## 2022-05-16 PROCEDURE — 74011000250 HC RX REV CODE- 250: Performed by: EMERGENCY MEDICINE

## 2022-05-16 PROCEDURE — 99223 1ST HOSP IP/OBS HIGH 75: CPT | Performed by: INTERNAL MEDICINE

## 2022-05-16 PROCEDURE — 93005 ELECTROCARDIOGRAM TRACING: CPT

## 2022-05-16 PROCEDURE — C1874 STENT, COATED/COV W/DEL SYS: HCPCS | Performed by: INTERNAL MEDICINE

## 2022-05-16 PROCEDURE — 93458 L HRT ARTERY/VENTRICLE ANGIO: CPT | Performed by: INTERNAL MEDICINE

## 2022-05-16 PROCEDURE — 99285 EMERGENCY DEPT VISIT HI MDM: CPT

## 2022-05-16 PROCEDURE — 85025 COMPLETE CBC W/AUTO DIFF WBC: CPT

## 2022-05-16 PROCEDURE — 80048 BASIC METABOLIC PNL TOTAL CA: CPT

## 2022-05-16 PROCEDURE — 96365 THER/PROPH/DIAG IV INF INIT: CPT

## 2022-05-16 PROCEDURE — 71045 X-RAY EXAM CHEST 1 VIEW: CPT

## 2022-05-16 PROCEDURE — 74011000258 HC RX REV CODE- 258: Performed by: INTERNAL MEDICINE

## 2022-05-16 PROCEDURE — 65660000004 HC RM CVT STEPDOWN

## 2022-05-16 PROCEDURE — 74011250636 HC RX REV CODE- 250/636: Performed by: INTERNAL MEDICINE

## 2022-05-16 PROCEDURE — 74011636637 HC RX REV CODE- 636/637: Performed by: INTERNAL MEDICINE

## 2022-05-16 PROCEDURE — 96375 TX/PRO/DX INJ NEW DRUG ADDON: CPT

## 2022-05-16 PROCEDURE — 94640 AIRWAY INHALATION TREATMENT: CPT

## 2022-05-16 PROCEDURE — 027034Z DILATION OF CORONARY ARTERY, ONE ARTERY WITH DRUG-ELUTING INTRALUMINAL DEVICE, PERCUTANEOUS APPROACH: ICD-10-PCS | Performed by: INTERNAL MEDICINE

## 2022-05-16 PROCEDURE — 74011250636 HC RX REV CODE- 250/636: Performed by: STUDENT IN AN ORGANIZED HEALTH CARE EDUCATION/TRAINING PROGRAM

## 2022-05-16 PROCEDURE — 74011250637 HC RX REV CODE- 250/637: Performed by: PHYSICIAN ASSISTANT

## 2022-05-16 PROCEDURE — 74011250637 HC RX REV CODE- 250/637: Performed by: INTERNAL MEDICINE

## 2022-05-16 PROCEDURE — B2151ZZ FLUOROSCOPY OF LEFT HEART USING LOW OSMOLAR CONTRAST: ICD-10-PCS | Performed by: INTERNAL MEDICINE

## 2022-05-16 PROCEDURE — 92928 PRQ TCAT PLMT NTRAC ST 1 LES: CPT | Performed by: INTERNAL MEDICINE

## 2022-05-16 PROCEDURE — 4A033BC MEASUREMENT OF ARTERIAL PRESSURE, CORONARY, PERCUTANEOUS APPROACH: ICD-10-PCS | Performed by: INTERNAL MEDICINE

## 2022-05-16 PROCEDURE — 93572 IV DOP VEL&/PRESS C FLO EA: CPT | Performed by: INTERNAL MEDICINE

## 2022-05-16 PROCEDURE — C1887 CATHETER, GUIDING: HCPCS | Performed by: INTERNAL MEDICINE

## 2022-05-16 PROCEDURE — 74011250636 HC RX REV CODE- 250/636: Performed by: EMERGENCY MEDICINE

## 2022-05-16 PROCEDURE — C1725 CATH, TRANSLUMIN NON-LASER: HCPCS | Performed by: INTERNAL MEDICINE

## 2022-05-16 PROCEDURE — 94762 N-INVAS EAR/PLS OXIMTRY CONT: CPT

## 2022-05-16 PROCEDURE — B2111ZZ FLUOROSCOPY OF MULTIPLE CORONARY ARTERIES USING LOW OSMOLAR CONTRAST: ICD-10-PCS | Performed by: INTERNAL MEDICINE

## 2022-05-16 PROCEDURE — 4A023N7 MEASUREMENT OF CARDIAC SAMPLING AND PRESSURE, LEFT HEART, PERCUTANEOUS APPROACH: ICD-10-PCS | Performed by: INTERNAL MEDICINE

## 2022-05-16 PROCEDURE — 74011000636 HC RX REV CODE- 636: Performed by: INTERNAL MEDICINE

## 2022-05-16 PROCEDURE — C1894 INTRO/SHEATH, NON-LASER: HCPCS | Performed by: INTERNAL MEDICINE

## 2022-05-16 PROCEDURE — 77030015766: Performed by: INTERNAL MEDICINE

## 2022-05-16 PROCEDURE — 82962 GLUCOSE BLOOD TEST: CPT

## 2022-05-16 DEVICE — STENT RONYX27512UX RESOLUTE ONYX 2.75X12
Type: IMPLANTABLE DEVICE | Status: FUNCTIONAL
Brand: RESOLUTE ONYX™

## 2022-05-16 RX ORDER — LANOLIN ALCOHOL/MO/W.PET/CERES
3 CREAM (GRAM) TOPICAL
Status: DISCONTINUED | OUTPATIENT
Start: 2022-05-16 | End: 2022-05-17 | Stop reason: HOSPADM

## 2022-05-16 RX ORDER — NITROGLYCERIN 0.4 MG/1
0.4 TABLET SUBLINGUAL
Status: COMPLETED | OUTPATIENT
Start: 2022-05-16 | End: 2022-05-16

## 2022-05-16 RX ORDER — ASPIRIN 81 MG/1
81 TABLET ORAL DAILY
Status: DISCONTINUED | OUTPATIENT
Start: 2022-05-17 | End: 2022-05-16

## 2022-05-16 RX ORDER — GUAIFENESIN 100 MG/5ML
81 LIQUID (ML) ORAL
Status: DISCONTINUED | OUTPATIENT
Start: 2022-05-16 | End: 2022-05-16 | Stop reason: ALTCHOICE

## 2022-05-16 RX ORDER — ATORVASTATIN CALCIUM 40 MG/1
80 TABLET, FILM COATED ORAL EVERY EVENING
Status: DISCONTINUED | OUTPATIENT
Start: 2022-05-16 | End: 2022-05-17 | Stop reason: HOSPADM

## 2022-05-16 RX ORDER — ASPIRIN 81 MG/1
81 TABLET ORAL DAILY
Status: DISCONTINUED | OUTPATIENT
Start: 2022-05-17 | End: 2022-05-17 | Stop reason: HOSPADM

## 2022-05-16 RX ORDER — CLOPIDOGREL 300 MG/1
TABLET, FILM COATED ORAL AS NEEDED
Status: DISCONTINUED | OUTPATIENT
Start: 2022-05-16 | End: 2022-05-16 | Stop reason: HOSPADM

## 2022-05-16 RX ORDER — HEPARIN SODIUM 1000 [USP'U]/ML
INJECTION, SOLUTION INTRAVENOUS; SUBCUTANEOUS AS NEEDED
Status: DISCONTINUED | OUTPATIENT
Start: 2022-05-16 | End: 2022-05-16 | Stop reason: HOSPADM

## 2022-05-16 RX ORDER — MAGNESIUM SULFATE HEPTAHYDRATE 40 MG/ML
2 INJECTION, SOLUTION INTRAVENOUS ONCE
Status: COMPLETED | OUTPATIENT
Start: 2022-05-16 | End: 2022-05-16

## 2022-05-16 RX ORDER — IPRATROPIUM BROMIDE AND ALBUTEROL SULFATE 2.5; .5 MG/3ML; MG/3ML
3 SOLUTION RESPIRATORY (INHALATION) ONCE
Status: COMPLETED | OUTPATIENT
Start: 2022-05-16 | End: 2022-05-16

## 2022-05-16 RX ORDER — CLOPIDOGREL BISULFATE 75 MG/1
75 TABLET ORAL DAILY
Status: DISCONTINUED | OUTPATIENT
Start: 2022-05-17 | End: 2022-05-16

## 2022-05-16 RX ORDER — SODIUM CHLORIDE 0.9 % (FLUSH) 0.9 %
5-40 SYRINGE (ML) INJECTION AS NEEDED
Status: DISCONTINUED | OUTPATIENT
Start: 2022-05-16 | End: 2022-05-17 | Stop reason: HOSPADM

## 2022-05-16 RX ORDER — FAMOTIDINE 20 MG/1
20 TABLET, FILM COATED ORAL DAILY
Status: DISCONTINUED | OUTPATIENT
Start: 2022-05-16 | End: 2022-05-17 | Stop reason: HOSPADM

## 2022-05-16 RX ORDER — INSULIN GLARGINE 100 [IU]/ML
12 INJECTION, SOLUTION SUBCUTANEOUS
Status: DISCONTINUED | OUTPATIENT
Start: 2022-05-16 | End: 2022-05-17

## 2022-05-16 RX ORDER — CLOPIDOGREL BISULFATE 75 MG/1
300 TABLET ORAL ONCE
Status: DISCONTINUED | OUTPATIENT
Start: 2022-05-16 | End: 2022-05-16

## 2022-05-16 RX ORDER — SODIUM CHLORIDE 9 MG/ML
100 INJECTION, SOLUTION INTRAVENOUS CONTINUOUS
Status: DISCONTINUED | OUTPATIENT
Start: 2022-05-16 | End: 2022-05-17 | Stop reason: HOSPADM

## 2022-05-16 RX ORDER — PREDNISONE 20 MG/1
20 TABLET ORAL 2 TIMES DAILY WITH MEALS
Status: DISCONTINUED | OUTPATIENT
Start: 2022-05-17 | End: 2022-05-17 | Stop reason: HOSPADM

## 2022-05-16 RX ORDER — INSULIN LISPRO 100 [IU]/ML
INJECTION, SOLUTION INTRAVENOUS; SUBCUTANEOUS EVERY 6 HOURS
Status: DISCONTINUED | OUTPATIENT
Start: 2022-05-16 | End: 2022-05-16

## 2022-05-16 RX ORDER — METOPROLOL TARTRATE 25 MG/1
25 TABLET, FILM COATED ORAL EVERY 12 HOURS
Status: DISCONTINUED | OUTPATIENT
Start: 2022-05-16 | End: 2022-05-17

## 2022-05-16 RX ORDER — GUAIFENESIN 100 MG/5ML
324 LIQUID (ML) ORAL
Status: COMPLETED | OUTPATIENT
Start: 2022-05-16 | End: 2022-05-16

## 2022-05-16 RX ORDER — SODIUM CHLORIDE 450 MG/100ML
150 INJECTION, SOLUTION INTRAVENOUS CONTINUOUS
Status: DISPENSED | OUTPATIENT
Start: 2022-05-16 | End: 2022-05-16

## 2022-05-16 RX ORDER — BIVALIRUDIN 250 MG/5ML
INJECTION, POWDER, LYOPHILIZED, FOR SOLUTION INTRAVENOUS AS NEEDED
Status: DISCONTINUED | OUTPATIENT
Start: 2022-05-16 | End: 2022-05-16 | Stop reason: HOSPADM

## 2022-05-16 RX ORDER — CLOPIDOGREL BISULFATE 75 MG/1
75 TABLET ORAL DAILY
Status: DISCONTINUED | OUTPATIENT
Start: 2022-05-16 | End: 2022-05-17 | Stop reason: HOSPADM

## 2022-05-16 RX ORDER — VERAPAMIL HYDROCHLORIDE 2.5 MG/ML
INJECTION, SOLUTION INTRAVENOUS AS NEEDED
Status: DISCONTINUED | OUTPATIENT
Start: 2022-05-16 | End: 2022-05-16 | Stop reason: HOSPADM

## 2022-05-16 RX ORDER — HEPARIN SODIUM 200 [USP'U]/100ML
INJECTION, SOLUTION INTRAVENOUS
Status: COMPLETED | OUTPATIENT
Start: 2022-05-16 | End: 2022-05-16

## 2022-05-16 RX ORDER — SODIUM CHLORIDE 0.9 % (FLUSH) 0.9 %
5-40 SYRINGE (ML) INJECTION EVERY 8 HOURS
Status: DISCONTINUED | OUTPATIENT
Start: 2022-05-16 | End: 2022-05-17 | Stop reason: HOSPADM

## 2022-05-16 RX ORDER — MAGNESIUM SULFATE 100 %
4 CRYSTALS MISCELLANEOUS AS NEEDED
Status: DISCONTINUED | OUTPATIENT
Start: 2022-05-16 | End: 2022-05-17 | Stop reason: HOSPADM

## 2022-05-16 RX ORDER — MIDAZOLAM HYDROCHLORIDE 1 MG/ML
INJECTION, SOLUTION INTRAMUSCULAR; INTRAVENOUS AS NEEDED
Status: DISCONTINUED | OUTPATIENT
Start: 2022-05-16 | End: 2022-05-16 | Stop reason: HOSPADM

## 2022-05-16 RX ORDER — IPRATROPIUM BROMIDE AND ALBUTEROL SULFATE 2.5; .5 MG/3ML; MG/3ML
3 SOLUTION RESPIRATORY (INHALATION)
Status: DISCONTINUED | OUTPATIENT
Start: 2022-05-16 | End: 2022-05-17 | Stop reason: HOSPADM

## 2022-05-16 RX ORDER — NITROGLYCERIN 400 UG/1
1 SPRAY ORAL
Status: ACTIVE | OUTPATIENT
Start: 2022-05-16 | End: 2022-05-17

## 2022-05-16 RX ORDER — GUAIFENESIN 100 MG/5ML
LIQUID (ML) ORAL
Status: DISCONTINUED
Start: 2022-05-16 | End: 2022-05-16 | Stop reason: WASHOUT

## 2022-05-16 RX ORDER — FENTANYL CITRATE 50 UG/ML
INJECTION, SOLUTION INTRAMUSCULAR; INTRAVENOUS AS NEEDED
Status: DISCONTINUED | OUTPATIENT
Start: 2022-05-16 | End: 2022-05-16 | Stop reason: HOSPADM

## 2022-05-16 RX ORDER — DEXTROSE MONOHYDRATE 100 MG/ML
0-250 INJECTION, SOLUTION INTRAVENOUS AS NEEDED
Status: DISCONTINUED | OUTPATIENT
Start: 2022-05-16 | End: 2022-05-17 | Stop reason: HOSPADM

## 2022-05-16 RX ORDER — ONDANSETRON 2 MG/ML
4 INJECTION INTRAMUSCULAR; INTRAVENOUS ONCE
Status: COMPLETED | OUTPATIENT
Start: 2022-05-16 | End: 2022-05-16

## 2022-05-16 RX ORDER — LIDOCAINE HYDROCHLORIDE 10 MG/ML
INJECTION, SOLUTION EPIDURAL; INFILTRATION; INTRACAUDAL; PERINEURAL AS NEEDED
Status: DISCONTINUED | OUTPATIENT
Start: 2022-05-16 | End: 2022-05-16 | Stop reason: HOSPADM

## 2022-05-16 RX ORDER — IPRATROPIUM BROMIDE AND ALBUTEROL SULFATE 2.5; .5 MG/3ML; MG/3ML
3 SOLUTION RESPIRATORY (INHALATION)
Status: DISCONTINUED | OUTPATIENT
Start: 2022-05-16 | End: 2022-05-16

## 2022-05-16 RX ORDER — INSULIN LISPRO 100 [IU]/ML
INJECTION, SOLUTION INTRAVENOUS; SUBCUTANEOUS
Status: DISCONTINUED | OUTPATIENT
Start: 2022-05-16 | End: 2022-05-17 | Stop reason: HOSPADM

## 2022-05-16 RX ADMIN — INSULIN LISPRO 13 UNITS: 100 INJECTION, SOLUTION INTRAVENOUS; SUBCUTANEOUS at 17:55

## 2022-05-16 RX ADMIN — ONDANSETRON 4 MG: 2 INJECTION INTRAMUSCULAR; INTRAVENOUS at 05:40

## 2022-05-16 RX ADMIN — METOPROLOL TARTRATE 25 MG: 25 TABLET, FILM COATED ORAL at 21:19

## 2022-05-16 RX ADMIN — Medication 12 UNITS: at 21:29

## 2022-05-16 RX ADMIN — Medication 0.4 MG: at 05:40

## 2022-05-16 RX ADMIN — IPRATROPIUM BROMIDE AND ALBUTEROL SULFATE 3 ML: .5; 2.5 SOLUTION RESPIRATORY (INHALATION) at 08:19

## 2022-05-16 RX ADMIN — CLOPIDOGREL BISULFATE 75 MG: 75 TABLET ORAL at 10:16

## 2022-05-16 RX ADMIN — SODIUM CHLORIDE 100 ML/HR: 900 INJECTION, SOLUTION INTRAVENOUS at 11:50

## 2022-05-16 RX ADMIN — ASPIRIN 81 MG 324 MG: 81 TABLET ORAL at 05:33

## 2022-05-16 RX ADMIN — Medication 9 UNITS: at 21:30

## 2022-05-16 RX ADMIN — MAGNESIUM SULFATE HEPTAHYDRATE 2 G: 40 INJECTION, SOLUTION INTRAVENOUS at 05:41

## 2022-05-16 RX ADMIN — FAMOTIDINE 20 MG: 20 TABLET ORAL at 21:19

## 2022-05-16 RX ADMIN — SODIUM CHLORIDE 150 ML: 450 INJECTION, SOLUTION INTRAVENOUS at 17:57

## 2022-05-16 RX ADMIN — METOPROLOL TARTRATE 25 MG: 25 TABLET, FILM COATED ORAL at 10:22

## 2022-05-16 RX ADMIN — ATORVASTATIN CALCIUM 80 MG: 40 TABLET, FILM COATED ORAL at 21:19

## 2022-05-16 RX ADMIN — METHYLPREDNISOLONE SODIUM SUCCINATE 125 MG: 125 INJECTION, POWDER, FOR SOLUTION INTRAMUSCULAR; INTRAVENOUS at 08:19

## 2022-05-16 RX ADMIN — SODIUM CHLORIDE, PRESERVATIVE FREE 10 ML: 5 INJECTION INTRAVENOUS at 22:43

## 2022-05-16 NOTE — ED NOTES
6:51 AM :Pt care assumed from Dr. Loyda Ramirez , ED provider. Pt complaint(s), current treatment plan, progression and available diagnostic results have been discussed thoroughly. Rounding occurred: yes  Intended Disposition: TBD  Pending diagnostic reports and/or labs (please list): Repeat trop and discussion regarding testing with cards    Patient has 2 troponins that are reassuring and still having left-sided chest pain which is sharp in nature without radiation associated with some nausea. The patient is wheezing and will give duo nebs and steroids. I discussed case with cardiology and patient has a heart score of 5 and discussed options they said they will come to see the patient and we will discussed next steps together. Patient was seen by cardiology and will plan to catheterize the patient today. We will discuss admission with the hospitalist.  Patient was given albuterol and duo nebs with some improvement. Tanisha Woodson DO 10:01 AM    HEART SCORE:   History: Moderately Suspicious  ECG: Nonspecific repolarization disturbance  Age: Greater than 45 years - Less than 65 years  Risk Factors: Greater than or equal to 3 risk factors, or history of atherosclerotic disease  Troponin: Less than or equal to normal limit   HEART Score Total : 5     Management   Scores 0-3: 0.9-1.7% risk of adverse cardiac event. In the HEART Score study, these patients were discharged (0.99% in the retrospective study, 1.7% in the prospective study)   Scores 4-6: 12-16.6% risk of adverse cardiac event. In the HEART Score study, these patients were admitted to the hospital. (11.6% retrospective, 16.6% prospective)   Scores ? 7: 50-65% risk of adverse cardiac event.  In the HEART Score study, these patients were candidates for early invasive measures. (65.2% retrospective, 50.1% prospective)   A MACE (Major Adverse Cardiac Event) was defined as all-cause mortality, myocardial infarction, or coronary revascularization. Original/Primary Reference  · Narciso REILLY, Jhonny Calvo. Chest pain in the emergency room: value of the HEART score. Neth Heart J. 2008;16(6):191-6. Validation  · Narciso Calvo, Jhonny ANDERSON, et al. A prospective validation of the HEART score for chest pain patients at the emergency department. Int J Cardiol. 2013;168(3):2153-8. · Jimmy RICKS, Narciso REILLY, Vaibhav Cruz et al. Chest pain in the emergency room: a multicenter validation of the HEART Score. Crit Pathw Cardiol. 2010;9(3):164-9. · Patrick CHAMP, Octaviano RF, Jass RAJPUT, et al. The HEART Pathway Randomized Controlled Trial One Year Outcomes.  [facility] Emerg Med. 2018;           MEDICATIONS:    Medications   albuterol-ipratropium (DUO-NEB) 2.5 MG-0.5 MG/3 ML (has no administration in time range)   methylPREDNISolone (PF) (Solu-MEDROL) injection 125 mg (has no administration in time range)   aspirin chewable tablet 324 mg (324 mg Oral Given 5/16/22 0533)   ondansetron (ZOFRAN) injection 4 mg (4 mg IntraVENous Given 5/16/22 0540)   nitroglycerin (NITROSTAT) tablet 0.4 mg (0.4 mg SubLINGual Given 5/16/22 0540)   magnesium sulfate 2 g/50 ml IVPB (premix or compounded) (2 g IntraVENous New Bag 5/16/22 0541)            RESULTS:        XR CHEST PORT    (Results Pending)           Abnormal Results this visit:  Labs Reviewed   CBC WITH AUTOMATED DIFF - Abnormal; Notable for the following components:       Result Value    HGB 12.3 (*)     EOSINOPHILS 6 (*)     All other components within normal limits   METABOLIC PANEL, BASIC - Abnormal; Notable for the following components:    CO2 33 (*)     Glucose 279 (*)     All other components within normal limits   TROPONIN-HIGH SENSITIVITY   MAGNESIUM   TROPONIN-HIGH SENSITIVITY       All Results past 24 hours:  Recent Results (from the past 24 hour(s))   EKG, 12 LEAD, INITIAL    Collection Time: 05/16/22  4:48 AM   Result Value Ref Range    Ventricular Rate 88 BPM    Atrial Rate 88 BPM    P-R Interval 184 ms QRS Duration 102 ms    Q-T Interval 368 ms    QTC Calculation (Bezet) 445 ms    Calculated P Axis 86 degrees    Calculated R Axis 73 degrees    Calculated T Axis -55 degrees    Diagnosis       Poor data quality, interpretation may be adversely affected  Sinus rhythm with fusion complexes  Incomplete right bundle branch block  Nonspecific ST and T wave abnormality  Abnormal ECG  When compared with ECG of 05-DEC-2021 16:28,  fusion complexes are now present     CBC WITH AUTOMATED DIFF    Collection Time: 05/16/22  4:52 AM   Result Value Ref Range    WBC 7.5 4.6 - 13.2 K/uL    RBC 4.51 4.35 - 5.65 M/uL    HGB 12.3 (L) 13.0 - 16.0 g/dL    HCT 39.4 36.0 - 48.0 %    MCV 87.4 78.0 - 100.0 FL    MCH 27.3 24.0 - 34.0 PG    MCHC 31.2 31.0 - 37.0 g/dL    RDW 13.2 11.6 - 14.5 %    PLATELET 607 332 - 065 K/uL    MPV 10.6 9.2 - 11.8 FL    NRBC 0.0 0  WBC    ABSOLUTE NRBC 0.00 0.00 - 0.01 K/uL    NEUTROPHILS 48 40 - 73 %    LYMPHOCYTES 38 21 - 52 %    MONOCYTES 7 3 - 10 %    EOSINOPHILS 6 (H) 0 - 5 %    BASOPHILS 1 0 - 2 %    IMMATURE GRANULOCYTES 0 0.0 - 0.5 %    ABS. NEUTROPHILS 3.6 1.8 - 8.0 K/UL    ABS. LYMPHOCYTES 2.8 0.9 - 3.6 K/UL    ABS. MONOCYTES 0.6 0.05 - 1.2 K/UL    ABS. EOSINOPHILS 0.4 0.0 - 0.4 K/UL    ABS. BASOPHILS 0.0 0.0 - 0.1 K/UL    ABS. IMM.  GRANS. 0.0 0.00 - 0.04 K/UL    DF AUTOMATED     METABOLIC PANEL, BASIC    Collection Time: 05/16/22  4:52 AM   Result Value Ref Range    Sodium 141 136 - 145 mmol/L    Potassium 4.1 3.5 - 5.5 mmol/L    Chloride 103 100 - 111 mmol/L    CO2 33 (H) 21 - 32 mmol/L    Anion gap 5 3.0 - 18 mmol/L    Glucose 279 (H) 74 - 99 mg/dL    BUN 13 7.0 - 18 MG/DL    Creatinine 0.85 0.6 - 1.3 MG/DL    BUN/Creatinine ratio 15 12 - 20      GFR est AA >60 >60 ml/min/1.73m2    GFR est non-AA >60 >60 ml/min/1.73m2    Calcium 9.1 8.5 - 10.1 MG/DL   TROPONIN-HIGH SENSITIVITY    Collection Time: 05/16/22  4:52 AM   Result Value Ref Range    Troponin-High Sensitivity 12 0 - 78 ng/L MAGNESIUM    Collection Time: 05/16/22  4:52 AM   Result Value Ref Range    Magnesium 1.6 1.6 - 2.6 mg/dL   TROPONIN-HIGH SENSITIVITY    Collection Time: 05/16/22  6:50 AM   Result Value Ref Range    Troponin-High Sensitivity 12 0 - 78 ng/L           CLINICAL IMPRESSION    1. Acute chest pain    2. Anemia, unspecified type    3.  Hyperglycemia

## 2022-05-16 NOTE — H&P
History and Physical    Patient: Genaro Mosley MRN: [id number]  SSN: [id number]    YOB: 1960    Age: 62 y.o. Sex: male    Francisca Pruitt MD    C/C : Chest pain    Subjective:      Genaro Mosley is a 62 y.o. male    Patient is 72-year-old male past history of COPD diabetes hypertension CAD s/p stent placement admitted with chest pain starting about 2 hours prior to coming to the hospital location of chest pain on left side of her chest sharp constant and no radiation    Now patient s/p cath with no chest pain    According to patient he had a recent history of COVID exposure and COVID related shortness of breath, he is also smoker he smokes since last 45 years, currently he is having some shortness of breath initially in the morning he also developed some wheezing, during my interview he had history of cough and wheezing. Apparently he received Solu-Medrol in the morning blood glucose was very high. Past Medical History:   Diagnosis Date    [facility] [address] [address]     Diabetes ([facility] [address]     Emphysema     H/O cardiovascular stress test 2012    negative    Hypertension     MI (myocardial infarction) ([facility] [address]      Past Surgical History:   Procedure Laterality Date    HX CORONARY STENT PLACEMENT        Family History   Problem Relation Age of Onset    Diabetes Mother      Social History     Tobacco Use    Smoking status: Current Every Day Smoker     Packs/day: 0.50     Types: Cigarettes    Smokeless tobacco: Never Used   Substance Use Topics    Alcohol use: Yes     Comment: social only      Prior to Admission medications    Medication Sig Start Date End Date Taking? Authorizing Provider   cyclobenzaprine (FLEXERIL) 10 mg tablet Take 1 Tablet by mouth three (3) times daily as needed for Muscle Spasm(s) (causes sedation, do not take while working/driving).  5/3/22   Yue Goodrich MD   insulin lispro (HUMALOG) 100 unit/mL injection Very Insulin Resistant For Blood Sugar (mg/dL) of:            Less than 150 =   0 units 150 -199 =   3 units 200 -249 =   6 units 250 -299 =   9 units 300 -349 =   12 units 350 and above =   15 units Initiate Hypoglycemic protocol if blood glucose is <70 mg/dL. Fast Acting - Administer Immediately - or within 15 minutes of start of meal, if mealtime coverage. 2/17/22   Rito Armas MD   insulin glargine (LANTUS,BASAGLAR) 100 unit/mL (3 mL) inpn 15 Units by SubCUTAneous route daily. 2/17/22   Rito Armas MD   glyBURIDE (DIABETA) 5 mg tablet Take 5 mg by mouth Daily (before breakfast). Provider, Historical   lidocaine (Lidoderm) 5 % Apply patch to the affected area for 12 hours a day and remove for 12 hours a day. 10/6/21   Marli Shaw MD   clopidogreL (PLAVIX) 75 mg tab TAKE ONE TABLET BY MOUTH DAILY 6/22/21   Carmen Aceves NP   atorvastatin (LIPITOR) 80 mg tablet TAKE ONE TABLET BY MOUTH EVERY EVENING 6/22/21   Carmen Aceves NP   albuterol sulfate 90 mcg/actuation aebs Take 1 Puff by inhalation every four to six (4-6) hours as needed for Wheezing. 7/10/20   Briana Tai MD   metoprolol tartrate (LOPRESSOR) 25 mg tablet Take 1 Tab by mouth every twelve (12) hours. Patient taking differently: Take 25 mg by mouth every twelve (12) hours. 4/2/20   Carmen Aceves NP   aspirin delayed-release 81 mg tablet Take 1 Tab by mouth daily. 3/14/20   Hector Maldonado MD   glucagon (GLUCAGEN) 1 mg injection 1 mL by IntraMUSCular route as needed for Hypoglycemia.  3/13/20   May, Evelin FOX MD   Insulin Syringe-Needle U-100 0.3 mL 31 gauge x 5/16\" syrg TID as needed 3/13/20   Rito Armas MD   lancets misc As directed 3/13/20   Rito Armas MD   Blood-Glucose Meter monitoring kit As directed 3/13/20   Rito Armas MD   glucose blood VI test strips (blood glucose test) strip As directed 3/13/20   Trevin Michelle Montenegro MD   metFORMIN (GLUCOPHAGE) 1,000 mg tablet Take 1 Tab by mouth two (2) times daily (with meals). 5/17/13   Solange Montes De Oca MD   tiotropium (SPIRIVA WITH HANDIHALER) 18 mcg inhalation capsule Take 1 Cap by inhalation daily. 11/20/12   Solange Montes De Oca MD   mometasone-formoterol (DULERA) 100-5 mcg/actuation HFAA HFA inhaler Take 2 Puffs by inhalation two (2) times a day. 11/20/12   Solange Montes De Oca MD        No Known Allergies    Review of Systems:  positive responses in bold type   Constitutional: Negative for fever, chills, diaphoresis and unexpected weight change. HENT: Negative for ear pain, congestion, sore throat, rhinorrhea, drooling, trouble swallowing, neck pain and tinnitus. Eyes: Negative for photophobia, pain, redness and visual disturbance. Respiratory: Positive  for shortness of breath, Positive cough, No choking, chest tightness, positive wheezing no  stridor. Cardiovascular: Negative for chest pain, palpitations and leg swelling. Gastrointestinal: Negative for nausea, vomiting, abdominal pain, diarrhea, constipation, blood in stool, abdominal distention and anal bleeding. Genitourinary: Negative for dysuria, urgency, frequency, hematuria, flank pain and difficulty urinating. Musculoskeletal: Negative for back pain and arthralgias. Skin: Negative for color change, rash and wound. Neurological: Negative for dizziness, seizures, syncope, speech difficulty, light-headedness or headaches. Hematological: Does not bruise/bleed easily. Psychiatric/Behavioral: Negative for suicidal ideas, hallucinations, behavioral problems, self-injury or agitation          Objective: Body mass index is 33.05 kg/m².   Vitals:    05/16/22 1124 05/16/22 1140 05/16/22 1324 05/16/22 1524   BP:   (!) 144/82 133/83   Pulse:  72 80 84   Resp: 18 13 14 13   Temp:       SpO2:  91% 91% 91%   Weight:       Height:            Physical Exam:  General appearance - alert, well appearing, and in no distress and oriented to person, place, and time  Mental status - alert, oriented to person, place, and time  Neck - supple, no significant adenopathy  Chest -bilateral air entry present bilateral extensive rhonchi present  Heart - normal rate, regular rhythm, normal S1, S2, no murmurs, rubs, clicks or gallops  Abdomen - soft, nontender, nondistended, no masses or organomegaly  Neurological - alert, oriented, normal speech, no focal findings or movement disorder noted  Musculoskeletal - no joint tenderness, deformity or swelling  Skin - normal coloration and turgor, no rashes, no suspicious skin lesions noted          Hospital Problems  Date Reviewed: 4/12/2022          Codes Class Noted POA    Acute chest pain ICD-10-CM: R07.9  ICD-9-CM: 786.50  5/16/2022 Unknown              CBC:  Lab Results   Component Value Date/Time    WBC 7.5 05/16/2022 04:52 AM    HGB 12.3 (L) 05/16/2022 04:52 AM    HCT 39.4 05/16/2022 04:52 AM    PLATELET 470 77/29/9279 04:52 AM    MCV 87.4 05/16/2022 04:52 AM        CMP:  Lab Results   Component Value Date/Time    Sodium 141 05/16/2022 04:52 AM    Potassium 4.1 05/16/2022 04:52 AM    Chloride 103 05/16/2022 04:52 AM    CO2 33 (H) 05/16/2022 04:52 AM    Anion gap 5 05/16/2022 04:52 AM    Glucose 279 (H) 05/16/2022 04:52 AM    BUN 13 05/16/2022 04:52 AM    Creatinine 0.85 05/16/2022 04:52 AM    BUN/Creatinine ratio 15 05/16/2022 04:52 AM    GFR est AA >60 05/16/2022 04:52 AM    GFR est non-AA >60 05/16/2022 04:52 AM    Calcium 9.1 05/16/2022 04:52 AM    Alk. phosphatase 75 05/03/2022 02:20 PM    Protein, total 6.3 (L) 05/03/2022 02:20 PM    Albumin 3.2 (L) 05/03/2022 02:20 PM    Globulin 3.1 05/03/2022 02:20 PM    A-G Ratio 1.0 05/03/2022 02:20 PM    ALT (SGPT) 20 05/03/2022 02:20 PM        PT/INR  Lab Results   Component Value Date/Time    INR 0.9 10/05/2021 01:26 PM    Prothrombin time 12.0 10/05/2021 01:26 PM            EKG: No results found for this or any previous visit. Assessment and  Plan:     1 chest pain  2 COPD Acute - BD + O2 as needed and steroids   3 diabetes mellitus type 2  4 hypertension  5 history of CAD  6 Chronic respiratory failure - from #2     Plan    -Patient is s/p cath-post-cath orders per cardiology    -For diabetes mellitus type II Place patient on Accu-Chek coverage monitor blood glucose level    -Continue aspirin, Lipitor high-dose, Plavix, beta-blockers      Signed By: Debra Joy MD     May 16, 2022

## 2022-05-16 NOTE — DIABETES MGMT
Diabetes Patient/Family Education Record    Factors That May Influence Patients Ability to Learn or Comply with Recommendations   []   Language barrier    []   Cultural needs   []   Motivation    []   Cognitive limitation    []   Physical   [x]   Education    []   Physiological factors   []   Hearing/vision/speaking impairment   []   Mu-ism beliefs    []   Financial factors   []  Other:   []  No factors identified at this time. Person Instructed:   [x]   Patient   []   Family   []  Other     Preference for Learning:   [x]   Verbal   []   Written   []  Demonstration     Level of Comprehension & Competence:    [x]  Good                                      [] Fair                                     []  Poor                             []  Needs Reinforcement   [x]  Teach back completed    Education Component:  Seen patient this morning in ED. [x]  Medication management, including how to administer insulin (if appropriate) and potential medication interactions:  Patient reported taking prescribed Metformin 1000 mg twice daily with meals. Patient also reported that he is not on insulin at home. [x]  Nutritional management - [x] Obtained usual meal pattern   []   Basic carbohydrate counting  []  Plate method  [x]  Limit concentrated sweets and avoid sweetened beverages  [x]  Portion control  []    Avoid skipping meals     []  Exercise   [x]  Signs, symptoms, and treatment of hyperglycemia and hypoglycemia   [x] Prevention, recognition and treatment of hyperglycemia and hypoglycemia   [x]  Importance of blood glucose monitoring  [x] Blood Glucose targets   []   Provided patient with blood glucose meter  []  Has glucometer and supplies at home   []  Instruction on use of the blood glucose meter and recommended monitoring schedule   [x]  Discuss the importance of HbA1C monitoring. Patient's A1c is 8.8% (5/16/2022).  This is equivalent to average glucose of 206 mg/dl for the past 2-3 months. Patient's current A1c level is above the recommended A1c of less than 7%. []  Sick day guidelines   []  Proper use and disposal of lancets, needles, syringes or insulin pens (if appropriate)   []  Potential long-term complications (retinopathy, kidney disease, neuropathy, foot care)   [x] Information about whom to contact in case of emergency or for more information    [x]  Goal:  Patient/family will demonstrate understanding of Diabetes Self- Management Skills by: 5/23/2022  Plan for post-discharge education or self-management support:    [x] Outpatient class schedule provided            [] Patient Declined    [] Scheduled for outpatient classes (date) _______    [] Written information provided  Verify: [x] Prior to admission Diabetes medications    Does patient understand how diabetes medications work? Yes. Does patient have difficulty obtaining diabetes medications and testing supplies?  No.    Verna Lott RN [facility]  Pager: [phone]

## 2022-05-16 NOTE — ED PROVIDER NOTES
EMERGENCY DEPARTMENT HISTORY AND PHYSICAL EXAM      Date: 5/16/2022  Patient Name: Jesse Rivero    History of Presenting Illness     Chief Complaint   Patient presents with    Chest Pain       History (Context): Jesse Rivero is a 62 y.o. male with a past medical history significant for COPD, diabetes, hypertension, CAD status post stent placement chest pain. Patient states chest pain began comes into the ED today due to approximate 2 hours prior to arrival.  Initially patient stated resolution of his chest pain prompting patient to be evaluated in the emergency department. Patient states chest pain is located left-sided chest, described as sharp, without any alleviating or exacerbating factors, and nonradiating. He states pain is similar to when he previously underwent cardiac cath and had stents placed. He denies any fever, chills, abdominal pain, or vomiting. He does admit to associated nausea. States symptoms are severe in quality initially however has improved and is currently mild. PCP: Marlys Shah MD    Current Facility-Administered Medications   Medication Dose Route Frequency Provider Last Rate Last Admin    aspirin chewable tablet 324 mg  324 mg Oral NOW Storm Booty, DO         Current Outpatient Medications   Medication Sig Dispense Refill    cyclobenzaprine (FLEXERIL) 10 mg tablet Take 1 Tablet by mouth three (3) times daily as needed for Muscle Spasm(s) (causes sedation, do not take while working/driving). 15 Tablet 0    insulin lispro (HUMALOG) 100 unit/mL injection Very Insulin Resistant For Blood Sugar (mg/dL) of:            Less than 150 =   0 units 150 -199 =   3 units 200 -249 =   6 units 250 -299 =   9 units 300 -349 =   12 units 350 and above =   15 units Initiate Hypoglycemic protocol if blood glucose is <70 mg/dL. Fast Acting - Administer Immediately - or within 15 minutes of start of meal, if mealtime coverage.  1 Each 0    insulin glargine (LANTUS,BASAGLAR) 100 unit/mL (3 mL) inpn 15 Units by SubCUTAneous route daily. 1 Pen 0    glyBURIDE (DIABETA) 5 mg tablet Take 5 mg by mouth Daily (before breakfast).  lidocaine (Lidoderm) 5 % Apply patch to the affected area for 12 hours a day and remove for 12 hours a day. 10 Each 0    clopidogreL (PLAVIX) 75 mg tab TAKE ONE TABLET BY MOUTH DAILY 90 Tablet 3    atorvastatin (LIPITOR) 80 mg tablet TAKE ONE TABLET BY MOUTH EVERY EVENING 90 Tablet 3    albuterol sulfate 90 mcg/actuation aebs Take 1 Puff by inhalation every four to six (4-6) hours as needed for Wheezing. 1 Each 0    metoprolol tartrate (LOPRESSOR) 25 mg tablet Take 1 Tab by mouth every twelve (12) hours. (Patient taking differently: Take 25 mg by mouth every twelve (12) hours.) 60 Tab 6    aspirin delayed-release 81 mg tablet Take 1 Tab by mouth daily. 30 Tab 0    glucagon (GLUCAGEN) 1 mg injection 1 mL by IntraMUSCular route as needed for Hypoglycemia. 1 Vial 0    Insulin Syringe-Needle U-100 0.3 mL 31 gauge x 5/16\" syrg TID as needed 100 Syringe 0    lancets misc As directed 100 Each 0    Blood-Glucose Meter monitoring kit As directed 1 Kit 0    glucose blood VI test strips (blood glucose test) strip As directed 60 Strip 0    metFORMIN (GLUCOPHAGE) 1,000 mg tablet Take 1 Tab by mouth two (2) times daily (with meals). 60 Tab 0    tiotropium (SPIRIVA WITH HANDIHALER) 18 mcg inhalation capsule Take 1 Cap by inhalation daily. 30 Cap 0    mometasone-formoterol (DULERA) 100-5 mcg/actuation HFAA HFA inhaler Take 2 Puffs by inhalation two (2) times a day.  2 Inhaler 0       Past History     Past Medical History:   Past Medical History:   Diagnosis Date    COPD (chronic obstructive pulmonary disease) ([address]     Diabetes ([facility] [address]     Emphysema     H/O cardiovascular stress test 2012    negative    Hypertension     MI (myocardial infarction) ([facility] [address]        Past Surgical History:  Past Surgical History:   Procedure Laterality Date    HX CORONARY STENT PLACEMENT         Family History:  Family History   Problem Relation Age of Onset    Diabetes Mother        Social History:   Social History     Tobacco Use    Smoking status: Current Every Day Smoker     Packs/day: 0.50     Types: Cigarettes    Smokeless tobacco: Never Used   Vaping Use    Vaping Use: Never used   Substance Use Topics    Alcohol use: Yes     Comment: social only    Drug use: Yes     Types: Marijuana       Allergies:  No Known Allergies    PMH, PSH, family history, social history, allergies reviewed with the patient with significant items noted above. Review of Systems   Review of Systems   Constitutional: Negative for chills, diaphoresis and fever. HENT: Negative for sore throat. Eyes: Negative for visual disturbance. Negative recent vision problems   Respiratory: Negative for shortness of breath. Cardiovascular: Positive for chest pain. Gastrointestinal: Positive for nausea. Negative for abdominal pain and diarrhea. Genitourinary: Negative for difficulty urinating. Musculoskeletal: Negative for myalgias. Skin: Negative for rash. Neurological: Negative for headaches. Negative altered level of consciousness   All other systems reviewed and are negative. Physical Exam   There were no vitals filed for this visit. Physical Exam  Vitals and nursing note reviewed. Constitutional:       General: He is not in acute distress. Appearance: Normal appearance. He is not ill-appearing or toxic-appearing. HENT:      Head: Normocephalic and atraumatic. Mouth/Throat:      Mouth: Mucous membranes are moist.   Eyes:      General: No scleral icterus. Conjunctiva/sclera: Conjunctivae normal.   Cardiovascular:      Rate and Rhythm: Normal rate and regular rhythm. Pulses: Normal pulses. Pulmonary:      Effort: Pulmonary effort is normal. No respiratory distress. Abdominal:      General: There is no distension. Palpations: Abdomen is soft. Tenderness: There is no abdominal tenderness. There is no guarding or rebound. Musculoskeletal:         General: No tenderness or deformity. Normal range of motion. Cervical back: Normal range of motion and neck supple. Right lower leg: No edema. Left lower leg: No edema. Skin:     General: Skin is warm and dry. Findings: No rash. Neurological:      General: No focal deficit present. Mental Status: He is alert and oriented to person, place, and time. Mental status is at baseline. Psychiatric:         Mood and Affect: Mood normal.         Behavior: Behavior normal.         Diagnostic Study Results     Labs -     Recent Results (from the past 12 hour(s))   EKG, 12 LEAD, INITIAL    Collection Time: 05/16/22  4:48 AM   Result Value Ref Range    Ventricular Rate 88 BPM    Atrial Rate 88 BPM    P-R Interval 184 ms    QRS Duration 102 ms    Q-T Interval 368 ms    QTC Calculation (Bezet) 445 ms    Calculated P Axis 86 degrees    Calculated R Axis 73 degrees    Calculated T Axis -55 degrees    Diagnosis       Poor data quality, interpretation may be adversely affected  Sinus rhythm with fusion complexes  Incomplete right bundle branch block  Nonspecific ST and T wave abnormality  Abnormal ECG  When compared with ECG of 05-DEC-2021 16:28,  fusion complexes are now present     CBC WITH AUTOMATED DIFF    Collection Time: 05/16/22  4:52 AM   Result Value Ref Range    WBC 7.5 4.6 - 13.2 K/uL    RBC 4.51 4.35 - 5.65 M/uL    HGB 12.3 (L) 13.0 - 16.0 g/dL    HCT 39.4 36.0 - 48.0 %    MCV 87.4 78.0 - 100.0 FL    MCH 27.3 24.0 - 34.0 PG    MCHC 31.2 31.0 - 37.0 g/dL    RDW 13.2 11.6 - 14.5 %    PLATELET 145 559 - 582 K/uL    MPV 10.6 9.2 - 11.8 FL    NRBC 0.0 0  WBC    ABSOLUTE NRBC 0.00 0.00 - 0.01 K/uL    NEUTROPHILS 48 40 - 73 %    LYMPHOCYTES 38 21 - 52 %    MONOCYTES 7 3 - 10 %    EOSINOPHILS 6 (H) 0 - 5 %    BASOPHILS 1 0 - 2 %    IMMATURE GRANULOCYTES 0 0.0 - 0.5 %    ABS.  NEUTROPHILS 3.6 1.8 - 8.0 K/UL    ABS. LYMPHOCYTES 2.8 0.9 - 3.6 K/UL    ABS. MONOCYTES 0.6 0.05 - 1.2 K/UL    ABS. EOSINOPHILS 0.4 0.0 - 0.4 K/UL    ABS. BASOPHILS 0.0 0.0 - 0.1 K/UL    ABS. IMM. GRANS. 0.0 0.00 - 0.04 K/UL    DF AUTOMATED        Labs Reviewed   CBC WITH AUTOMATED DIFF - Abnormal; Notable for the following components:       Result Value    HGB 12.3 (*)     EOSINOPHILS 6 (*)     All other components within normal limits   METABOLIC PANEL, BASIC   TROPONIN-HIGH SENSITIVITY   MAGNESIUM       Radiologic Studies -   XR CHEST PORT    (Results Pending)     CT Results  (Last 48 hours)    None        CXR Results  (Last 48 hours)    None          The laboratory results, imaging results, and other diagnostic exams were reviewed in the EMR. Medical Decision Making   I am the first provider for this patient. I reviewed the vital signs, available nursing notes, past medical history, past surgical history, family history and social history. Vital Signs-Reviewed the patient's vital signs. ED EKG interpretation:  Rhythm: normal sinus rhythm; and regular . Rate (approx.): 88; Axis: normal; P wave: normal; QRS interval: normal ; ST/T wave: non-specific changes; Other findings: abnormal ekg. This EKG was interpreted by Rolf Kaplan D.O. Records Reviewed: Personally, on initial evaluation    MDM:   Jagruti Blunt presents with complaint of chest pain  DDX includes but is not limited to: ACS, MI, chest pain    Patient overall well-appearing, no acute distress, and vital signs grossly within normal limits. Will obtain lab work and imaging for further evaluation of patients complaint. Will continue to monitor and evaluate patient while in the ED.       Orders as below:  Orders Placed This Encounter    XR CHEST PORT    CBC WITH AUTOMATED DIFF    BASIC METABOLIC PANEL    TROPONIN-HIGH SENSITIVITY    MAGNESIUM    EKG, 12 LEAD, INITIAL    aspirin chewable tablet 324 mg        ED Course:   ED Course as of 05/16/22 [address]  May 16, 2022   1499 Patient's lab work significant for hemoglobin 12.3, otherwise labs grossly within normal limits. We will continue to monitor patient. [DV]      ED Course User Index  [DV] Juhi Spencer DO     5:53 AM : Pt care transferred to Dr. Pedrito Flynn  ,ED provider. History of patient complaint(s), available diagnostic reports and current treatment plan has been discussed thoroughly. Bedside rounding on patient occured : no . Intended disposition of patient : TBD  Pending diagnostics reports and/or labs (please list):  Rpt Trop and cardiology consult    Dr. Joaquin Preston assistance in completion of this plan is greatly appreciated but it should be noted that I will be the provider of record for this patient. Procedures:  Procedures        Diagnosis and Disposition     CLINICAL IMPRESSION:  No diagnosis found. Current Discharge Medication List          Disposition: TBD        Dragon Disclaimer     Please note that this dictation was completed with [employer], the computer voice recognition software. Quite often unanticipated grammatical, syntax, homophones, and other interpretive errors are inadvertently transcribed by the computer software. Please disregard these errors. Please excuse any errors that have escaped final proofreading. Rianna KHANNA.

## 2022-05-16 NOTE — DIABETES MGMT
Diabetes/ Glycemic Control Plan of Care      Recommendations:   1.) correctional lispro insulin. Order obtained. Dose modified to very resistant given 2 BG values above 200.  2.) add basal lantus insulin 10 units daily at bedtime starting tonight.    5/16/2022:  Patient with history of diabetes was seen this morning in ED and noted that he presented with report of chest pain. Lab  at 04:52 this morning. Noted patient given IV Solumedrol 125 mg x1 dose at 08:19  POC  at 10:27    Completed assessment of home diabetes management and education. Current A1c of 8.8 (5/16/2022) and patient reported that he takes prescribed diabetes medication, Metformin 1000 mg BID with meals and he is not on any other diabetes meds including insulin. Assessment:   DX:   1. Acute chest pain     2. Anemia, unspecified type     3. Hyperglycemia     4. NSTEMI (non-ST elevated myocardial infarction) [facility])  CARDIAC PROCEDURE    CARDIAC PROCEDURE   5. Coronary artery disease involving native coronary artery of native heart with angina pectoris ([facility] [address]     6. Hypertension, unspecified type     7. Chronic obstructive pulmonary disease with acute exacerbation (HCC)        Fasting/ Morning blood glucose:   Lab Results   Component Value Date/Time    Glucose 279 (H) 05/16/2022 04:52 AM    Glucose (POC) 278 (H) 05/16/2022 10:27 AM    Glucose (POC) 185 (H) 07/19/2014 05:28 PM     IV Fluids containing dextrose: None    Steroids:   None    Blood glucose values: Within target range (70-180mg/dL): No    Current insulin orders:   Correctional lispro insulin. Very resistant dose    Total Daily Dose previous 24 hours: N/A.  Patient presented to ED this morning, 5/16/2022    Current A1c:   Lab Results   Component Value Date/Time    Hemoglobin A1c 8.8 (H) 05/16/2022 04:52 AM      equivalent  to ave Blood Glucose of 206 mg/dl for 2-3 months prior to admission    Adequate glycemic control PTA: No    Nutrition/Diet:   Active Orders   Diet DIET NPO      Meal Intake:  No data found. Supplement Intake:  No data found. Home diabetes medications:  Patient reported on 5/16/2022 that he is only on Metformin 1000 mg twice daily at home. Key Antihyperglycemic Medications             insulin lispro (HUMALOG) 100 unit/mL injection Very Insulin Resistant For Blood Sugar (mg/dL) of:            Less than 150 =   0 units 150 -199 =   3 units 200 -249 =   6 units 250 -299 =   9 units 300 -349 =   12 units 350 and above =   15 units Initiate Hypoglycemic protocol if blood glucose is <70 mg/dL. Fast Acting - Administer Immediately - or within 15 minutes of start of meal, if mealtime coverage. insulin glargine (LANTUS,BASAGLAR) 100 unit/mL (3 mL) inpn 15 Units by SubCUTAneous route daily. glyBURIDE (DIABETA) 5 mg tablet Take 5 mg by mouth Daily (before breakfast). metFORMIN (GLUCOPHAGE) 1,000 mg tablet Take 1 Tab by mouth two (2) times daily (with meals). Plan/Goals:   Blood glucose will be within target of 70 - 180 mg/dl within 72 hours  Reinforce dietary and medication compliance at home.       Education:  [x] Refer to Diabetes Education Record: 5/16/2022                       [] Education not indicated at this time     Linden Gardner RN [facility]  Pager: [phone]

## 2022-05-16 NOTE — Clinical Note
TRANSFER - OUT REPORT:     Verbal report given to: Usman Carmona RN. Report consisted of patient's Situation, Background, Assessment and   Recommendations(SBAR). Opportunity for questions and clarification was provided. Patient transported with a Registered Nurse. Patient transported to: holding area.

## 2022-05-16 NOTE — Clinical Note
Contrast Dose Calculator:   Patient's age: 62.   Patient's sex: Male. Patient weight (kg) = 108. Creatinine level (mg/dL) = 0.85. Creatinine clearance (mL/min): 141. 18. Max Contrast dose per Creatinine Cl calculator = 317.65 mL.

## 2022-05-16 NOTE — PROGRESS NOTES
Right wrist D-STAT band removed, no bleeding or swelling. Sterile hemostatic dressing applied. Normal radial pulse,normal distal circulation and neuro check. Safety splint applied. Safety instructions reviewed with the patient.

## 2022-05-16 NOTE — Clinical Note
Right groin and right radial prepped with ChloraPrep and draped. Wet prep elapsed drying time: 3 mins. I personally performed the service described in the documentation recorded by the scribe in my presence, and it accurately and completely records my words and actions.

## 2022-05-16 NOTE — Clinical Note
Lesion 1: Guidewire redirected and used as the primary guidewire crosses lesion.  Re-wired from CX into mLAD lesion

## 2022-05-16 NOTE — CONSULTS
Cardiology Initial Patient Referral Note    Cardiology referral request from Dr. Valeri Webster for evaluation and management/treatment of chest pain    Date of  Admission: 5/16/2022  5:08 AM   Primary Care Physician:  Tristan Ashford MD    Attending Cardiologist: Dr. Meza Draper:     -Chest pain, negative trop x 2. -CAD, Hx NSTEMI 03/2020, s/p cardiac cath 3/12/2020 with findings as follows:  · 95% mid LAD stenosis with evidence of aneurysm. · S/p successful stent to proximal/mid LAD to residual 0%. · Occluded first diagonal branch. · S/p balloon angioplasty of diagonal branch to residual 0%. · Mildly diminished LV function with EF 45-50% with large anteroapical hypokinesis. -Echo 4/16/2021: EF 50-55%, normal LV cavity size and diastolic function, mildly to moderately increased LV wall thickness. -HTN  -DM  -COPD  -Hx COVID. -Tobacco abuse, 1 PPD smoker. Primary cardiologist is Dr. Kimball Beverage:     Addendum: Independently seen and evaluated. Agree with below. Patient has known history of CAD. He states that he had nonexertional chest pain quite similar to his previous presentation for myocardial infarction back in 2020. The quality of the chest discomfort is quite similar according to the patient. With his continued smoking and multiple coronary risk factors, we discussed repeat coronary angiography versus noninvasive coronary restratification. After the discussion, decision was made to proceed with coronary angiography and possible revascularization as needed. All questions answered. Both he and his wife agree with the plan. -Will plan for cardiac catheterization today. Procedure discussed with pt and significant other at bedside, to include risks/benefits, alternatives. All questions answered. Pt in agreement with proceeding this afternoon.  -Pt has been given  mg.   Resume ASA 81 mg starting tomorrow.  -Will resume remainder of cardiac medication regimen to include Plavix, Lopressor, Lipitor.  -Further recommendations based on cath findings. History of Present Illness: This is a 62 y.o. male admitted for No admission diagnoses are documented for this encounter. .    Patient complains of: chest pain      Roberto Pearl is a 62 y.o. male who presented to the ER due to chest pain that awakened him at around 03:00 today. Pt states that the pain persisted for 45 minutes, prompting him to seek evaluation. Patient reports associated L arm tingling. He has chronic shortness of breath associated with his COPD, which he reports is at baseline. He states he has used his home O2 approximately twice over the last month. He denies any PND/orthopnea. Has noticed some swelling in his legs recently, mostly after prolonged standing/walking.       Cardiac risk factors: smoking/ tobacco exposure, obesity, male gender, hypertension, known CAD      Review of Symptoms:  Except as stated above include:  Constitutional:  negative  Respiratory:  negative  Cardiovascular:  As per HPI  Gastrointestinal: negative  Genitourinary:  negative  Musculoskeletal:  Negative  Neurological:  Negative  Dermatological:  Negative  Endocrinological: Negative  Psychological:  Negative       Past Medical History:     Past Medical History:   Diagnosis Date    COPD [facility]chronic obstructive pulmonary disease) [facility] [address]     Diabetes ([facility] [address]     Emphysema     H/O cardiovascular stress test 2012    negative    Hypertension     MI (myocardial infarction) ([facility] [address]          Social History:     Social History     Socioeconomic History    Marital status: SINGLE   Tobacco Use    Smoking status: Current Every Day Smoker     Packs/day: 0.50     Types: Cigarettes    Smokeless tobacco: Never Used   Vaping Use    Vaping Use: Never used   Substance and Sexual Activity    Alcohol use: Yes     Comment: social only    Drug use: Yes     Types: Marijuana        Family History:     Family History   Problem Relation Age of Onset    Diabetes Mother         Medications:   No Known Allergies     No current facility-administered medications for this encounter. Current Outpatient Medications   Medication Sig    cyclobenzaprine (FLEXERIL) 10 mg tablet Take 1 Tablet by mouth three (3) times daily as needed for Muscle Spasm(s) (causes sedation, do not take while working/driving).  insulin lispro (HUMALOG) 100 unit/mL injection Very Insulin Resistant For Blood Sugar (mg/dL) of:            Less than 150 =   0 units 150 -199 =   3 units 200 -249 =   6 units 250 -299 =   9 units 300 -349 =   12 units 350 and above =   15 units Initiate Hypoglycemic protocol if blood glucose is <70 mg/dL. Fast Acting - Administer Immediately - or within 15 minutes of start of meal, if mealtime coverage.  insulin glargine (LANTUS,BASAGLAR) 100 unit/mL (3 mL) inpn 15 Units by SubCUTAneous route daily.  glyBURIDE (DIABETA) 5 mg tablet Take 5 mg by mouth Daily (before breakfast).  lidocaine (Lidoderm) 5 % Apply patch to the affected area for 12 hours a day and remove for 12 hours a day.  clopidogreL (PLAVIX) 75 mg tab TAKE ONE TABLET BY MOUTH DAILY    atorvastatin (LIPITOR) 80 mg tablet TAKE ONE TABLET BY MOUTH EVERY EVENING    albuterol sulfate 90 mcg/actuation aebs Take 1 Puff by inhalation every four to six (4-6) hours as needed for Wheezing.  metoprolol tartrate (LOPRESSOR) 25 mg tablet Take 1 Tab by mouth every twelve (12) hours. (Patient taking differently: Take 25 mg by mouth every twelve (12) hours.)    aspirin delayed-release 81 mg tablet Take 1 Tab by mouth daily.  glucagon (GLUCAGEN) 1 mg injection 1 mL by IntraMUSCular route as needed for Hypoglycemia.     Insulin Syringe-Needle U-100 0.3 mL 31 gauge x 5/16\" syrg TID as needed    lancets misc As directed    Blood-Glucose Meter monitoring kit As directed    glucose blood VI test strips (blood glucose test) strip As directed    metFORMIN (GLUCOPHAGE) 1,000 mg tablet Take 1 Tab by mouth two (2) times daily (with meals).  tiotropium (SPIRIVA WITH HANDIHALER) 18 mcg inhalation capsule Take 1 Cap by inhalation daily.  mometasone-formoterol (DULERA) 100-5 mcg/actuation HFAA HFA inhaler Take 2 Puffs by inhalation two (2) times a day. Physical Exam:     Visit Vitals  BP (!) 145/64 (BP 1 Location: Right upper arm, BP Patient Position: Sitting)   Pulse 91   Temp 98.1 °F (36.7 °C)   Resp 20   SpO2 92%         BP Readings from Last 3 Encounters:   05/16/22 (!) 145/64   05/03/22 134/74   04/12/22 (!) 162/92     Pulse Readings from Last 3 Encounters:   05/16/22 91   05/03/22 89   04/12/22 95     Wt Readings from Last 3 Encounters:   05/03/22 109.8 kg (242 lb)   04/12/22 109.8 kg (242 lb)   02/15/22 107.2 kg (236 lb 6.4 oz)       General:  alert, cooperative, no distress, appears stated age  Neck:  supple  Lungs:  Diffuse expiratory wheezing  Heart:  regular rate and rhythm  Abdomen:  abdomen is soft without significant tenderness, masses, organomegaly or guarding  Extremities:  atraumatic, trace edema  Skin: Warm and dry.    Neuro: alert, oriented x3, affect appropriate, no focal neurological deficits, moves all extremities well, no involuntary movements  Psych: non focal     Data Review:     Recent Labs     05/16/22  0452   WBC 7.5   HGB 12.3*   HCT 39.4        Recent Labs     05/16/22  0452      K 4.1      CO2 33*   *   BUN 13   CREA 0.85   CA 9.1   MG 1.6       Results for orders placed or performed during the hospital encounter of 05/16/22   EKG, 12 LEAD, INITIAL   Result Value Ref Range    Ventricular Rate 88 BPM    Atrial Rate 88 BPM    P-R Interval 184 ms    QRS Duration 102 ms    Q-T Interval 368 ms    QTC Calculation (Bezet) 445 ms    Calculated P Axis 86 degrees    Calculated R Axis 73 degrees    Calculated T Axis -55 degrees    Diagnosis       Poor data quality, interpretation may be adversely affected  Sinus rhythm with fusion complexes  Incomplete right bundle branch block  Nonspecific ST and T wave abnormality  Abnormal ECG    Confirmed by Ermias Angel MD, [employer] ([id number]) on 5/16/2022 8:39:54 AM         All Cardiac Markers in the last 24 hours:  No results found for: CPK, CK, CKMMB, CKMB, RCK3, CKMBT, CKNDX, CKND1, LIANE, TROPT, TROIQ, GILBERTO, TROPT, TNIPOC, BNP, BNPP    Last Lipid:    Lab Results   Component Value Date/Time    Cholesterol, total 118 (L) 04/01/2021 09:40 AM    HDL Cholesterol 35 (L) 04/01/2021 09:40 AM    LDL, calculated 58 04/01/2021 09:40 AM    Triglyceride 124 04/01/2021 09:40 AM    CHOL/HDL Ratio 3.4 04/01/2021 09:40 AM       Cardiographics:     EKG Results     Procedure 720 Value Units Date/Time    EKG, 12 LEAD, INITIAL [[id number]] Collected: 05/16/22 0448    Order Status: Completed Updated: 05/16/22 0840     Ventricular Rate 88 BPM      Atrial Rate 88 BPM      P-R Interval 184 ms      QRS Duration 102 ms      Q-T Interval 368 ms      QTC Calculation (Bezet) 445 ms      Calculated P Axis 86 degrees      Calculated R Axis 73 degrees      Calculated T Axis -55 degrees      Diagnosis --     Poor data quality, interpretation may be adversely affected  Sinus rhythm with fusion complexes  Incomplete right bundle branch block  Nonspecific ST and T wave abnormality  Abnormal ECG    Confirmed by Ermias Angel MD, [employer] ([id number]) on 5/16/2022 8:39:54 AM          04/14/21    ECHO ADULT COMPLETE 04/16/2021 4/16/2021    Interpretation Summary  · LV: Estimated LVEF is 50 - 55%. Visually measured ejection fraction. Normal cavity size and diastolic function. Mildly to moderately increased wall thickness. Low normal systolic function. Wall motion: unable to assess due to limited image quality. · RA: Mildly dilated right atrium. · PA: Pulmonary arterial systolic pressure is 35 mmHg. Pulmonary hypertension not suggested by Doppler findings. · LA: Left Atrium volume index is 31 mL/m2. · Image quality for this study was technically difficult.     Signed by: Alex Sanchez MD on 4/16/2021 11:45 AM        03/12/20    CARDIAC PROCEDURE 03/12/2020 3/16/2020    Conclusion  · Non-STEMI. · Mildly diminished LV function with EF of 45-50% with large anteroapical hypokinesis. · Coronary angiography reveals occluded first diagonal branch. He also has 95% long mid LAD stenosis with evidence of an aneurysm. · Successful balloon angioplasty of the diagonal branch to residual 0%. · Successful stent to proximal and mid LAD to residual 0%. · He needs to discontinue tobacco.  Addendum by: Lissette Vergara MD on 3/16/2020  8:28 AM    Signed by: Lissette Vergara MD on 3/12/2020  3:48 PM      XR Results (most recent):  Results from [place] encounter on 05/16/22    XR CHEST PORT    Narrative  EXAM: Chest Radiograph    INDICATION:  chest pain    TECHNIQUE: AP view of the chest    COMPARISON: 2/14/2022, 12/5/2021, 5/31/2021    FINDINGS: No pneumothorax identified. The lungs are clear. No infiltrates  appreciated. No effusions identified. The cardiomediastinal silhouette is  unremarkable. The pulmonary vasculature is unremarkable. The osseous  structures are unremarkable. Impression  1. No acute cardiopulmonary process.         Signed By: Fiona Holley PA-C     May 16, 2022

## 2022-05-16 NOTE — Clinical Note
TRANSFER - IN REPORT:     Verbal report received from: Ashli Enamorado RN. Report consisted of patient's Situation, Background, Assessment and   Recommendations(SBAR). Opportunity for questions and clarification was provided. Assessment completed upon patient's arrival to unit and care assumed. Patient transported with a Registered Nurse.

## 2022-05-17 VITALS
SYSTOLIC BLOOD PRESSURE: 134 MMHG | HEIGHT: 71 IN | BODY MASS INDEX: 33.18 KG/M2 | HEART RATE: 70 BPM | WEIGHT: 237 LBS | DIASTOLIC BLOOD PRESSURE: 80 MMHG | TEMPERATURE: 98.2 F | OXYGEN SATURATION: 94 % | RESPIRATION RATE: 20 BRPM

## 2022-05-17 LAB
ATRIAL RATE: 70 BPM
CALCULATED P AXIS, ECG09: 70 DEGREES
CALCULATED R AXIS, ECG10: 70 DEGREES
CALCULATED T AXIS, ECG11: 30 DEGREES
DIAGNOSIS, 93000: NORMAL
GLUCOSE BLD STRIP.AUTO-MCNC: 290 MG/DL (ref 70–110)
GLUCOSE BLD STRIP.AUTO-MCNC: 299 MG/DL (ref 70–110)
GLUCOSE BLD STRIP.AUTO-MCNC: 394 MG/DL (ref 70–110)
GLUCOSE BLD STRIP.AUTO-MCNC: 408 MG/DL (ref 70–110)
P-R INTERVAL, ECG05: 184 MS
Q-T INTERVAL, ECG07: 380 MS
QRS DURATION, ECG06: 118 MS
QTC CALCULATION (BEZET), ECG08: 410 MS
VENTRICULAR RATE, ECG03: 70 BPM

## 2022-05-17 PROCEDURE — 74011636637 HC RX REV CODE- 636/637: Performed by: INTERNAL MEDICINE

## 2022-05-17 PROCEDURE — 74011250637 HC RX REV CODE- 250/637: Performed by: STUDENT IN AN ORGANIZED HEALTH CARE EDUCATION/TRAINING PROGRAM

## 2022-05-17 PROCEDURE — 74011250637 HC RX REV CODE- 250/637: Performed by: INTERNAL MEDICINE

## 2022-05-17 PROCEDURE — 74011000250 HC RX REV CODE- 250: Performed by: INTERNAL MEDICINE

## 2022-05-17 PROCEDURE — 82962 GLUCOSE BLOOD TEST: CPT

## 2022-05-17 PROCEDURE — 99232 SBSQ HOSP IP/OBS MODERATE 35: CPT | Performed by: STUDENT IN AN ORGANIZED HEALTH CARE EDUCATION/TRAINING PROGRAM

## 2022-05-17 PROCEDURE — 74011000250 HC RX REV CODE- 250: Performed by: EMERGENCY MEDICINE

## 2022-05-17 PROCEDURE — 93005 ELECTROCARDIOGRAM TRACING: CPT

## 2022-05-17 PROCEDURE — 2709999900 HC NON-CHARGEABLE SUPPLY

## 2022-05-17 PROCEDURE — 99239 HOSP IP/OBS DSCHRG MGMT >30: CPT | Performed by: INTERNAL MEDICINE

## 2022-05-17 RX ORDER — METOPROLOL TARTRATE 50 MG/1
50 TABLET ORAL EVERY 12 HOURS
Status: DISCONTINUED | OUTPATIENT
Start: 2022-05-17 | End: 2022-05-17 | Stop reason: HOSPADM

## 2022-05-17 RX ORDER — ALBUTEROL SULFATE 0.63 MG/3ML
0.63 SOLUTION RESPIRATORY (INHALATION)
Qty: 25 NEBULE | Refills: 4 | Status: SHIPPED | OUTPATIENT
Start: 2022-05-17

## 2022-05-17 RX ORDER — METOPROLOL TARTRATE 25 MG/1
25 TABLET, FILM COATED ORAL ONCE
Status: COMPLETED | OUTPATIENT
Start: 2022-05-17 | End: 2022-05-17

## 2022-05-17 RX ORDER — METOPROLOL TARTRATE 50 MG/1
50 TABLET ORAL EVERY 12 HOURS
Qty: 60 TABLET | Refills: 0 | Status: SHIPPED | OUTPATIENT
Start: 2022-05-17 | End: 2022-10-27 | Stop reason: SDUPTHER

## 2022-05-17 RX ORDER — INSULIN GLARGINE 100 [IU]/ML
20 INJECTION, SOLUTION SUBCUTANEOUS
Status: DISCONTINUED | OUTPATIENT
Start: 2022-05-17 | End: 2022-05-17 | Stop reason: HOSPADM

## 2022-05-17 RX ORDER — PREDNISONE 20 MG/1
20 TABLET ORAL DAILY
Qty: 3 TABLET | Refills: 0 | Status: SHIPPED | OUTPATIENT
Start: 2022-05-17 | End: 2022-10-27 | Stop reason: ALTCHOICE

## 2022-05-17 RX ORDER — LISINOPRIL 10 MG/1
10 TABLET ORAL DAILY
Status: DISCONTINUED | OUTPATIENT
Start: 2022-05-17 | End: 2022-05-17 | Stop reason: HOSPADM

## 2022-05-17 RX ADMIN — PREDNISONE 20 MG: 20 TABLET ORAL at 08:31

## 2022-05-17 RX ADMIN — SODIUM CHLORIDE, PRESERVATIVE FREE 10 ML: 5 INJECTION INTRAVENOUS at 05:21

## 2022-05-17 RX ADMIN — Medication 9 UNITS: at 08:15

## 2022-05-17 RX ADMIN — SODIUM CHLORIDE, PRESERVATIVE FREE 10 ML: 5 INJECTION INTRAVENOUS at 14:00

## 2022-05-17 RX ADMIN — Medication 15 UNITS: at 16:32

## 2022-05-17 RX ADMIN — IPRATROPIUM BROMIDE AND ALBUTEROL SULFATE 3 ML: .5; 2.5 SOLUTION RESPIRATORY (INHALATION) at 10:22

## 2022-05-17 RX ADMIN — CLOPIDOGREL BISULFATE 75 MG: 75 TABLET ORAL at 08:31

## 2022-05-17 RX ADMIN — Medication 9 UNITS: at 11:49

## 2022-05-17 RX ADMIN — METOPROLOL TARTRATE 25 MG: 25 TABLET, FILM COATED ORAL at 10:22

## 2022-05-17 RX ADMIN — LISINOPRIL 10 MG: 10 TABLET ORAL at 10:22

## 2022-05-17 RX ADMIN — METOPROLOL TARTRATE 25 MG: 25 TABLET, FILM COATED ORAL at 08:31

## 2022-05-17 RX ADMIN — ASPIRIN 81 MG: 81 TABLET, COATED ORAL at 08:31

## 2022-05-17 RX ADMIN — FAMOTIDINE 20 MG: 20 TABLET ORAL at 08:31

## 2022-05-17 RX ADMIN — PREDNISONE 20 MG: 20 TABLET ORAL at 16:32

## 2022-05-17 NOTE — PROGRESS NOTES
TRANSFER - IN REPORT:    Verbal report received from [address] (name) on Lesvia El  being received from Cath Lab (unit) for routine progression of care      Report consisted of patients Situation, Background, Assessment and   Recommendations(SBAR). Information from the following report(s) SBAR, Procedure Summary, Intake/Output, MAR, Recent Results and Cardiac Rhythm NSR was reviewed with the receiving nurse. Opportunity for questions and clarification was provided. Assessment completed upon patients arrival to unit and care assumed. 2000 Patient arrived to CVTSD room 2302. Connected to monitor, NSR. Pt requesting to be placed on 3 L NC, which he states he wears at home PRN. No complaints of pain. R radial cath site is c/d/i, with no bruising or bleeding. VSS.     0600 EKG done per order.

## 2022-05-17 NOTE — MED STUDENT NOTES
*ATTENTION:  This note has been created by a medical student for educational purposes only. Please do not refer to the content of this note for clinical decision-making, billing, or other purposes. Please see attending physicians note to obtain clinical information on this patient. [facility]  Progress Note    Patient: Genaro Mosley Age: 62 y.o. : 1960 MR#: [id number] SSN: [id number]  Date/Time: 2022     CC: Chest Pain    Subjective:   Mr. Blossom Gautam was alert and oriented on exam. He was resting comfortably on his bed in no apparent distress. Wife was in the room with the patient. Patient shared history of symptoms which brought him to the hospital.   CAD risk factors and important life style modifications were discussed with the patient. He endorsed an understanding of needing to quit smoking, and stated his wife and him plan to quit on discharge from the hospital. Patient endorsed regular visits with his Cardiologist and PCP. Patient denied any new questions or concerns. Plan of care was discussed and the patient and family endorsed understanding of plan. Review of systems  General: No fevers or chills. Cardiovascular: No chest pain or pressure. No palpitations. Pulmonary: Mild shortness of breath relieved w/NEB, endorsed occasional cough and wheezing. Gastrointestinal: No abdominal pain, nausea, vomiting or diarrhea. Genitourinary: No urinary frequency, urgency, hesitancy or dysuria. Musculoskeletal: No joint or muscle pain, no back pain, no recent trauma. Neurologic: No headache, numbness, tingling or weakness. Assessment/Plan:   Patient is a 62year old male admitted d/t unprovoked chest pain, currently CAD s/p stent placement to LAD 2022, PMHx significant for NSTEMI s/p cardiac cath 3/2022, HTN, DM, COPD, and tobacco abuse.  Dispo: home with close outpatient f/u.     1. Chest pain, negative trop x 2.  2. CAD, s/p stent placement to proximal/mid LAD 2022   Hx NSTEMI 2020, s/p cardiac cath 3/12/2020 with findings as follows:   Mildly diminished LV function with EF 45-50% Echo 2021: EF 50-55%, normal LV cavity size and diastolic function, mildly to moderately increased LV wall thickness. Con't dual anti-platelet therapy (PLAXIX and Aspirin)  4. HTN    Con't Lopressor TAB 50 mg every day   5. Hyperlipidemia    Con't LIPITOR tab 80 mg PO every day   6. DM   -Con't SSI and monitor blood glucose levels   - Con't Lisinopril tablet 10 mg PO every day   7. COPD - emphysema   Requiring occasional home oxygen use   Discussed need for SpO2 stat monitoring at home   Con't DELTASONE tab 20 mg every day   8. Hx/o COVID w/pneumonia   9. Tobacco abuse, 1 PPD smoker, 45 years   Counseled patient on smoking cessation      I spent 30 minutes with the patient in face-to-face consultation, of which greater than 50% was spent in counseling and coordination of care as described above. Case discussed with:  [x]Patient  [x]Family  []Nursing  []Case Management  DVT Prophylaxis:  []Lovenox  []Hep SQ  []SCDs  []Coumadin   []Eliquis/Xarelto   [x] Aspirin and Plavex [x] Early abmulation    Objective:   VS:   Visit Vitals  BP (!) 149/74 (BP 1 Location: Left upper arm, BP Patient Position: Sitting)   Pulse 62   Temp 98.4 °F (36.9 °C)   Resp 20   Ht 5' 11\" (1.803 m)   Wt 107.5 kg (237 lb)   SpO2 94%   BMI 33.05 kg/m²      Tmax/24hrs: Temp (24hrs), Av.5 °F (36.9 °C), Min:97.9 °F (36.6 °C), Max:99.8 °F (37.7 °C)  IOBRIEFNo intake or output data in the 24 hours ending 22 1219    Physical Exam:  General:  Alert, cooperative, no acute distress    HEENT: PERRLA, anicteric sclerae. Pulmonary:  CTA Bilaterally. No Wheezing/Rales. Cardiovascular: Regular rate and Rhythm. GI:  Soft, Non distended, Non tender. + Bowel sounds. Extremities:  No edema. No calf tenderness. Psych: Good insight. Not anxious or agitated.   Neurologic: Alert and oriented X 3. Moves all ext.   Additional:    Medications:   Current Facility-Administered Medications   Medication Dose Route Frequency    lisinopriL (PRINIVIL, ZESTRIL) tablet 10 mg  10 mg Oral DAILY    metoprolol tartrate (LOPRESSOR) tablet 50 mg  50 mg Oral Q12H    clopidogreL (PLAVIX) tablet 75 mg  75 mg Oral DAILY    atorvastatin (LIPITOR) tablet 80 mg  80 mg Oral QPM    aspirin delayed-release tablet 81 mg  81 mg Oral DAILY    glucose chewable tablet 16 g  4 Tablet Oral PRN    glucagon (GLUCAGEN) injection 1 mg  1 mg IntraMUSCular PRN    dextrose 10% infusion 0-250 mL  0-250 mL IntraVENous PRN    0.9% sodium chloride infusion  100 mL/hr IntraVENous CONTINUOUS    sodium chloride (NS) flush 5-40 mL  5-40 mL IntraVENous Q8H    sodium chloride (NS) flush 5-40 mL  5-40 mL IntraVENous PRN    nitroglycerin (NITROLINGUAL) sublingual 0.4 mg/spray  1 Spray SubLINGual Q5MIN PRN    insulin glargine (LANTUS) injection 12 Units  12 Units SubCUTAneous QHS    predniSONE (DELTASONE) tablet 20 mg  20 mg Oral BID WITH MEALS    famotidine (PEPCID) tablet 20 mg  20 mg Oral DAILY    melatonin tablet 3 mg  3 mg Oral QHS PRN    albuterol-ipratropium (DUO-NEB) 2.5 MG-0.5 MG/3 ML  3 mL Nebulization Q4H PRN    insulin lispro (HUMALOG) injection   SubCUTAneous AC&HS       Labs:    Recent Results (from the past 24 hour(s))   GLUCOSE, POC    Collection Time: 05/16/22  5:21 PM   Result Value Ref Range    Glucose (POC) 309 (H) 70 - 110 mg/dL   GLUCOSE, POC    Collection Time: 05/16/22  9:17 PM   Result Value Ref Range    Glucose (POC) 280 (H) 70 - 110 mg/dL   EKG, 12 LEAD, SUBSEQUENT    Collection Time: 05/17/22  6:10 AM   Result Value Ref Range    Ventricular Rate 70 BPM    Atrial Rate 70 BPM    P-R Interval 184 ms    QRS Duration 118 ms    Q-T Interval 380 ms    QTC Calculation (Bezet) 410 ms    Calculated P Axis 70 degrees    Calculated R Axis 70 degrees    Calculated T Axis 30 degrees    Diagnosis       Normal sinus rhythm  Incomplete right bundle branch block  Lateral infarct , age undetermined  Abnormal ECG  When compared with ECG of 16-MAY-2022 04:48,  fusion complexes are no longer present  Confirmed by Yaneth Seymour ([id number]) on 5/17/2022 7:47:29 AM     GLUCOSE, POC    Collection Time: 05/17/22  7:14 AM   Result Value Ref Range    Glucose (POC) 299 (H) 70 - 110 mg/dL   GLUCOSE, POC    Collection Time: 05/17/22 11:02 AM   Result Value Ref Range    Glucose (POC) 290 (H) 70 - 110 mg/dL       Signed By: Zaira Schreiber     May 17, 2022

## 2022-05-17 NOTE — DISCHARGE SUMMARY
Discharge Summary     Patient ID:  Lesvia El  [id number]  16 y.o.  1960  Body mass index is 33.05 kg/m². PCP on record: Angelita Anderson MD    Admit date: 2022  Discharge date and time: 2022      Reason for admission: Chest pain     Discharge Diagnoses:                                           1 chest pain- Unstable angina  2 COPD Acute -   3 diabetes mellitus type 2  4 hypertension  5 history of CAD  6 Chronic respiratory failure - from #2   7 h/o Tobacco abuse - advised to stop tobacco in any form , patient and his wife both verbalized understanding       Consults: Cardiology          Hospital Course by problems:  -Patient with a history of CAD s/p stent in 2019, history of COPD emphysema, chronic tobacco abuse, diabetes mellitus type 2 morbid obesity, chronic backache admitted with history of substernal chest pain at rest associated with some dyspnea and radiation to left arm, patient was taken to cath where he was stented-details of cath procedure posted below    Post cath patient is fine early morning he had mild chest discomfort but since then he is asymptomatic ambulating without having any chest pain. Patient also had developed severe wheezing when he came initially secondary to his COPD exacerbation he was started on bronchodilator and on steroid with marked improvement. Patient has home oxygen for chronic respiratory failure secondary to COPD patient will continue using oxygen. Patient is being discharged home with outpatient follow-up with his PCP        Patient seen and examined by me on discharge day.   Pertinent Findings:  Stable     Significant Diagnostic Studies:  Lesvia El  CARDIAC PROCEDURE  Order# [id number]  Reading physician: Daniel Bradley MD Ordering physician: Daniel Bradley MD Study date: 22       Patient Information    Patient Name   Lesvia El MRN   [id number] Legal Sex   Male      1960 (62 y.o.)     Link to Procedure Log    Procedure Log     Physicians    Panel Physicians Referring Physician Case Authorizing Physician   Martha Hall MD (Primary)  Martha Hall MD     Assisting Physician    None     Procedures    CORONARY ANGIOGRAPHY   FRACTIONAL FLOW RESERVE   INSERT STENT RAFIQ CORONARY   LEFT HEART CATH   LEFT VENTRICULOGRAPHY       Pre Procedure Diagnosis    NSTEMI (non-ST elevated myocardial infarction) ([facility] [address] [I21.4]         Indications    NSTEMI (non-ST elevated myocardial infarction) ([facility] [address] [I21.4 (ICD-10-CM)]     Conclusion    · Chest pain consistent with coronary ischemia. · Previously stented first diagonal branch is closed with MAEVE 0-I flow. This lesion was left alone. · Mid LAD has new focal 85% stenosis beyond previous stent. This was stented using 2.75 mm RAFIQ to residual 0%. Previous stent IFR was 0.86. Post stent IFR was 1.00.  · Ostial circumflex is new 60% stenosis since 2020 cath. iFR of this lesion was 1.00. This lesion was left alone. · RCA is large and dominant. There is proximal 25-30% stenosis noted. This is also new since 2020. · LVEDP was 14 mmHg. EF is 40-45% with anteroapical hypokinesis. · Again, coronary anatomy was explained. He needs to discontinue tobacco use. He needs to continue on baby aspirin, Plavix, statins. · If able to resume physical activity without discomfort, he can be discharged home on 5/17/2022. Complications      Complications documented before study signed (5/16/2022  5:11 PM)       No complications were associated with this study. Documented by Martha Hall MD - 5/16/2022  5:07 PM        Coronary Findings      Diagnostic  Dominance: Right    Left Anterior Descending   Previously placed Prox LAD to Mid LAD drug eluting stent is widely patent. Previous treatment took place >2 years ago. The strut appears well apposed. Mid LAD lesion, 85% stenosed. Diagnostic coronary angiography shows negative for . Lesion is the culprit lesion.  The lesion is focal. The lesion was not previously treated. The stenosis was measured by a visual reading. Pressure wire/iFR was perfromed on the lesion. Flow Reserve: 0.86. The plaque feature of this lesion is concentric. First Diagonal Branch   1st Diag lesion, 100% stenosed. The lesion was previously treated using a drug-eluting stent. Previous treatment took place >2 years ago. Previous stent has restenosis present. The stenosis was measured by a visual reading. Left Circumflex   Ost Cx to Prox Cx lesion, 60% stenosed. Not the culprit lesion. The lesion is concentric. The lesion was not previously treated. The stenosis was measured by a visual reading. Pressure wire/iFR was perfromed on the lesion. Flow Reserve: 1. Checking with IFR   Right Coronary Artery   Prox RCA lesion, 30% stenosed. Not the culprit lesion. The stenosis was measured by a visual reading. Intervention      Mid LAD lesion   IVUS FFR OCT   FFR/iFR: iFR: 0.86. Angioplasty   Angioplasty was performed prior to stent deployment. The balloon used was a CATH BLLN ANGIO 2.69Z43YU SC EUPHORA RX. Lesion complication(s): no complications. Balloon inserted   Supplies used: CATH BLLN ANGIO 2.69G49NO SC EUPHORA RX   Angioplasty   Balloon removed   Supplies used: CATH BLLN ANGIO 2.35X05BM SC EUPHORA RX   Stent   A single stent was placed. Drug-eluting stent was successfully placed. The stent used was a STENT COR 2.96Y15GP RESOLUTE CECIL RX RAFIQ. Stent inserted The strut is well apposed. Supplies used: STENT COR [id number] RESOLUTE CECIL RX RAFIQ   Stent   Stent deployed, delivery system removed   Supplies used: STENT COR 2.93S64MM RESOLUTE CECIL RX RAFIQ   Post-Intervention Lesion Assessment   The intervention was successful. The guidewire crossed the lesion. Device was deployed. The pre-interventional distal flow is normal (MAEVE 3). Post-intervention MAEVE flow is 3. There were no complications. There is a 0% residual stenosis post intervention.    Ost Cx to Prox Cx lesion   IVUS FFR OCT FFR/iFR: iFR: 0.98. IVUS FFR OCT   FFR/iFR: iFR: 1.01.   IVUS FFR OCT   FFR/iFR: iFR: 1. There is a 60% residual stenosis post intervention. Left Heart    Left Ventricle The left ventricle is mildly dilated. There is mild left ventricular systolic dysfunction. LV end diastolic pressure is normal. LV EDP is: 14. The estimated EF = 40 - 45%. There are (is) wall motion abnormalities in the left ventricle. Wall Motion            Link to printable coronary/vascular diagram report    Coronary/Vascular Diagrams     Coronary Diagram      Diagnostic  Dominance: Right        Intervention          Wall Scoring    Wall Motion     The mid inferior, basilar anterior, basilar inferior and apical inferior segments are normal. The mid anterior and apical anterior segments are hypokinetic. Phase: Baseline    Data Systolic (mmHg) Diastolic (mmHg) Mean (mmHg) dP/dt (mmHg/sec) A Wave (mmHg) V Wave (mmHg)   LV Pressures 117    22     2064        AO Pressures 127    77    98                 Sedation Time    Moderate conscious sedation of 45 minutes 21 seconds was performed by an independent provider giving the medication per my discretion and monitoring the vital signs throughout the case. Contrast Administration    Contrast: Isovue. Contrast concentration: 300. Amount: 125 mL. Radiation Exposure      Event Details User   4:55 PM 5/16/22 Radiation Tracking Panel 1: Ninfa PAULA MD   Invasive Radiation (mGy) = 1733.000; Fluoro Time (min) = 8.7; DAP (cGy.cm2) = 9387.000 CS     Estimated Blood Loss/Specimen Collection    No specimen collected. Estimated Blood Loss: <30 mL.       Implants       Implant     Stent Cor 2.11k61ap Resolute [place] [employer] - [id number] - Implanted    Inventory item: STENT COR 2.48X03AM RESOLUTE CECIL RX CONNOR Model/Cat number: [id number]   : [employer]WD Lot number: [id number]   Device identifier: [id number] Device identifier type: [place] Po Information    Request status Successful     Brand name: Lon Rafael Version/Model: [id number]   Company name: [employer]. MRI safety info as of 5/16/22: MR Conditional   Contains dry or latex rubber: No     GMDN P.T. name: Drug-eluting coronary artery stent, non-bioabsorbable-polymer-coated       As of 5/16/2022    Status: Implanted          PACS Images          Pertinent Lab Data:  Recent Labs     05/16/22 0452   WBC 7.5   HGB 12.3*   HCT 39.4        Recent Labs     05/16/22 0452      K 4.1      CO2 33*   *   BUN 13   CREA 0.85   CA 9.1   MG 1.6       DISCHARGE MEDICATIONS:   @  Current Discharge Medication List      START taking these medications    Details   predniSONE (DELTASONE) 20 mg tablet Take 1 Tablet by mouth daily. Qty: 3 Tablet, Refills: 0  Start date: 5/17/2022      albuterol (ACCUNEB) 0.63 mg/3 mL nebulizer solution 3 mL by Nebulization route every six (6) hours as needed for Wheezing. Qty: 25 Nebule, Refills: 4  Start date: 5/17/2022         CONTINUE these medications which have CHANGED    Details   metoprolol tartrate (LOPRESSOR) 50 mg tablet Take 1 Tablet by mouth every twelve (12) hours. Qty: 60 Tablet, Refills: 0  Start date: 5/17/2022         CONTINUE these medications which have NOT CHANGED    Details   cyclobenzaprine (FLEXERIL) 10 mg tablet Take 1 Tablet by mouth three (3) times daily as needed for Muscle Spasm(s) (causes sedation, do not take while working/driving). Qty: 15 Tablet, Refills: 0      insulin lispro (HUMALOG) 100 unit/mL injection Very Insulin Resistant For Blood Sugar (mg/dL) of:            Less than 150 =   0 units 150 -199 =   3 units 200 -249 =   6 units 250 -299 =   9 units 300 -349 =   12 units 350 and above =   15 units Initiate Hypoglycemic protocol if blood glucose is <70 mg/dL. Fast Acting - Administer Immediately - or within 15 minutes of start of meal, if mealtime coverage.   Qty: 1 Each, Refills: 0      insulin glargine (LANTUS,BASAGLAR) 100 unit/mL (3 mL) inpn 15 Units by SubCUTAneous route daily. Qty: 1 Pen, Refills: 0      glyBURIDE (DIABETA) 5 mg tablet Take 5 mg by mouth Daily (before breakfast). lidocaine (Lidoderm) 5 % Apply patch to the affected area for 12 hours a day and remove for 12 hours a day. Qty: 10 Each, Refills: 0      clopidogreL (PLAVIX) 75 mg tab TAKE ONE TABLET BY MOUTH DAILY  Qty: 90 Tablet, Refills: 3      atorvastatin (LIPITOR) 80 mg tablet TAKE ONE TABLET BY MOUTH EVERY EVENING  Qty: 90 Tablet, Refills: 3      albuterol sulfate 90 mcg/actuation aebs Take 1 Puff by inhalation every four to six (4-6) hours as needed for Wheezing. Qty: 1 Each, Refills: 0      aspirin delayed-release 81 mg tablet Take 1 Tab by mouth daily. Qty: 30 Tab, Refills: 0      glucagon (GLUCAGEN) 1 mg injection 1 mL by IntraMUSCular route as needed for Hypoglycemia. Qty: 1 Vial, Refills: 0      Insulin Syringe-Needle U-100 0.3 mL 31 gauge x 5/16\" syrg TID as needed  Qty: 100 Syringe, Refills: 0      lancets misc As directed  Qty: 100 Each, Refills: 0      Blood-Glucose Meter monitoring kit As directed  Qty: 1 Kit, Refills: 0      glucose blood VI test strips (blood glucose test) strip As directed  Qty: 60 Strip, Refills: 0      metFORMIN (GLUCOPHAGE) 1,000 mg tablet Take 1 Tab by mouth two (2) times daily (with meals). Qty: 60 Tab, Refills: 0    Associated Diagnoses: Type II or unspecified type diabetes mellitus without mention of complication, uncontrolled      tiotropium (SPIRIVA WITH HANDIHALER) 18 mcg inhalation capsule Take 1 Cap by inhalation daily. Qty: 30 Cap, Refills: 0    Associated Diagnoses: COPD exacerbation ([address]      mometasone-formoterol (DULERA) 100-5 mcg/actuation HFAA HFA inhaler Take 2 Puffs by inhalation two (2) times a day.   Qty: 2 Inhaler, Refills: 0    Associated Diagnoses: COPD exacerbation ([address]               My Recommended Diet, Activity, Wound Care, and follow-up labs are listed in the patient's Discharge Insturctions which I have personally completed and reviewed. Disposition:     [x] Home with family     [] [facility] PT/RN   [] SNF/NH   [] Inpatient Rehab/HALIE  Condition at Discharge:  Stable    Follow up with:   PCP : Marlys Shah MD      Please follow-up tests/labs that are still pendin. None  2.    >30 minutes spent coordinating this discharge (review instructions/follow-up, prescriptions, preparing report for sign off)    Disclaimer: Sections of this note are dictated utilizing voice recognition software, which may have resulted in some phonetic based errors in grammar and contents. Even though attempts were made to correct all the mistakes, some may have been missed, and remained in the body of the document. If questions arise, please contact our department.     Signed:  Tiki Olson MD

## 2022-05-17 NOTE — DISCHARGE INSTRUCTIONS
Patient Education        ACE Inhibitors: Care Instructions  Your Care Instructions     ACE (angiotensin-converting enzyme) inhibitors lower blood pressure. They also treat heart failure and prevent heart attacks and strokes. They block an enzyme that makes blood vessels narrow. As a result, the blood vessels relax and widen. This lowers blood pressure. These medicines also put more water and salt into the urine. This lowers blood pressure too. These medicines are a good choice for people with diabetes. They don't affect blood sugar levels, and they may protect the kidneys. Examples include:  · Benazepril (Lotensin). · Lisinopril (Prinivil, Zestril). · Ramipril (Altace). Before you start taking this medicine, make sure your doctor knows if you take other medicines, especially water pills (diuretics) or potassium tablets. And tell your doctor if you use a salt substitute. You should not take an ACE inhibitor if you are pregnant or planning to become pregnant. You may need regular blood tests. Follow-up care is a key part of your treatment and safety. Be sure to make and go to all appointments, and call your doctor if you are having problems. It's also a good idea to know your test results and keep a list of the medicines you take. How can you care for yourself at home? · Take your medicines exactly as prescribed. Be sure to take your medicine every day. Call your doctor if you think you are having a problem with your medicine. · ACE inhibitors can cause a dry cough. If the cough is bad, talk to your doctor. You may need to try a different medicine. · ACE inhibitors can cause swelling of your lips, tongue, or face. If the swelling is severe, you may need treatment right away. Severe swelling can make it hard to breathe, but this is very rare. · Check with your doctor or pharmacist before you use any other medicines. This includes over-the-counter medicines.  Make sure your doctor knows all of the medicines, vitamins, herbal products, and supplements you take. Taking some medicines together can cause problems. When should you call for help? Call your doctor now or seek immediate medical care if:    · You have swelling of your lips, tongue, or face.     · You think you are having problems with your medicine. Watch closely for changes in your health, and be sure to contact your doctor if you have any problems. Where can you learn more? Go to http://www.gray.com/  Enter [id number] in the search box to learn more about \"ACE Inhibitors: Care Instructions. \"  Current as of: January 10, 2022               Content Version: 13.2  © 2006-2022 [employer]. Care instructions adapted under license by [employer] (which disclaims liability or warranty for this information). If you have questions about a medical condition or this instruction, always ask your healthcare professional. [address] any warranty or liability for your use of this information. Patient Education        Anemia: Care Instructions  Your Care Instructions     Anemia is a low level of red blood cells, which carry oxygen throughout your body. Many things can cause anemia. Lack of iron is one of the most common causes. Your body needs iron to make hemoglobin, a substance in red blood cells that carries oxygen from the lungs to your body's cells. Without enough iron, the body produces fewer and smaller red blood cells. As a result, your body's cells do not get enough oxygen, and you feel tired and weak. And you may have trouble concentrating. Bleeding is the most common cause of a lack of iron. You may have heavy menstrual bleeding or bleeding caused by conditions such as ulcers, hemorrhoids, or cancer. Regular use of aspirin or other anti-inflammatory medicines (such as ibuprofen) also can cause bleeding in some people.  A lack of iron in your diet also can cause anemia, especially at times when the body needs more iron, such as during pregnancy, infancy, and the teen years. Your doctor may have prescribed iron pills. It may take several months of treatment for your iron levels to return to normal. Your doctor also may suggest that you eat foods that are rich in iron, such as meat and beans. There are many other causes of anemia. It is not always due to a lack of iron. Finding the specific cause of your anemia will help your doctor find the right treatment for you. Follow-up care is a key part of your treatment and safety. Be sure to make and go to all appointments, and call your doctor if you are having problems. It's also a good idea to know your test results and keep a list of the medicines you take. How can you care for yourself at home? · Take your medicines exactly as prescribed. Call your doctor if you think you are having a problem with your medicine. · If your doctor recommends iron pills, take them as directed:  ? Try to take the pills on an empty stomach about 1 hour before or 2 hours after meals. But you may need to take iron with food to avoid an upset stomach. ? Do not take antacids or drink milk or caffeine drinks (such as coffee, tea, or cola) at the same time or within 2 hours of the time that you take your iron. They can make it hard for your body to absorb the iron. ? Vitamin C (from food or supplements) helps your body absorb iron. Try taking iron pills with a glass of orange juice or some other food that is high in vitamin C, such as citrus fruits. ? Iron pills may cause stomach problems, such as heartburn, nausea, diarrhea, constipation, and cramps. Be sure to drink plenty of fluids, and include fruits, vegetables, and fiber in your diet each day. Iron pills often make your bowel movements dark or green. ? If you forget to take an iron pill, do not take a double dose of iron the next time you take a pill. ? Keep iron pills out of the reach of small children.  An overdose of iron can be very dangerous. · Follow your doctor's advice about eating iron-rich foods. These include red meat, shellfish, poultry, eggs, beans, raisins, whole-grain bread, and leafy green vegetables. · Steam vegetables to help them keep their iron content. When should you call for help? Call 911 anytime you think you may need emergency care. For example, call if:    · You have symptoms of a heart attack. These may include:  ? Chest pain or pressure, or a strange feeling in the chest.  ? Sweating. ? Shortness of breath. ? Nausea or vomiting. ? Pain, pressure, or a strange feeling in the back, neck, jaw, or upper belly or in one or both shoulders or arms. ? Lightheadedness or sudden weakness. ? A fast or irregular heartbeat. After you call 911, the  may tell you to chew 1 adult-strength or 2 to 4 low-dose aspirin. Wait for an ambulance. Do not try to drive yourself.     · You passed out (lost consciousness). Call your doctor now or seek immediate medical care if:    · You have new or increased shortness of breath.     · You are dizzy or lightheaded, or you feel like you may faint.     · Your fatigue and weakness continue or get worse.     · You have any abnormal bleeding, such as:  ? Nosebleeds. ? Vaginal bleeding that is different (heavier, more frequent, at a different time of the month) than what you are used to.  ? Bloody or black stools, or rectal bleeding. ? Bloody or pink urine. Watch closely for changes in your health, and be sure to contact your doctor if:    · You do not get better as expected. Where can you learn more? Go to http://www.[facility].com/  Enter R301 in the search box to learn more about \"Anemia: Care Instructions. \"  Current as of: November 29, 2021               Content Version: 13.2  © 7347-9878 [employer].    Care instructions adapted under license by [employer] (which disclaims liability or warranty for this information). If you have questions about a medical condition or this instruction, always ask your healthcare professional. [place] [address] any warranty or liability for your use of this information.

## 2022-05-17 NOTE — PROGRESS NOTES
Bg 408 at 1530. Rechecked BG at 1600; . Notified Dr. Leann Alberts regarding discharging patient. Pt takes insulin at home and MD is okay with patient discharge.

## 2022-05-17 NOTE — PROGRESS NOTES
Reason for Admission:  Acute chest pain [R07.9]                 RUR Score:    14           Plan for utilizing home health:    No                      Likelihood of Readmission:   LOW                         Transition of Care Plan:              Initial assessment completed with patient. Cognitive status of patient: oriented to time, place, person and situation at time of assessment. Face sheet information confirmed:  yes. The patient designates friend, Fab Butler, to participate in his discharge plan and to receive any needed information. This patient lives alone in a single family home. The patient lives in a 1 level home. There are 3 steps to enter from the front and 3 steps to etner from the back. Patient is able to navigate steps as needed. Prior to hospitalization, patient was considered to be independent with ADLs/IADLS : yes . Patient has a current ACP document on file: no      Healthcare Decision Maker:     Click here to complete [address] including selection of the Healthcare Decision Maker Relationship (ie \"Primary\")    The patient's friend, Fab Butler, will be available to transport patient home upon discharge. The patient does not have any durable medical equipment available in the home. The patient is on 3 liters of oxygen at home. The patient receives oxygen [facility] in [place], [place]. The patient reported that he does not always utilize his oxygen. Patient is not currently active with home health. The patient reported that he previously used [facility]. Patient has not stayed in a skilled nursing facility or rehab. Was  stay within last 60 days : no. This patient is on dialysis :no         Currently, the discharge plan is Home. The patient states that he can obtain his medications from the pharmacy, and take his medications as directed. Patient's current insurance is [employer].   The patient currently receives [employer] disability. Care Management Interventions  PCP Verified by CM: Yes  Mode of Transport at Discharge:  Other (see comment) (Friend Wykimotr Vivar)  Transition of Care Consult (CM Consult): Discharge Planning  Support Systems: Friend/Neighbor  Confirm Follow Up Transport: Friends (Friend Kevin Vivar)  Discharge Location  Patient Expects to be Discharged to[de-identified] Home        BELGICA Cervantes

## 2022-05-17 NOTE — PROGRESS NOTES
Cardiology Progress Note    Admit Date: 5/16/2022  Attending Cardiologist: Dr. Maeve Mancini:     Hospital Problems  Date Reviewed: 4/12/2022          Codes Class Noted POA    Acute chest pain ICD-10-CM: R07.9  ICD-9-CM: 786.50  5/16/2022 Unknown              -Chest pain, negative trop x 2. -CAD, Hx NSTEMI 03/2020, s/p cardiac cath 3/12/2020 with findings as follows:  · 95% mid LAD stenosis with evidence of aneurysm. ? S/p successful stent to proximal/mid LAD to residual 0%. · Occluded first diagonal branch. ? S/p balloon angioplasty of diagonal branch to residual 0%. · Mildly diminished LV function with EF 45-50% with large anteroapical hypokinesis. -Echo 4/16/2021: EF 50-55%, normal LV cavity size and diastolic function, mildly to moderately increased LV wall thickness. -HTN  -DM  -COPD  -Hx COVID. -Tobacco abuse, 1 PPD smoker.     Primary cardiologist is Dr. Flako Pickering:     1. Status post coronary angiogram yesterday with LAD PCI as noted in the report. Continue dual antiplatelet therapy with aspirin and Plavix along with high intensity statin. 2.  We will uptitrate the beta-blocker to 50 mg twice daily. 3.  Given elevated blood pressures with low normal LVEF, we will plan to add lisinopril for better blood pressure control. 4.  Encourage ambulation, DuoNeb treatment given wheezing on exam and MiraLAX for his ongoing constipation. 5.  Once he ambulates he continues to remain chest pain-free with improved breathing, stable for discharge today versus tomorrow if he continues to have any symptoms    Subjective:     Notes 2 very short episodes lasting seconds to a minute of left-sided sharp chest discomfort this morning.     Objective:      Patient Vitals for the past 8 hrs:   Temp Pulse Resp BP SpO2   05/17/22 0716 97.9 °F (36.6 °C) 70 20 (!) 158/90 94 %   05/17/22 0505 98.1 °F (36.7 °C) 69 22 (!) 144/85 95 %   05/17/22 0400 -- 69 -- -- --         Patient Vitals for the past 96 hrs:   Weight 05/16/22 1118 107.5 kg (237 lb)       TELE: PAC               Current Facility-Administered Medications   Medication Dose Route Frequency Last Admin    lisinopriL (PRINIVIL, ZESTRIL) tablet 10 mg  10 mg Oral DAILY 10 mg at 05/17/22 1022    metoprolol tartrate (LOPRESSOR) tablet 50 mg  50 mg Oral Q12H      clopidogreL (PLAVIX) tablet 75 mg  75 mg Oral DAILY 75 mg at 05/17/22 0831    atorvastatin (LIPITOR) tablet 80 mg  80 mg Oral QPM 80 mg at 05/16/22 2119    aspirin delayed-release tablet 81 mg  81 mg Oral DAILY 81 mg at 05/17/22 0831    glucose chewable tablet 16 g  4 Tablet Oral PRN      glucagon (GLUCAGEN) injection 1 mg  1 mg IntraMUSCular PRN      dextrose 10% infusion 0-250 mL  0-250 mL IntraVENous PRN      0.9% sodium chloride infusion  100 mL/hr IntraVENous CONTINUOUS 100 mL/hr at 05/16/22 1150    sodium chloride (NS) flush 5-40 mL  5-40 mL IntraVENous Q8H 10 mL at 05/17/22 0521    sodium chloride (NS) flush 5-40 mL  5-40 mL IntraVENous PRN      nitroglycerin (NITROLINGUAL) sublingual 0.4 mg/spray  1 Spray SubLINGual Q5MIN PRN      insulin glargine (LANTUS) injection 12 Units  12 Units SubCUTAneous QHS 12 Units at 05/16/22 2129    predniSONE (DELTASONE) tablet 20 mg  20 mg Oral BID WITH MEALS 20 mg at 05/17/22 0831    famotidine (PEPCID) tablet 20 mg  20 mg Oral DAILY 20 mg at 05/17/22 0831    melatonin tablet 3 mg  3 mg Oral QHS PRN      albuterol-ipratropium (DUO-NEB) 2.5 MG-0.5 MG/3 ML  3 mL Nebulization Q4H PRN 3 mL at 05/17/22 1022    insulin lispro (HUMALOG) injection   SubCUTAneous AC&HS 9 Units at 05/17/22 0815       No intake or output data in the 24 hours ending 05/17/22 1050    Physical Exam:  General:  alert, cooperative, no distress, appears stated age  Neck:  nontender, no stridor  Lungs:  clear to auscultation bilaterally  Heart:  regular rate and rhythm, S1, S2 normal, no murmur, click, rub or gallop  Abdomen:  abdomen is soft without significant tenderness, masses, organomegaly or guarding  Extremities:  extremities normal, atraumatic, no cyanosis or edema. Right radial site looks clean dry and intact. Visit Vitals  BP (!) 158/90 (BP 1 Location: Left upper arm, BP Patient Position: At rest)   Pulse 70   Temp 97.9 °F (36.6 °C)   Resp 20   Ht 5' 11\" (1.803 m)   Wt 107.5 kg (237 lb)   SpO2 94%   BMI 33.05 kg/m²       Data Review:     Labs: Results:       Chemistry Recent Labs     05/16/22 0452   *      K 4.1      CO2 33*   BUN 13   CREA 0.85   CA 9.1   MG 1.6   AGAP 5   BUCR 15      CBC w/Diff Recent Labs     05/16/22 0452   WBC 7.5   RBC 4.51   HGB 12.3*   HCT 39.4      GRANS 48   LYMPH 38   EOS 6*      Cardiac Enzymes No results found for: CPK, CK, CKMMB, CKMB, RCK3, CKMBT, CKNDX, CKND1, LIANE, TROPT, TROIQ, GILBERTO, TROPT, TNIPOC, BNP, BNPP   Coagulation No results for input(s): PTP, INR, APTT, INREXT in the last 72 hours. Lipid Panel Lab Results   Component Value Date/Time    Cholesterol, total 118 (L) 04/01/2021 09:40 AM    HDL Cholesterol 35 (L) 04/01/2021 09:40 AM    LDL, calculated 58 04/01/2021 09:40 AM    Triglyceride 124 04/01/2021 09:40 AM    CHOL/HDL Ratio 3.4 04/01/2021 09:40 AM      BNP No results found for: BNP, BNPP, XBNPT   Liver Enzymes No results for input(s): TP, ALB, TBIL, AP in the last 72 hours.     No lab exists for component: SGOT, GPT, DBIL   Thyroid Studies Lab Results   Component Value Date/Time    TSH 0.854 04/01/2021 09:40 AM          Signed By: Frankie Friend MD     May 17, 2022

## 2022-05-17 NOTE — DIABETES MGMT
Diabetes/ Glycemic Control Plan of Care  Recommendations:   Blood glucose elevated above target range, 299 mg/dl this am.  Recommend increasing lantus dose to 20 units every bedtime. Continue corrective insulin coverage as needed. Already receiving very insulin resistant dosing. Will continue inpatient monitoring and assess need for insulin adjustments. Assessment:   DX:   1. Acute chest pain     2. Anemia, unspecified type     3. Hyperglycemia     4. NSTEMI (non-ST elevated myocardial infarction) [facility])  CARDIAC PROCEDURE    CARDIAC PROCEDURE   5. Coronary artery disease involving native coronary artery of native heart with angina pectoris ([facility] [address]     6. Hypertension, unspecified type     7. Chronic obstructive pulmonary disease with acute exacerbation (HCC)        Fasting/ Morning blood glucose:   Lab Results   Component Value Date/Time    Glucose 279 (H) 05/16/2022 04:52 AM    Glucose (POC) 290 (H) 05/17/2022 11:02 AM    Glucose (POC) 185 (H) 07/19/2014 05:28 PM     IV Fluids containing dextrose:  none      Rx Glucocorticoids (24h ago, onward)     Start     Dose Route Frequency Ordered Stop    05/17/22 0800  predniSONE (DELTASONE) tablet 20 mg         20 mg PO 2 TIMES DAILY WITH MEALS 05/16/22 1823 --            Blood glucose values:       Results for Kirsten Alvarado (MRN [id number]) as of 5/17/2022 12:46   Ref. Range 5/16/2022 10:27 5/16/2022 17:21 5/16/2022 21:17 5/17/2022 07:14 5/17/2022 11:02   GLUCOSE,FAST - POC Latest Ref Range: 70 - 110 mg/dL 278 (H) 309 (H) 280 (H) 299 (H) 290 (H)     Within target range (70-180mg/dL):  no   Current insulin orders:   Lantus 12 units every bedtime.   Lispro corrective insulin coverage AC&HS  Total Daily Dose previous 24 hours =  34 units   Current A1c:   Lab Results   Component Value Date/Time    Hemoglobin A1c 8.8 (H) 05/16/2022 04:52 AM      equivalent  to ave Blood Glucose of 206 mg/dl for 2-3 months prior to admission  Adequate glycemic control PTA:  no Nutrition/Diet:   Active Orders   Diet    ADULT DIET Regular; 4 carb choices (60 gm/meal)      Meal Intake:  Patient Vitals for the past 168 hrs:   % Diet Eaten   05/17/22 0815 76 - 100%     Supplement Intake:  No data found. Home diabetes medications:   Per previous diabetes educator note, pt only taking metformin at home currently  Key Antihyperglycemic Medications             insulin lispro (HUMALOG) 100 unit/mL injection Very Insulin Resistant For Blood Sugar (mg/dL) of:            Less than 150 =   0 units 150 -199 =   3 units 200 -249 =   6 units 250 -299 =   9 units 300 -349 =   12 units 350 and above =   15 units Initiate Hypoglycemic protocol if blood glucose is <70 mg/dL. Fast Acting - Administer Immediately - or within 15 minutes of start of meal, if mealtime coverage. insulin glargine (LANTUS,BASAGLAR) 100 unit/mL (3 mL) inpn 15 Units by SubCUTAneous route daily. glyBURIDE (DIABETA) 5 mg tablet Take 5 mg by mouth Daily (before breakfast). metFORMIN (GLUCOPHAGE) 1,000 mg tablet Take 1 Tab by mouth two (2) times daily (with meals). Plan/Goals:   Blood glucose will be within target of 70 - 180 mg/dl within 72 hours  Reinforce dietary and medication compliance at home.        Education:  [x] Refer to Diabetes Education Record                          [] Education not indicated at this time     Berenice Carpenter [facility] CDE  Ext [phone]

## 2022-05-17 NOTE — PROGRESS NOTES
Problem: Diabetes Self-Management  Goal: *Disease process and treatment process  Description: Define diabetes and identify own type of diabetes; list 3 options for treating diabetes. 5/17/2022 1528 by Anna Zafar RN  Outcome: Resolved/Met  5/17/2022 0920 by Anna Zafar RN  Outcome: Progressing Towards Goal  5/17/2022 0851 by Anna Zafar RN  Outcome: Progressing Towards Goal  Goal: *Incorporating nutritional management into lifestyle  Description: Describe effect of type, amount and timing of food on blood glucose; list 3 methods for planning meals. 5/17/2022 1528 by Anna Zafar RN  Outcome: Resolved/Met  5/17/2022 0920 by Anna Zafar RN  Outcome: Progressing Towards Goal  5/17/2022 0851 by Anna Zafar RN  Outcome: Progressing Towards Goal  Goal: *Incorporating physical activity into lifestyle  Description: State effect of exercise on blood glucose levels. 5/17/2022 1528 by Anna Zafar RN  Outcome: Resolved/Met  5/17/2022 0920 by Anna Zafar RN  Outcome: Progressing Towards Goal  5/17/2022 0851 by Anna Zafar RN  Outcome: Progressing Towards Goal  Goal: *Developing strategies to promote health/change behavior  Description: Define the ABC's of diabetes; identify appropriate screenings, schedule and personal plan for screenings. 5/17/2022 1528 by Anna Zafar RN  Outcome: Resolved/Met  5/17/2022 0920 by Anna Zafar RN  Outcome: Progressing Towards Goal  5/17/2022 0851 by Anna Zafar RN  Outcome: Progressing Towards Goal  Goal: *Using medications safely  Description: State effect of diabetes medications on diabetes; name diabetes medication taking, action and side effects.   5/17/2022 1528 by Anna Zafar RN  Outcome: Resolved/Met  5/17/2022 0920 by Anna Zafar RN  Outcome: Progressing Towards Goal  5/17/2022 0851 by Anna Zafar RN  Outcome: Progressing Towards Goal  Goal: *Monitoring blood glucose, interpreting and using results  Description: Identify recommended blood glucose targets  and personal targets. 5/17/2022 1528 by Genesis Jarrell RN  Outcome: Resolved/Met  5/17/2022 0920 by Genesis Jarrell, RN  Outcome: Progressing Towards Goal  5/17/2022 0851 by Genesis Jarrell, RN  Outcome: Progressing Towards Goal  Goal: *Prevention, detection, treatment of acute complications  Description: List symptoms of hyper- and hypoglycemia; describe how to treat low blood sugar and actions for lowering  high blood glucose level. 5/17/2022 1528 by Genesis Jarrell RN  Outcome: Resolved/Met  5/17/2022 0920 by Genesis Jarrell RN  Outcome: Progressing Towards Goal  5/17/2022 0851 by Genesis Jarrell RN  Outcome: Progressing Towards Goal  Goal: *Prevention, detection and treatment of chronic complications  Description: Define the natural course of diabetes and describe the relationship of blood glucose levels to long term complications of diabetes.   5/17/2022 1528 by Genesis Jarrell RN  Outcome: Resolved/Met  5/17/2022 0920 by Genesis Jarrell RN  Outcome: Progressing Towards Goal  5/17/2022 0851 by Genesis Jarrell RN  Outcome: Progressing Towards Goal  Goal: *Developing strategies to address psychosocial issues  Description: Describe feelings about living with diabetes; identify support needed and support network  5/17/2022 1528 by Genesis Jarrell RN  Outcome: Resolved/Met  5/17/2022 0920 by Genesis Jarrell RN  Outcome: Progressing Towards Goal  5/17/2022 0851 by Genesis Jarrell RN  Outcome: Progressing Towards Goal  Goal: *Insulin pump training  5/17/2022 1528 by Genesis Jarrell, RN  Outcome: Resolved/Met  5/17/2022 0920 by Genesis Jarrell RN  Outcome: Progressing Towards Goal  5/17/2022 0851 by Genesis Jarrell RN  Outcome: Progressing Towards Goal  Goal: *Sick day guidelines  5/17/2022 1528 by Genesis Jarrell, RN  Outcome: Resolved/Met  5/17/2022 0920 by Genesis Jarrell, RN  Outcome: Progressing Towards Goal  5/17/2022 3035 by Reuben Salcedo RN  Outcome: Progressing Towards Goal  Goal: *Patient Specific Goal (EDIT GOAL, INSERT TEXT)  5/17/2022 1528 by Reuben Salcedo RN  Outcome: Resolved/Met  5/17/2022 0920 by Reuben Salcedo RN  Outcome: Progressing Towards Goal  5/17/2022 0851 by Reuben Salcedo RN  Outcome: Progressing Towards Goal     Problem: Patient Education: Go to Patient Education Activity  Goal: Patient/Family Education  5/17/2022 1528 by Reuben Salcedo RN  Outcome: Resolved/Met  5/17/2022 0920 by Reuben Salcedo RN  Outcome: Progressing Towards Goal  5/17/2022 0851 by Reuben Salcedo RN  Outcome: Progressing Towards Goal     Problem: Falls - Risk of  Goal: *Absence of Falls  Description: Document Alirio Fall Risk and appropriate interventions in the flowsheet. 5/17/2022 1528 by Reuben Salcedo RN  Outcome: Resolved/Met  Note: Fall Risk Interventions:            Medication Interventions: Assess postural VS orthostatic hypotension,Bed/chair exit alarm                5/17/2022 0920 by Reuben Salcedo RN  Outcome: Progressing Towards Goal  Note: Fall Risk Interventions:            Medication Interventions: Patient to call before getting OOB,Teach patient to arise slowly                5/17/2022 0851 by Reuben Salcedo RN  Outcome: Progressing Towards Goal  Note: Fall Risk Interventions:            Medication Interventions: Patient to call before getting OOB,Teach patient to arise slowly                   Problem: Patient Education: Go to Patient Education Activity  Goal: Patient/Family Education  5/17/2022 1528 by Reuben Salcedo RN  Outcome: Resolved/Met  5/17/2022 0920 by Reuben Salcedo RN  Outcome: Progressing Towards Goal  5/17/2022 0851 by Reuben Salcedo RN  Outcome: Progressing Towards Goal     Problem: Pressure Injury - Risk of  Goal: *Prevention of pressure injury  Description: Document Bean Scale and appropriate interventions in the flowsheet.   Outcome: Resolved/Met  Note: Pressure Injury Interventions:  Sensory Interventions: Assess changes in LOC         Activity Interventions: Assess need for specialty bed    Mobility Interventions: Assess need for specialty bed    Nutrition Interventions: Document food/fluid/supplement intake    Friction and Shear Interventions: Apply protective barrier, creams and emollients                Problem: Patient Education: Go to Patient Education Activity  Goal: Patient/Family Education  Outcome: Resolved/Met

## 2022-05-17 NOTE — PROGRESS NOTES
Problem: Diabetes Self-Management  Goal: *Disease process and treatment process  Description: Define diabetes and identify own type of diabetes; list 3 options for treating diabetes. Outcome: Progressing Towards Goal  Goal: *Incorporating nutritional management into lifestyle  Description: Describe effect of type, amount and timing of food on blood glucose; list 3 methods for planning meals. Outcome: Progressing Towards Goal  Goal: *Incorporating physical activity into lifestyle  Description: State effect of exercise on blood glucose levels. Outcome: Progressing Towards Goal  Goal: *Developing strategies to promote health/change behavior  Description: Define the ABC's of diabetes; identify appropriate screenings, schedule and personal plan for screenings. Outcome: Progressing Towards Goal  Goal: *Using medications safely  Description: State effect of diabetes medications on diabetes; name diabetes medication taking, action and side effects. Outcome: Progressing Towards Goal  Goal: *Monitoring blood glucose, interpreting and using results  Description: Identify recommended blood glucose targets  and personal targets. Outcome: Progressing Towards Goal  Goal: *Prevention, detection, treatment of acute complications  Description: List symptoms of hyper- and hypoglycemia; describe how to treat low blood sugar and actions for lowering  high blood glucose level. Outcome: Progressing Towards Goal  Goal: *Prevention, detection and treatment of chronic complications  Description: Define the natural course of diabetes and describe the relationship of blood glucose levels to long term complications of diabetes.   Outcome: Progressing Towards Goal  Goal: *Developing strategies to address psychosocial issues  Description: Describe feelings about living with diabetes; identify support needed and support network  Outcome: Progressing Towards Goal  Goal: *Insulin pump training  Outcome: Progressing Towards Goal  Goal: *Sick day guidelines  Outcome: Progressing Towards Goal  Goal: *Patient Specific Goal (EDIT GOAL, INSERT TEXT)  Outcome: Progressing Towards Goal     Problem: Patient Education: Go to Patient Education Activity  Goal: Patient/Family Education  Outcome: Progressing Towards Goal     Problem: Falls - Risk of  Goal: *Absence of Falls  Description: Document Federico Delgado Fall Risk and appropriate interventions in the flowsheet.   Outcome: Progressing Towards Goal  Note: Fall Risk Interventions:            Medication Interventions: Patient to call before getting OOB,Teach patient to arise slowly                   Problem: Patient Education: Go to Patient Education Activity  Goal: Patient/Family Education  Outcome: Progressing Towards Goal

## 2022-05-17 NOTE — PROGRESS NOTES
Discharge order noted for today. Met with patient and he is agreeable to the transition plan today. Transport has been arranged through friend Fab Butler.   Discharge information has been documented on the AVS.       BELGICA Munoz

## 2022-05-17 NOTE — PROGRESS NOTES
Ambulated pt after neb treatment. Pt denies SOB during ambulation on room air. O2 sat 89-91% during ambulation. O2 sat 96% in room after ambulation on room air, /80.

## 2022-05-17 NOTE — DIABETES MGMT
Diabetes Patient/Family Education Record    Factors That May Influence Patients Ability to Learn or Comply with Recommendations   []   Language barrier    []   Cultural needs   [x]   Motivation    []   Cognitive limitation    []   Physical   [x]   Education    []   Physiological factors   []   Hearing/vision/speaking impairment   []   Rastafari beliefs    []   Financial factors   []  Other:   []  No factors identified at this time. Person Instructed:   [x]   Patient   [x]   Family  (wife)   []  Other     Preference for Learning:   [x]   Verbal   []   Written   []  Demonstration     Level of Comprehension & Competence:    [x]  Good                                      [] Fair                                     []  Poor                             []  Needs Reinforcement   []  Teach back completed    Education Component:   [x]  Medication management, including how to administer insulin (if appropriate) and potential medication interactions   · Reviewed how to use insulin pens with patient and patients wife. She states she is familiar with the pen because her  was recently prescribed insulin for one week while he was on steroids. · Patient's wife states she feels very comfortable administering insulin to her . No questions or concerns voiced.    []  Nutritional management - [] Obtained usual meal pattern   []   Basic carbohydrate counting  []  Plate method  []  Limit concentrated sweets and avoid sweetened beverages  []  Portion control  []    Avoid skipping meals   []  Exercise   [x]  Signs, symptoms, and treatment of hyperglycemia and hypoglycemia   [x] Prevention, recognition and treatment of hyperglycemia and hypoglycemia   [x]  Importance of blood glucose monitoring  [x] Blood Glucose targets   []   Provided patient with blood glucose meter  []  Has glucometer and supplies at home   []  Instruction on use of the blood glucose meter and recommended monitoring schedule   [x]  Discuss the importance of HbA1C monitoring. Patients A1c is 8.8 %. This is equivalent to average glucose of 206 mg/dl for the past 2-3 months.   []  Sick day guidelines   [x]  Proper use and disposal of lancets, needles, syringes or insulin pens (if appropriate)   [x]  Potential long-term complications (retinopathy, kidney disease, neuropathy, foot care)   [x] Information about whom to contact in case of emergency or for more information    [x]  Goal:  Patient/family will demonstrate understanding of Diabetes Self- Management Skills by: (date) _______  Plan for post-discharge education or self-management support:    [] Outpatient class schedule provided            [] Patient Declined    [] Scheduled for outpatient classes (date) _______    [] Written information provided  Verify: [x] Prior to admission Diabetes medications    Does patient understand how diabetes medications work? Yes   Does patient have difficulty obtaining diabetes medications or testing supplies?   Uses [facility] for prescriptions

## 2022-05-22 NOTE — PROGRESS NOTES
[address] presents today for a post-hospital follow-up of CAD. He was hospitalized from 5/16/22 through 5/17/22 for chest pain, acute COPD. He presented with complaints of substernal chest pain at rest associated with some dyspnea and radiation to left arm. He underwent cardiac catheterization which showed:  · Chest pain consistent with coronary ischemia. · Previously stented first diagonal branch is closed with MAEVE 0-I flow. This lesion was left alone. · Mid LAD has new focal 85% stenosis beyond previous stent. This was stented using 2.75 mm RAFIQ to residual 0%. Previous stent IFR was 0.86. Post stent IFR was 1.00.  · Ostial circumflex is new 60% stenosis since 2020 cath. iFR of this lesion was 1.00. This lesion was left alone. · RCA is large and dominant. There is proximal 25-30% stenosis noted. This is also new since 2020. · LVEDP was 14 mmHg. EF is 40-45% with anteroapical hypokinesis. · Again, coronary anatomy was explained. He needs to discontinue tobacco use. He needs to continue on baby aspirin, Plavix, statins. · If able to resume physical activity without discomfort, he can be discharged home on 5/17/2022. He is a 62year old male with history of CAD (hx of non-STEMI in March 2020), hypertension, hyperlipidemia, diabetes, COPD, tobacco use. His last echo was done in April 2021 and it showed an EF of 50-55%, PASP of 35 mmHg. He states that he is going to begin using oxygen therapy at home. He states that he received a call from Cardiac rehab today stating that someone ordered cardiac rehab for him. Denies chest pain, tightness, heaviness, and palpitations. Denies shortness of breath at rest, dyspnea on exertion, orthopnea and PND. Denies abdominal bloating. Denies lightheadedness, dizziness, and syncope. Denies lower extremity edema and claudication. Denies nausea, vomiting, diarrhea, melena, hematochezia. Denies hematuria, urgency, frequency. Denies fever, chills.       He reports that he quit smoking on 5/19/22. PMH:  Past Medical History:   Diagnosis Date    [facility]     Diabetes ([facility]     Emphysema     H/O cardiovascular stress test 2012    negative    Hypertension     MI (myocardial infarction) ([facility]        PSH:  Past Surgical History:   Procedure Laterality Date    HX CORONARY STENT PLACEMENT         MEDS:  Current Outpatient Medications   Medication Sig    metoprolol tartrate (LOPRESSOR) 50 mg tablet Take 1 Tablet by mouth every twelve (12) hours.  predniSONE (DELTASONE) 20 mg tablet Take 1 Tablet by mouth daily.  albuterol (ACCUNEB) 0.63 mg/3 mL nebulizer solution 3 mL by Nebulization route every six (6) hours as needed for Wheezing.  cyclobenzaprine (FLEXERIL) 10 mg tablet Take 1 Tablet by mouth three (3) times daily as needed for Muscle Spasm(s) (causes sedation, do not take while working/driving).  insulin lispro (HUMALOG) 100 unit/mL injection Very Insulin Resistant For Blood Sugar (mg/dL) of:            Less than 150 =   0 units 150 -199 =   3 units 200 -249 =   6 units 250 -299 =   9 units 300 -349 =   12 units 350 and above =   15 units Initiate Hypoglycemic protocol if blood glucose is <70 mg/dL. Fast Acting - Administer Immediately - or within 15 minutes of start of meal, if mealtime coverage.  insulin glargine (LANTUS,BASAGLAR) 100 unit/mL (3 mL) inpn 15 Units by SubCUTAneous route daily.  glyBURIDE (DIABETA) 5 mg tablet Take 5 mg by mouth Daily (before breakfast).  lidocaine (Lidoderm) 5 % Apply patch to the affected area for 12 hours a day and remove for 12 hours a day.  clopidogreL (PLAVIX) 75 mg tab TAKE ONE TABLET BY MOUTH DAILY    atorvastatin (LIPITOR) 80 mg tablet TAKE ONE TABLET BY MOUTH EVERY EVENING    albuterol sulfate 90 mcg/actuation aebs Take 1 Puff by inhalation every four to six (4-6) hours as needed for Wheezing.     aspirin delayed-release 81 mg tablet Take 1 Tab by mouth daily.  glucagon (GLUCAGEN) 1 mg injection 1 mL by IntraMUSCular route as needed for Hypoglycemia.  Insulin Syringe-Needle U-100 0.3 mL 31 gauge x 5/16\" syrg TID as needed    lancets misc As directed    Blood-Glucose Meter monitoring kit As directed    glucose blood VI test strips (blood glucose test) strip As directed    metFORMIN (GLUCOPHAGE) 1,000 mg tablet Take 1 Tab by mouth two (2) times daily (with meals).  tiotropium (SPIRIVA WITH HANDIHALER) 18 mcg inhalation capsule Take 1 Cap by inhalation daily.  mometasone-formoterol (DULERA) 100-5 mcg/actuation HFAA HFA inhaler Take 2 Puffs by inhalation two (2) times a day. No current facility-administered medications for this visit. Allergies and Sensitivities:  No Known Allergies    Family History:  Family History   Problem Relation Age of Onset    Diabetes Mother        Social History:  He  reports that he has been smoking cigarettes. He has been smoking about 0.50 packs per day. He has never used smokeless tobacco.  He  reports current alcohol use. Physical:  Visit Vitals  /72 (BP 1 Location: Left upper arm, BP Patient Position: Sitting, BP Cuff Size: Large adult)   Pulse 63   Ht 5' 11\" (1.803 m)   Wt 109.8 kg (242 lb)   SpO2 95%   BMI 33.75 kg/m²         Exam:  Neck:  Supple, no JVD, no carotid bruits  CV:  Normal S1 and  S2, no murmurs, rubs, or gallops noted  Lungs:  Clear to ausculation throughout, no wheezes or rales  Abd:  Soft, non-tender, obese, with good bowel sounds.   No hepatosplenomegaly  Extremities:  No edema      Data:  EKG:      LABS:  Lab Results   Component Value Date/Time    Sodium 141 05/16/2022 04:52 AM    Potassium 4.1 05/16/2022 04:52 AM    Chloride 103 05/16/2022 04:52 AM    CO2 33 (H) 05/16/2022 04:52 AM    Glucose 279 (H) 05/16/2022 04:52 AM    BUN 13 05/16/2022 04:52 AM    Creatinine 0.85 05/16/2022 04:52 AM     Lab Results   Component Value Date/Time    Cholesterol, total 118 (L) 04/01/2021 09:40 AM    HDL Cholesterol 35 (L) 04/01/2021 09:40 AM    LDL, calculated 58 04/01/2021 09:40 AM    Triglyceride 124 04/01/2021 09:40 AM    CHOL/HDL Ratio 3.4 04/01/2021 09:40 AM     Lab Results   Component Value Date/Time    ALT (SGPT) 20 05/03/2022 02:20 PM         Impression/Plan:  1.  CAD, s/p non-STEMI and s/p PCI/stent to a new focal 85% stenosis in the mid LAD beyond the previous stent. Also has a new 60% lesion in the ostial circumflex  2. Hypertension, blood pressure controlled  3. Hyperlipidemia, on atorvastatin 80mg  4. Diabetes mellitus, recommend Hgb A1c less than 7% from cardiac standpoint  5. COPD  6. Tobacco abuse, states he quit on 5/19/22    Mr. Federico Thurman was seen today for a post-hospital follow-up. Since being discharged, he reports that he quit smoking 2 days later. His girlfriend is also going to quit to support him. He offers no complaints of chest pain and admits to chronic shortness of breath. He states that he is going to begin using oxygen at home. He reports compliance with his medications and he was reminded of the importance of taking his Plavix and ASA daily without fail. He states that he has never been prescribed sublingual nitroglycerin. This was prescribed today and teaching done regarding its use. He received a call from Cardiac rehab informing him that this service has been requested. He was not aware if cardiac rehab was requested prior to discharge. He wanted to make sure that it was requested and he is willing to participate if it is covered by [facility]. He had several questions regarding cardiac rehab and I answered those that I could. He is going to call them tomorrow and will discuss the remaining questions with cardiac rehab staff. His blood pressure is controlled, breath sounds are clear with slight wheezing noted anteriorly. He does not exhibit any signs of volume overload.       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 He is considering slowing down somewhat and getting a \"young millicent\" to do more of the physical labor. He will follow-up with Dr. Nataly Lopes as scheduled and as needed. Kadie Mckinney MSN, FNP-BC     Please note:  Portions of this chart were created with Dragon medical speech to text program.  Unrecognized errors may be present.

## 2022-05-25 ENCOUNTER — OFFICE VISIT (OUTPATIENT)
Dept: CARDIOLOGY CLINIC | Age: 62
End: 2022-05-25
Payer: MEDICARE

## 2022-05-25 VITALS
DIASTOLIC BLOOD PRESSURE: 72 MMHG | SYSTOLIC BLOOD PRESSURE: 130 MMHG | HEART RATE: 63 BPM | HEIGHT: 71 IN | BODY MASS INDEX: 33.88 KG/M2 | WEIGHT: 242 LBS | OXYGEN SATURATION: 95 %

## 2022-05-25 DIAGNOSIS — R06.02 SOB (SHORTNESS OF BREATH): ICD-10-CM

## 2022-05-25 DIAGNOSIS — R07.9 CHEST PAIN, UNSPECIFIED TYPE: ICD-10-CM

## 2022-05-25 DIAGNOSIS — I21.4 NSTEMI (NON-ST ELEVATED MYOCARDIAL INFARCTION) (HCC): ICD-10-CM

## 2022-05-25 DIAGNOSIS — I25.10 CORONARY ARTERY DISEASE INVOLVING NATIVE CORONARY ARTERY OF NATIVE HEART WITHOUT ANGINA PECTORIS: Primary | ICD-10-CM

## 2022-05-25 DIAGNOSIS — R42 DIZZINESS: ICD-10-CM

## 2022-05-25 PROCEDURE — 93000 ELECTROCARDIOGRAM COMPLETE: CPT | Performed by: NURSE PRACTITIONER

## 2022-05-25 PROCEDURE — G8752 SYS BP LESS 140: HCPCS | Performed by: NURSE PRACTITIONER

## 2022-05-25 PROCEDURE — 1111F DSCHRG MED/CURRENT MED MERGE: CPT | Performed by: NURSE PRACTITIONER

## 2022-05-25 PROCEDURE — G8432 DEP SCR NOT DOC, RNG: HCPCS | Performed by: NURSE PRACTITIONER

## 2022-05-25 PROCEDURE — 99214 OFFICE O/P EST MOD 30 MIN: CPT | Performed by: NURSE PRACTITIONER

## 2022-05-25 PROCEDURE — G8427 DOCREV CUR MEDS BY ELIG CLIN: HCPCS | Performed by: NURSE PRACTITIONER

## 2022-05-25 PROCEDURE — 3017F COLORECTAL CA SCREEN DOC REV: CPT | Performed by: NURSE PRACTITIONER

## 2022-05-25 PROCEDURE — G8754 DIAS BP LESS 90: HCPCS | Performed by: NURSE PRACTITIONER

## 2022-05-25 PROCEDURE — G8417 CALC BMI ABV UP PARAM F/U: HCPCS | Performed by: NURSE PRACTITIONER

## 2022-05-25 RX ORDER — NITROGLYCERIN 0.4 MG/1
0.4 TABLET SUBLINGUAL
Qty: 25 TABLET | Refills: 3 | Status: SHIPPED | OUTPATIENT
Start: 2022-05-25

## 2022-05-25 NOTE — PROGRESS NOTES
Stormy Hankins presents today for   Chief Complaint   Patient presents with   [facility] Follow Up     chest pain    Chest Pain     left side    Shortness of Breath     with exertion       [place] Lars preferred language for health care discussion is english/other. Is someone accompanying this pt? no    Is the patient using any DME equipment during [address]? no    Depression Screening:  3 most recent PHQ Screens 5/25/2022   Little interest or pleasure in doing things Not at all   Feeling down, depressed, irritable, or hopeless Not at all   Total Score PHQ 2 0       Learning Assessment:  Learning Assessment 5/25/2022   PRIMARY LEARNER Patient   HIGHEST LEVEL OF EDUCATION - PRIMARY LEARNER  -   BARRIERS PRIMARY LEARNER -   [facility] NAME -   PRIMARY LANGUAGE ENGLISH   LEARNER PREFERENCE PRIMARY DEMONSTRATION   ANSWERED BY patient   RELATIONSHIP SELF       Abuse Screening:  Abuse Screening Questionnaire 5/25/2022   Do you ever feel afraid of your partner? N   Are you in a relationship with someone who physically or mentally threatens you? N   Is it safe for you to go home? Y       Fall Risk  Fall Risk Assessment, last 12 mths 4/2/2020   Able to walk? Yes   Fall in past 12 months? No           Pt currently taking Anticoagulant therapy? no    Pt currently taking Antiplatelet therapy ? plavix 75 mg daily and aspirin 81 mg daily      Coordination of Care:  1. Have you been to the ER, urgent care clinic since your last visit? Hospitalized since your last visit? yes    2. Have you seen or consulted any other health care providers outside of the [address] [place] [address] since your last visit? Include any pap smears or colon screening.  no

## 2022-05-25 NOTE — PATIENT INSTRUCTIONS
Continue present medication regimen  Follow-up with Dr. Kodak Bower as scheduled and as needed  Sublingual nitroglycerin 0.4mg as needed for chest pain

## 2022-08-19 RX ORDER — CLOPIDOGREL BISULFATE 75 MG/1
TABLET ORAL
Qty: 90 TABLET | Refills: 3 | Status: SHIPPED | OUTPATIENT
Start: 2022-08-19

## 2022-09-05 ENCOUNTER — HOSPITAL ENCOUNTER (EMERGENCY)
Age: 62
Discharge: HOME | End: 2022-09-05
Attending: STUDENT IN AN ORGANIZED HEALTH CARE EDUCATION/TRAINING PROGRAM
Payer: MEDICARE

## 2022-09-05 VITALS
HEART RATE: 88 BPM | TEMPERATURE: 98.1 F | RESPIRATION RATE: 18 BRPM | DIASTOLIC BLOOD PRESSURE: 76 MMHG | OXYGEN SATURATION: 98 % | SYSTOLIC BLOOD PRESSURE: 138 MMHG

## 2022-09-05 DIAGNOSIS — H11.32 SUBCONJUNCTIVAL HEMORRHAGE OF LEFT EYE: Primary | ICD-10-CM

## 2022-09-05 PROCEDURE — 74011000250 HC RX REV CODE- 250: Performed by: PHYSICIAN ASSISTANT

## 2022-09-05 PROCEDURE — 99283 EMERGENCY DEPT VISIT LOW MDM: CPT

## 2022-09-05 RX ORDER — OXYCODONE AND ACETAMINOPHEN 5; 325 MG/1; MG/1
1 TABLET ORAL
Qty: 6 TABLET | Refills: 0 | Status: SHIPPED | OUTPATIENT
Start: 2022-09-05 | End: 2022-09-07

## 2022-09-05 RX ORDER — PROPARACAINE HYDROCHLORIDE 5 MG/ML
1 SOLUTION/ DROPS OPHTHALMIC
Status: COMPLETED | OUTPATIENT
Start: 2022-09-05 | End: 2022-09-05

## 2022-09-05 RX ADMIN — PROPARACAINE HYDROCHLORIDE 1 DROP: 5 SOLUTION/ DROPS OPHTHALMIC at 12:17

## 2022-09-05 RX ADMIN — PROPARACAINE HYDROCHLORIDE 1 DROP: 5 SOLUTION/ DROPS OPHTHALMIC at 12:16

## 2022-09-05 RX ADMIN — FLUORESCEIN SODIUM 1 STRIP: 0.6 STRIP OPHTHALMIC at 12:17

## 2022-09-05 NOTE — ED TRIAGE NOTES
61 yo to ED for L eye injury, reports he has not being able to see out of it for two days.  Endorses light sensitivity Pt denies injury

## 2022-09-05 NOTE — ED NOTES
I performed a brief evaluation, including history and physical, of the patient here in triage and I have determined that pt will need further treatment and evaluation from the main side ER physician. I have placed initial orders to help in expediting patients care. September 05, 2022 at 12:03 PM - GIORGI Bridges        Reports eye redness and loss of vision. Reports pain is worse with light and when trying to sleep.  Unsure if he insured it or not

## 2022-09-05 NOTE — ED PROVIDER NOTES
EMERGENCY DEPARTMENT HISTORY AND PHYSICAL EXAM    Date: 9/5/2022  Patient Name: Scott Elizondo    History of Presenting Illness     Chief Complaint   Patient presents with    Eye Injury         History Provided By: Patient  Additional History (Context): Scott Elizondo is a 62 y.o. male with diabetes, hypertension, hyperlipidemia, obesity, myocardial infarction, and COPD who presents with complaint of left eye redness. He said 4 days ago he started having redness in his eyes. He remembers brushing up against his left eye with his forearm as he frequently does when he works outside and he is brushing away the sweat off of his body. He is on Plavix as well as aspirin. The bleeding into his conjunctive a has worsened over the past few days and yesterday he started experiencing some discomfort that kept him up from night. Symptoms are moderate. Denies glasses or contact lens use. He photophobic today from the inflammation of it all. Denies foreign body sensation. PCP: Crissy Jones MD    Current Outpatient Medications   Medication Sig Dispense Refill    oxyCODONE-acetaminophen (Percocet) 5-325 mg per tablet Take 1 Tablet by mouth every eight (8) hours as needed for Pain for up to 2 days. Max Daily Amount: 3 Tablets. 6 Tablet 0    clopidogreL (PLAVIX) 75 mg tab TAKE ONE TABLET BY MOUTH DAILY 90 Tablet 3    nitroglycerin (NITROSTAT) 0.4 mg SL tablet 1 Tablet by SubLINGual route every five (5) minutes as needed for Chest Pain. Up to 3 doses. 25 Tablet 3    metoprolol tartrate (LOPRESSOR) 50 mg tablet Take 1 Tablet by mouth every twelve (12) hours. 60 Tablet 0    predniSONE (DELTASONE) 20 mg tablet Take 1 Tablet by mouth daily. 3 Tablet 0    albuterol (ACCUNEB) 0.63 mg/3 mL nebulizer solution 3 mL by Nebulization route every six (6) hours as needed for Wheezing.  25 Nebule 4    cyclobenzaprine (FLEXERIL) 10 mg tablet Take 1 Tablet by mouth three (3) times daily as needed for Muscle Spasm(s) (causes sedation, do not take while working/driving). 15 Tablet 0    insulin lispro (HUMALOG) 100 unit/mL injection Very Insulin Resistant For Blood Sugar (mg/dL) of:            Less than 150 =   0 units 150 -199 =   3 units 200 -249 =   6 units 250 -299 =   9 units 300 -349 =   12 units 350 and above =   15 units Initiate Hypoglycemic protocol if blood glucose is <70 mg/dL. Fast Acting - Administer Immediately - or within 15 minutes of start of meal, if mealtime coverage. 1 Each 0    insulin glargine (LANTUS,BASAGLAR) 100 unit/mL (3 mL) inpn 15 Units by SubCUTAneous route daily. 1 Pen 0    glyBURIDE (DIABETA) 5 mg tablet Take 5 mg by mouth Daily (before breakfast). lidocaine (Lidoderm) 5 % Apply patch to the affected area for 12 hours a day and remove for 12 hours a day. 10 Each 0    atorvastatin (LIPITOR) 80 mg tablet TAKE ONE TABLET BY MOUTH EVERY EVENING 90 Tablet 3    albuterol sulfate 90 mcg/actuation aebs Take 1 Puff by inhalation every four to six (4-6) hours as needed for Wheezing. 1 Each 0    aspirin delayed-release 81 mg tablet Take 1 Tab by mouth daily. 30 Tab 0    glucagon (GLUCAGEN) 1 mg injection 1 mL by IntraMUSCular route as needed for Hypoglycemia. 1 Vial 0    Insulin Syringe-Needle U-100 0.3 mL 31 gauge x 5/16\" syrg TID as needed 100 Syringe 0    lancets misc As directed 100 Each 0    Blood-Glucose Meter monitoring kit As directed 1 Kit 0    glucose blood VI test strips (blood glucose test) strip As directed 60 Strip 0    metFORMIN (GLUCOPHAGE) 1,000 mg tablet Take 1 Tab by mouth two (2) times daily (with meals). 60 Tab 0    tiotropium (SPIRIVA WITH HANDIHALER) 18 mcg inhalation capsule Take 1 Cap by inhalation daily. 30 Cap 0    mometasone-formoterol (DULERA) 100-5 mcg/actuation HFAA HFA inhaler Take 2 Puffs by inhalation two (2) times a day.  2 Inhaler 0       Past History     Past Medical History:  Past Medical History:   Diagnosis Date    COPD [facility]chronic obstructive [facility] disease) ([facility] Diabetes ([address]     Emphysema     H/O cardiovascular stress test 2012    negative    Hypertension     MI (myocardial infarction) [facility])        Past Surgical History:  Past Surgical History:   Procedure Laterality Date    HX CORONARY STENT PLACEMENT         Family History:  Family History   Problem Relation Age of Onset    Diabetes Mother        Social History:  Social History     Tobacco Use    Smoking status: Every Day     Packs/day: 0.50     Types: Cigarettes    Smokeless tobacco: Never   Vaping Use    Vaping Use: Never used   Substance Use Topics    Alcohol use: Yes     Comment: social only    Drug use: Yes     Types: Marijuana       Allergies:  No Known Allergies      Review of Systems   Review of Systems   Constitutional: Negative. HENT: Negative. Eyes:  Positive for photophobia, pain and redness. Respiratory: Negative. Cardiovascular: Negative. Gastrointestinal: Negative. Endocrine: Negative. Genitourinary: Negative. Musculoskeletal: Negative. Skin: Negative. Allergic/Immunologic: Negative. Neurological:  Positive for headaches. Hematological:  Bruises/bleeds easily. Psychiatric/Behavioral:  Positive for sleep disturbance. All Other Systems Negative  Physical Exam     Vitals:    09/05/22 1203   BP: 138/76   Pulse: 88   Resp: 18   Temp: 98.1 °F (36.7 °C)   SpO2: 98%     Physical Exam  Vitals and nursing note reviewed. Constitutional:       General: He is not in acute distress. Appearance: He is well-developed. He is not ill-appearing, toxic-appearing or diaphoretic. HENT:      Head: Normocephalic and atraumatic. Eyes:      Extraocular Movements: Extraocular movements intact. Comments: Left conjunctival hemorrhage, moderate   Neck:      Thyroid: No thyromegaly. Vascular: No carotid bruit. Trachea: No tracheal deviation. Cardiovascular:      Rate and Rhythm: Normal rate and regular rhythm. Heart sounds: Normal heart sounds. No murmur heard. No friction rub. No gallop. Pulmonary:      Effort: Pulmonary effort is normal. No respiratory distress. Breath sounds: Normal breath sounds. No stridor. No wheezing or rales. Chest:      Chest wall: No tenderness. Abdominal:      General: There is no distension. Palpations: Abdomen is soft. There is no mass. Tenderness: There is no abdominal tenderness. There is no guarding or rebound. Musculoskeletal:         General: Normal range of motion. Cervical back: Normal range of motion and neck supple. Skin:     General: Skin is warm and dry. Coloration: Skin is not pale. Neurological:      Mental Status: He is alert. Psychiatric:         Speech: Speech normal.         Behavior: Behavior normal.         Thought Content: Thought content normal.         Judgment: Judgment normal.            Diagnostic Study Results     Labs -   No results found for this or any previous visit (from the past 12 hour(s)). Radiologic Studies -   No orders to display     CT Results  (Last 48 hours)      None          CXR Results  (Last 48 hours)      None              Medical Decision Making   I am the first provider for this patient. I reviewed the vital signs, available nursing notes, past medical history, past surgical history, family history and social history. Vital Signs-Reviewed the patient's vital signs. Records Reviewed: Nursing Notes    Procedures:  Eye Stain      Date/Time: 9/5/2022 1:00 PM    Performed by: PA  Supervising provider: Bozena Higginbotham        Corneal abrasion was not present on eyelid eversion. Cornea is clear. Anterior chamber is clear. Patient tolerance: patient tolerated the procedure well with no immediate complications  My total time at bedside, performing this procedure was 1-15 minutes. Comments: No limbic flush. No hyphema. With eyelids closed, pressures to palpation feel similar, soft. Provider Notes (Medical Decision Making):  Anterior chamber clear. Patient with subconjunctival hemorrhage on Plavix. We will have him follow-up with ophthalmology. Doubt acute angle with similar density to palpation of eyes. Visual acuity shows equal vision. Treat his pain. Has antibiotic ointment at home that he can take prophylactically for corneal abrasion though none seen. MED RECONCILIATION:  No current facility-administered medications for this encounter. Current Outpatient Medications   Medication Sig    oxyCODONE-acetaminophen (Percocet) 5-325 mg per tablet Take 1 Tablet by mouth every eight (8) hours as needed for Pain for up to 2 days. Max Daily Amount: 3 Tablets. clopidogreL (PLAVIX) 75 mg tab TAKE ONE TABLET BY MOUTH DAILY    nitroglycerin (NITROSTAT) 0.4 mg SL tablet 1 Tablet by SubLINGual route every five (5) minutes as needed for Chest Pain. Up to 3 doses. metoprolol tartrate (LOPRESSOR) 50 mg tablet Take 1 Tablet by mouth every twelve (12) hours. predniSONE (DELTASONE) 20 mg tablet Take 1 Tablet by mouth daily. albuterol (ACCUNEB) 0.63 mg/3 mL nebulizer solution 3 mL by Nebulization route every six (6) hours as needed for Wheezing. cyclobenzaprine (FLEXERIL) 10 mg tablet Take 1 Tablet by mouth three (3) times daily as needed for Muscle Spasm(s) (causes sedation, do not take while working/driving). insulin lispro (HUMALOG) 100 unit/mL injection Very Insulin Resistant For Blood Sugar (mg/dL) of:            Less than 150 =   0 units 150 -199 =   3 units 200 -249 =   6 units 250 -299 =   9 units 300 -349 =   12 units 350 and above =   15 units Initiate Hypoglycemic protocol if blood glucose is <70 mg/dL. Fast Acting - Administer Immediately - or within 15 minutes of start of meal, if mealtime coverage. insulin glargine (LANTUS,BASAGLAR) 100 unit/mL (3 mL) inpn 15 Units by SubCUTAneous route daily. glyBURIDE (DIABETA) 5 mg tablet Take 5 mg by mouth Daily (before breakfast).     lidocaine (Lidoderm) 5 % Apply patch to the affected area for 12 hours a day and remove for 12 hours a day. atorvastatin (LIPITOR) 80 mg tablet TAKE ONE TABLET BY MOUTH EVERY EVENING    albuterol sulfate 90 mcg/actuation aebs Take 1 Puff by inhalation every four to six (4-6) hours as needed for Wheezing. aspirin delayed-release 81 mg tablet Take 1 Tab by mouth daily. glucagon (GLUCAGEN) 1 mg injection 1 mL by IntraMUSCular route as needed for Hypoglycemia. Insulin Syringe-Needle U-100 0.3 mL 31 gauge x 5/16\" syrg TID as needed    lancets misc As directed    Blood-Glucose Meter monitoring kit As directed    glucose blood VI test strips (blood glucose test) strip As directed    metFORMIN (GLUCOPHAGE) 1,000 mg tablet Take 1 Tab by mouth two (2) times daily (with meals). tiotropium (SPIRIVA WITH HANDIHALER) 18 mcg inhalation capsule Take 1 Cap by inhalation daily. mometasone-formoterol (DULERA) 100-5 mcg/actuation HFAA HFA inhaler Take 2 Puffs by inhalation two (2) times a day. Disposition:  home    DISCHARGE NOTE:   1:31 PM    Pt has been reexamined. Patient has no new complaints, changes, or physical findings. Care plan outlined and precautions discussed. Results of exam were reviewed with the patient. All medications were reviewed with the patient. All of pt's questions and concerns were addressed. Patient was instructed and agrees to follow up with iacobucci, as well as to return to the ED upon further deterioration. Patient is ready to go home.     Follow-up Information       Follow up With Specialties Details Why Contact Info    Evelin Torres MD Ophthalmology Schedule an appointment as soon as possible for a visit in 1 day  [address] [place] [address]  [phone]      [facility] EMERGENCY DEPT Emergency Medicine  If symptoms worsen return immediately [address]  [phone]            Current Discharge Medication List        START taking these medications    Details oxyCODONE-acetaminophen (Percocet) 5-325 mg per tablet Take 1 Tablet by mouth every eight (8) hours as needed for Pain for up to 2 days. Max Daily Amount: 3 Tablets. Qty: 6 Tablet, Refills: 0  Start date: 9/5/2022, End date: 9/7/2022    Comments: ED Attending: Lora Almaraz DO  JESUS ALBERTO: [id number]  Associated Diagnoses: Subconjunctival hemorrhage of left eye             Diagnosis     Clinical Impression:   1.  Subconjunctival hemorrhage of left eye

## 2022-09-20 RX ORDER — ATORVASTATIN CALCIUM 80 MG/1
TABLET, FILM COATED ORAL
Qty: 90 TABLET | Refills: 3 | Status: SHIPPED | OUTPATIENT
Start: 2022-09-20

## 2022-10-25 ENCOUNTER — HOSPITAL ENCOUNTER (EMERGENCY)
Age: 62
Discharge: HOME | End: 2022-10-25
Attending: EMERGENCY MEDICINE
Payer: MEDICARE

## 2022-10-25 ENCOUNTER — APPOINTMENT (OUTPATIENT)
Dept: GENERAL RADIOLOGY | Age: 62
End: 2022-10-25
Attending: NURSE PRACTITIONER
Payer: MEDICARE

## 2022-10-25 VITALS
SYSTOLIC BLOOD PRESSURE: 152 MMHG | BODY MASS INDEX: 32.93 KG/M2 | HEIGHT: 70 IN | TEMPERATURE: 97.9 F | DIASTOLIC BLOOD PRESSURE: 81 MMHG | WEIGHT: 230 LBS | HEART RATE: 85 BPM | RESPIRATION RATE: 20 BRPM | OXYGEN SATURATION: 96 %

## 2022-10-25 DIAGNOSIS — R07.9 CHEST PAIN, UNSPECIFIED TYPE: Primary | ICD-10-CM

## 2022-10-25 LAB
ALBUMIN SERPL-MCNC: 4 G/DL (ref 3.4–5)
ALBUMIN/GLOB SERPL: 1.3 {RATIO} (ref 0.8–1.7)
ALP SERPL-CCNC: 100 U/L (ref 45–117)
ALT SERPL-CCNC: 20 U/L (ref 16–61)
ANION GAP SERPL CALC-SCNC: 5 MMOL/L (ref 3–18)
AST SERPL-CCNC: 12 U/L (ref 10–38)
BASOPHILS # BLD: 0 K/UL (ref 0–0.1)
BASOPHILS NFR BLD: 0 % (ref 0–2)
BILIRUB SERPL-MCNC: 0.6 MG/DL (ref 0.2–1)
BNP SERPL-MCNC: 201 PG/ML (ref 0–900)
BUN SERPL-MCNC: 9 MG/DL (ref 7–18)
BUN/CREAT SERPL: 14 (ref 12–20)
CALCIUM SERPL-MCNC: 9 MG/DL (ref 8.5–10.1)
CHLORIDE SERPL-SCNC: 99 MMOL/L (ref 100–111)
CO2 SERPL-SCNC: 32 MMOL/L (ref 21–32)
CREAT SERPL-MCNC: 0.64 MG/DL (ref 0.6–1.3)
DIFFERENTIAL METHOD BLD: ABNORMAL
EOSINOPHIL # BLD: 0.3 K/UL (ref 0–0.4)
EOSINOPHIL NFR BLD: 3 % (ref 0–5)
ERYTHROCYTE [DISTWIDTH] IN BLOOD BY AUTOMATED COUNT: 13.4 % (ref 11.6–14.5)
GLOBULIN SER CALC-MCNC: 3.2 G/DL (ref 2–4)
GLUCOSE SERPL-MCNC: 257 MG/DL (ref 74–99)
HCT VFR BLD AUTO: 40.6 % (ref 36–48)
HGB BLD-MCNC: 12.9 G/DL (ref 13–16)
IMM GRANULOCYTES # BLD AUTO: 0 K/UL (ref 0–0.04)
IMM GRANULOCYTES NFR BLD AUTO: 0 % (ref 0–0.5)
LYMPHOCYTES # BLD: 2.2 K/UL (ref 0.9–3.6)
LYMPHOCYTES NFR BLD: 22 % (ref 21–52)
MCH RBC QN AUTO: 27.8 PG (ref 24–34)
MCHC RBC AUTO-ENTMCNC: 31.8 G/DL (ref 31–37)
MCV RBC AUTO: 87.5 FL (ref 78–100)
MONOCYTES # BLD: 0.6 K/UL (ref 0.05–1.2)
MONOCYTES NFR BLD: 6 % (ref 3–10)
NEUTS SEG # BLD: 6.7 K/UL (ref 1.8–8)
NEUTS SEG NFR BLD: 68 % (ref 40–73)
NRBC # BLD: 0 K/UL (ref 0–0.01)
NRBC BLD-RTO: 0 PER 100 WBC
PLATELET # BLD AUTO: 220 K/UL (ref 135–420)
PMV BLD AUTO: 10.9 FL (ref 9.2–11.8)
POTASSIUM SERPL-SCNC: 4.1 MMOL/L (ref 3.5–5.5)
PROT SERPL-MCNC: 7.2 G/DL (ref 6.4–8.2)
RBC # BLD AUTO: 4.64 M/UL (ref 4.35–5.65)
SODIUM SERPL-SCNC: 136 MMOL/L (ref 136–145)
TROPONIN-HIGH SENSITIVITY: 13 NG/L (ref 0–78)
TROPONIN-HIGH SENSITIVITY: 15 NG/L (ref 0–78)
WBC # BLD AUTO: 9.9 K/UL (ref 4.6–13.2)

## 2022-10-25 PROCEDURE — 84484 ASSAY OF TROPONIN QUANT: CPT

## 2022-10-25 PROCEDURE — 93005 ELECTROCARDIOGRAM TRACING: CPT

## 2022-10-25 PROCEDURE — 99285 EMERGENCY DEPT VISIT HI MDM: CPT

## 2022-10-25 PROCEDURE — 83880 ASSAY OF NATRIURETIC PEPTIDE: CPT

## 2022-10-25 PROCEDURE — 80053 COMPREHEN METABOLIC PANEL: CPT

## 2022-10-25 PROCEDURE — 71046 X-RAY EXAM CHEST 2 VIEWS: CPT

## 2022-10-25 PROCEDURE — 85025 COMPLETE CBC W/AUTO DIFF WBC: CPT

## 2022-10-25 RX ORDER — GUAIFENESIN 100 MG/5ML
162 LIQUID (ML) ORAL
Status: DISCONTINUED | OUTPATIENT
Start: 2022-10-25 | End: 2022-10-25

## 2022-10-25 RX ORDER — NITROGLYCERIN 0.4 MG/1
0.4 TABLET SUBLINGUAL AS NEEDED
Status: DISCONTINUED | OUTPATIENT
Start: 2022-10-25 | End: 2022-10-25

## 2022-10-25 RX ORDER — IPRATROPIUM BROMIDE AND ALBUTEROL SULFATE 2.5; .5 MG/3ML; MG/3ML
3 SOLUTION RESPIRATORY (INHALATION)
Status: DISCONTINUED | OUTPATIENT
Start: 2022-10-25 | End: 2022-10-25

## 2022-10-25 NOTE — ED TRIAGE NOTES
Pt stated chest pain started aprox 15-20mins ago. Reports sharp, shooting pain in his left breast/chest area. Pt reports past history of heart attack x2, last one being May 2022.

## 2022-10-26 LAB
ATRIAL RATE: 82 BPM
CALCULATED P AXIS, ECG09: 92 DEGREES
CALCULATED R AXIS, ECG10: 74 DEGREES
CALCULATED T AXIS, ECG11: 53 DEGREES
DIAGNOSIS, 93000: NORMAL
P-R INTERVAL, ECG05: 180 MS
Q-T INTERVAL, ECG07: 372 MS
QRS DURATION, ECG06: 104 MS
QTC CALCULATION (BEZET), ECG08: 434 MS
VENTRICULAR RATE, ECG03: 82 BPM

## 2022-10-26 NOTE — ED NOTES
Patient signed out to me pending repeat troponin. Repeat troponin is stable at 15. Upon reexamination patient states that his chest pain has resolved. He states it did flareup a little bit while ago but he is currently asymptomatic. He has chronic shortness of breath and oxygen at home. He has no concerns for his difficulty breathing at this time. He has not yet received his medications due to the emergency department being very backed up. He does not want to stay for these medications. I do feel this is appropriate at this time. He states he has all of these things at home. Patient stable for discharge at this time. Patient is in agreement with the plan to be discharged at this time. All the patient's questions were answered. Patient was given written instructions on the diagnosis, and states understanding of the plan moving forward. We did discuss important signs and symptoms that should prompt quick return to the emergency department. Disposition: Patient was discharged home in stable condition.   They will follow up with cardiology    Prescriptions: None    Diagnosis: Acute chest pain

## 2022-10-26 NOTE — ED PROVIDER NOTES
The patient is a 61-year-old male with past medical history significant for COPD, diabetes, coronary artery disease status post MI x2 and hypertension, who presents to the ED today with chest pain. He states that he was sitting at home and it started 15 minutes prior to arrival.  He described it as strong and sharp and lasted 11 to 12 minutes. He also states that it recurred 6 to 7 minutes ago while he was sitting in the waiting room. Additionally, he states that he did have some pain while he was walking across the parking lot. He is also complaining of increasing shortness of breath with exertion. He had an MI in May 2022 and this pain is worse than his MI pain. He also states that the pain is worse than his prior MI 2 years ago. He states that he has a portable oxygen monitor and his oxygen saturation was 91%. He has been prescribed home oxygen and has a home oxygen concentrator but does not use it regularly now. He had been using it fairly regularly after being diagnosed with COVID but has weaned himself off. While in the ED, he is complaining of a little bit of disorientation and dizziness but does not have any shortness of breath while he is sitting. He states that he has been prescribed nitroglycerin in the past but did not take any because he was unsure of how to use it.          Past Medical History:   Diagnosis Date    COPD (chronic obstructive pulmonary disease) ([facility] [address]     Diabetes ([facility] [address]     Emphysema     H/O cardiovascular stress test 2012    negative    Hypertension     MI (myocardial infarction) ([facility] [address]        Past Surgical History:   Procedure Laterality Date    HX CORONARY STENT PLACEMENT           Family History:   Problem Relation Age of Onset    Diabetes Mother        Social History     Socioeconomic History    Marital status: SINGLE     Spouse name: Not on file    Number of children: Not on file    Years of education: Not on file    Highest education level: Not on file Occupational History    Not on file   Tobacco Use    Smoking status: Every Day     Packs/day: 0.50     Types: Cigarettes    Smokeless tobacco: Never   Vaping Use    Vaping Use: Never used   Substance and Sexual Activity    Alcohol use: Yes     Comment: social only    Drug use: Yes     Types: Marijuana    Sexual activity: Not on file   Other Topics Concern    Not on file   Social History Narrative    Not on file     Social Determinants of Health     Financial Resource Strain: Not on file   Food Insecurity: Not on file   Transportation Needs: Not on file   Physical Activity: Not on file   Stress: Not on file   Social Connections: Not on file   Intimate Partner Violence: Not on file   Housing Stability: Not on file     No current facility-administered medications on file prior to encounter. Current Outpatient Medications on File Prior to Encounter   Medication Sig Dispense Refill    atorvastatin (LIPITOR) 80 mg tablet TAKE ONE TABLET BY MOUTH EVERY EVENING 90 Tablet 3    clopidogreL (PLAVIX) 75 mg tab TAKE ONE TABLET BY MOUTH DAILY 90 Tablet 3    nitroglycerin (NITROSTAT) 0.4 mg SL tablet 1 Tablet by SubLINGual route every five (5) minutes as needed for Chest Pain. Up to 3 doses. 25 Tablet 3    metoprolol tartrate (LOPRESSOR) 50 mg tablet Take 1 Tablet by mouth every twelve (12) hours. 60 Tablet 0    predniSONE (DELTASONE) 20 mg tablet Take 1 Tablet by mouth daily. 3 Tablet 0    albuterol (ACCUNEB) 0.63 mg/3 mL nebulizer solution 3 mL by Nebulization route every six (6) hours as needed for Wheezing. 25 Nebule 4    cyclobenzaprine (FLEXERIL) 10 mg tablet Take 1 Tablet by mouth three (3) times daily as needed for Muscle Spasm(s) (causes sedation, do not take while working/driving).  15 Tablet 0    insulin lispro (HUMALOG) 100 unit/mL injection Very Insulin Resistant For Blood Sugar (mg/dL) of:            Less than 150 =   0 units 150 -199 =   3 units 200 -249 =   6 units 250 -299 =   9 units 300 -349 =   12 units 350 and above =   15 units Initiate Hypoglycemic protocol if blood glucose is <70 mg/dL. Fast Acting - Administer Immediately - or within 15 minutes of start of meal, if mealtime coverage. 1 Each 0    insulin glargine (LANTUS,BASAGLAR) 100 unit/mL (3 mL) inpn 15 Units by SubCUTAneous route daily. 1 Pen 0    glyBURIDE (DIABETA) 5 mg tablet Take 5 mg by mouth Daily (before breakfast). lidocaine (Lidoderm) 5 % Apply patch to the affected area for 12 hours a day and remove for 12 hours a day. 10 Each 0    albuterol sulfate 90 mcg/actuation aebs Take 1 Puff by inhalation every four to six (4-6) hours as needed for Wheezing. 1 Each 0    aspirin delayed-release 81 mg tablet Take 1 Tab by mouth daily. 30 Tab 0    glucagon (GLUCAGEN) 1 mg injection 1 mL by IntraMUSCular route as needed for Hypoglycemia. 1 Vial 0    Insulin Syringe-Needle U-100 0.3 mL 31 gauge x 5/16\" syrg TID as needed 100 Syringe 0    lancets misc As directed 100 Each 0    Blood-Glucose Meter monitoring kit As directed 1 Kit 0    glucose blood VI test strips (blood glucose test) strip As directed 60 Strip 0    metFORMIN (GLUCOPHAGE) 1,000 mg tablet Take 1 Tab by mouth two (2) times daily (with meals). 60 Tab 0    tiotropium (SPIRIVA WITH HANDIHALER) 18 mcg inhalation capsule Take 1 Cap by inhalation daily. 30 Cap 0    mometasone-formoterol (DULERA) 100-5 mcg/actuation HFAA HFA inhaler Take 2 Puffs by inhalation two (2) times a day. 2 Inhaler 0         ALLERGIES: Patient has no known allergies. Review of Systems   All other systems reviewed and are negative. Vitals:    10/25/22 1854 10/25/22 1857   BP: (!) 152/81    Pulse: 85    Resp: 20    Temp: 97.9 °F (36.6 °C)    SpO2: 96%    Weight:  104.3 kg (230 lb)   Height:  5' 10\" (1.778 m)            Physical Exam  Vitals and nursing note reviewed. Constitutional:       Appearance: He is obese. HENT:      Head: Normocephalic and atraumatic.    Eyes:      Extraocular Movements: Extraocular movements intact. Pupils: Pupils are equal, round, and reactive to light. Cardiovascular:      Rate and Rhythm: Normal rate and regular rhythm. Heart sounds: Normal heart sounds. Pulmonary:      Effort: Pulmonary effort is normal.      Breath sounds: Examination of the right-upper field reveals wheezing. Examination of the left-upper field reveals wheezing. Examination of the right-middle field reveals wheezing. Examination of the left-middle field reveals wheezing. Examination of the right-lower field reveals wheezing. Examination of the left-lower field reveals wheezing. Decreased breath sounds and wheezing present. Abdominal:      General: Bowel sounds are normal.      Palpations: Abdomen is soft. Musculoskeletal:         General: Normal range of motion. Cervical back: Normal range of motion and neck supple. Right lower leg: No edema. Left lower leg: No edema. Skin:     General: Skin is warm and dry. Capillary Refill: Capillary refill takes less than 2 seconds. Neurological:      General: No focal deficit present. Mental Status: He is alert and oriented to person, place, and time.    Psychiatric:         Mood and Affect: Mood normal.         Behavior: Behavior normal.      Recent Results (from the past 12 hour(s))   EKG, 12 LEAD, INITIAL    Collection Time: 10/25/22  6:48 PM   Result Value Ref Range    Ventricular Rate 82 BPM    Atrial Rate 82 BPM    P-R Interval 180 ms    QRS Duration 104 ms    Q-T Interval 372 ms    QTC Calculation (Bezet) 434 ms    Calculated P Axis 92 degrees    Calculated R Axis 74 degrees    Calculated T Axis 53 degrees    Diagnosis       Normal sinus rhythm  Septal infarct , age undetermined  Lateral infarct (cited on or before 17-MAY-2022)  Abnormal ECG  When compared with ECG of 17-MAY-2022 06:10,  Septal infarct is now present     CBC WITH AUTOMATED DIFF    Collection Time: 10/25/22  7:00 PM   Result Value Ref Range    WBC 9.9 4.6 - 13.2 K/uL    RBC 4. 64 4.35 - 5.65 M/uL    HGB 12.9 (L) 13.0 - 16.0 g/dL    HCT 40.6 36.0 - 48.0 %    MCV 87.5 78.0 - 100.0 FL    MCH 27.8 24.0 - 34.0 PG    MCHC 31.8 31.0 - 37.0 g/dL    RDW 13.4 11.6 - 14.5 %    PLATELET 419 463 - 405 K/uL    MPV 10.9 9.2 - 11.8 FL    NRBC 0.0 0  WBC    ABSOLUTE NRBC 0.00 0.00 - 0.01 K/uL    NEUTROPHILS 68 40 - 73 %    LYMPHOCYTES 22 21 - 52 %    MONOCYTES 6 3 - 10 %    EOSINOPHILS 3 0 - 5 %    BASOPHILS 0 0 - 2 %    IMMATURE GRANULOCYTES 0 0.0 - 0.5 %    ABS. NEUTROPHILS 6.7 1.8 - 8.0 K/UL    ABS. LYMPHOCYTES 2.2 0.9 - 3.6 K/UL    ABS. MONOCYTES 0.6 0.05 - 1.2 K/UL    ABS. EOSINOPHILS 0.3 0.0 - 0.4 K/UL    ABS. BASOPHILS 0.0 0.0 - 0.1 K/UL    ABS. IMM. GRANS. 0.0 0.00 - 0.04 K/UL    DF AUTOMATED     METABOLIC PANEL, COMPREHENSIVE    Collection Time: 10/25/22  7:00 PM   Result Value Ref Range    Sodium 136 136 - 145 mmol/L    Potassium 4.1 3.5 - 5.5 mmol/L    Chloride 99 (L) 100 - 111 mmol/L    CO2 32 21 - 32 mmol/L    Anion gap 5 3.0 - 18 mmol/L    Glucose 257 (H) 74 - 99 mg/dL    BUN 9 7.0 - 18 MG/DL    Creatinine 0.64 0.6 - 1.3 MG/DL    BUN/Creatinine ratio 14 12 - 20      eGFR >60 >60 ml/min/1.73m2    Calcium 9.0 8.5 - 10.1 MG/DL    Bilirubin, total 0.6 0.2 - 1.0 MG/DL    ALT (SGPT) 20 16 - 61 U/L    AST (SGOT) 12 10 - 38 U/L    Alk. phosphatase 100 45 - 117 U/L    Protein, total 7.2 6.4 - 8.2 g/dL    Albumin 4.0 3.4 - 5.0 g/dL    Globulin 3.2 2.0 - 4.0 g/dL    A-G Ratio 1.3 0.8 - 1.7     NT-PRO BNP    Collection Time: 10/25/22  7:00 PM   Result Value Ref Range    NT pro- 0 - 900 PG/ML   TROPONIN-HIGH SENSITIVITY    Collection Time: 10/25/22  7:00 PM   Result Value Ref Range    Troponin-High Sensitivity 15 0 - 78 ng/L     XR CHEST PA LAT   Final Result   No acute pulmonary disease. MDM  Number of Diagnoses or Management Options  Chest pain, unspecified type  Diagnosis management comments: I am the provider of record for this patient.     The patient is a 58-year-old male with past medical history significant for COPD, diabetes, hypertension, and coronary artery disease status post MI x2 with the last one being in May 2022, who presents to the ED today with chest pain that began 15 minutes prior to arrival.  He states that the pain is worse than it was with his prior MI. The patient's EKG shows a lateral infarct which is now present compared to prior EKGs. There are no acute ischemic changes. His first opponent is negative. His chest x-ray is also negative. The patient received IV Solu-Medrol, duo nebs, aspirin and nitroglycerin in the ED. Upon signout, Dr. Mariana Wynn will follow up on the second troponin and disposition the patient accordingly.                  Procedures

## 2022-10-27 ENCOUNTER — OFFICE VISIT (OUTPATIENT)
Dept: CARDIOLOGY CLINIC | Age: 62
End: 2022-10-27
Payer: MEDICARE

## 2022-10-27 VITALS
DIASTOLIC BLOOD PRESSURE: 60 MMHG | OXYGEN SATURATION: 94 % | SYSTOLIC BLOOD PRESSURE: 140 MMHG | WEIGHT: 238 LBS | HEIGHT: 70 IN | BODY MASS INDEX: 34.07 KG/M2

## 2022-10-27 DIAGNOSIS — R07.9 CHEST PAIN, UNSPECIFIED TYPE: Primary | ICD-10-CM

## 2022-10-27 DIAGNOSIS — E78.5 HYPERLIPIDEMIA, UNSPECIFIED HYPERLIPIDEMIA TYPE: ICD-10-CM

## 2022-10-27 DIAGNOSIS — I25.10 CORONARY ARTERY DISEASE INVOLVING NATIVE CORONARY ARTERY OF NATIVE HEART WITHOUT ANGINA PECTORIS: ICD-10-CM

## 2022-10-27 DIAGNOSIS — R06.02 SOB (SHORTNESS OF BREATH): ICD-10-CM

## 2022-10-27 DIAGNOSIS — I10 HYPERTENSION, UNSPECIFIED TYPE: ICD-10-CM

## 2022-10-27 PROCEDURE — 3078F DIAST BP <80 MM HG: CPT | Performed by: NURSE PRACTITIONER

## 2022-10-27 PROCEDURE — G8754 DIAS BP LESS 90: HCPCS | Performed by: NURSE PRACTITIONER

## 2022-10-27 PROCEDURE — G8417 CALC BMI ABV UP PARAM F/U: HCPCS | Performed by: NURSE PRACTITIONER

## 2022-10-27 PROCEDURE — 99214 OFFICE O/P EST MOD 30 MIN: CPT | Performed by: NURSE PRACTITIONER

## 2022-10-27 PROCEDURE — 3017F COLORECTAL CA SCREEN DOC REV: CPT | Performed by: NURSE PRACTITIONER

## 2022-10-27 PROCEDURE — G8753 SYS BP > OR = 140: HCPCS | Performed by: NURSE PRACTITIONER

## 2022-10-27 PROCEDURE — 3074F SYST BP LT 130 MM HG: CPT | Performed by: NURSE PRACTITIONER

## 2022-10-27 PROCEDURE — G8432 DEP SCR NOT DOC, RNG: HCPCS | Performed by: NURSE PRACTITIONER

## 2022-10-27 PROCEDURE — G8427 DOCREV CUR MEDS BY ELIG CLIN: HCPCS | Performed by: NURSE PRACTITIONER

## 2022-10-27 RX ORDER — LISINOPRIL 5 MG/1
5 TABLET ORAL DAILY
COMMUNITY
Start: 2022-09-20

## 2022-10-27 RX ORDER — METOPROLOL TARTRATE 50 MG/1
50 TABLET ORAL EVERY 12 HOURS
Qty: 60 TABLET | Refills: 6 | Status: SHIPPED | OUTPATIENT
Start: 2022-10-27

## 2022-10-27 RX ORDER — LIDOCAINE 50 MG/G
PATCH TOPICAL
Qty: 10 EACH | Refills: 0
Start: 2022-10-27

## 2022-10-27 NOTE — PATIENT INSTRUCTIONS
Restart metoprolol tartrate 50mg twice a day  All other medications to remain the same  Please verify your medication list and make sure that you bring an updated list with you to every appointment  Referral to pulmonology - Dr. Antoinette Castaneda   Follow-up with Dr. Esha Cuenca as scheduled for Nov. 15, 2022

## 2022-10-27 NOTE — PROGRESS NOTES
Karissa Eckert presents today for a post-ER follow-up of CAD. He presented to the ER on 1025/22 with complaints of chest pain that occurred while he was eating dinner. It lasted for about 12 minutes. He informed the ER provider that he also had some pain while walking from the parking lot to the ER and while sitting in the waiting area. His initial troponin was 15 and the second set was 13. His BNP was within normal limits. He is a 62year old male with history of CAD (hx of non-STEMI in March 2020), hypertension, hyperlipidemia, diabetes, COPD, tobacco use. He had Covid this year and states that his breathing has worsened somewhat since he had it. His last echo was done in April 2021 and it showed an EF of 50-55%, PASP of 35 mmHg. He states that he has oxygen therapy at home but does not use it routinely. He uses inhalers and a nebulizer which helps with the shortness of breath. He is not followed by pulmonology. He was hospitalized from 5/16/22 through 5/17/22 for chest pain, acute COPD. He presented with complaints of substernal chest pain at rest associated with some dyspnea and radiation to left arm. He underwent cardiac catheterization which showed:  Chest pain consistent with coronary ischemia. Previously stented first diagonal branch is closed with MAEVE 0-I flow. This lesion was left alone. Mid LAD has new focal 85% stenosis beyond previous stent. This was stented using 2.75 mm RAFIQ to residual 0%. Previous stent IFR was 0.86. Post stent IFR was 1.00. Ostial circumflex is new 60% stenosis since 2020 cath. iFR of this lesion was 1.00. This lesion was left alone. RCA is large and dominant. There is proximal 25-30% stenosis noted. This is also new since 2020. LVEDP was 14 mmHg. EF is 40-45% with anteroapical hypokinesis. Again, coronary anatomy was explained. He needs to discontinue tobacco use. He needs to continue on baby aspirin, Plavix, statins.   If able to resume physical activity without discomfort, he can be discharged home on 5/17/2022. Denies chest pain, tightness, heaviness, and palpitations. Admits to chronic shortness of breath at rest, dyspnea on exertion, denies orthopnea and PND. Denies abdominal bloating. Denies lightheadedness, dizziness, and syncope. Denies lower extremity edema and claudication. Denies nausea, vomiting, diarrhea, melena, hematochezia. Denies hematuria, urgency, frequency. Denies fever, chills. He resumed smoking but states that he is only smoking about 2 cigarettes per week. PMH:  Past Medical History:   Diagnosis Date    COPD (chronic obstructive pulmonary disease) ([facility] [address]     Diabetes ([facility] [address]     Emphysema     H/O cardiovascular stress test 2012    negative    Hypertension     MI (myocardial infarction) ([facility]        PSH:  Past Surgical History:   Procedure Laterality Date    HX CORONARY STENT PLACEMENT         MEDS:  Current Outpatient Medications   Medication Sig    lidocaine (Lidoderm) 5 % Apply patch to the affected area for 12 hours a day and remove for 12 hours a day. As needed    metoprolol tartrate (LOPRESSOR) 50 mg tablet Take 1 Tablet by mouth every twelve (12) hours. lisinopriL (PRINIVIL, ZESTRIL) 5 mg tablet Take 5 mg by mouth daily. atorvastatin (LIPITOR) 80 mg tablet TAKE ONE TABLET BY MOUTH EVERY EVENING    clopidogreL (PLAVIX) 75 mg tab TAKE ONE TABLET BY MOUTH DAILY    nitroglycerin (NITROSTAT) 0.4 mg SL tablet 1 Tablet by SubLINGual route every five (5) minutes as needed for Chest Pain. Up to 3 doses. albuterol (ACCUNEB) 0.63 mg/3 mL nebulizer solution 3 mL by Nebulization route every six (6) hours as needed for Wheezing.     insulin lispro (HUMALOG) 100 unit/mL injection Very Insulin Resistant For Blood Sugar (mg/dL) of:            Less than 150 =   0 units 150 -199 =   3 units 200 -249 =   6 units 250 -299 =   9 units 300 -349 =   12 units 350 and above =   15 units Initiate Hypoglycemic protocol if blood glucose is <70 mg/dL. Fast Acting - Administer Immediately - or within 15 minutes of start of meal, if mealtime coverage. glyBURIDE (DIABETA) 5 mg tablet Take 5 mg by mouth Daily (before breakfast). albuterol sulfate 90 mcg/actuation aebs Take 1 Puff by inhalation every four to six (4-6) hours as needed for Wheezing. aspirin delayed-release 81 mg tablet Take 1 Tab by mouth daily. Insulin Syringe-Needle U-100 0.3 mL 31 gauge x 5/16\" syrg TID as needed    metFORMIN (GLUCOPHAGE) 1,000 mg tablet Take 1 Tab by mouth two (2) times daily (with meals). tiotropium (SPIRIVA WITH HANDIHALER) 18 mcg inhalation capsule Take 1 Cap by inhalation daily. No current facility-administered medications for this visit. Allergies and Sensitivities:  No Known Allergies    Family History:  Family History   Problem Relation Age of Onset    Diabetes Mother        Social History:  He  reports that he has been smoking cigarettes. He has been smoking an average of .5 packs per day. He has never used smokeless tobacco.  He  reports current alcohol use. Physical:  Visit Vitals  BP (!) 140/60 (BP 1 Location: Left upper arm)   Ht 5' 10\" (1.778 m)   Wt 108 kg (238 lb)   SpO2 94%   BMI 34.15 kg/m²         Exam:  Neck:  Supple, no JVD, no carotid bruits  CV:  Normal S1 and  S2, no murmurs, rubs, or gallops noted  Lungs:  Scattered wheezes that cleared with coughing, no rales  Abd:  Soft, non-tender, obese, with good bowel sounds.   No hepatosplenomegaly  Extremities:  No edema      Data:  EKG:      LABS:  Lab Results   Component Value Date/Time    Sodium 136 10/25/2022 07:00 PM    Potassium 4.1 10/25/2022 07:00 PM    Chloride 99 (L) 10/25/2022 07:00 PM    CO2 32 10/25/2022 07:00 PM    Glucose 257 (H) 10/25/2022 07:00 PM    BUN 9 10/25/2022 07:00 PM    Creatinine 0.64 10/25/2022 07:00 PM     Lab Results   Component Value Date/Time    Cholesterol, total 118 (L) 04/01/2021 09:40 AM    HDL Cholesterol 35 (L) 04/01/2021 09:40 AM    LDL, calculated 58 04/01/2021 09:40 AM    Triglyceride 124 04/01/2021 09:40 AM    CHOL/HDL Ratio 3.4 04/01/2021 09:40 AM     Lab Results   Component Value Date/Time    ALT (SGPT) 20 10/25/2022 07:00 PM         Impression/Plan:  1.  CAD, s/p non-STEMI and s/p PCI/stent to a new focal 85% stenosis in the mid LAD beyond the previous stent. Also has a new 60% lesion in the ostial circumflex  2. Hypertension, blood pressure elevated  3. Hyperlipidemia, on atorvastatin 80mg  4. Diabetes mellitus, recommend Hgb A1c less than 7% from cardiac standpoint  5. COPD  6. Tobacco abuse, states he has resumed smoking but \"only 2 cigarettes per week. \"    Mr. Lazarus Carina was seen today for a post-ER follow-up of chest pain. It is not similar to the pain he experienced prior to his hospitalization in May 2022. He ruled out for MI with troponins of 15 and 13. BNP was normal.  He admits to chronic shortness of breath for which he has oxygen but not using routinely. He states that he uses his nebulizer and inhalers which relieves the shortness of breath most of the time. He is not followed by pulmonology. Will refer him to Dr. Oleksandr Loera for pulmonary consultation. His blood pressure was elevated. He did not bring a current list of medications and while reviewing his list verbally, he states that he does not believe that he is taking the metoprolol as he ran out of the medication. He was instructed to restart the metoprolol tartrate 50mg BID. If he continues to experience chest pain after the beta blocker is restarted, a stress test may need to be ordered to evaluate for ischemia and to evaluate patency of his stent(s) and the status of the 60% lesion in the circumflex. Teaching done today regarding COPD, CAD, medications. He does not know how to use sublingual nitroglycerin. He states that he has limited finances and is concerned about being on multiple medications.   He states that he has to wait until payday to get the metoprolol but should be able to get it within the next 2-3 days. He gets samples of his inhalers from his PCP. I asked that he verify his medication list and call us with any changes that need to be made. He states that he only knows his medications by shape and color. He was also advised to bring an updated list of medications with him to every visit. Time:  35 minutes    He will follow-up with Dr. Karina Lundy as scheduled and as needed. Cherylene Doctor MSN, FNP-BC     Please note:  Portions of this chart were created with Dragon medical speech to text program.  Unrecognized errors may be present.

## 2022-11-08 ENCOUNTER — OFFICE VISIT (OUTPATIENT)
Dept: CARDIOLOGY CLINIC | Age: 62
End: 2022-11-08
Payer: MEDICARE

## 2022-11-08 VITALS
OXYGEN SATURATION: 81 % | BODY MASS INDEX: 36.22 KG/M2 | DIASTOLIC BLOOD PRESSURE: 80 MMHG | WEIGHT: 253 LBS | SYSTOLIC BLOOD PRESSURE: 136 MMHG | HEART RATE: 78 BPM | HEIGHT: 70 IN

## 2022-11-08 DIAGNOSIS — I50.41 CHF (CONGESTIVE HEART FAILURE), NYHA CLASS III, ACUTE, COMBINED (HCC): ICD-10-CM

## 2022-11-08 DIAGNOSIS — I25.10 CORONARY ARTERY DISEASE INVOLVING NATIVE CORONARY ARTERY OF NATIVE HEART WITHOUT ANGINA PECTORIS: ICD-10-CM

## 2022-11-08 DIAGNOSIS — R07.9 CHEST PAIN, UNSPECIFIED TYPE: Primary | ICD-10-CM

## 2022-11-08 DIAGNOSIS — I10 HYPERTENSION, UNSPECIFIED TYPE: ICD-10-CM

## 2022-11-08 DIAGNOSIS — R60.0 BILATERAL LOWER EXTREMITY EDEMA: ICD-10-CM

## 2022-11-08 DIAGNOSIS — E78.5 HYPERLIPIDEMIA, UNSPECIFIED HYPERLIPIDEMIA TYPE: ICD-10-CM

## 2022-11-08 DIAGNOSIS — R06.02 SOB (SHORTNESS OF BREATH): ICD-10-CM

## 2022-11-08 PROCEDURE — 3074F SYST BP LT 130 MM HG: CPT | Performed by: NURSE PRACTITIONER

## 2022-11-08 PROCEDURE — 3078F DIAST BP <80 MM HG: CPT | Performed by: NURSE PRACTITIONER

## 2022-11-08 PROCEDURE — 99214 OFFICE O/P EST MOD 30 MIN: CPT | Performed by: NURSE PRACTITIONER

## 2022-11-08 PROCEDURE — G8754 DIAS BP LESS 90: HCPCS | Performed by: NURSE PRACTITIONER

## 2022-11-08 PROCEDURE — G8752 SYS BP LESS 140: HCPCS | Performed by: NURSE PRACTITIONER

## 2022-11-08 PROCEDURE — 3017F COLORECTAL CA SCREEN DOC REV: CPT | Performed by: NURSE PRACTITIONER

## 2022-11-08 PROCEDURE — G8432 DEP SCR NOT DOC, RNG: HCPCS | Performed by: NURSE PRACTITIONER

## 2022-11-08 PROCEDURE — G8417 CALC BMI ABV UP PARAM F/U: HCPCS | Performed by: NURSE PRACTITIONER

## 2022-11-08 PROCEDURE — G8427 DOCREV CUR MEDS BY ELIG CLIN: HCPCS | Performed by: NURSE PRACTITIONER

## 2022-11-08 RX ORDER — SPIRONOLACTONE 25 MG/1
25 TABLET ORAL DAILY
Qty: 30 TABLET | Refills: 6 | Status: SHIPPED | OUTPATIENT
Start: 2022-11-08

## 2022-11-08 RX ORDER — FUROSEMIDE 40 MG/1
40 TABLET ORAL DAILY
Qty: 60 TABLET | Refills: 6 | Status: SHIPPED | OUTPATIENT
Start: 2022-11-08

## 2022-11-08 NOTE — PROGRESS NOTES
Obdulia Zapata presents today for   Chief Complaint   Patient presents with    Follow-up     C/o Leg and feet Swelling       Obdulia Zapata preferred language for health care discussion is english/other. Is someone accompanying this pt? yes    Is the patient using any DME equipment during [address]? no    Depression Screening:  3 most recent PHQ Screens 11/8/2022   Little interest or pleasure in doing things Not at all   Feeling down, depressed, irritable, or hopeless Not at all   Total Score PHQ 2 0       Learning Assessment:  Learning Assessment 11/8/2022   PRIMARY LEARNER Patient   HIGHEST LEVEL OF EDUCATION - PRIMARY LEARNER  -   BARRIERS PRIMARY LEARNER -   [address] [place] [address] NAME -   PRIMARY LANGUAGE ENGLISH   LEARNER PREFERENCE PRIMARY DEMONSTRATION   ANSWERED BY patient   RELATIONSHIP SELF       Abuse Screening:  Abuse Screening Questionnaire 11/8/2022   Do you ever feel afraid of your partner? N   Are you in a relationship with someone who physically or mentally threatens you? N   Is it safe for you to go home? Y       Fall Risk  Fall Risk Assessment, last 12 mths 4/2/2020   Able to walk? Yes   Fall in past 12 months? No           Pt currently taking Anticoagulant therapy? no    Pt currently taking Antiplatelet therapy ? ASA 81 mg once a day      Coordination of Care:  1. Have you been to the ER, urgent care clinic since your last visit? Hospitalized since your last visit? no    2. Have you seen or consulted any other health care providers outside of the [address] [place] [address] since your last visit? Include any pap smears or colon screening.  no

## 2022-11-08 NOTE — PROGRESS NOTES
Tank Ortiz presents today for evaluation of complaints of lower extremity edema, shortness of breath, increasing MUÑIZ that has been occurring for the past week or so. He states that he has had to use his oxygen continuously for the past 3 days due to the shortness of breath. He does not have a scale at home and therefore, does not weigh daily. He states that he has been compliant with sodium restrictions and states that he has not been drinking much because he has not been thirsty. He admits to early satiety and abdominal fullness/bloating. He went to the ER on 10/25/22 for evaluation of complaints of chest pain and he ruled out for MI. His NT pro-BNP was 201 at that time (WNL). He is a 65year old male with history of CAD (hx of non-STEMI in March 2020), hypertension, hyperlipidemia, diabetes, COPD, tobacco use. He had Covid this year and states that his breathing has worsened somewhat since he had it. His last echo was done in April 2021 and it showed an EF of 50-55%, PASP of 35 mmHg. He states that he has oxygen therapy at home but does not use it routinely. He uses inhalers and a nebulizer which helps with the shortness of breath. He is not followed by pulmonology. He was hospitalized from 5/16/22 through 5/17/22 for chest pain, acute COPD. He presented with complaints of substernal chest pain at rest associated with some dyspnea and radiation to left arm. He underwent cardiac catheterization which showed:  Chest pain consistent with coronary ischemia. Previously stented first diagonal branch is closed with MAEVE 0-I flow. This lesion was left alone. Mid LAD has new focal 85% stenosis beyond previous stent. This was stented using 2.75 mm RAFIQ to residual 0%. Previous stent IFR was 0.86. Post stent IFR was 1.00. Ostial circumflex is new 60% stenosis since 2020 cath. iFR of this lesion was 1.00. This lesion was left alone. RCA is large and dominant. There is proximal 25-30% stenosis noted. This is also new since 2020. LVEDP was 14 mmHg. EF is 40-45% with anteroapical hypokinesis. Again, coronary anatomy was explained. He needs to discontinue tobacco use. He needs to continue on baby aspirin, Plavix, statins. If able to resume physical activity without discomfort, he can be discharged home on 5/17/2022. He presented to the ER on 1025/22 with complaints of chest pain that occurred while he was eating dinner. It lasted for about 12 minutes. He informed the ER provider that he also had some pain while walking from the parking lot to the ER and while sitting in the waiting area. His initial troponin was 15 and the second set was 13. His BNP was within normal limits. Denies chest pain, tightness, heaviness, and palpitations. Admits to chronic shortness of breath at rest, increasing dyspnea on exertion, orthopnea and PND. Admits to abdominal bloating. Denies lightheadedness, dizziness, and syncope. Admits to lower extremity edema and denies claudication. Denies nausea, vomiting, diarrhea, melena, hematochezia. Denies hematuria, urgency, frequency. Denies fever, chills. He continues to smoke but \"not much. \"     PMH:  Past Medical History:   Diagnosis Date    COPD (chronic obstructive pulmonary disease) ([facility]     Diabetes ([facility]     Emphysema     H/O cardiovascular stress test 2012    negative    Hypertension     [facility]        PSH:  Past Surgical History:   Procedure Laterality Date    HX CORONARY STENT PLACEMENT         MEDS:  Current Outpatient Medications   Medication Sig    lidocaine (Lidoderm) 5 % Apply patch to the affected area for 12 hours a day and remove for 12 hours a day. As needed    lisinopriL (PRINIVIL, ZESTRIL) 5 mg tablet Take 5 mg by mouth daily. metoprolol tartrate (LOPRESSOR) 50 mg tablet Take 1 Tablet by mouth every twelve (12) hours.     atorvastatin (LIPITOR) 80 mg tablet TAKE ONE TABLET BY MOUTH EVERY EVENING    clopidogreL (PLAVIX) 75 mg tab TAKE ONE TABLET BY MOUTH DAILY    nitroglycerin (NITROSTAT) 0.4 mg SL tablet 1 Tablet by SubLINGual route every five (5) minutes as needed for Chest Pain. Up to 3 doses. albuterol (ACCUNEB) 0.63 mg/3 mL nebulizer solution 3 mL by Nebulization route every six (6) hours as needed for Wheezing. insulin lispro (HUMALOG) 100 unit/mL injection Very Insulin Resistant For Blood Sugar (mg/dL) of:            Less than 150 =   0 units 150 -199 =   3 units 200 -249 =   6 units 250 -299 =   9 units 300 -349 =   12 units 350 and above =   15 units Initiate Hypoglycemic protocol if blood glucose is <70 mg/dL. Fast Acting - Administer Immediately - or within 15 minutes of start of meal, if mealtime coverage. glyBURIDE (DIABETA) 5 mg tablet Take 5 mg by mouth Daily (before breakfast). albuterol sulfate 90 mcg/actuation aebs Take 1 Puff by inhalation every four to six (4-6) hours as needed for Wheezing. aspirin delayed-release 81 mg tablet Take 1 Tab by mouth daily. Insulin Syringe-Needle U-100 0.3 mL 31 gauge x 5/16\" syrg TID as needed    metFORMIN (GLUCOPHAGE) 1,000 mg tablet Take 1 Tab by mouth two (2) times daily (with meals). tiotropium (SPIRIVA WITH HANDIHALER) 18 mcg inhalation capsule Take 1 Cap by inhalation daily. No current facility-administered medications for this visit. Allergies and Sensitivities:  No Known Allergies    Family History:  Family History   Problem Relation Age of Onset    Diabetes Mother        Social History:  He  reports that he has been smoking cigarettes. He has been smoking an average of .5 packs per day. He has never used smokeless tobacco.  He  reports current alcohol use.       Physical:  Visit Vitals  /80 (BP 1 Location: Left upper arm, BP Patient Position: Sitting, BP Cuff Size: Large adult)   Pulse 78   Ht 5' 10\" (1.778 m)   Wt 114.8 kg (253 lb)   SpO2 (!) 81%   BMI 36.30 kg/m²       His weight is up 15 pounds since his last visit      Exam:  Neck: Supple, no JVD, no carotid bruits  CV:  Normal S1 and  S2, no murmurs, rubs, or gallops noted  Lungs:  Scattered wheezing in the upper airways, no rales  Abd:  Soft, non-tender, obese, distendedwith good bowel sounds. No hepatosplenomegaly  Extremities:  1+ pitting lower extremity edema      Data:  EKG:      LABS:  Lab Results   Component Value Date/Time    Sodium 136 10/25/2022 07:00 PM    Potassium 4.1 10/25/2022 07:00 PM    Chloride 99 (L) 10/25/2022 07:00 PM    CO2 32 10/25/2022 07:00 PM    Glucose 257 (H) 10/25/2022 07:00 PM    BUN 9 10/25/2022 07:00 PM    Creatinine 0.64 10/25/2022 07:00 PM     Lab Results   Component Value Date/Time    Cholesterol, total 118 (L) 04/01/2021 09:40 AM    HDL Cholesterol 35 (L) 04/01/2021 09:40 AM    LDL, calculated 58 04/01/2021 09:40 AM    Triglyceride 124 04/01/2021 09:40 AM    CHOL/HDL Ratio 3.4 04/01/2021 09:40 AM     Lab Results   Component Value Date/Time    ALT (SGPT) 20 10/25/2022 07:00 PM         Impression/Plan:  1.  CAD, s/p non-STEMI and s/p PCI/stent to a new focal 85% stenosis in the mid LAD beyond the previous stent. Also has a new 60% lesion in the ostial circumflex  2. Hypertension, blood pressure controlled  3. Hyperlipidemia, on atorvastatin 80mg  4. Diabetes mellitus, recommend Hgb A1c less than 7% from cardiac standpoint  5. COPD  6. Tobacco abuse, continues to smoke  7. Acute combined systolic/diastolic heart failure  8. Shortness of breath/MUÑIZ    Mr. Yaneth Mcarthur was seen today for evaluation of complaints of lower extremity edema, abdominal fullness/bloating, worsening shortness of breath/MUÑIZ, orthopnea, and PND. Symptoms have been occurring for the past week. He has required continuous use of his oxygen for the past 3 days. He went to the ER about 2 weeks ago for chest pain and he ruled out for MI. His BNP was only 201 at that time (normal 0 to 900).     He reports compliance with his medications and sodium restriction and states that he has not been drinking much fluids as he has not been thirsty. His weight is up 15 pounds since his last visit. He does not weigh at home as he has no scale. He admits to shortness of breath at rest and more so with exertion. He has 1+ pitting lower extremity edema and he exhibits abdominal fullness/bloating and early satiety. He is currently not on a diuretic. He will be started on Lasix 40mg and was instructed to take 40mg BID for 2 days and then 40mg daily thereafter. He will be started on spironolactone 25mg daily. All other medications to remain the same. I asked that he have a BMP, Mg, and NT pro-BNP done today or tomorrow and a BMP and NT pro-BNP in one week. He will follow-up with Dr. Argenis Pichardo on 11/15/22. Will request that he have an echo done to re-evaluate his EF as his last echo was done in April 2021 and his EF at that time was 50-55%. Congestive heart failure teaching reinforced today. Advised to limit sodium intake to no more than 1500mg per day and to also limit fluid intake to 48 ounces per day (unless otherwise specified). Advised to weigh daily every morning (advised to purchase a scale) and record weights. Instructed to call our office if progressive weight gain is noted over a 2 to 3 day period of time, shortness of breath increases, or if abdominal bloating, nausea, fatigue, or increased lower extremity edema is noted. Time: 35 minutes    He will follow-up with Dr. Argenis Pichardo as scheduled and as needed. Saurav Torres MSN, FNP-BC     Please note:  Portions of this chart were created with Dragon medical speech to text program.  Unrecognized errors may be present.

## 2022-11-08 NOTE — PATIENT INSTRUCTIONS
BMP, Mg, NT pro-BNP  Begin lasix (furosemide) 40mg - take 1 tablet twice a day for 2 days only and the 1 tablet daily thereafter  Begin spironolactone 25mg once a day  All other medications to remain the same  BMP, NT pro-BNP to be done in one week  Follow-up with Dr. Rima Olson as scheduled in one week   Weigh daily and record  Limit sodium intake to 1500mg per day  Limit fluid intake to no more than 6, eight ounce glasses of any type of fluids per day (total of 48 ounces per day)  Call if you notice sudden or progressive weight gain (3-5 pounds in 2-3 days), increasing shortness of breath, abdominal bloating, increasing lower extremity edema, inability to lie flat or on your normal number of pillows, having to sit up to catch your breath, fatigue, increased somnolence (sleeping more), poor appetite

## 2022-11-09 ENCOUNTER — HOSPITAL ENCOUNTER (OUTPATIENT)
Dept: LAB | Age: 62
Discharge: HOME | End: 2022-11-09
Payer: MEDICARE

## 2022-11-09 DIAGNOSIS — R06.02 SOB (SHORTNESS OF BREATH): ICD-10-CM

## 2022-11-09 DIAGNOSIS — I10 HYPERTENSION, UNSPECIFIED TYPE: ICD-10-CM

## 2022-11-09 DIAGNOSIS — I25.10 CORONARY ARTERY DISEASE INVOLVING NATIVE CORONARY ARTERY OF NATIVE HEART WITHOUT ANGINA PECTORIS: ICD-10-CM

## 2022-11-09 LAB
ANION GAP SERPL CALC-SCNC: 6 MMOL/L (ref 3–18)
BNP SERPL-MCNC: 235 PG/ML (ref 0–900)
BUN SERPL-MCNC: 12 MG/DL (ref 7–18)
BUN/CREAT SERPL: 18 (ref 12–20)
CALCIUM SERPL-MCNC: 9.5 MG/DL (ref 8.5–10.1)
CHLORIDE SERPL-SCNC: 102 MMOL/L (ref 100–111)
CO2 SERPL-SCNC: 35 MMOL/L (ref 21–32)
CREAT SERPL-MCNC: 0.67 MG/DL (ref 0.6–1.3)
GLUCOSE SERPL-MCNC: 168 MG/DL (ref 74–99)
MAGNESIUM SERPL-MCNC: 1.6 MG/DL (ref 1.6–2.6)
POTASSIUM SERPL-SCNC: 4 MMOL/L (ref 3.5–5.5)
SODIUM SERPL-SCNC: 143 MMOL/L (ref 136–145)

## 2022-11-09 PROCEDURE — 83880 ASSAY OF NATRIURETIC PEPTIDE: CPT

## 2022-11-09 PROCEDURE — 83735 ASSAY OF MAGNESIUM: CPT

## 2022-11-09 PROCEDURE — 36415 COLL VENOUS BLD VENIPUNCTURE: CPT

## 2022-11-09 PROCEDURE — 80048 BASIC METABOLIC PNL TOTAL CA: CPT

## 2022-11-14 ENCOUNTER — HOSPITAL ENCOUNTER (OUTPATIENT)
Dept: LAB | Age: 62
Discharge: HOME | End: 2022-11-14
Payer: MEDICARE

## 2022-11-14 ENCOUNTER — TELEPHONE (OUTPATIENT)
Dept: CARDIOLOGY CLINIC | Age: 62
End: 2022-11-14

## 2022-11-14 DIAGNOSIS — I10 HYPERTENSION, UNSPECIFIED TYPE: ICD-10-CM

## 2022-11-14 DIAGNOSIS — R06.02 SOB (SHORTNESS OF BREATH): ICD-10-CM

## 2022-11-14 LAB
ANION GAP SERPL CALC-SCNC: 2 MMOL/L (ref 3–18)
BNP SERPL-MCNC: 143 PG/ML (ref 0–900)
BUN SERPL-MCNC: 12 MG/DL (ref 7–18)
BUN/CREAT SERPL: 17 (ref 12–20)
CALCIUM SERPL-MCNC: 8.7 MG/DL (ref 8.5–10.1)
CHLORIDE SERPL-SCNC: 97 MMOL/L (ref 100–111)
CO2 SERPL-SCNC: 41 MMOL/L (ref 21–32)
CREAT SERPL-MCNC: 0.69 MG/DL (ref 0.6–1.3)
GLUCOSE SERPL-MCNC: 235 MG/DL (ref 74–99)
POTASSIUM SERPL-SCNC: 4.1 MMOL/L (ref 3.5–5.5)
SODIUM SERPL-SCNC: 140 MMOL/L (ref 136–145)

## 2022-11-14 PROCEDURE — 36415 COLL VENOUS BLD VENIPUNCTURE: CPT

## 2022-11-14 PROCEDURE — 80048 BASIC METABOLIC PNL TOTAL CA: CPT

## 2022-11-14 PROCEDURE — 83880 ASSAY OF NATRIURETIC PEPTIDE: CPT

## 2022-11-14 NOTE — TELEPHONE ENCOUNTER
Received call from Lab in regards Critcal value for CO2 41. Advise provider will be in tomorrow. Per Dr Roni Slade have patient to stop Lasix for today and s/w Dr Trinh Horner tomorrow.

## 2022-11-15 ENCOUNTER — OFFICE VISIT (OUTPATIENT)
Dept: CARDIOLOGY CLINIC | Age: 62
End: 2022-11-15
Payer: MEDICARE

## 2022-11-15 VITALS — OXYGEN SATURATION: 92 % | WEIGHT: 242 LBS | HEIGHT: 70 IN | BODY MASS INDEX: 34.65 KG/M2 | HEART RATE: 60 BPM

## 2022-11-15 DIAGNOSIS — I10 HYPERTENSION, UNSPECIFIED TYPE: ICD-10-CM

## 2022-11-15 DIAGNOSIS — E78.5 HYPERLIPIDEMIA, UNSPECIFIED HYPERLIPIDEMIA TYPE: ICD-10-CM

## 2022-11-15 DIAGNOSIS — R60.0 BILATERAL LOWER EXTREMITY EDEMA: ICD-10-CM

## 2022-11-15 DIAGNOSIS — I25.10 CORONARY ARTERY DISEASE INVOLVING NATIVE CORONARY ARTERY OF NATIVE HEART WITHOUT ANGINA PECTORIS: Primary | ICD-10-CM

## 2022-11-15 DIAGNOSIS — R07.9 CHEST PAIN, UNSPECIFIED TYPE: ICD-10-CM

## 2022-11-15 PROCEDURE — G8756 NO BP MEASURE DOC: HCPCS | Performed by: INTERNAL MEDICINE

## 2022-11-15 PROCEDURE — 3017F COLORECTAL CA SCREEN DOC REV: CPT | Performed by: INTERNAL MEDICINE

## 2022-11-15 PROCEDURE — G8510 SCR DEP NEG, NO PLAN REQD: HCPCS | Performed by: INTERNAL MEDICINE

## 2022-11-15 PROCEDURE — 99215 OFFICE O/P EST HI 40 MIN: CPT | Performed by: INTERNAL MEDICINE

## 2022-11-15 PROCEDURE — G8417 CALC BMI ABV UP PARAM F/U: HCPCS | Performed by: INTERNAL MEDICINE

## 2022-11-15 PROCEDURE — 93000 ELECTROCARDIOGRAM COMPLETE: CPT | Performed by: INTERNAL MEDICINE

## 2022-11-15 PROCEDURE — G8427 DOCREV CUR MEDS BY ELIG CLIN: HCPCS | Performed by: INTERNAL MEDICINE

## 2022-11-15 NOTE — PATIENT INSTRUCTIONS
Get BMP lab work prior to Jonne Boast NP visit    See Jonne Boast NP q 3 weeks for next 4 months, and make sure daughter comes with each visit.     Stay on Lasix

## 2022-11-15 NOTE — PROGRESS NOTES
Tony Hilliard presents today for   Chief Complaint   Patient presents with    Follow-up     6 month follow up       Shortness of Breath     All the time       Leg Swelling     Bilateral lower extremities edema          Tony Hilliard preferred language for health care discussion is english/other. Is someone accompanying this pt? Yes, daughter     Is the patient using any DME equipment during OV? no    Depression Screening:  3 most recent PHQ Screens 11/15/2022   Little interest or pleasure in doing things Not at all   Feeling down, depressed, irritable, or hopeless Not at all   Total Score PHQ 2 0       Learning Assessment:  Learning Assessment 11/8/2022   PRIMARY LEARNER Patient   HIGHEST LEVEL OF EDUCATION - PRIMARY LEARNER  -   BARRIERS PRIMARY LEARNER -   [address] [place] [address] NAME -   PRIMARY LANGUAGE ENGLISH   LEARNER PREFERENCE PRIMARY DEMONSTRATION   ANSWERED BY patient   RELATIONSHIP SELF       Abuse Screening:  Abuse Screening Questionnaire 11/15/2022   Do you ever feel afraid of your partner? N   Are you in a relationship with someone who physically or mentally threatens you? N   Is it safe for you to go home? Y       Fall Risk  Fall Risk Assessment, last 12 mths 4/2/2020   Able to walk? Yes   Fall in past 12 months? No       Pt currently taking Anticoagulant therapy? No but taking Plavix 75mg daily     Coordination of Care:  1. Have you been to the ER, urgent care clinic since your last visit? Hospitalized since your last visit? no    2. Have you seen or consulted any other health care providers outside of the [address] [place] [address] since your last visit? Include any pap smears or colon screening.  no

## 2022-11-15 NOTE — PROGRESS NOTES
HISTORY OF PRESENT ILLNESS  Karissa Eckert is a 65 y.o. male. ASSESSMENT and PLAN    Mr. Merry Matthews presented in March 2020 with chest pains. He was diagnosed with non-STEMI. He underwent emergent heart catheterization, coronary angiography. His EF was noted to be 45-50% with anteroapical hypokinesis. He had long mid 95% LAD stenosis which was successfully stented to residual 0%. He also had diagonal branch lesion which was balloon dilated. Unfortunately, he has smoked for over 40 years and continued to smoke at the time of his non-STEMI. He has COPD as well as insulin-dependent diabetes mellitus. He has hypertension and hyperlipidemia. 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 He has no employees. 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 When he gets tired, he slows down. He has not had any recurrent episodes of chest pain despite vigorous physical activity. He was seen in the emergency room on 8/24/2020 for atypical chest pain. He was evaluated and discharged home. His echocardiogram in April 2021 revealed EF 50-55%. PA pressure was noted to be 35 mmHg. In February 2022, he was admitted with COVID pneumonia. He had acute respiratory distress. He was diagnosed with COPD exacerbation. Despite this, he continues to smoke about a pack a day. In May 2022, he presented to the hospital with chest pains. Repeat coronary angiography revealed 85% LAD ISR. He also has proximal RCA 30 to 40% stenosis as well as ostial circumflex 60% stenosis. The ostial circumflex lesion underwent IFR which was 0.98. This was left alone. He has had recurrent episodes of shortness of breath. He discontinued tobacco use early part of November 2022. CAD:   Symptomatically stable. He has not had any recurrent chest pains. BP:   Well controlled at [facility]. Rhythm:   Stable sinus at 60 bpm.  CHF:    There is no evidence of decompensated CHF noted.   Weight:    His weight today is 242 pounds. This is at baseline. His target weight is 210 pounds. Cholesterol:   Target LDL <70. Lipitor 80. Tobacco:    He states that he discontinued tobacco use around 11/8/2022. Anti-platelet:   Remains on ASA, and Plavix. He was accompanied by his daughter. She had numerous questions. All questions were answered. She had not accompanied her father to our office previously. Over 40 minutes spent answering all her questions about his recent episode of pulmonary edema. She also had questions about his COPD. I wanted to have the patient be seen by our CHF clinic within 1-2 weeks. Unfortunately, there are no available openings. Therefore I will see him back in 1 week and again in 6 months. Thank you    Encounter Diagnoses   Name Primary?  Coronary artery disease involving native coronary artery of native heart without angina pectoris Yes    Chest pain, unspecified type     Hypertension, unspecified type     Hyperlipidemia, unspecified hyperlipidemia type     Bilateral lower extremity edema      current treatment plan is effective, no change in therapy  lab results and schedule of future lab studies reviewed with patient  reviewed diet, exercise and weight control  very strongly urged to quit smoking to reduce cardiovascular risk  cardiovascular risk and specific lipid/LDL goals reviewed  use of aspirin to prevent MI and TIA's discussed    Follow-up  Associated symptoms include shortness of breath. Pertinent negatives include no chest pain. Shortness of Breath  Pertinent negatives include no PND, no orthopnea, no chest pain, no leg swelling and no claudication. Leg Swelling  Associated symptoms include shortness of breath. Pertinent negatives include no chest pain. Today, Mr. Hannah Majano has no complaints of shortness of breath. Apparently, about a month ago, he had a profound episode of dyspnea.   This happened rather suddenly in his opinion as well as according to his daughter who accompanied him today. He was started on Lasix regimen at that time and had improvement in his breathing. His laboratory evaluation revealed no significant compromise to his kidneys. He had been seen by CHF nurse practitioner since October 2022. There is no complaints of chest pains. Currently, he denies orthopnea or PND. His lower extremity swelling has improved back to baseline. Review of Systems   Respiratory:  Positive for shortness of breath. Cardiovascular:  Negative for chest pain, palpitations, orthopnea, claudication, leg swelling and PND. All other systems reviewed and are negative. Physical Exam  Vitals and nursing note reviewed. Constitutional:       Appearance: He is obese. HENT:      Head: Normocephalic. Eyes:      Conjunctiva/sclera: Conjunctivae normal.   Cardiovascular:      Rate and Rhythm: Normal rate and regular rhythm. Pulmonary:      Breath sounds: Normal breath sounds. Abdominal:      Palpations: Abdomen is soft. Musculoskeletal:         General: No swelling. Cervical back: No rigidity. Skin:     General: Skin is warm and dry. Neurological:      General: No focal deficit present. Mental Status: He is alert and oriented to person, place, and time. Psychiatric:         Mood and Affect: Mood normal.         Behavior: Behavior normal.       PCP: Mansi Reilly MD    Past Medical History:   Diagnosis Date    [facility]     Diabetes [facility]     Emphysema     H/O cardiovascular stress test 2012    negative    Hypertension     MI (myocardial infarction) ([facility]        Past Surgical History:   Procedure Laterality Date    HX CORONARY STENT PLACEMENT         Current Outpatient Medications   Medication Sig Dispense Refill    furosemide (LASIX) 40 mg tablet Take 1 Tablet by mouth daily. Or as directed 60 Tablet 6    spironolactone (ALDACTONE) 25 mg tablet Take 1 Tablet by mouth daily.  30 Tablet 6  lidocaine (Lidoderm) 5 % Apply patch to the affected area for 12 hours a day and remove for 12 hours a day. As needed 10 Each 0    lisinopriL (PRINIVIL, ZESTRIL) 5 mg tablet Take 5 mg by mouth daily.  metoprolol tartrate (LOPRESSOR) 50 mg tablet Take 1 Tablet by mouth every twelve (12) hours. 60 Tablet 6    atorvastatin (LIPITOR) 80 mg tablet TAKE ONE TABLET BY MOUTH EVERY EVENING 90 Tablet 3    clopidogreL (PLAVIX) 75 mg tab TAKE ONE TABLET BY MOUTH DAILY 90 Tablet 3    nitroglycerin (NITROSTAT) 0.4 mg SL tablet 1 Tablet by SubLINGual route every five (5) minutes as needed for Chest Pain. Up to 3 doses. 25 Tablet 3    albuterol (ACCUNEB) 0.63 mg/3 mL nebulizer solution 3 mL by Nebulization route every six (6) hours as needed for Wheezing. 25 Nebule 4    insulin lispro (HUMALOG) 100 unit/mL injection Very Insulin Resistant For Blood Sugar (mg/dL) of:            Less than 150 =   0 units 150 -199 =   3 units 200 -249 =   6 units 250 -299 =   9 units 300 -349 =   12 units 350 and above =   15 units Initiate Hypoglycemic protocol if blood glucose is <70 mg/dL. Fast Acting - Administer Immediately - or within 15 minutes of start of meal, if mealtime coverage. 1 Each 0    glyBURIDE (DIABETA) 5 mg tablet Take 5 mg by mouth Daily (before breakfast).  albuterol sulfate 90 mcg/actuation aebs Take 1 Puff by inhalation every four to six (4-6) hours as needed for Wheezing. 1 Each 0    aspirin delayed-release 81 mg tablet Take 1 Tab by mouth daily. 30 Tab 0    Insulin Syringe-Needle U-100 0.3 mL 31 gauge x 5/16\" syrg TID as needed 100 Syringe 0    metFORMIN (GLUCOPHAGE) 1,000 mg tablet Take 1 Tab by mouth two (2) times daily (with meals). 60 Tab 0    tiotropium (SPIRIVA WITH HANDIHALER) 18 mcg inhalation capsule Take 1 Cap by inhalation daily.  30 Cap 0       The patient has a family history of    Social History     Tobacco Use    Smoking status: Every Day     Packs/day: 0.50     Types: Cigarettes    Smokeless tobacco: Never   Vaping Use    Vaping Use: Never used   Substance Use Topics    Alcohol use: Yes     Comment: social only    Drug use: Yes     Types: Marijuana       Lab Results   Component Value Date/Time    Cholesterol, total 118 (L) 04/01/2021 09:40 AM    HDL Cholesterol 35 (L) 04/01/2021 09:40 AM    LDL, calculated 58 04/01/2021 09:40 AM    Triglyceride 124 04/01/2021 09:40 AM    CHOL/HDL Ratio 3.4 04/01/2021 09:40 AM        BP Readings from Last 3 Encounters:   11/08/22 136/80   10/27/22 (!) 140/60   10/25/22 (!) 152/81        Pulse Readings from Last 3 Encounters:   11/15/22 60   11/08/22 78   10/25/22 85       Wt Readings from Last 3 Encounters:   11/15/22 109.8 kg (242 lb)   11/08/22 114.8 kg (253 lb)   10/27/22 108 kg (238 lb)         EKG: unchanged from previous tracings, normal sinus rhythm, nonspecific ST and T waves changes.

## 2022-11-21 ENCOUNTER — HOSPITAL ENCOUNTER (OUTPATIENT)
Dept: LAB | Age: 62
Discharge: HOME | End: 2022-11-21
Payer: MEDICARE

## 2022-11-21 DIAGNOSIS — R60.0 BILATERAL LOWER EXTREMITY EDEMA: ICD-10-CM

## 2022-11-21 DIAGNOSIS — I10 HYPERTENSION, UNSPECIFIED TYPE: ICD-10-CM

## 2022-11-21 DIAGNOSIS — I25.10 CORONARY ARTERY DISEASE INVOLVING NATIVE CORONARY ARTERY OF NATIVE HEART WITHOUT ANGINA PECTORIS: ICD-10-CM

## 2022-11-21 LAB
ANION GAP SERPL CALC-SCNC: 1 MMOL/L (ref 3–18)
BUN SERPL-MCNC: 11 MG/DL (ref 7–18)
BUN/CREAT SERPL: 14 (ref 12–20)
CALCIUM SERPL-MCNC: 9.2 MG/DL (ref 8.5–10.1)
CHLORIDE SERPL-SCNC: 99 MMOL/L (ref 100–111)
CO2 SERPL-SCNC: 39 MMOL/L (ref 21–32)
CREAT SERPL-MCNC: 0.81 MG/DL (ref 0.6–1.3)
GLUCOSE SERPL-MCNC: 253 MG/DL (ref 74–99)
POTASSIUM SERPL-SCNC: 4.6 MMOL/L (ref 3.5–5.5)
SODIUM SERPL-SCNC: 139 MMOL/L (ref 136–145)

## 2022-11-21 PROCEDURE — 36415 COLL VENOUS BLD VENIPUNCTURE: CPT

## 2022-11-21 PROCEDURE — 80048 BASIC METABOLIC PNL TOTAL CA: CPT

## 2022-11-22 ENCOUNTER — OFFICE VISIT (OUTPATIENT)
Dept: CARDIOLOGY CLINIC | Age: 62
End: 2022-11-22
Payer: MEDICARE

## 2022-11-22 VITALS
OXYGEN SATURATION: 93 % | DIASTOLIC BLOOD PRESSURE: 62 MMHG | WEIGHT: 239 LBS | HEIGHT: 70 IN | SYSTOLIC BLOOD PRESSURE: 126 MMHG | HEART RATE: 66 BPM | BODY MASS INDEX: 34.22 KG/M2

## 2022-11-22 DIAGNOSIS — R06.02 SOB (SHORTNESS OF BREATH): Primary | ICD-10-CM

## 2022-11-22 PROCEDURE — G8417 CALC BMI ABV UP PARAM F/U: HCPCS | Performed by: INTERNAL MEDICINE

## 2022-11-22 PROCEDURE — G8432 DEP SCR NOT DOC, RNG: HCPCS | Performed by: INTERNAL MEDICINE

## 2022-11-22 PROCEDURE — 99214 OFFICE O/P EST MOD 30 MIN: CPT | Performed by: INTERNAL MEDICINE

## 2022-11-22 PROCEDURE — 3074F SYST BP LT 130 MM HG: CPT | Performed by: INTERNAL MEDICINE

## 2022-11-22 PROCEDURE — G8427 DOCREV CUR MEDS BY ELIG CLIN: HCPCS | Performed by: INTERNAL MEDICINE

## 2022-11-22 PROCEDURE — G8752 SYS BP LESS 140: HCPCS | Performed by: INTERNAL MEDICINE

## 2022-11-22 PROCEDURE — G8754 DIAS BP LESS 90: HCPCS | Performed by: INTERNAL MEDICINE

## 2022-11-22 PROCEDURE — 3078F DIAST BP <80 MM HG: CPT | Performed by: INTERNAL MEDICINE

## 2022-11-22 PROCEDURE — 3017F COLORECTAL CA SCREEN DOC REV: CPT | Performed by: INTERNAL MEDICINE

## 2022-11-22 NOTE — PROGRESS NOTES
HISTORY OF PRESENT ILLNESS  Terrie Huerta is a 65 y.o. male. ASSESSMENT and PLAN    Mr. Mele Lovelace presented in March 2020 with chest pains. He was diagnosed with non-STEMI. He underwent emergent heart catheterization, coronary angiography. His EF was noted to be 45-50% with anteroapical hypokinesis. He had long mid 95% LAD stenosis which was successfully stented to residual 0%. He also had diagonal branch lesion which was balloon dilated. Unfortunately, he has smoked for over 40 years and continued to smoke at the time of his non-STEMI. He has COPD as well as insulin-dependent diabetes mellitus. He has hypertension and hyperlipidemia. 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 He has no employees. 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 When he gets tired, he slows down. He has not had any recurrent episodes of chest pain despite vigorous physical activity. He was seen in the emergency room on 8/24/2020 for atypical chest pain. He was evaluated and discharged home. His echocardiogram in April 2021 revealed EF 50-55%. PA pressure was noted to be 35 mmHg. In February 2022, he was admitted with COVID pneumonia. He had acute respiratory distress. He was diagnosed with COPD exacerbation. Despite this, he continues to smoke about a pack a day. In May 2022, he presented to the hospital with chest pains. Repeat coronary angiography revealed 85% LAD ISR; he had his LAD lesion stented to residual 0%. He also has proximal RCA 30 to 40% stenosis as well as ostial circumflex 60% stenosis. The ostial circumflex lesion underwent IFR which was 0.98. This was left alone. He has had recurrent episodes of shortness of breath. He discontinued tobacco use early part of November 2022. CAD:   Clinically stable. BP:   Well controlled at 126/62. Rhythm:   Stable sinus rhythm at 66 bpm.  CHF:    There is no evidence of decompensated CHF noted.   Weight:    His weight today is 239 pounds. His baseline weight is 240 pounds. His target weight is 210 pounds. Cholesterol:   Target LDL <70. Tobacco:    He has stayed off of tobacco use since 11/8/2022. Anti-platelet:   Remains on ASA. I will see him in 6 months. Thank you    No diagnosis found. current treatment plan is effective, no change in therapy  lab results and schedule of future lab studies reviewed with patient  reviewed diet, exercise and weight control  very strongly urged to quit smoking to reduce cardiovascular risk  cardiovascular risk and specific lipid/LDL goals reviewed  use of aspirin to prevent MI and TIA's discussed    Follow-up  Associated symptoms include shortness of breath. Pertinent negatives include no chest pain. Shortness of Breath  Associated symptoms include orthopnea. Pertinent negatives include no PND, no chest pain, no leg swelling and no claudication. Dizziness  Associated symptoms include shortness of breath. Pertinent negatives include no chest pain. Today, Mr. Eliecer Head has no complaints of chest pains. His swelling has improved significantly since having his medications alter last week. He still has occasional episodes of orthopnea but much more easily tolerated. He denies any changes in his exercise capacity. He denies any changes in his breathing status. Review of Systems   Respiratory:  Positive for shortness of breath. Cardiovascular:  Positive for orthopnea. Negative for chest pain, palpitations, claudication, leg swelling and PND. Neurological:  Positive for dizziness. All other systems reviewed and are negative. Physical Exam  Vitals and nursing note reviewed. Constitutional:       Appearance: He is obese. HENT:      Head: Normocephalic. Eyes:      Conjunctiva/sclera: Conjunctivae normal.   Cardiovascular:      Rate and Rhythm: Normal rate and regular rhythm. Pulmonary:      Breath sounds: Normal breath sounds.    Abdominal:      Palpations: Abdomen is soft. Musculoskeletal:         General: No swelling. Cervical back: No rigidity. Skin:     General: Skin is warm and dry. Neurological:      General: No focal deficit present. Mental Status: He is alert and oriented to person, place, and time. Psychiatric:         Mood and Affect: Mood normal.         Behavior: Behavior normal.       PCP: Jaylin De MD    Past Medical History:   Diagnosis Date    COPD (chronic obstructive pulmonary disease) ([facility]     Diabetes ([facility]     Emphysema     H/O cardiovascular stress test 2012    negative    Hypertension     MI (myocardial infarction) ([facility]        Past Surgical History:   Procedure Laterality Date    HX CORONARY STENT PLACEMENT         Current Outpatient Medications   Medication Sig Dispense Refill    furosemide (LASIX) 40 mg tablet Take 1 Tablet by mouth daily. Or as directed 60 Tablet 6    spironolactone (ALDACTONE) 25 mg tablet Take 1 Tablet by mouth daily. 30 Tablet 6    lidocaine (Lidoderm) 5 % Apply patch to the affected area for 12 hours a day and remove for 12 hours a day. As needed 10 Each 0    lisinopriL (PRINIVIL, ZESTRIL) 5 mg tablet Take 5 mg by mouth daily. metoprolol tartrate (LOPRESSOR) 50 mg tablet Take 1 Tablet by mouth every twelve (12) hours. 60 Tablet 6    atorvastatin (LIPITOR) 80 mg tablet TAKE ONE TABLET BY MOUTH EVERY EVENING 90 Tablet 3    clopidogreL (PLAVIX) 75 mg tab TAKE ONE TABLET BY MOUTH DAILY 90 Tablet 3    nitroglycerin (NITROSTAT) 0.4 mg SL tablet 1 Tablet by SubLINGual route every five (5) minutes as needed for Chest Pain. Up to 3 doses. 25 Tablet 3    albuterol (ACCUNEB) 0.63 mg/3 mL nebulizer solution 3 mL by Nebulization route every six (6) hours as needed for Wheezing.  25 Nebule 4    insulin lispro (HUMALOG) 100 unit/mL injection Very Insulin Resistant For Blood Sugar (mg/dL) of:            Less than 150 =   0 units 150 -199 =   3 units 200 -249 =   6 units 250 -299 =   9 units 300 -349 = 12 units 350 and above =   15 units Initiate Hypoglycemic protocol if blood glucose is <70 mg/dL. Fast Acting - Administer Immediately - or within 15 minutes of start of meal, if mealtime coverage. 1 Each 0    glyBURIDE (DIABETA) 5 mg tablet Take 5 mg by mouth Daily (before breakfast). albuterol sulfate 90 mcg/actuation aebs Take 1 Puff by inhalation every four to six (4-6) hours as needed for Wheezing. 1 Each 0    aspirin delayed-release 81 mg tablet Take 1 Tab by mouth daily. 30 Tab 0    Insulin Syringe-Needle U-100 0.3 mL 31 gauge x 5/16\" syrg TID as needed 100 Syringe 0    metFORMIN (GLUCOPHAGE) 1,000 mg tablet Take 1 Tab by mouth two (2) times daily (with meals). 60 Tab 0    tiotropium (SPIRIVA WITH HANDIHALER) 18 mcg inhalation capsule Take 1 Cap by inhalation daily. 30 Cap 0       The patient has a family history of    Social History     Tobacco Use    Smoking status: Every Day     Packs/day: 0.50     Types: Cigarettes    Smokeless tobacco: Never   Vaping Use    Vaping Use: Never used   Substance Use Topics    Alcohol use: Yes     Comment: social only    Drug use: Yes     Types: Marijuana       Lab Results   Component Value Date/Time    Cholesterol, total 118 (L) 04/01/2021 09:40 AM    HDL Cholesterol 35 (L) 04/01/2021 09:40 AM    LDL, calculated 58 04/01/2021 09:40 AM    Triglyceride 124 04/01/2021 09:40 AM    CHOL/HDL Ratio 3.4 04/01/2021 09:40 AM        BP Readings from Last 3 Encounters:   11/22/22 126/62   11/08/22 136/80   10/27/22 (!) 140/60        Pulse Readings from Last 3 Encounters:   11/22/22 66   11/15/22 60   11/08/22 78       Wt Readings from Last 3 Encounters:   11/22/22 108.4 kg (239 lb)   11/15/22 109.8 kg (242 lb)   11/08/22 114.8 kg (253 lb)         EKG: Not done today.

## 2022-11-22 NOTE — PROGRESS NOTES
Andi Sanches presents today for   Chief Complaint   Patient presents with    Follow-up     1 week follow up     Shortness of Breath     All the time       Dizziness     Feels more off balance when walking          Andi Sanches preferred language for health care discussion is english/other. Is someone accompanying this pt? Yes, daughter     Is the patient using any DME equipment during OV? no    Depression Screening:  3 most recent PHQ Screens 11/15/2022   Little interest or pleasure in doing things Not at all   Feeling down, depressed, irritable, or hopeless Not at all   Total Score PHQ 2 0       Learning Assessment:  Learning Assessment 11/8/2022   PRIMARY LEARNER Patient   HIGHEST LEVEL OF EDUCATION - PRIMARY LEARNER  -   BARRIERS PRIMARY LEARNER -   [address] [place] [address] NAME -   PRIMARY LANGUAGE ENGLISH   LEARNER PREFERENCE PRIMARY DEMONSTRATION   ANSWERED BY patient   RELATIONSHIP SELF       Abuse Screening:  Abuse Screening Questionnaire 11/15/2022   Do you ever feel afraid of your partner? N   Are you in a relationship with someone who physically or mentally threatens you? N   Is it safe for you to go home? Y       Fall Risk  Fall Risk Assessment, last 12 mths 4/2/2020   Able to walk? Yes   Fall in past 12 months? No       Pt currently taking Anticoagulant therapy? no    Coordination of Care:  1. Have you been to the ER, urgent care clinic since your last visit? Hospitalized since your last visit? no    2. Have you seen or consulted any other health care providers outside of the [address] [place] [address] since your last visit? Include any pap smears or colon screening.  no

## 2022-11-23 NOTE — PROGRESS NOTES
Per 2nd last office note:  He was accompanied by his daughter. She had numerous questions. All questions were answered. She had not accompanied her father to our office previously. Over 40 minutes spent answering all her questions about his recent episode of pulmonary edema. She also had questions about his COPD. I wanted to have the patient be seen by our CHF clinic within 1-2 weeks. Unfortunately, there are no available openings.   Therefore I will see him back in 1 week and again in 6 months. Thank you

## 2023-01-27 RX ORDER — METOPROLOL TARTRATE 50 MG/1
50 TABLET ORAL EVERY 12 HOURS
Qty: 180 TABLET | Refills: 3 | Status: SHIPPED | OUTPATIENT
Start: 2023-01-27

## 2023-01-27 RX ORDER — SPIRONOLACTONE 25 MG/1
25 TABLET ORAL DAILY
Qty: 90 TABLET | Refills: 3 | Status: SHIPPED | OUTPATIENT
Start: 2023-01-27

## 2023-02-16 ENCOUNTER — TELEPHONE (OUTPATIENT)
Age: 63
End: 2023-02-16

## 2023-04-15 ENCOUNTER — APPOINTMENT (OUTPATIENT)
Facility: HOSPITAL | Age: 63
End: 2023-04-15
Payer: MEDICARE

## 2023-04-15 ENCOUNTER — HOSPITAL ENCOUNTER (EMERGENCY)
Facility: HOSPITAL | Age: 63
Discharge: HOME | End: 2023-04-15
Attending: EMERGENCY MEDICINE | Admitting: EMERGENCY MEDICINE
Payer: MEDICARE

## 2023-04-15 VITALS
HEIGHT: 70 IN | TEMPERATURE: 99.7 F | RESPIRATION RATE: 18 BRPM | WEIGHT: 234 LBS | SYSTOLIC BLOOD PRESSURE: 114 MMHG | DIASTOLIC BLOOD PRESSURE: 64 MMHG | HEART RATE: 64 BPM | OXYGEN SATURATION: 96 % | BODY MASS INDEX: 33.5 KG/M2

## 2023-04-15 DIAGNOSIS — G62.9 NEUROPATHY: Primary | ICD-10-CM

## 2023-04-15 DIAGNOSIS — R07.9 CHEST PAIN, UNSPECIFIED TYPE: ICD-10-CM

## 2023-04-15 LAB
ALBUMIN SERPL-MCNC: 3.5 G/DL (ref 3.4–5)
ALBUMIN/GLOB SERPL: 1.2 (ref 0.8–1.7)
ALP SERPL-CCNC: 97 U/L (ref 45–117)
ALT SERPL-CCNC: 23 U/L (ref 16–61)
ANION GAP SERPL CALC-SCNC: 4 MMOL/L (ref 3–18)
AST SERPL-CCNC: 16 U/L (ref 10–38)
BASOPHILS # BLD: 0 K/UL (ref 0–0.1)
BASOPHILS NFR BLD: 0 % (ref 0–2)
BILIRUB SERPL-MCNC: 0.6 MG/DL (ref 0.2–1)
BUN SERPL-MCNC: 15 MG/DL (ref 7–18)
BUN/CREAT SERPL: 17 (ref 12–20)
CALCIUM SERPL-MCNC: 8.9 MG/DL (ref 8.5–10.1)
CHLORIDE SERPL-SCNC: 102 MMOL/L (ref 100–111)
CO2 SERPL-SCNC: 31 MMOL/L (ref 21–32)
CREAT SERPL-MCNC: 0.86 MG/DL (ref 0.6–1.3)
DIFFERENTIAL METHOD BLD: ABNORMAL
EKG ATRIAL RATE: 69 BPM
EKG DIAGNOSIS: NORMAL
EKG P AXIS: 31 DEGREES
EKG P-R INTERVAL: 216 MS
EKG Q-T INTERVAL: 404 MS
EKG QRS DURATION: 100 MS
EKG QTC CALCULATION (BAZETT): 432 MS
EKG R AXIS: 67 DEGREES
EKG T AXIS: 59 DEGREES
EKG VENTRICULAR RATE: 69 BPM
EOSINOPHIL # BLD: 0.3 K/UL (ref 0–0.4)
EOSINOPHIL NFR BLD: 3 % (ref 0–5)
ERYTHROCYTE [DISTWIDTH] IN BLOOD BY AUTOMATED COUNT: 13.8 % (ref 11.6–14.5)
GLOBULIN SER CALC-MCNC: 2.9 G/DL (ref 2–4)
GLUCOSE SERPL-MCNC: 280 MG/DL (ref 74–99)
HCT VFR BLD AUTO: 36.9 % (ref 36–48)
HGB BLD-MCNC: 11.9 G/DL (ref 13–16)
IMM GRANULOCYTES # BLD AUTO: 0 K/UL (ref 0–0.04)
IMM GRANULOCYTES NFR BLD AUTO: 0 % (ref 0–0.5)
LYMPHOCYTES # BLD: 2.4 K/UL (ref 0.9–3.6)
LYMPHOCYTES NFR BLD: 25 % (ref 21–52)
MCH RBC QN AUTO: 27.4 PG (ref 24–34)
MCHC RBC AUTO-ENTMCNC: 32.2 G/DL (ref 31–37)
MCV RBC AUTO: 84.8 FL (ref 78–100)
MONOCYTES # BLD: 0.7 K/UL (ref 0.05–1.2)
MONOCYTES NFR BLD: 7 % (ref 3–10)
NEUTS SEG # BLD: 6 K/UL (ref 1.8–8)
NEUTS SEG NFR BLD: 64 % (ref 40–73)
NRBC # BLD: 0 K/UL (ref 0–0.01)
NRBC BLD-RTO: 0 PER 100 WBC
PLATELET # BLD AUTO: 235 K/UL (ref 135–420)
PMV BLD AUTO: 11.2 FL (ref 9.2–11.8)
POTASSIUM SERPL-SCNC: 3.8 MMOL/L (ref 3.5–5.5)
PROT SERPL-MCNC: 6.4 G/DL (ref 6.4–8.2)
RBC # BLD AUTO: 4.35 M/UL (ref 4.35–5.65)
SODIUM SERPL-SCNC: 137 MMOL/L (ref 136–145)
TROPONIN I SERPL HS-MCNC: 12 NG/L (ref 0–78)
TROPONIN I SERPL HS-MCNC: 12 NG/L (ref 0–78)
WBC # BLD AUTO: 9.4 K/UL (ref 4.6–13.2)

## 2023-04-15 PROCEDURE — 93005 ELECTROCARDIOGRAM TRACING: CPT | Performed by: EMERGENCY MEDICINE

## 2023-04-15 PROCEDURE — 70450 CT HEAD/BRAIN W/O DYE: CPT

## 2023-04-15 PROCEDURE — 73090 X-RAY EXAM OF FOREARM: CPT

## 2023-04-15 PROCEDURE — 85025 COMPLETE CBC W/AUTO DIFF WBC: CPT

## 2023-04-15 PROCEDURE — 93010 ELECTROCARDIOGRAM REPORT: CPT | Performed by: EMERGENCY MEDICINE

## 2023-04-15 PROCEDURE — 6370000000 HC RX 637 (ALT 250 FOR IP)

## 2023-04-15 PROCEDURE — 72070 X-RAY EXAM THORAC SPINE 2VWS: CPT

## 2023-04-15 PROCEDURE — 80053 COMPREHEN METABOLIC PANEL: CPT

## 2023-04-15 PROCEDURE — 84484 ASSAY OF TROPONIN QUANT: CPT

## 2023-04-15 PROCEDURE — 99285 EMERGENCY DEPT VISIT HI MDM: CPT | Performed by: EMERGENCY MEDICINE

## 2023-04-15 PROCEDURE — 73060 X-RAY EXAM OF HUMERUS: CPT

## 2023-04-15 PROCEDURE — 71045 X-RAY EXAM CHEST 1 VIEW: CPT

## 2023-04-15 RX ORDER — GABAPENTIN 300 MG/1
300 CAPSULE ORAL 2 TIMES DAILY
Qty: 180 CAPSULE | Refills: 1 | Status: SHIPPED | OUTPATIENT
Start: 2023-04-15 | End: 2023-04-29

## 2023-04-15 RX ORDER — HYDROCODONE BITARTRATE AND ACETAMINOPHEN 5; 325 MG/1; MG/1
1 TABLET ORAL
Status: COMPLETED | OUTPATIENT
Start: 2023-04-15 | End: 2023-04-15

## 2023-04-15 RX ADMIN — HYDROCODONE BITARTRATE AND ACETAMINOPHEN 1 TABLET: 5; 325 TABLET ORAL at 14:14

## 2023-04-15 ASSESSMENT — ENCOUNTER SYMPTOMS
CHOKING: 0
BLOOD IN STOOL: 0
ABDOMINAL DISTENTION: 0
COUGH: 1
RECTAL PAIN: 0
WHEEZING: 0
ABDOMINAL PAIN: 0
DIARRHEA: 0
NAUSEA: 0
CHEST TIGHTNESS: 1
ANAL BLEEDING: 0
VOMITING: 0
CONSTIPATION: 0
SHORTNESS OF BREATH: 0
COUGH: 0
SHORTNESS OF BREATH: 1

## 2023-04-15 ASSESSMENT — PAIN DESCRIPTION - DIRECTION: RADIATING_TOWARDS: L ARM

## 2023-04-15 ASSESSMENT — PAIN - FUNCTIONAL ASSESSMENT: PAIN_FUNCTIONAL_ASSESSMENT: 0-10

## 2023-04-15 ASSESSMENT — PAIN SCALES - GENERAL
PAINLEVEL_OUTOF10: 5
PAINLEVEL_OUTOF10: 7

## 2023-04-15 ASSESSMENT — PAIN DESCRIPTION - PAIN TYPE: TYPE: ACUTE PAIN

## 2023-04-15 ASSESSMENT — PAIN DESCRIPTION - DESCRIPTORS: DESCRIPTORS: ACHING

## 2023-04-15 ASSESSMENT — PAIN DESCRIPTION - LOCATION: LOCATION: CHEST

## 2023-04-15 ASSESSMENT — HEART SCORE: ECG: 0

## 2023-04-15 NOTE — H&P
breath. Negative for wheezing. Cardiovascular:  Positive for chest pain. Negative for palpitations and leg swelling. Gastrointestinal:  Negative for abdominal distention and abdominal pain. Neurological:  Positive for dizziness and headaches. Negative for speech difficulty and light-headedness. Physical Exam   /64   Pulse 64   Temp 99.7 °F (37.6 °C) (Oral)   Resp 18   Ht 5' 10\" (1.778 m)   Wt 234 lb (106.1 kg)   SpO2 96%   BMI 33.58 kg/m²       Physical Exam  Constitutional:       General: He is not in acute distress. Appearance: Normal appearance. HENT:      Head: Normocephalic and atraumatic. Cardiovascular:      Rate and Rhythm: Normal rate and regular rhythm. Pulmonary:      Effort: Pulmonary effort is normal.      Breath sounds: Wheezing present. Abdominal:      General: Bowel sounds are normal.   Musculoskeletal:      Comments: Upper extremity: no swelling, ecchymosis noted to affect left upper extremity; bilateral upper extremity strength is 5/5. Bilateral ROM normal.    Skin:     General: Skin is warm and dry. Capillary Refill: Capillary refill takes less than 2 seconds. Neurological:      General: No focal deficit present. Mental Status: He is alert and oriented to person, place, and time. Sensory: No sensory deficit. Motor: No weakness.       Comments: Patient's sensation is intact, however light touch feels slightly different in the ulnar distribution of his left hand             Diagnostic Study Results     Labs -  Recent Results (from the past 12 hour(s))   EKG 12 Lead    Collection Time: 04/15/23 12:10 PM   Result Value Ref Range    Ventricular Rate 69 BPM    Atrial Rate 69 BPM    P-R Interval 216 ms    QRS Duration 100 ms    Q-T Interval 404 ms    QTc Calculation (Bazett) 432 ms    P Axis 31 degrees    R Axis 67 degrees    T Axis 59 degrees    Diagnosis       Sinus rhythm with 1st degree AV block  Lateral infarct (cited on or before

## 2023-04-15 NOTE — ED TRIAGE NOTES
Pt arrived with c/o of left side chest pain and arm pain that started appx one hour ago.  Pt has hx of two MI.

## 2023-04-15 NOTE — ED NOTES
Provided pt with discharge instructions and paperwork. Advised pt to follow up with PCP as instructed in discharge instruction. Patient verbally acknowledged understanding of discharge instructions. Pt had no further questions or concerns.         Ada Chance RN  04/15/23 1252

## 2023-04-15 NOTE — ED PROVIDER NOTES
[facility] EMERGENCY DEPT  EMERGENCY DEPARTMENT ENCOUNTER      Pt Name: Maribel Pelayo  MRN: [id number]  [place] 1960  Date of evaluation: 4/15/2023  Provider: Dre Cunningham MD    CHIEF COMPLAINT       Chief Complaint   Patient presents with    Chest Pain    Arm Pain         HISTORY OF PRESENT ILLNESS   (Location/Symptom, Timing/Onset, Context/Setting, Quality, Duration, Modifying Factors, Severity)  Note limiting factors. Maribel Pelayo is a 65 y.o. male who presents to the emergency department      64yo M presenting for chest and arm pain x1 hour. Patient with history of STEMI 2.5 and 1 year ago w/ stent placement. This morning he woke up with vague chest pain. He then began to have numbness and tingling in the 4th and 5th fingers on his L arm. He says he was unable to move the arm and this prompted him to come to the emergency department. This has never happened to him before. He did not have any trauma. Additionally, he reports new onset headaches that are the worst headaches he has ever had with some associated gait instability. He has not recently started any new medications. No fevers, chills, night sweats. This does not feel like his prior ACS. The history is provided by the patient. Nursing Notes were reviewed. REVIEW OF SYSTEMS    (2-9 systems for level 4, 10 or more for level 5)     Review of Systems   Constitutional:  Negative for activity change, appetite change, chills, diaphoresis, fatigue and fever. Respiratory:  Positive for chest tightness. Negative for cough, choking and shortness of breath. Cardiovascular:  Positive for chest pain. Negative for palpitations. Gastrointestinal:  Negative for abdominal distention, abdominal pain, anal bleeding, blood in stool, constipation, diarrhea, nausea, rectal pain and vomiting. Musculoskeletal:  Negative for joint swelling, myalgias, neck pain and neck stiffness. Neurological:  Positive for numbness and headaches.  Negative for

## 2023-04-27 ENCOUNTER — HOSPITAL ENCOUNTER (EMERGENCY)
Facility: HOSPITAL | Age: 63
Discharge: HOME | End: 2023-04-27
Attending: EMERGENCY MEDICINE
Payer: MEDICARE

## 2023-04-27 ENCOUNTER — APPOINTMENT (OUTPATIENT)
Facility: HOSPITAL | Age: 63
End: 2023-04-27
Payer: MEDICARE

## 2023-04-27 VITALS
WEIGHT: 234 LBS | RESPIRATION RATE: 21 BRPM | SYSTOLIC BLOOD PRESSURE: 119 MMHG | OXYGEN SATURATION: 90 % | BODY MASS INDEX: 32.76 KG/M2 | HEART RATE: 64 BPM | DIASTOLIC BLOOD PRESSURE: 74 MMHG | TEMPERATURE: 98.1 F | HEIGHT: 71 IN

## 2023-04-27 DIAGNOSIS — J44.1 COPD EXACERBATION (HCC): Primary | ICD-10-CM

## 2023-04-27 LAB
ALBUMIN SERPL-MCNC: 3.6 G/DL (ref 3.4–5)
ALBUMIN/GLOB SERPL: 1.2 (ref 0.8–1.7)
ALP SERPL-CCNC: 74 U/L (ref 45–117)
ALT SERPL-CCNC: 20 U/L (ref 16–61)
ANION GAP SERPL CALC-SCNC: 3 MMOL/L (ref 3–18)
AST SERPL-CCNC: 15 U/L (ref 10–38)
BASOPHILS # BLD: 0 K/UL (ref 0–0.1)
BASOPHILS NFR BLD: 1 % (ref 0–2)
BILIRUB SERPL-MCNC: 0.7 MG/DL (ref 0.2–1)
BUN SERPL-MCNC: 13 MG/DL (ref 7–18)
BUN/CREAT SERPL: 18 (ref 12–20)
CALCIUM SERPL-MCNC: 8.9 MG/DL (ref 8.5–10.1)
CHLORIDE SERPL-SCNC: 103 MMOL/L (ref 100–111)
CO2 SERPL-SCNC: 35 MMOL/L (ref 21–32)
CREAT SERPL-MCNC: 0.73 MG/DL (ref 0.6–1.3)
DIFFERENTIAL METHOD BLD: ABNORMAL
EOSINOPHIL # BLD: 0.2 K/UL (ref 0–0.4)
EOSINOPHIL NFR BLD: 3 % (ref 0–5)
ERYTHROCYTE [DISTWIDTH] IN BLOOD BY AUTOMATED COUNT: 13.7 % (ref 11.6–14.5)
FLUAV RNA SPEC QL NAA+PROBE: NOT DETECTED
FLUBV RNA SPEC QL NAA+PROBE: NOT DETECTED
GLOBULIN SER CALC-MCNC: 3 G/DL (ref 2–4)
GLUCOSE SERPL-MCNC: 247 MG/DL (ref 74–99)
HCT VFR BLD AUTO: 36.6 % (ref 36–48)
HGB BLD-MCNC: 11.5 G/DL (ref 13–16)
IMM GRANULOCYTES # BLD AUTO: 0 K/UL (ref 0–0.04)
IMM GRANULOCYTES NFR BLD AUTO: 0 % (ref 0–0.5)
LYMPHOCYTES # BLD: 2.4 K/UL (ref 0.9–3.6)
LYMPHOCYTES NFR BLD: 29 % (ref 21–52)
MCH RBC QN AUTO: 27.4 PG (ref 24–34)
MCHC RBC AUTO-ENTMCNC: 31.4 G/DL (ref 31–37)
MCV RBC AUTO: 87.4 FL (ref 78–100)
MONOCYTES # BLD: 0.6 K/UL (ref 0.05–1.2)
MONOCYTES NFR BLD: 7 % (ref 3–10)
NEUTS SEG # BLD: 4.9 K/UL (ref 1.8–8)
NEUTS SEG NFR BLD: 60 % (ref 40–73)
NRBC # BLD: 0 K/UL (ref 0–0.01)
NRBC BLD-RTO: 0 PER 100 WBC
NT PRO BNP: 162 PG/ML (ref 0–900)
PLATELET # BLD AUTO: 214 K/UL (ref 135–420)
PMV BLD AUTO: 11.1 FL (ref 9.2–11.8)
POTASSIUM SERPL-SCNC: 4.1 MMOL/L (ref 3.5–5.5)
PROT SERPL-MCNC: 6.6 G/DL (ref 6.4–8.2)
RBC # BLD AUTO: 4.19 M/UL (ref 4.35–5.65)
SARS-COV-2 RNA RESP QL NAA+PROBE: NOT DETECTED
SODIUM SERPL-SCNC: 141 MMOL/L (ref 136–145)
TROPONIN I SERPL HS-MCNC: 11 NG/L (ref 0–78)
WBC # BLD AUTO: 8.1 K/UL (ref 4.6–13.2)

## 2023-04-27 PROCEDURE — 87636 SARSCOV2 & INF A&B AMP PRB: CPT

## 2023-04-27 PROCEDURE — 99285 EMERGENCY DEPT VISIT HI MDM: CPT

## 2023-04-27 PROCEDURE — 93005 ELECTROCARDIOGRAM TRACING: CPT | Performed by: EMERGENCY MEDICINE

## 2023-04-27 PROCEDURE — 71045 X-RAY EXAM CHEST 1 VIEW: CPT

## 2023-04-27 PROCEDURE — 85025 COMPLETE CBC W/AUTO DIFF WBC: CPT

## 2023-04-27 PROCEDURE — 80053 COMPREHEN METABOLIC PANEL: CPT

## 2023-04-27 PROCEDURE — 84484 ASSAY OF TROPONIN QUANT: CPT

## 2023-04-27 PROCEDURE — 83880 ASSAY OF NATRIURETIC PEPTIDE: CPT

## 2023-04-27 RX ORDER — PREDNISONE 20 MG/1
TABLET ORAL
Qty: 30 TABLET | Refills: 0 | Status: SHIPPED | OUTPATIENT
Start: 2023-04-27 | End: 2023-05-12

## 2023-04-27 ASSESSMENT — ENCOUNTER SYMPTOMS
WHEEZING: 1
ABDOMINAL PAIN: 0
COUGH: 1
ABDOMINAL DISTENTION: 0
SHORTNESS OF BREATH: 1

## 2023-04-27 NOTE — ED PROVIDER NOTES
Absolute Mono # 0.6 0.05 - 1.2 K/UL    Absolute Eos # 0.2 0.0 - 0.4 K/UL    Basophils Absolute 0.0 0.0 - 0.1 K/UL    Absolute Immature Granulocyte 0.0 0.00 - 0.04 K/UL    Differential Type AUTOMATED     CMP    Collection Time: 04/27/23 11:30 AM   Result Value Ref Range    Sodium 141 136 - 145 mmol/L    Potassium 4.1 3.5 - 5.5 mmol/L    Chloride 103 100 - 111 mmol/L    CO2 35 (H) 21 - 32 mmol/L    Anion Gap 3 3.0 - 18 mmol/L    Glucose 247 (H) 74 - 99 mg/dL    BUN 13 7.0 - 18 MG/DL    Creatinine 0.73 0.6 - 1.3 MG/DL    Bun/Cre Ratio 18 12 - 20      Est, Glom Filt Rate >60 >60 ml/min/1.73m2    Calcium 8.9 8.5 - 10.1 MG/DL    Total Bilirubin 0.7 0.2 - 1.0 MG/DL    ALT 20 16 - 61 U/L    AST 15 10 - 38 U/L    Alk Phosphatase 74 45 - 117 U/L    Total Protein 6.6 6.4 - 8.2 g/dL    Albumin 3.6 3.4 - 5.0 g/dL    Globulin 3.0 2.0 - 4.0 g/dL    Albumin/Globulin Ratio 1.2 0.8 - 1.7     Troponin    Collection Time: 04/27/23 11:30 AM   Result Value Ref Range    Troponin, High Sensitivity 11 0 - 78 ng/L   Brain Natriuretic Peptide    Collection Time: 04/27/23 11:30 AM   Result Value Ref Range    NT Pro- 0 - 900 PG/ML   COVID-19 & Influenza Combo    Collection Time: 04/27/23 11:45 AM    Specimen: Nasopharyngeal   Result Value Ref Range    SARS-CoV-2, PCR Not detected NOTD      Rapid Influenza A By PCR Not detected NOTD      Rapid Influenza B By PCR Not detected NOTD         Radiologic Studies -   XR CHEST PORTABLE   Final Result    IMPRESSION:   1. No acute cardiopulmonary process. No change. Medical Decision Making   I am the first provider for this patient. I reviewed the vital signs, available nursing notes, past medical history, past surgical history, family history and social history. Vital Signs-Reviewed the patient's vital signs. EKG: Time 11:31 AM, rate 64, normal sinus rhythm, normal axis, normal intervals, normal ST segments, no acute changes.     ED Course: Progress Notes, Reevaluation,

## 2023-04-27 NOTE — ED TRIAGE NOTES
Pt in ED with c/o SOB onset 4 days ago. PT has Hx of Copd and Emphysema.  Pt states he is supposed to be on home O2 but currently dose not have his Spo2

## 2023-04-28 LAB
EKG ATRIAL RATE: 64 BPM
EKG DIAGNOSIS: NORMAL
EKG P AXIS: 1 DEGREES
EKG P-R INTERVAL: 198 MS
EKG Q-T INTERVAL: 410 MS
EKG QRS DURATION: 98 MS
EKG QTC CALCULATION (BAZETT): 422 MS
EKG R AXIS: 71 DEGREES
EKG T AXIS: 48 DEGREES
EKG VENTRICULAR RATE: 64 BPM

## 2023-04-28 PROCEDURE — 93010 ELECTROCARDIOGRAM REPORT: CPT | Performed by: INTERNAL MEDICINE

## 2023-06-28 ENCOUNTER — APPOINTMENT (OUTPATIENT)
Facility: HOSPITAL | Age: 63
End: 2023-06-28
Payer: MEDICARE

## 2023-06-28 ENCOUNTER — HOSPITAL ENCOUNTER (EMERGENCY)
Facility: HOSPITAL | Age: 63
Discharge: HOME | End: 2023-06-28
Attending: EMERGENCY MEDICINE
Payer: MEDICARE

## 2023-06-28 VITALS
DIASTOLIC BLOOD PRESSURE: 82 MMHG | SYSTOLIC BLOOD PRESSURE: 115 MMHG | WEIGHT: 230 LBS | RESPIRATION RATE: 17 BRPM | TEMPERATURE: 98.7 F | OXYGEN SATURATION: 97 % | HEART RATE: 72 BPM | HEIGHT: 71 IN | BODY MASS INDEX: 32.2 KG/M2

## 2023-06-28 DIAGNOSIS — J44.1 COPD EXACERBATION (HCC): Primary | ICD-10-CM

## 2023-06-28 LAB
ANION GAP SERPL CALC-SCNC: 5 MMOL/L (ref 3–18)
BASOPHILS # BLD: 0 K/UL (ref 0–0.1)
BASOPHILS NFR BLD: 0 % (ref 0–2)
BUN SERPL-MCNC: 16 MG/DL (ref 7–18)
BUN/CREAT SERPL: 20 (ref 12–20)
CALCIUM SERPL-MCNC: 9.1 MG/DL (ref 8.5–10.1)
CHLORIDE SERPL-SCNC: 104 MMOL/L (ref 100–111)
CO2 SERPL-SCNC: 32 MMOL/L (ref 21–32)
CREAT SERPL-MCNC: 0.8 MG/DL (ref 0.6–1.3)
DIFFERENTIAL METHOD BLD: ABNORMAL
EOSINOPHIL # BLD: 0.2 K/UL (ref 0–0.4)
EOSINOPHIL NFR BLD: 2 % (ref 0–5)
ERYTHROCYTE [DISTWIDTH] IN BLOOD BY AUTOMATED COUNT: 13.7 % (ref 11.6–14.5)
GLUCOSE SERPL-MCNC: 111 MG/DL (ref 74–99)
HCT VFR BLD AUTO: 32.4 % (ref 36–48)
HGB BLD-MCNC: 9.9 G/DL (ref 13–16)
IMM GRANULOCYTES # BLD AUTO: 0 K/UL (ref 0–0.04)
IMM GRANULOCYTES NFR BLD AUTO: 0 % (ref 0–0.5)
LYMPHOCYTES # BLD: 2.1 K/UL (ref 0.9–3.6)
LYMPHOCYTES NFR BLD: 23 % (ref 21–52)
MCH RBC QN AUTO: 26.5 PG (ref 24–34)
MCHC RBC AUTO-ENTMCNC: 30.6 G/DL (ref 31–37)
MCV RBC AUTO: 86.6 FL (ref 78–100)
MONOCYTES # BLD: 0.8 K/UL (ref 0.05–1.2)
MONOCYTES NFR BLD: 9 % (ref 3–10)
NEUTS SEG # BLD: 6 K/UL (ref 1.8–8)
NEUTS SEG NFR BLD: 66 % (ref 40–73)
NRBC # BLD: 0 K/UL (ref 0–0.01)
NRBC BLD-RTO: 0 PER 100 WBC
NT PRO BNP: 181 PG/ML (ref 0–900)
PLATELET # BLD AUTO: 250 K/UL (ref 135–420)
PMV BLD AUTO: 11.4 FL (ref 9.2–11.8)
POTASSIUM SERPL-SCNC: 3.7 MMOL/L (ref 3.5–5.5)
RBC # BLD AUTO: 3.74 M/UL (ref 4.35–5.65)
SODIUM SERPL-SCNC: 141 MMOL/L (ref 136–145)
TROPONIN I SERPL HS-MCNC: 11 NG/L (ref 0–78)
TROPONIN I SERPL HS-MCNC: 11 NG/L (ref 0–78)
WBC # BLD AUTO: 9 K/UL (ref 4.6–13.2)

## 2023-06-28 PROCEDURE — 83880 ASSAY OF NATRIURETIC PEPTIDE: CPT

## 2023-06-28 PROCEDURE — 85025 COMPLETE CBC W/AUTO DIFF WBC: CPT

## 2023-06-28 PROCEDURE — 99285 EMERGENCY DEPT VISIT HI MDM: CPT

## 2023-06-28 PROCEDURE — 93005 ELECTROCARDIOGRAM TRACING: CPT | Performed by: EMERGENCY MEDICINE

## 2023-06-28 PROCEDURE — 94761 N-INVAS EAR/PLS OXIMETRY MLT: CPT

## 2023-06-28 PROCEDURE — 80048 BASIC METABOLIC PNL TOTAL CA: CPT

## 2023-06-28 PROCEDURE — 6360000002 HC RX W HCPCS: Performed by: EMERGENCY MEDICINE

## 2023-06-28 PROCEDURE — 71045 X-RAY EXAM CHEST 1 VIEW: CPT

## 2023-06-28 PROCEDURE — 84484 ASSAY OF TROPONIN QUANT: CPT

## 2023-06-28 RX ORDER — ALBUTEROL SULFATE 90 UG/1
2 AEROSOL, METERED RESPIRATORY (INHALATION) 4 TIMES DAILY PRN
Qty: 54 G | Refills: 1 | Status: SHIPPED | OUTPATIENT
Start: 2023-06-28

## 2023-06-28 RX ORDER — PREDNISONE 20 MG/1
60 TABLET ORAL DAILY
Qty: 15 TABLET | Refills: 0 | Status: SHIPPED | OUTPATIENT
Start: 2023-06-28 | End: 2023-07-03

## 2023-06-28 RX ORDER — ALBUTEROL SULFATE 2.5 MG/3ML
2.5 SOLUTION RESPIRATORY (INHALATION)
Status: COMPLETED | OUTPATIENT
Start: 2023-06-28 | End: 2023-06-28

## 2023-06-28 RX ADMIN — ALBUTEROL SULFATE 2.5 MG: 2.5 SOLUTION RESPIRATORY (INHALATION) at 18:35

## 2023-06-28 RX ADMIN — ALBUTEROL SULFATE 2.5 MG: 2.5 SOLUTION RESPIRATORY (INHALATION) at 18:40

## 2023-06-28 ASSESSMENT — PAIN - FUNCTIONAL ASSESSMENT: PAIN_FUNCTIONAL_ASSESSMENT: NONE - DENIES PAIN

## 2023-06-29 LAB
EKG ATRIAL RATE: 67 BPM
EKG ATRIAL RATE: 70 BPM
EKG DIAGNOSIS: NORMAL
EKG DIAGNOSIS: NORMAL
EKG P AXIS: -19 DEGREES
EKG P AXIS: 0 DEGREES
EKG P-R INTERVAL: 194 MS
EKG P-R INTERVAL: 204 MS
EKG Q-T INTERVAL: 382 MS
EKG Q-T INTERVAL: 396 MS
EKG QRS DURATION: 92 MS
EKG QRS DURATION: 96 MS
EKG QTC CALCULATION (BAZETT): 412 MS
EKG QTC CALCULATION (BAZETT): 418 MS
EKG R AXIS: 67 DEGREES
EKG R AXIS: 69 DEGREES
EKG T AXIS: 11 DEGREES
EKG T AXIS: 37 DEGREES
EKG VENTRICULAR RATE: 67 BPM
EKG VENTRICULAR RATE: 70 BPM

## 2023-06-29 PROCEDURE — 93010 ELECTROCARDIOGRAM REPORT: CPT | Performed by: INTERNAL MEDICINE

## 2023-07-10 RX ORDER — ATORVASTATIN CALCIUM 80 MG/1
TABLET, FILM COATED ORAL
Qty: 90 TABLET | Refills: 3 | Status: SHIPPED | OUTPATIENT
Start: 2023-07-10

## 2023-07-11 ENCOUNTER — OFFICE VISIT (OUTPATIENT)
Age: 63
End: 2023-07-11

## 2023-07-11 VITALS
WEIGHT: 257 LBS | OXYGEN SATURATION: 87 % | SYSTOLIC BLOOD PRESSURE: 138 MMHG | BODY MASS INDEX: 35.98 KG/M2 | HEIGHT: 71 IN | HEART RATE: 73 BPM | DIASTOLIC BLOOD PRESSURE: 72 MMHG

## 2023-07-11 DIAGNOSIS — J44.9 CHRONIC OBSTRUCTIVE PULMONARY DISEASE, UNSPECIFIED COPD TYPE (HCC): ICD-10-CM

## 2023-07-11 DIAGNOSIS — I25.10 CORONARY ARTERY DISEASE INVOLVING NATIVE CORONARY ARTERY OF NATIVE HEART WITHOUT ANGINA PECTORIS: ICD-10-CM

## 2023-07-11 DIAGNOSIS — R06.02 SHORTNESS OF BREATH: Primary | ICD-10-CM

## 2023-07-11 DIAGNOSIS — I10 ESSENTIAL (PRIMARY) HYPERTENSION: ICD-10-CM

## 2023-07-11 DIAGNOSIS — E78.5 HYPERLIPIDEMIA, UNSPECIFIED HYPERLIPIDEMIA TYPE: ICD-10-CM

## 2023-07-11 RX ORDER — IPRATROPIUM BROMIDE AND ALBUTEROL SULFATE 2.5; .5 MG/3ML; MG/3ML
SOLUTION RESPIRATORY (INHALATION)
COMMUNITY
Start: 2023-06-29

## 2023-07-11 RX ORDER — FUROSEMIDE 40 MG/1
80 TABLET ORAL DAILY
Qty: 60 TABLET | Refills: 6 | Status: SHIPPED | OUTPATIENT
Start: 2023-07-11

## 2023-07-11 RX ORDER — FLUTICASONE FUROATE, UMECLIDINIUM BROMIDE AND VILANTEROL TRIFENATATE 100; 62.5; 25 UG/1; UG/1; UG/1
1 POWDER RESPIRATORY (INHALATION) DAILY
COMMUNITY

## 2023-07-11 ASSESSMENT — ENCOUNTER SYMPTOMS
WHEEZING: 1
CONSTIPATION: 0
VOMITING: 0
BLOOD IN STOOL: 0
COUGH: 0
CHEST TIGHTNESS: 0
ABDOMINAL DISTENTION: 1
NAUSEA: 0
DIARRHEA: 0

## 2023-07-11 ASSESSMENT — PATIENT HEALTH QUESTIONNAIRE - PHQ9
1. LITTLE INTEREST OR PLEASURE IN DOING THINGS: 0
SUM OF ALL RESPONSES TO PHQ9 QUESTIONS 1 & 2: 0
SUM OF ALL RESPONSES TO PHQ QUESTIONS 1-9: 0
2. FEELING DOWN, DEPRESSED OR HOPELESS: 0
SUM OF ALL RESPONSES TO PHQ QUESTIONS 1-9: 0

## 2023-07-11 NOTE — PATIENT INSTRUCTIONS
Increase furosemide to 80mg once a day - take 2 of your 40mg tablets once a day  All other medications to remain the same  BMP to be done in 2 weeks  Pulmonology consult with Mark with Dr. Carmelo Felty as scheduled and as needed  Weigh daily and record  Limit sodium intake to 1500-2000mg per day  Limit fluid intake to no more than 6, eight ounce glasses of any type of fluids per day (total of 48 ounces per day)  Call if you notice sudden or progressive weight gain (3-5 pounds in 2-3 days), increasing shortness of breath, abdominal bloating, increasing lower extremity edema, inability to lie flat or on your normal number of pillows, having to sit up to catch your breath, fatigue, increased somnolence (sleeping more), poor appetite

## 2023-07-11 NOTE — PROGRESS NOTES
Oriana Shante presents today for   Chief Complaint   Patient presents with    Follow-up     Add on due to edema     Chest Pain     Left chest sharp pains on/off    Shortness of Breath     SOB with exertion     Dizziness     Dizzy spells on/off    Edema     Bilateral legs,feet,ankle and abdomen constant     Fatigue     Severe fatigue        Oriana Frey preferred language for health care discussion is english/other. Is someone accompanying this pt? no    Is the patient using any DME equipment during OV? no    Depression Screening:  Depression: Not at risk    PHQ-2 Score: 0        Learning Assessment:  Who is the primary learner? Patient    What is the preferred language for health care of the primary learner? ENGLISH    How does the primary learner prefer to learn new concepts? DEMONSTRATION    Answered By patient    Relationship to Learner SELF           Pt currently taking Anticoagulant therapy? no    Pt currently taking Antiplatelet therapy ? ASA 81 mg 1x daily       Coordination of Care:  1. Have you been to the ER, urgent care clinic since your last visit? Hospitalized since your last visit? no    2. Have you seen or consulted any other health care providers outside of the [address] [place] [address] since your last visit? Include any pap smears or colon screening.  no

## 2023-08-22 ENCOUNTER — APPOINTMENT (OUTPATIENT)
Facility: HOSPITAL | Age: 63
End: 2023-08-22
Payer: MEDICARE

## 2023-08-22 ENCOUNTER — HOSPITAL ENCOUNTER (EMERGENCY)
Facility: HOSPITAL | Age: 63
Discharge: HOME | End: 2023-08-22
Attending: EMERGENCY MEDICINE
Payer: MEDICARE

## 2023-08-22 VITALS — HEART RATE: 65 BPM | RESPIRATION RATE: 19 BRPM | TEMPERATURE: 98.1 F | WEIGHT: 234 LBS | BODY MASS INDEX: 32.64 KG/M2

## 2023-08-22 DIAGNOSIS — R73.9 HYPERGLYCEMIA: ICD-10-CM

## 2023-08-22 DIAGNOSIS — R10.9 RIGHT FLANK PAIN: Primary | ICD-10-CM

## 2023-08-22 LAB
ALBUMIN SERPL-MCNC: 3.7 G/DL (ref 3.4–5)
ALBUMIN/GLOB SERPL: 1.1 (ref 0.8–1.7)
ALP SERPL-CCNC: 113 U/L (ref 45–117)
ALT SERPL-CCNC: 20 U/L (ref 16–61)
ANION GAP SERPL CALC-SCNC: 9 MMOL/L (ref 3–18)
APPEARANCE UR: CLEAR
AST SERPL-CCNC: 16 U/L (ref 10–38)
BASOPHILS # BLD: 0 K/UL (ref 0–0.1)
BASOPHILS NFR BLD: 0 % (ref 0–2)
BILIRUB SERPL-MCNC: 0.4 MG/DL (ref 0.2–1)
BILIRUB UR QL: NEGATIVE
BUN SERPL-MCNC: 25 MG/DL (ref 7–18)
BUN/CREAT SERPL: 22 (ref 12–20)
CALCIUM SERPL-MCNC: 9.2 MG/DL (ref 8.5–10.1)
CHLORIDE SERPL-SCNC: 99 MMOL/L (ref 100–111)
CO2 SERPL-SCNC: 28 MMOL/L (ref 21–32)
COLOR UR: YELLOW
CREAT SERPL-MCNC: 1.12 MG/DL (ref 0.6–1.3)
DIFFERENTIAL METHOD BLD: ABNORMAL
EOSINOPHIL # BLD: 0.2 K/UL (ref 0–0.4)
EOSINOPHIL NFR BLD: 3 % (ref 0–5)
ERYTHROCYTE [DISTWIDTH] IN BLOOD BY AUTOMATED COUNT: 13.9 % (ref 11.6–14.5)
GLOBULIN SER CALC-MCNC: 3.4 G/DL (ref 2–4)
GLUCOSE SERPL-MCNC: 399 MG/DL (ref 74–99)
GLUCOSE UR STRIP.AUTO-MCNC: >1000 MG/DL
HCT VFR BLD AUTO: 32.2 % (ref 36–48)
HGB BLD-MCNC: 9.7 G/DL (ref 13–16)
HGB UR QL STRIP: NEGATIVE
IMM GRANULOCYTES # BLD AUTO: 0 K/UL (ref 0–0.04)
IMM GRANULOCYTES NFR BLD AUTO: 0 % (ref 0–0.5)
KETONES UR QL STRIP.AUTO: NEGATIVE MG/DL
LEUKOCYTE ESTERASE UR QL STRIP.AUTO: NEGATIVE
LIPASE SERPL-CCNC: 194 U/L (ref 73–393)
LYMPHOCYTES # BLD: 1.9 K/UL (ref 0.9–3.6)
LYMPHOCYTES NFR BLD: 24 % (ref 21–52)
MCH RBC QN AUTO: 24.7 PG (ref 24–34)
MCHC RBC AUTO-ENTMCNC: 30.1 G/DL (ref 31–37)
MCV RBC AUTO: 81.9 FL (ref 78–100)
MONOCYTES # BLD: 0.6 K/UL (ref 0.05–1.2)
MONOCYTES NFR BLD: 8 % (ref 3–10)
NEUTS SEG # BLD: 5.2 K/UL (ref 1.8–8)
NEUTS SEG NFR BLD: 65 % (ref 40–73)
NITRITE UR QL STRIP.AUTO: NEGATIVE
NRBC # BLD: 0 K/UL (ref 0–0.01)
NRBC BLD-RTO: 0 PER 100 WBC
PH UR STRIP: 5.5 (ref 5–8)
PLATELET # BLD AUTO: 218 K/UL (ref 135–420)
PMV BLD AUTO: 11.2 FL (ref 9.2–11.8)
POTASSIUM SERPL-SCNC: 4.8 MMOL/L (ref 3.5–5.5)
PROT SERPL-MCNC: 7.1 G/DL (ref 6.4–8.2)
PROT UR STRIP-MCNC: NEGATIVE MG/DL
RBC # BLD AUTO: 3.93 M/UL (ref 4.35–5.65)
SODIUM SERPL-SCNC: 136 MMOL/L (ref 136–145)
SP GR UR REFRACTOMETRY: >1.03 (ref 1–1.03)
UROBILINOGEN UR QL STRIP.AUTO: 0.2 EU/DL (ref 0.2–1)
WBC # BLD AUTO: 8.1 K/UL (ref 4.6–13.2)

## 2023-08-22 PROCEDURE — 80053 COMPREHEN METABOLIC PANEL: CPT

## 2023-08-22 PROCEDURE — 83690 ASSAY OF LIPASE: CPT

## 2023-08-22 PROCEDURE — 85025 COMPLETE CBC W/AUTO DIFF WBC: CPT

## 2023-08-22 PROCEDURE — 96374 THER/PROPH/DIAG INJ IV PUSH: CPT

## 2023-08-22 PROCEDURE — 99284 EMERGENCY DEPT VISIT MOD MDM: CPT

## 2023-08-22 PROCEDURE — 74176 CT ABD & PELVIS W/O CONTRAST: CPT

## 2023-08-22 PROCEDURE — 6360000002 HC RX W HCPCS: Performed by: EMERGENCY MEDICINE

## 2023-08-22 PROCEDURE — 96375 TX/PRO/DX INJ NEW DRUG ADDON: CPT

## 2023-08-22 PROCEDURE — 96361 HYDRATE IV INFUSION ADD-ON: CPT

## 2023-08-22 PROCEDURE — 81003 URINALYSIS AUTO W/O SCOPE: CPT

## 2023-08-22 PROCEDURE — 2580000003 HC RX 258: Performed by: EMERGENCY MEDICINE

## 2023-08-22 RX ORDER — 0.9 % SODIUM CHLORIDE 0.9 %
1000 INTRAVENOUS SOLUTION INTRAVENOUS ONCE
Status: COMPLETED | OUTPATIENT
Start: 2023-08-22 | End: 2023-08-22

## 2023-08-22 RX ORDER — ONDANSETRON 2 MG/ML
4 INJECTION INTRAMUSCULAR; INTRAVENOUS
Status: COMPLETED | OUTPATIENT
Start: 2023-08-22 | End: 2023-08-22

## 2023-08-22 RX ORDER — MORPHINE SULFATE 4 MG/ML
4 INJECTION, SOLUTION INTRAMUSCULAR; INTRAVENOUS
Status: COMPLETED | OUTPATIENT
Start: 2023-08-22 | End: 2023-08-22

## 2023-08-22 RX ADMIN — MORPHINE SULFATE 4 MG: 4 INJECTION, SOLUTION INTRAMUSCULAR; INTRAVENOUS at 04:23

## 2023-08-22 RX ADMIN — SODIUM CHLORIDE 1000 ML: 9 INJECTION, SOLUTION INTRAVENOUS at 04:23

## 2023-08-22 RX ADMIN — ONDANSETRON 4 MG: 2 INJECTION INTRAMUSCULAR; INTRAVENOUS at 04:23

## 2023-08-22 ASSESSMENT — ENCOUNTER SYMPTOMS
ABDOMINAL PAIN: 0
COUGH: 0
SHORTNESS OF BREATH: 0

## 2023-08-22 ASSESSMENT — LIFESTYLE VARIABLES
HOW MANY STANDARD DRINKS CONTAINING ALCOHOL DO YOU HAVE ON A TYPICAL DAY: 7 TO 9
HOW OFTEN DO YOU HAVE A DRINK CONTAINING ALCOHOL: 2-4 TIMES A MONTH

## 2023-08-22 NOTE — ED NOTES
Discharge instructions reviewed with pt. Pt voiced understanding.   Pt ambulated out of [place], [place]  08/22/23 4888

## 2023-09-17 ENCOUNTER — APPOINTMENT (OUTPATIENT)
Facility: HOSPITAL | Age: 63
End: 2023-09-17
Payer: MEDICARE

## 2023-09-17 ENCOUNTER — HOSPITAL ENCOUNTER (EMERGENCY)
Facility: HOSPITAL | Age: 63
Discharge: HOME | End: 2023-09-17
Attending: EMERGENCY MEDICINE
Payer: MEDICARE

## 2023-09-17 VITALS
BODY MASS INDEX: 34.05 KG/M2 | WEIGHT: 243.2 LBS | DIASTOLIC BLOOD PRESSURE: 75 MMHG | SYSTOLIC BLOOD PRESSURE: 142 MMHG | RESPIRATION RATE: 12 BRPM | HEART RATE: 71 BPM | OXYGEN SATURATION: 91 % | HEIGHT: 71 IN | TEMPERATURE: 98.2 F

## 2023-09-17 DIAGNOSIS — E11.69 TYPE 2 DIABETES MELLITUS WITH OTHER SPECIFIED COMPLICATION, UNSPECIFIED WHETHER LONG TERM INSULIN USE (HCC): ICD-10-CM

## 2023-09-17 DIAGNOSIS — R07.9 CHEST PAIN, UNSPECIFIED TYPE: Primary | ICD-10-CM

## 2023-09-17 DIAGNOSIS — J44.1 COPD EXACERBATION (HCC): ICD-10-CM

## 2023-09-17 LAB
ALBUMIN SERPL-MCNC: 3.8 G/DL (ref 3.4–5)
ALBUMIN/GLOB SERPL: 1.1 (ref 0.8–1.7)
ALP SERPL-CCNC: 117 U/L (ref 45–117)
ALT SERPL-CCNC: 26 U/L (ref 16–61)
ANION GAP SERPL CALC-SCNC: 3 MMOL/L (ref 3–18)
AST SERPL-CCNC: 16 U/L (ref 10–38)
BASOPHILS # BLD: 0.1 K/UL (ref 0–0.1)
BASOPHILS NFR BLD: 1 % (ref 0–2)
BILIRUB SERPL-MCNC: 0.5 MG/DL (ref 0.2–1)
BUN SERPL-MCNC: 27 MG/DL (ref 7–18)
BUN/CREAT SERPL: 21 (ref 12–20)
CALCIUM SERPL-MCNC: 9.3 MG/DL (ref 8.5–10.1)
CHLORIDE SERPL-SCNC: 96 MMOL/L (ref 100–111)
CO2 SERPL-SCNC: 35 MMOL/L (ref 21–32)
CREAT SERPL-MCNC: 1.27 MG/DL (ref 0.6–1.3)
DIFFERENTIAL METHOD BLD: ABNORMAL
EKG ATRIAL RATE: 76 BPM
EKG DIAGNOSIS: NORMAL
EKG P AXIS: 85 DEGREES
EKG P-R INTERVAL: 228 MS
EKG Q-T INTERVAL: 378 MS
EKG QRS DURATION: 96 MS
EKG QTC CALCULATION (BAZETT): 425 MS
EKG R AXIS: 73 DEGREES
EKG T AXIS: 75 DEGREES
EKG VENTRICULAR RATE: 76 BPM
EOSINOPHIL # BLD: 0.2 K/UL (ref 0–0.4)
EOSINOPHIL NFR BLD: 2 % (ref 0–5)
ERYTHROCYTE [DISTWIDTH] IN BLOOD BY AUTOMATED COUNT: 14.6 % (ref 11.6–14.5)
GLOBULIN SER CALC-MCNC: 3.5 G/DL (ref 2–4)
GLUCOSE SERPL-MCNC: 361 MG/DL (ref 74–99)
HCT VFR BLD AUTO: 35.7 % (ref 36–48)
HGB BLD-MCNC: 10.9 G/DL (ref 13–16)
IMM GRANULOCYTES # BLD AUTO: 0.1 K/UL (ref 0–0.04)
IMM GRANULOCYTES NFR BLD AUTO: 1 % (ref 0–0.5)
LYMPHOCYTES # BLD: 1.8 K/UL (ref 0.9–3.6)
LYMPHOCYTES NFR BLD: 18 % (ref 21–52)
MCH RBC QN AUTO: 25.1 PG (ref 24–34)
MCHC RBC AUTO-ENTMCNC: 30.5 G/DL (ref 31–37)
MCV RBC AUTO: 82.3 FL (ref 78–100)
MONOCYTES # BLD: 0.7 K/UL (ref 0.05–1.2)
MONOCYTES NFR BLD: 7 % (ref 3–10)
NEUTS SEG # BLD: 7.2 K/UL (ref 1.8–8)
NEUTS SEG NFR BLD: 72 % (ref 40–73)
NRBC # BLD: 0 K/UL (ref 0–0.01)
NRBC BLD-RTO: 0 PER 100 WBC
PLATELET # BLD AUTO: 243 K/UL (ref 135–420)
PMV BLD AUTO: 10.7 FL (ref 9.2–11.8)
POTASSIUM SERPL-SCNC: 5 MMOL/L (ref 3.5–5.5)
PROT SERPL-MCNC: 7.3 G/DL (ref 6.4–8.2)
RBC # BLD AUTO: 4.34 M/UL (ref 4.35–5.65)
SODIUM SERPL-SCNC: 134 MMOL/L (ref 136–145)
TROPONIN I SERPL HS-MCNC: 10 NG/L (ref 0–78)
TROPONIN I SERPL HS-MCNC: 10 NG/L (ref 0–78)
WBC # BLD AUTO: 10 K/UL (ref 4.6–13.2)

## 2023-09-17 PROCEDURE — 94762 N-INVAS EAR/PLS OXIMTRY CONT: CPT

## 2023-09-17 PROCEDURE — 6370000000 HC RX 637 (ALT 250 FOR IP): Performed by: EMERGENCY MEDICINE

## 2023-09-17 PROCEDURE — 85025 COMPLETE CBC W/AUTO DIFF WBC: CPT

## 2023-09-17 PROCEDURE — 93010 ELECTROCARDIOGRAM REPORT: CPT | Performed by: INTERNAL MEDICINE

## 2023-09-17 PROCEDURE — 71045 X-RAY EXAM CHEST 1 VIEW: CPT

## 2023-09-17 PROCEDURE — 99285 EMERGENCY DEPT VISIT HI MDM: CPT

## 2023-09-17 PROCEDURE — 80053 COMPREHEN METABOLIC PANEL: CPT

## 2023-09-17 PROCEDURE — 84484 ASSAY OF TROPONIN QUANT: CPT

## 2023-09-17 PROCEDURE — 93005 ELECTROCARDIOGRAM TRACING: CPT | Performed by: EMERGENCY MEDICINE

## 2023-09-17 RX ORDER — PREDNISONE 50 MG/1
50 TABLET ORAL DAILY
Qty: 4 TABLET | Refills: 0 | Status: SHIPPED | OUTPATIENT
Start: 2023-09-17 | End: 2023-09-21

## 2023-09-17 RX ORDER — PREDNISONE 20 MG/1
60 TABLET ORAL
Status: COMPLETED | OUTPATIENT
Start: 2023-09-17 | End: 2023-09-17

## 2023-09-17 RX ADMIN — PREDNISONE 60 MG: 20 TABLET ORAL at 05:29

## 2023-09-17 ASSESSMENT — LIFESTYLE VARIABLES
HOW OFTEN DO YOU HAVE A DRINK CONTAINING ALCOHOL: MONTHLY OR LESS
HOW MANY STANDARD DRINKS CONTAINING ALCOHOL DO YOU HAVE ON A TYPICAL DAY: 1 OR 2

## 2023-09-17 ASSESSMENT — PAIN SCALES - GENERAL: PAINLEVEL_OUTOF10: 7

## 2023-09-17 ASSESSMENT — PAIN DESCRIPTION - LOCATION: LOCATION: CHEST

## 2023-09-17 NOTE — ED PROVIDER NOTES
exclusive of any separately billable procedures. Aspirin: (was aspirin given for stroke?)    Diagnosis     Clinical Impression:   1. Chest pain, unspecified type    2. COPD exacerbation ([address])    3.  Type 2 diabetes mellitus with other specified complication, unspecified whether long term insulin use ([address])        Disposition: DISPOSITION Decision To Discharge 09/17/2023 06:44:35 AM       New for 2023:  DISCHARGE MEDICATIONS:  Current Discharge Medication List             Details   predniSONE (DELTASONE) 50 MG tablet Take 1 tablet by mouth daily for 4 days  Qty: 4 tablet, Refills: 0                Details   OXYGEN Inhale 3 L into the lungs      ipratropium 0.5 mg-albuterol 2.5 mg (DUONEB) 0.5-2.5 (3) MG/3ML SOLN nebulizer solution USE 1 VIAL VIA INHALATION EVERY 6 HOURS      fluticasone-umeclidin-vilant (TRELEGY ELLIPTA) 100-62.5-25 MCG/ACT AEPB inhaler Inhale 1 puff into the lungs daily      furosemide (LASIX) 40 MG tablet Take 2 tablets by mouth daily  Qty: 60 tablet, Refills: 6      atorvastatin (LIPITOR) 80 MG tablet TAKE ONE TABLET BY MOUTH EVERY EVENING  Qty: 90 tablet, Refills: 3      albuterol sulfate HFA (VENTOLIN HFA) 108 (90 Base) MCG/ACT inhaler Inhale 2 puffs into the lungs 4 times daily as needed for Wheezing  Qty: 54 g, Refills: 1      albuterol (PROVENTIL) (5 MG/ML) 0.5% nebulizer solution Take 0.5 mLs by nebulization every 6 hours as needed for Wheezing  Qty: 120 each, Refills: 0      aspirin 81 MG EC tablet Take 1 tablet by mouth daily      clopidogrel (PLAVIX) 75 MG tablet TAKE ONE TABLET BY MOUTH DAILY      glyBURIDE (DIABETA) 5 MG tablet Take 1 tablet by mouth every morning (before breakfast)      insulin lispro (HUMALOG) 100 UNIT/ML SOLN injection vial Very Insulin Resistant For Blood Sugar (mg/dL) of:            Less than 150 =   0 units 150 -199 =   3 units 200 -249 =   6 units 250 -299 =   9 units 300 -349 =   12 units 350 and above =   15 units Initiate Hypoglycemic protocol if blood

## 2023-09-17 NOTE — ED TRIAGE NOTES
Chest pain x 30 minutes pta. States he was laying in bed and began having chest pain. Took home nitro with no relief.   Hx of MI

## 2023-09-17 NOTE — ED NOTES
Patient states his diuretic was increased to 80mg, however he has been taking half the dose due to increase urination and cramps. Patient endorses tight chest, increase breathing. Further, states last week lower extremities were edematous, but this has gone down significantly. Informed MD of all this information.       Amanda Mckay RN  09/17/23 [id number]

## 2023-09-27 RX ORDER — CLOPIDOGREL BISULFATE 75 MG/1
75 TABLET ORAL DAILY
Qty: 90 TABLET | Refills: 3 | Status: SHIPPED | OUTPATIENT
Start: 2023-09-27

## 2023-10-25 ENCOUNTER — APPOINTMENT (OUTPATIENT)
Facility: HOSPITAL | Age: 63
End: 2023-10-25
Payer: MEDICARE

## 2023-10-25 ENCOUNTER — HOSPITAL ENCOUNTER (EMERGENCY)
Facility: HOSPITAL | Age: 63
Discharge: HOME | End: 2023-10-25
Payer: MEDICARE

## 2023-10-25 VITALS
BODY MASS INDEX: 32.2 KG/M2 | RESPIRATION RATE: 20 BRPM | DIASTOLIC BLOOD PRESSURE: 87 MMHG | OXYGEN SATURATION: 95 % | HEIGHT: 71 IN | TEMPERATURE: 98.6 F | WEIGHT: 230 LBS | SYSTOLIC BLOOD PRESSURE: 108 MMHG | HEART RATE: 70 BPM

## 2023-10-25 DIAGNOSIS — E83.42 HYPOMAGNESEMIA: ICD-10-CM

## 2023-10-25 DIAGNOSIS — S09.90XA INJURY OF HEAD, INITIAL ENCOUNTER: Primary | ICD-10-CM

## 2023-10-25 LAB
ALBUMIN SERPL-MCNC: 3.6 G/DL (ref 3.4–5)
ALBUMIN/GLOB SERPL: 1.1 (ref 0.8–1.7)
ALP SERPL-CCNC: 119 U/L (ref 45–117)
ALT SERPL-CCNC: 21 U/L (ref 16–61)
ANION GAP SERPL CALC-SCNC: 3 MMOL/L (ref 3–18)
AST SERPL-CCNC: 13 U/L (ref 10–38)
BASOPHILS # BLD: 0 K/UL (ref 0–0.1)
BASOPHILS NFR BLD: 1 % (ref 0–2)
BILIRUB SERPL-MCNC: 0.4 MG/DL (ref 0.2–1)
BUN SERPL-MCNC: 12 MG/DL (ref 7–18)
BUN/CREAT SERPL: 12 (ref 12–20)
CALCIUM SERPL-MCNC: 8.8 MG/DL (ref 8.5–10.1)
CHLORIDE SERPL-SCNC: 96 MMOL/L (ref 100–111)
CO2 SERPL-SCNC: 34 MMOL/L (ref 21–32)
CREAT SERPL-MCNC: 0.98 MG/DL (ref 0.6–1.3)
DIFFERENTIAL METHOD BLD: ABNORMAL
EKG ATRIAL RATE: 67 BPM
EKG DIAGNOSIS: NORMAL
EKG P AXIS: 31 DEGREES
EKG P-R INTERVAL: 200 MS
EKG Q-T INTERVAL: 386 MS
EKG QRS DURATION: 98 MS
EKG QTC CALCULATION (BAZETT): 407 MS
EKG R AXIS: 65 DEGREES
EKG T AXIS: 52 DEGREES
EKG VENTRICULAR RATE: 67 BPM
EOSINOPHIL # BLD: 0.2 K/UL (ref 0–0.4)
EOSINOPHIL NFR BLD: 2 % (ref 0–5)
ERYTHROCYTE [DISTWIDTH] IN BLOOD BY AUTOMATED COUNT: 15.1 % (ref 11.6–14.5)
GLOBULIN SER CALC-MCNC: 3.3 G/DL (ref 2–4)
GLUCOSE SERPL-MCNC: 371 MG/DL (ref 74–99)
HCT VFR BLD AUTO: 36.8 % (ref 36–48)
HGB BLD-MCNC: 11.1 G/DL (ref 13–16)
IMM GRANULOCYTES # BLD AUTO: 0 K/UL (ref 0–0.04)
IMM GRANULOCYTES NFR BLD AUTO: 0 % (ref 0–0.5)
LYMPHOCYTES # BLD: 2.4 K/UL (ref 0.9–3.6)
LYMPHOCYTES NFR BLD: 28 % (ref 21–52)
MAGNESIUM SERPL-MCNC: 1.4 MG/DL (ref 1.6–2.6)
MCH RBC QN AUTO: 24.7 PG (ref 24–34)
MCHC RBC AUTO-ENTMCNC: 30.2 G/DL (ref 31–37)
MCV RBC AUTO: 82 FL (ref 78–100)
MONOCYTES # BLD: 0.6 K/UL (ref 0.05–1.2)
MONOCYTES NFR BLD: 7 % (ref 3–10)
NEUTS SEG # BLD: 5.3 K/UL (ref 1.8–8)
NEUTS SEG NFR BLD: 62 % (ref 40–73)
NRBC # BLD: 0 K/UL (ref 0–0.01)
NRBC BLD-RTO: 0 PER 100 WBC
PLATELET # BLD AUTO: 275 K/UL (ref 135–420)
PMV BLD AUTO: 11.2 FL (ref 9.2–11.8)
POTASSIUM SERPL-SCNC: 4.6 MMOL/L (ref 3.5–5.5)
PROT SERPL-MCNC: 6.9 G/DL (ref 6.4–8.2)
RBC # BLD AUTO: 4.49 M/UL (ref 4.35–5.65)
SODIUM SERPL-SCNC: 133 MMOL/L (ref 136–145)
TROPONIN I SERPL HS-MCNC: 12 NG/L (ref 0–78)
TROPONIN I SERPL HS-MCNC: 12 NG/L (ref 0–78)
WBC # BLD AUTO: 8.5 K/UL (ref 4.6–13.2)

## 2023-10-25 PROCEDURE — 83735 ASSAY OF MAGNESIUM: CPT

## 2023-10-25 PROCEDURE — 99285 EMERGENCY DEPT VISIT HI MDM: CPT

## 2023-10-25 PROCEDURE — 93005 ELECTROCARDIOGRAM TRACING: CPT

## 2023-10-25 PROCEDURE — 96365 THER/PROPH/DIAG IV INF INIT: CPT

## 2023-10-25 PROCEDURE — 6360000002 HC RX W HCPCS

## 2023-10-25 PROCEDURE — 70450 CT HEAD/BRAIN W/O DYE: CPT

## 2023-10-25 PROCEDURE — 84484 ASSAY OF TROPONIN QUANT: CPT

## 2023-10-25 PROCEDURE — 85025 COMPLETE CBC W/AUTO DIFF WBC: CPT

## 2023-10-25 PROCEDURE — 80053 COMPREHEN METABOLIC PANEL: CPT

## 2023-10-25 PROCEDURE — 73070 X-RAY EXAM OF ELBOW: CPT

## 2023-10-25 PROCEDURE — 93010 ELECTROCARDIOGRAM REPORT: CPT | Performed by: INTERNAL MEDICINE

## 2023-10-25 RX ORDER — MAGNESIUM SULFATE IN WATER 40 MG/ML
2000 INJECTION, SOLUTION INTRAVENOUS
Status: COMPLETED | OUTPATIENT
Start: 2023-10-25 | End: 2023-10-25

## 2023-10-25 RX ADMIN — MAGNESIUM SULFATE HEPTAHYDRATE 2000 MG: 40 INJECTION, SOLUTION INTRAVENOUS at 15:37

## 2023-10-25 ASSESSMENT — PAIN SCALES - GENERAL: PAINLEVEL_OUTOF10: 7

## 2023-10-25 ASSESSMENT — PAIN - FUNCTIONAL ASSESSMENT: PAIN_FUNCTIONAL_ASSESSMENT: 0-10

## 2023-10-25 ASSESSMENT — ENCOUNTER SYMPTOMS
CONSTIPATION: 0
DIARRHEA: 0
COUGH: 0
CHEST TIGHTNESS: 0
SHORTNESS OF BREATH: 0
NAUSEA: 0
ABDOMINAL PAIN: 0
VOMITING: 0

## 2023-10-25 ASSESSMENT — PAIN DESCRIPTION - LOCATION: LOCATION: HEAD;ARM

## 2023-10-25 ASSESSMENT — PAIN DESCRIPTION - ORIENTATION: ORIENTATION: RIGHT

## 2023-10-25 NOTE — ED PROVIDER NOTES
history, family history and social history. Vital Signs-Reviewed the patient's vital signs. Pulse Oximetry Analysis -  95 on room air (Interpretation)    EKG:   Rate: 67 bpm   Rhythm: Normal Sinus Rhythm    Interpretation: Normal axis. Normal normals. No ST elevations or depressions. Comparison: Similar when compared to prior EKG completed on 9/17/2023    Records Reviewed: Prior medical records(Time of Review: 5:22 PM)    ED Course: Progress Notes, Reevaluation, and Consults:  DDx: Intracranial bleed, closed injury, radius fracture, ulnar fracture, elbow dislocation, contusion, dehydration, vasovagal episode, electrolyte abnormality    Provider Notes (Medical Decision Making):   Patient is a 43-year-old male presenting to the emergency department due to a headache and right elbow pain following a syncopal event. On exam, patient is alert, oriented x3, no acute distress. Heart regular rate and rhythm. No murmurs appreciated. No lower extremity edema. Lungs clear to auscultation bilaterally. Abdomen is soft nontender. Head is NC/AT. EOMI. PERRL. Normal neuro exam with gait intact. Right upper extremity is neurovascularly intact. No swelling or deformities noted. Minimal tenderness on palpation of the right elbow. Event as well as significant cardiac history, will obtain baseline labs as well as EKG. Will also obtain CT of the head and x-ray of the elbow. Orthostatics performed. Patient became dizzy upon standing with a slight decrease in the blood pressure. CBC WNL. CMP significant for sodium of 133, glucose of 371. Magnesium of 1.4. Initial troponin of 12, repeat of 12. EKG is nonischemic and similar when compared to prior. Will replenish magnesium at this time. Patient informed of elevated glucose. Reports that he did not watch what he ate today. CT head shows no intracranial hemorrhage or mass effect. X-ray of the elbow shows no bony abnormality seen.     Patient

## 2023-10-25 NOTE — ED NOTES
PATIENT HAD REPEAT ORTHOSTATIC BP REPEAT. PATIENT ASKING FOR IV TO BE REMOVED.      PATIENT REQUEST TO GO BACK TO WAITING AREA TO BE WITH RELATIVE     Lexis Gray RN  10/25/23 4772

## 2023-10-25 NOTE — ED TRIAGE NOTES
Patient states that he took a fall yesterday hit his head on a end table. Also has c/o pain right arm.

## 2023-10-25 NOTE — ED NOTES
PATIENT VERBALIZED TO HAVING SYNCOPAL EPISODE.  ORTHOSTATIC BP DONE, EKG DONE AND BLOODS SENT TO THE LAB     Beti Abel RN  10/25/23 6512

## 2023-11-14 ASSESSMENT — ENCOUNTER SYMPTOMS
COUGH: 0
BLOOD IN STOOL: 0
CHEST TIGHTNESS: 0
VOMITING: 0
CONSTIPATION: 0
ABDOMINAL DISTENTION: 1
NAUSEA: 0

## 2023-11-14 NOTE — PROGRESS NOTES
Julian Soliman presents today for a post ER follow-up after a fall/syncopal episode. He presented to the ER on 10/25/23. His EKG showed NSR, Na level was 133, troponin 12 x 2 sets, and Mg was low at 1.4 and he states that he received IV magnesium. They checked orthostatic blood pressures and they did notice a slight decrease in his blood pressure upon standing. He was instructed to hold his diuretics and follow-up with cardiology. Since the ER visit, he states that he has been taking lasix 40mg daily. He continues to complain of shortness of breath and states that he has oxygen at home. He was not using supplemental oxygen when he came to the office. His saturation on room air was 82% and we placed him on 3L/min in the office and it went up to 93%. When I last saw him, he was referred to pulmonology and he has his first appointment is tomorrow with Dr. Vernon Lorenzo. He is frustrated that the shortness of breath continues. He states that he has noticed 2 episodes of his heart \"pounding/racing\" and when he checked his pulse with his pulse oximeter, it was in the 130's. He also complains of occasional episodes of diarrhea. He has not been able to get in to see his PCP and he has not had his labs rechecked. He is a 59year old male with history of CAD (hx of non-STEMI in March 2020), hypertension, hyperlipidemia, diabetes, COPD, tobacco use. He had Covid in 2022 and states that his breathing has worsened since he got it. His last echo was done in April 2021 and it showed an EF of 50-55%, PASP of 35 mmHg. He states that he has oxygen therapy at home but does not use it routinely. He uses inhalers and a nebulizer which helps with the shortness of breath. He is not followed by pulmonology. He was hospitalized from 5/16/22 through 5/17/22 for chest pain, acute COPD. He presented with complaints of substernal chest pain at rest associated with some dyspnea and radiation to left arm.   He underwent

## 2023-11-20 ENCOUNTER — OFFICE VISIT (OUTPATIENT)
Age: 63
End: 2023-11-20
Payer: MEDICARE

## 2023-11-20 VITALS
WEIGHT: 244 LBS | BODY MASS INDEX: 34.16 KG/M2 | SYSTOLIC BLOOD PRESSURE: 110 MMHG | HEIGHT: 71 IN | HEART RATE: 72 BPM | DIASTOLIC BLOOD PRESSURE: 60 MMHG | OXYGEN SATURATION: 82 %

## 2023-11-20 DIAGNOSIS — I10 PRIMARY HYPERTENSION: ICD-10-CM

## 2023-11-20 DIAGNOSIS — E78.5 HYPERLIPIDEMIA, UNSPECIFIED HYPERLIPIDEMIA TYPE: ICD-10-CM

## 2023-11-20 DIAGNOSIS — I10 ESSENTIAL (PRIMARY) HYPERTENSION: ICD-10-CM

## 2023-11-20 DIAGNOSIS — R00.2 PALPITATIONS: ICD-10-CM

## 2023-11-20 DIAGNOSIS — I25.10 CORONARY ARTERY DISEASE INVOLVING NATIVE CORONARY ARTERY OF NATIVE HEART WITHOUT ANGINA PECTORIS: Primary | ICD-10-CM

## 2023-11-20 DIAGNOSIS — R06.02 SHORTNESS OF BREATH: ICD-10-CM

## 2023-11-20 PROCEDURE — 3017F COLORECTAL CA SCREEN DOC REV: CPT | Performed by: NURSE PRACTITIONER

## 2023-11-20 PROCEDURE — 99215 OFFICE O/P EST HI 40 MIN: CPT | Performed by: NURSE PRACTITIONER

## 2023-11-20 PROCEDURE — G8484 FLU IMMUNIZE NO ADMIN: HCPCS | Performed by: NURSE PRACTITIONER

## 2023-11-20 PROCEDURE — 3078F DIAST BP <80 MM HG: CPT | Performed by: NURSE PRACTITIONER

## 2023-11-20 PROCEDURE — G8417 CALC BMI ABV UP PARAM F/U: HCPCS | Performed by: NURSE PRACTITIONER

## 2023-11-20 PROCEDURE — 4004F PT TOBACCO SCREEN RCVD TLK: CPT | Performed by: NURSE PRACTITIONER

## 2023-11-20 PROCEDURE — 3074F SYST BP LT 130 MM HG: CPT | Performed by: NURSE PRACTITIONER

## 2023-11-20 PROCEDURE — G8427 DOCREV CUR MEDS BY ELIG CLIN: HCPCS | Performed by: NURSE PRACTITIONER

## 2023-11-20 RX ORDER — FUROSEMIDE 40 MG/1
40 TABLET ORAL DAILY
Qty: 1 TABLET | Refills: 0
Start: 2023-11-20

## 2023-11-20 RX ORDER — MAGNESIUM OXIDE 400 MG/1
400 TABLET ORAL 2 TIMES DAILY
Qty: 60 TABLET | Refills: 3 | Status: SHIPPED | OUTPATIENT
Start: 2023-11-20

## 2023-11-20 ASSESSMENT — ENCOUNTER SYMPTOMS
WHEEZING: 1
DIARRHEA: 1

## 2023-11-20 NOTE — PROGRESS NOTES
Darrel Mace presents today for No chief complaint on file. Darrel Mace preferred language for health care discussion is english/other. Is someone accompanying this pt? no    Is the patient using any DME equipment during OV? no    Depression Screening:  Depression: Not at risk (7/11/2023)    PHQ-2     PHQ-2 Score: 0        Learning Assessment:  No question data found. Pt currently taking Anticoagulant therapy? no    Pt currently taking Antiplatelet therapy ? Yes aspirin and plavix      Coordination of Care:  1. Have you been to the ER, urgent care clinic since your last visit? Hospitalized since your last visit? Yes for chest pain     2. Have you seen or consulted any other health care providers outside of the [address] [place] [address] since your last visit? Include any pap smears or colon screening.  no

## 2023-11-20 NOTE — PATIENT INSTRUCTIONS
Pharmacologic nuclear stress test; Dx: CAD, increasing SOB  Echocardiogram: Dx:  SOB  BMP, Mg   Magnesium oxide 400mg twice a day  30 day event monitor;  Dx: palpitations (heart pounding/racing)  Follow-up with Dr. Kay Thomas as scheduled and as needed

## 2023-11-21 ENCOUNTER — OFFICE VISIT (OUTPATIENT)
Age: 63
End: 2023-11-21
Payer: MEDICARE

## 2023-11-21 VITALS
HEIGHT: 71 IN | WEIGHT: 241 LBS | DIASTOLIC BLOOD PRESSURE: 74 MMHG | HEART RATE: 76 BPM | OXYGEN SATURATION: 95 % | RESPIRATION RATE: 24 BRPM | SYSTOLIC BLOOD PRESSURE: 137 MMHG | TEMPERATURE: 98.1 F | BODY MASS INDEX: 33.74 KG/M2

## 2023-11-21 DIAGNOSIS — J96.11 CHRONIC RESPIRATORY FAILURE WITH HYPOXIA (HCC): ICD-10-CM

## 2023-11-21 DIAGNOSIS — J44.9 STAGE 4 VERY SEVERE COPD BY GOLD CLASSIFICATION (HCC): Primary | ICD-10-CM

## 2023-11-21 DIAGNOSIS — R06.02 SHORTNESS OF BREATH: ICD-10-CM

## 2023-11-21 DIAGNOSIS — Z87.891 PERSONAL HISTORY OF TOBACCO USE: ICD-10-CM

## 2023-11-21 DIAGNOSIS — I25.10 CORONARY ARTERY DISEASE INVOLVING NATIVE CORONARY ARTERY OF NATIVE HEART WITHOUT ANGINA PECTORIS: ICD-10-CM

## 2023-11-21 PROCEDURE — 3023F SPIROM DOC REV: CPT | Performed by: INTERNAL MEDICINE

## 2023-11-21 PROCEDURE — 94729 DIFFUSING CAPACITY: CPT | Performed by: INTERNAL MEDICINE

## 2023-11-21 PROCEDURE — 3017F COLORECTAL CA SCREEN DOC REV: CPT | Performed by: INTERNAL MEDICINE

## 2023-11-21 PROCEDURE — 94727 GAS DIL/WSHOT DETER LNG VOL: CPT | Performed by: INTERNAL MEDICINE

## 2023-11-21 PROCEDURE — 3075F SYST BP GE 130 - 139MM HG: CPT | Performed by: INTERNAL MEDICINE

## 2023-11-21 PROCEDURE — 94060 EVALUATION OF WHEEZING: CPT | Performed by: INTERNAL MEDICINE

## 2023-11-21 PROCEDURE — 99204 OFFICE O/P NEW MOD 45 MIN: CPT | Performed by: INTERNAL MEDICINE

## 2023-11-21 PROCEDURE — G8427 DOCREV CUR MEDS BY ELIG CLIN: HCPCS | Performed by: INTERNAL MEDICINE

## 2023-11-21 PROCEDURE — G8417 CALC BMI ABV UP PARAM F/U: HCPCS | Performed by: INTERNAL MEDICINE

## 2023-11-21 PROCEDURE — 3078F DIAST BP <80 MM HG: CPT | Performed by: INTERNAL MEDICINE

## 2023-11-21 PROCEDURE — 4004F PT TOBACCO SCREEN RCVD TLK: CPT | Performed by: INTERNAL MEDICINE

## 2023-11-21 PROCEDURE — G8484 FLU IMMUNIZE NO ADMIN: HCPCS | Performed by: INTERNAL MEDICINE

## 2023-11-21 RX ORDER — FLUTICASONE FUROATE, UMECLIDINIUM BROMIDE AND VILANTEROL TRIFENATATE 100; 62.5; 25 UG/1; UG/1; UG/1
1 POWDER RESPIRATORY (INHALATION) DAILY
Qty: 3 EACH | Refills: 0 | COMMUNITY
Start: 2023-11-21 | End: 2023-11-21

## 2023-11-21 RX ORDER — PREDNISONE 10 MG/1
TABLET ORAL
Qty: 18 TABLET | Refills: 0 | Status: SHIPPED | OUTPATIENT
Start: 2023-11-21

## 2023-11-21 NOTE — PROGRESS NOTES
[facility]  Pulmonary, Critical Care, and Sleep Medicine      Pulmonary Office Initial referral report    Name: Raji Don     : 1960     Date: 2023        Subjective:   Patient has been referred for evaluation of: Shortness of breath, coughing, wheezing and chest discomfort. Patient is a 59 y.o. male who is a current everyday smoker and has smoked total of 47 pack years states that he was fairly active in his usual state of health until  when he got COVID leading to hospitalizations for 17 days with predominant symptoms of severe shortness of breath. Was discharged patient states that he has never been the same and although he recovered enough to be able to do some work as a  he has regressed again over the past few months and more so over the past 1 year. He now has severe and persistent symptoms of shortness of breath with inability to carry out activities of daily living. States that at times he drops down to oxygen saturation of 70% and he puts on his oxygen. He wears oxygen daily at night at 3 L and in the daytime uses his pulse oximeter to assess and use oxygen. He was at some point prescribed Trelegy but could not get it refilled due to cost.  He is currently using only albuterol. He has persistent symptoms of severe shortness of breath  Complains of cough which is productive of thick white mucus  He has wheezing  He is fatigued all the time and also has aches and pains in his body recently  He has had 2 episodes of chest pain-diagnosed with acute MI according to patient. He is following up with cardiology. Denies any swelling of his lower extremities  He has a nebulizer at home  He has not had any screening CT scan of chest-there is a report of a CT scan from  done for other reasons  Denies fever, chills, night sweats dyspepsia, reflux.        1 2 3 4 5   Cough   3     Mucus  2      Chest tightness   3     ADL's   3     Climb 1 flight

## 2023-11-21 NOTE — PROGRESS NOTES
Tameka Glez presents today for   Chief Complaint   Patient presents with    Shortness of Breath       Is someone accompanying this pt? Daughter    Is the patient using any DME equipment during [address]? No  Oxygen @ home    -DME [employer]     Depression Screenin/11/2023     3:09 PM   PHQ-9 Questionaire   Little interest or pleasure in doing things 0   Feeling down, depressed, or hopeless 0   PHQ-9 Total Score 0       Learning Needs Questionnaire:     No question data found. Fall Risk:         2023     3:09 PM   Fall Risk   2 or more falls in past year? no   Fall with injury in past year? no        Abuse Screening:          No data to display                  Coordination of Care:    1. Have you been to the ER, urgent care clinic since your last visit? Hospitalized since your last visit? [facility] ED   Patient Bogdan Hassan. 2. Have you seen or consulted any other health care providers outside of the [address] [place] [address] Avenue since your last visit? Include any pap smears or colon screening. No     Medication list has been update per patient.

## 2023-11-29 ENCOUNTER — HOSPITAL ENCOUNTER (OUTPATIENT)
Facility: HOSPITAL | Age: 63
Discharge: HOME | End: 2023-12-02
Payer: MEDICARE

## 2023-11-29 ENCOUNTER — TELEPHONE (OUTPATIENT)
Age: 63
End: 2023-11-29

## 2023-11-29 DIAGNOSIS — I10 PRIMARY HYPERTENSION: ICD-10-CM

## 2023-11-29 LAB
ANION GAP SERPL CALC-SCNC: 2 MMOL/L (ref 3–18)
BUN SERPL-MCNC: 15 MG/DL (ref 7–18)
BUN/CREAT SERPL: 16 (ref 12–20)
CALCIUM SERPL-MCNC: 9.2 MG/DL (ref 8.5–10.1)
CHLORIDE SERPL-SCNC: 97 MMOL/L (ref 100–111)
CO2 SERPL-SCNC: 36 MMOL/L (ref 21–32)
CREAT SERPL-MCNC: 0.93 MG/DL (ref 0.6–1.3)
GLUCOSE SERPL-MCNC: 307 MG/DL (ref 74–99)
MAGNESIUM SERPL-MCNC: 1.4 MG/DL (ref 1.6–2.6)
POTASSIUM SERPL-SCNC: 4.3 MMOL/L (ref 3.5–5.5)
SODIUM SERPL-SCNC: 135 MMOL/L (ref 136–145)

## 2023-11-29 PROCEDURE — 80048 BASIC METABOLIC PNL TOTAL CA: CPT

## 2023-11-29 PROCEDURE — 83735 ASSAY OF MAGNESIUM: CPT

## 2023-11-29 PROCEDURE — 36415 COLL VENOUS BLD VENIPUNCTURE: CPT

## 2023-11-29 NOTE — TELEPHONE ENCOUNTER
----- Message from Sandi Castro sent at 11/22/2023 11:58 AM EST -----  Regarding: Medication  Patient was prescribed Prednisone by the Pulmonologist yesterday but he states last time he was on Prednisone he was told that it may interfere with one of his heart medications but unsure which one

## 2023-12-05 ENCOUNTER — OFFICE VISIT (OUTPATIENT)
Age: 63
End: 2023-12-05
Payer: MEDICARE

## 2023-12-05 VITALS
HEIGHT: 71 IN | WEIGHT: 244 LBS | BODY MASS INDEX: 34.16 KG/M2 | OXYGEN SATURATION: 93 % | SYSTOLIC BLOOD PRESSURE: 118 MMHG | HEART RATE: 65 BPM | DIASTOLIC BLOOD PRESSURE: 64 MMHG

## 2023-12-05 DIAGNOSIS — I10 PRIMARY HYPERTENSION: ICD-10-CM

## 2023-12-05 DIAGNOSIS — I10 ESSENTIAL (PRIMARY) HYPERTENSION: ICD-10-CM

## 2023-12-05 DIAGNOSIS — R06.02 SHORTNESS OF BREATH: ICD-10-CM

## 2023-12-05 DIAGNOSIS — E78.5 HYPERLIPIDEMIA, UNSPECIFIED HYPERLIPIDEMIA TYPE: ICD-10-CM

## 2023-12-05 DIAGNOSIS — R00.2 PALPITATIONS: ICD-10-CM

## 2023-12-05 DIAGNOSIS — I25.10 ATHEROSCLEROSIS OF NATIVE CORONARY ARTERY OF NATIVE HEART WITHOUT ANGINA PECTORIS: Primary | ICD-10-CM

## 2023-12-05 PROCEDURE — 4004F PT TOBACCO SCREEN RCVD TLK: CPT | Performed by: INTERNAL MEDICINE

## 2023-12-05 PROCEDURE — 3078F DIAST BP <80 MM HG: CPT | Performed by: INTERNAL MEDICINE

## 2023-12-05 PROCEDURE — G8417 CALC BMI ABV UP PARAM F/U: HCPCS | Performed by: INTERNAL MEDICINE

## 2023-12-05 PROCEDURE — 93000 ELECTROCARDIOGRAM COMPLETE: CPT | Performed by: INTERNAL MEDICINE

## 2023-12-05 PROCEDURE — G8427 DOCREV CUR MEDS BY ELIG CLIN: HCPCS | Performed by: INTERNAL MEDICINE

## 2023-12-05 PROCEDURE — 3017F COLORECTAL CA SCREEN DOC REV: CPT | Performed by: INTERNAL MEDICINE

## 2023-12-05 PROCEDURE — 99214 OFFICE O/P EST MOD 30 MIN: CPT | Performed by: INTERNAL MEDICINE

## 2023-12-05 PROCEDURE — G8484 FLU IMMUNIZE NO ADMIN: HCPCS | Performed by: INTERNAL MEDICINE

## 2023-12-05 PROCEDURE — 3074F SYST BP LT 130 MM HG: CPT | Performed by: INTERNAL MEDICINE

## 2023-12-05 ASSESSMENT — PATIENT HEALTH QUESTIONNAIRE - PHQ9
SUM OF ALL RESPONSES TO PHQ QUESTIONS 1-9: 0
SUM OF ALL RESPONSES TO PHQ9 QUESTIONS 1 & 2: 0
2. FEELING DOWN, DEPRESSED OR HOPELESS: 0
1. LITTLE INTEREST OR PLEASURE IN DOING THINGS: 0

## 2023-12-13 ENCOUNTER — TELEPHONE (OUTPATIENT)
Age: 63
End: 2023-12-13

## 2023-12-13 DIAGNOSIS — E83.42 HYPOMAGNESEMIA: Primary | ICD-10-CM

## 2023-12-13 NOTE — TELEPHONE ENCOUNTER
----- Message from AYAAN Preston NP sent at 12/1/2023  4:08 PM EST -----  Please let him know that his Mg level remains at 1.4. I already prescribed Mag oxide 400mg BID the same day that I ordered the labs as I suspected that his level was probably not where it needed to be. He needs to continue the Magnesium supplementation and please repeat the Mg level in about 3 weeks. If it remains low, he may need supplementation for an extended period of time.     Thank you,  Bj Hassan  ----- Message -----  From: Suraj Blum Incoming [facility] For [facility]  Sent: 11/29/2023   9:09 AM EST  To: AYAAN Preston NP

## 2023-12-13 NOTE — TELEPHONE ENCOUNTER
This has been fully explained to the patient, who indicates understanding. Order for follow up testing is in chart.

## 2023-12-24 ENCOUNTER — HOSPITAL ENCOUNTER (EMERGENCY)
Facility: HOSPITAL | Age: 63
Discharge: HOME | End: 2023-12-24
Attending: EMERGENCY MEDICINE
Payer: MEDICARE

## 2023-12-24 ENCOUNTER — APPOINTMENT (OUTPATIENT)
Facility: HOSPITAL | Age: 63
End: 2023-12-24
Payer: MEDICARE

## 2023-12-24 VITALS
SYSTOLIC BLOOD PRESSURE: 144 MMHG | OXYGEN SATURATION: 95 % | TEMPERATURE: 98.5 F | HEART RATE: 97 BPM | DIASTOLIC BLOOD PRESSURE: 71 MMHG | RESPIRATION RATE: 20 BRPM

## 2023-12-24 DIAGNOSIS — R73.9 HYPERGLYCEMIA: ICD-10-CM

## 2023-12-24 DIAGNOSIS — B34.8 RHINOVIRUS: ICD-10-CM

## 2023-12-24 DIAGNOSIS — J44.1 COPD EXACERBATION (HCC): Primary | ICD-10-CM

## 2023-12-24 DIAGNOSIS — D64.9 ANEMIA, UNSPECIFIED TYPE: ICD-10-CM

## 2023-12-24 LAB
ANION GAP SERPL CALC-SCNC: 5 MMOL/L (ref 3–18)
BASOPHILS # BLD: 0 K/UL (ref 0–0.1)
BASOPHILS NFR BLD: 0 % (ref 0–2)
BUN SERPL-MCNC: 19 MG/DL (ref 7–18)
BUN/CREAT SERPL: 18 (ref 12–20)
CALCIUM SERPL-MCNC: 9.1 MG/DL (ref 8.5–10.1)
CHLORIDE SERPL-SCNC: 98 MMOL/L (ref 100–111)
CO2 SERPL-SCNC: 33 MMOL/L (ref 21–32)
CREAT SERPL-MCNC: 1.03 MG/DL (ref 0.6–1.3)
DIFFERENTIAL METHOD BLD: ABNORMAL
EKG ATRIAL RATE: 94 BPM
EKG DIAGNOSIS: NORMAL
EKG P AXIS: 80 DEGREES
EKG P-R INTERVAL: 210 MS
EKG Q-T INTERVAL: 350 MS
EKG QRS DURATION: 90 MS
EKG QTC CALCULATION (BAZETT): 437 MS
EKG R AXIS: 72 DEGREES
EKG T AXIS: 20 DEGREES
EKG VENTRICULAR RATE: 94 BPM
EOSINOPHIL # BLD: 0 K/UL (ref 0–0.4)
EOSINOPHIL NFR BLD: 0 % (ref 0–5)
ERYTHROCYTE [DISTWIDTH] IN BLOOD BY AUTOMATED COUNT: 14.8 % (ref 11.6–14.5)
GLUCOSE SERPL-MCNC: 368 MG/DL (ref 74–99)
HCT VFR BLD AUTO: 34.2 % (ref 36–48)
HGB BLD-MCNC: 10.6 G/DL (ref 13–16)
IMM GRANULOCYTES # BLD AUTO: 0 K/UL (ref 0–0.04)
IMM GRANULOCYTES NFR BLD AUTO: 0 % (ref 0–0.5)
LYMPHOCYTES # BLD: 1.1 K/UL (ref 0.9–3.6)
LYMPHOCYTES NFR BLD: 10 % (ref 21–52)
MCH RBC QN AUTO: 26.2 PG (ref 24–34)
MCHC RBC AUTO-ENTMCNC: 31 G/DL (ref 31–37)
MCV RBC AUTO: 84.7 FL (ref 78–100)
MONOCYTES # BLD: 0.7 K/UL (ref 0.05–1.2)
MONOCYTES NFR BLD: 7 % (ref 3–10)
NEUTS SEG # BLD: 8.6 K/UL (ref 1.8–8)
NEUTS SEG NFR BLD: 82 % (ref 40–73)
NRBC # BLD: 0 K/UL (ref 0–0.01)
NRBC BLD-RTO: 0 PER 100 WBC
PLATELET # BLD AUTO: 235 K/UL (ref 135–420)
PMV BLD AUTO: 11.1 FL (ref 9.2–11.8)
POTASSIUM SERPL-SCNC: 4.5 MMOL/L (ref 3.5–5.5)
RBC # BLD AUTO: 4.04 M/UL (ref 4.35–5.65)
SODIUM SERPL-SCNC: 136 MMOL/L (ref 136–145)
TROPONIN I SERPL HS-MCNC: 20 NG/L (ref 0–78)
WBC # BLD AUTO: 10.5 K/UL (ref 4.6–13.2)

## 2023-12-24 PROCEDURE — 2700000000 HC OXYGEN THERAPY PER DAY

## 2023-12-24 PROCEDURE — 85025 COMPLETE CBC W/AUTO DIFF WBC: CPT

## 2023-12-24 PROCEDURE — 96374 THER/PROPH/DIAG INJ IV PUSH: CPT

## 2023-12-24 PROCEDURE — 71045 X-RAY EXAM CHEST 1 VIEW: CPT

## 2023-12-24 PROCEDURE — 99285 EMERGENCY DEPT VISIT HI MDM: CPT

## 2023-12-24 PROCEDURE — 84484 ASSAY OF TROPONIN QUANT: CPT

## 2023-12-24 PROCEDURE — 6370000000 HC RX 637 (ALT 250 FOR IP): Performed by: STUDENT IN AN ORGANIZED HEALTH CARE EDUCATION/TRAINING PROGRAM

## 2023-12-24 PROCEDURE — 94761 N-INVAS EAR/PLS OXIMETRY MLT: CPT

## 2023-12-24 PROCEDURE — 93010 ELECTROCARDIOGRAM REPORT: CPT | Performed by: INTERNAL MEDICINE

## 2023-12-24 PROCEDURE — 6360000002 HC RX W HCPCS: Performed by: STUDENT IN AN ORGANIZED HEALTH CARE EDUCATION/TRAINING PROGRAM

## 2023-12-24 PROCEDURE — 2580000003 HC RX 258: Performed by: STUDENT IN AN ORGANIZED HEALTH CARE EDUCATION/TRAINING PROGRAM

## 2023-12-24 PROCEDURE — 80048 BASIC METABOLIC PNL TOTAL CA: CPT

## 2023-12-24 PROCEDURE — 93005 ELECTROCARDIOGRAM TRACING: CPT | Performed by: EMERGENCY MEDICINE

## 2023-12-24 RX ORDER — IPRATROPIUM BROMIDE AND ALBUTEROL SULFATE 2.5; .5 MG/3ML; MG/3ML
1 SOLUTION RESPIRATORY (INHALATION)
Status: COMPLETED | OUTPATIENT
Start: 2023-12-24 | End: 2023-12-24

## 2023-12-24 RX ORDER — PREDNISONE 20 MG/1
TABLET ORAL
Qty: 30 TABLET | Refills: 0 | Status: SHIPPED | OUTPATIENT
Start: 2023-12-24 | End: 2024-01-08

## 2023-12-24 RX ADMIN — IPRATROPIUM BROMIDE AND ALBUTEROL SULFATE 1 DOSE: .5; 3 SOLUTION RESPIRATORY (INHALATION) at 15:05

## 2023-12-24 RX ADMIN — IPRATROPIUM BROMIDE AND ALBUTEROL SULFATE 1 DOSE: .5; 3 SOLUTION RESPIRATORY (INHALATION) at 15:04

## 2023-12-24 RX ADMIN — WATER 125 MG: 1 INJECTION INTRAMUSCULAR; INTRAVENOUS; SUBCUTANEOUS at 14:27

## 2023-12-24 RX ADMIN — IPRATROPIUM BROMIDE AND ALBUTEROL SULFATE 1 DOSE: .5; 3 SOLUTION RESPIRATORY (INHALATION) at 14:25

## 2023-12-24 NOTE — ED PROVIDER NOTES
EMERGENCY DEPARTMENT HISTORY AND PHYSICAL EXAM      Patient Name: Isreal Byers  MRN: [id number]  YOB: 1960  Provider: Quinton Tobar MD  PCP: Milton Monge MD   Time/Date of evaluation: 1:45 PM EST on 12/24/23    History of Presenting Illness     Chief Complaint   Patient presents with    Shortness of Breath       History Provided By: Patient     History Baby Leigh):   Isreal Byers is a 59 y.o. male with a PMHX of COPD on 3 L home oxygen at night, hypertension, CAD, current smoker  who presents to the emergency department  by POV C/O shortness of breath onset 3 days ago. Patient was recently diagnosed with rhinovirus on 12/20. States that his shortness of breath has been getting progressively worse since then. Patient noted that today, he had worsening chest pain that moved to his left side. States that it feels similar to his prior heart attacks. He has been having significant coughing and rib pain. He denies any hemoptysis. Has had intermittent relief from his albuterol and has been taking his prednisone as prescribed. Due to patient's chest pain, he took 1 nitro, 1 albuterol treatment, and 2 tabs of prednisone prior to arrival in the emergency department.         Past History     Past Medical History:  Past Medical History:   Diagnosis Date    COPD (chronic obstructive pulmonary disease) ([address])     Diabetes ([address])     Emphysema     H/O cardiovascular stress test 2012    negative    Hypertension     MI (myocardial infarction) ([address])        Past Surgical History:  Past Surgical History:   Procedure Laterality Date    CORONARY ANGIOPLASTY WITH STENT PLACEMENT         Family History:  Family History   Problem Relation Age of Onset    Diabetes Mother        Social History:  Social History     Tobacco Use    Smoking status: Every Day     Current packs/day: 0.50     Average packs/day: 0.5 packs/day for 32.0 years (16.0 ttl pk-yrs)     Types: Cigarettes    Smokeless tobacco: Never

## 2023-12-24 NOTE — ED NOTES
Assumed care of pt in room 6, came ambulatory from triage c/o SOB. Placed on monitor and on o2 at 3lpm via NC. A&Ox4. Pt states he's using o2 at 3lpm via NC at home due to COPD. Pt was recently diagnosed with RSV. Prefers to sit on the chair for comfort. EKG done and given to Dr. Juan Hernandez for review.

## 2023-12-24 NOTE — ED NOTES
Pt stated that he took Nitro 1tab for chest pain prior to ED arrival. Dr. Oriana Rae made aware. Pt not in acute pain at this time.

## 2023-12-24 NOTE — DISCHARGE INSTRUCTIONS
You were seen in the emergency department for a COPD exacerbation. I altered your prednisone prescription for a higher steroid dose over a longer period of time. It is very important that you discuss with your pulmonologist your medications to see if any other long-term management needs to be changed. Additionally, it is very important that you use your albuterol every 2-4 hours for the next 1 to 2 days to help manage your symptoms. Return to the emergency department if you have any worsening difficulty breathing, severe chest pain, loss of consciousness, or any other symptoms concerning to you.

## 2023-12-24 NOTE — ED NOTES
Pt placed on monitor and in position of most comfort in chair with call bell within reach. Pt states no other needs at this time, Nurse at bedside for further treatment.

## 2023-12-26 ENCOUNTER — TELEPHONE (OUTPATIENT)
Age: 63
End: 2023-12-26

## 2023-12-26 NOTE — TELEPHONE ENCOUNTER
Patient is requesting call back  from nurse. Patient stated that he has been continually going  Ed for SOB and chest pain. Patient stated he took a nitro pill the other day and is scared.  Pt stated that ED told him that he has some sort of viral infection taking place.  Patient contact# [phone]

## 2023-12-26 NOTE — TELEPHONE ENCOUNTER
Patient was called. Encouraged him to continue taking medication ED gave him and get plenty of rest. Patient agreeable.

## 2024-01-08 ENCOUNTER — TELEPHONE (OUTPATIENT)
Age: 64
End: 2024-01-08

## 2024-01-08 NOTE — TELEPHONE ENCOUNTER
----- Message from AYAAN Macedo NP sent at 1/5/2024 10:42 AM EST -----  Please call and let patient know that his echo showed normal heart pumping function, EF 55-60%.      Thanks,  Naatlee  ----- Message -----  From: Korey Song MD  Sent: 12/27/2023   5:42 PM EST  To: AYAAN Macedo NP

## 2024-01-08 NOTE — TELEPHONE ENCOUNTER
Called the patient to inform him of the results of the cardiac testing that he had done    Verbal order and read back per AYAAN Macedo NP    Please call and let patient know that his echo showed normal heart pumping function, EF 55-60% and that his nuclear stress test was normal.

## 2024-01-19 ENCOUNTER — OFFICE VISIT (OUTPATIENT)
Age: 64
End: 2024-01-19
Payer: MEDICARE

## 2024-01-19 VITALS
BODY MASS INDEX: 33.94 KG/M2 | OXYGEN SATURATION: 98 % | HEART RATE: 72 BPM | HEIGHT: 71 IN | SYSTOLIC BLOOD PRESSURE: 129 MMHG | WEIGHT: 242.4 LBS | DIASTOLIC BLOOD PRESSURE: 74 MMHG | TEMPERATURE: 97.2 F | RESPIRATION RATE: 16 BRPM

## 2024-01-19 DIAGNOSIS — J96.11 CHRONIC RESPIRATORY FAILURE WITH HYPOXIA (HCC): ICD-10-CM

## 2024-01-19 DIAGNOSIS — J44.9 STAGE 4 VERY SEVERE COPD BY GOLD CLASSIFICATION (HCC): Primary | ICD-10-CM

## 2024-01-19 PROCEDURE — G8484 FLU IMMUNIZE NO ADMIN: HCPCS | Performed by: INTERNAL MEDICINE

## 2024-01-19 PROCEDURE — 3078F DIAST BP <80 MM HG: CPT | Performed by: INTERNAL MEDICINE

## 2024-01-19 PROCEDURE — 4004F PT TOBACCO SCREEN RCVD TLK: CPT | Performed by: INTERNAL MEDICINE

## 2024-01-19 PROCEDURE — 3017F COLORECTAL CA SCREEN DOC REV: CPT | Performed by: INTERNAL MEDICINE

## 2024-01-19 PROCEDURE — 3023F SPIROM DOC REV: CPT | Performed by: INTERNAL MEDICINE

## 2024-01-19 PROCEDURE — 3074F SYST BP LT 130 MM HG: CPT | Performed by: INTERNAL MEDICINE

## 2024-01-19 PROCEDURE — G8417 CALC BMI ABV UP PARAM F/U: HCPCS | Performed by: INTERNAL MEDICINE

## 2024-01-19 PROCEDURE — 99215 OFFICE O/P EST HI 40 MIN: CPT | Performed by: INTERNAL MEDICINE

## 2024-01-19 PROCEDURE — G8427 DOCREV CUR MEDS BY ELIG CLIN: HCPCS | Performed by: INTERNAL MEDICINE

## 2024-01-19 RX ORDER — PREDNISONE 10 MG/1
TABLET ORAL
Qty: 18 TABLET | Refills: 0 | Status: SHIPPED | OUTPATIENT
Start: 2024-01-19

## 2024-01-19 RX ORDER — FLUTICASONE FUROATE, UMECLIDINIUM BROMIDE AND VILANTEROL TRIFENATATE 100; 62.5; 25 UG/1; UG/1; UG/1
1 POWDER RESPIRATORY (INHALATION) DAILY
Qty: 2 EACH | Refills: 0 | Status: SHIPPED | COMMUNITY
Start: 2024-01-19

## 2024-01-19 RX ORDER — DOXYCYCLINE HYCLATE 100 MG
100 TABLET ORAL 2 TIMES DAILY
Qty: 20 TABLET | Refills: 0 | Status: SHIPPED | OUTPATIENT
Start: 2024-01-19 | End: 2024-01-29

## 2024-01-19 NOTE — PROGRESS NOTES
[facility]  Pulmonary, Critical Care, and Sleep Medicine      Pulmonary Office follow-up visit    Name: Solo Souza     : 1960     Date: 2024        Subjective:   Patient has been referred for evaluation of: Shortness of breath, coughing, wheezing and chest discomfort.    24     Patient complains of persistent symptoms of shortness of breath and cough  He states that the Trelegy helps but he cannot afford the medication  More recently he states he came down with a virus and was prescribed prednisone but his blood sugar went up to 500+ he is very reluctant to use any more prednisone  Has been using the nebulizer with minimal benefit  Complains of mucus being very thick and difficult to expectorate  Unfortunately has continued to smoke but has been quit for 3 days  He has oxygen at home which he uses  Patient complains of not being able to get his medications till his monthly check comes    HPI  Patient is a 63 y.o. male who is a current everyday smoker and has smoked total of 47 pack years states that he was fairly active in his usual state of health until  when he got COVID leading to hospitalizations for 17 days with predominant symptoms of severe shortness of breath.  Was discharged patient states that he has never been the same and although he recovered enough to be able to do some work as a  he has regressed again over the past few months and more so over the past 1 year.  He now has severe and persistent symptoms of shortness of breath with inability to carry out activities of daily living.  States that at times he drops down to oxygen saturation of 70% and he puts on his oxygen.  He wears oxygen daily at night at 3 L and in the daytime uses his pulse oximeter to assess and use oxygen.  He was at some point prescribed Trelegy but could not get it refilled due to cost.  He is currently using only albuterol.  He has persistent symptoms of severe shortness

## 2024-01-19 NOTE — PROGRESS NOTES
Solo Souza presents today for   Chief Complaint   Patient presents with    Follow-up    COPD    Shortness of Breath    Discuss Medications     Would like telergy samples       Is someone accompanying this pt? No    Is the patient using any DME equipment during OV? No    -DME Company NA    Depression Screenin/5/2023    11:23 AM   PHQ-9 Questionaire   Little interest or pleasure in doing things 0   Feeling down, depressed, or hopeless 0   PHQ-9 Total Score 0       Learning Needs Questionnaire:     No question data found.      Fall Risk:         2023    11:23 AM 2023     3:09 PM   Fall Risk   2 or more falls in past year? no no   Fall with injury in past year? no no        Abuse Screening:          No data to display                  Coordination of Care:    1. Have you been to the ER, urgent care clinic since your last visit? Hospitalized since your last visit? Yes    2. Have you seen or consulted any other health care providers outside of the [facility] since your last visit? Include any pap smears or colon screening. No    Medication list has been update per patient.

## 2024-01-30 ENCOUNTER — APPOINTMENT (OUTPATIENT)
Facility: HOSPITAL | Age: 64
DRG: 438 | End: 2024-01-30
Payer: MEDICARE

## 2024-01-30 ENCOUNTER — HOSPITAL ENCOUNTER (EMERGENCY)
Facility: HOSPITAL | Age: 64
Discharge: HOME | DRG: 438 | End: 2024-01-31
Payer: MEDICARE

## 2024-01-30 DIAGNOSIS — J18.9 COMMUNITY ACQUIRED PNEUMONIA, UNSPECIFIED LATERALITY: ICD-10-CM

## 2024-01-30 DIAGNOSIS — K85.90 ACUTE PANCREATITIS WITHOUT INFECTION OR NECROSIS, UNSPECIFIED PANCREATITIS TYPE: Primary | ICD-10-CM

## 2024-01-30 LAB
ALBUMIN SERPL-MCNC: 3.4 G/DL (ref 3.4–5)
ALBUMIN/GLOB SERPL: 0.9 (ref 0.8–1.7)
ALP SERPL-CCNC: 93 U/L (ref 45–117)
ALT SERPL-CCNC: 16 U/L (ref 16–61)
ANION GAP SERPL CALC-SCNC: 4 MMOL/L (ref 3–18)
AST SERPL-CCNC: 10 U/L (ref 10–38)
BASOPHILS # BLD: 0.1 K/UL (ref 0–0.1)
BASOPHILS NFR BLD: 1 % (ref 0–2)
BILIRUB SERPL-MCNC: 0.4 MG/DL (ref 0.2–1)
BUN SERPL-MCNC: 10 MG/DL (ref 7–18)
BUN/CREAT SERPL: 10 (ref 12–20)
CALCIUM SERPL-MCNC: 9.6 MG/DL (ref 8.5–10.1)
CHLORIDE SERPL-SCNC: 96 MMOL/L (ref 100–111)
CO2 SERPL-SCNC: 36 MMOL/L (ref 21–32)
CREAT SERPL-MCNC: 1.05 MG/DL (ref 0.6–1.3)
D DIMER PPP FEU-MCNC: 0.52 UG/ML(FEU)
DIFFERENTIAL METHOD BLD: ABNORMAL
EOSINOPHIL # BLD: 0.2 K/UL (ref 0–0.4)
EOSINOPHIL NFR BLD: 1 % (ref 0–5)
ERYTHROCYTE [DISTWIDTH] IN BLOOD BY AUTOMATED COUNT: 13.7 % (ref 11.6–14.5)
GLOBULIN SER CALC-MCNC: 3.6 G/DL (ref 2–4)
GLUCOSE SERPL-MCNC: 277 MG/DL (ref 74–99)
HCT VFR BLD AUTO: 39.8 % (ref 36–48)
HGB BLD-MCNC: 12.5 G/DL (ref 13–16)
IMM GRANULOCYTES # BLD AUTO: 0 K/UL (ref 0–0.04)
IMM GRANULOCYTES NFR BLD AUTO: 0 % (ref 0–0.5)
LIPASE SERPL-CCNC: 1279 U/L (ref 13–75)
LYMPHOCYTES # BLD: 2.8 K/UL (ref 0.9–3.6)
LYMPHOCYTES NFR BLD: 24 % (ref 21–52)
MCH RBC QN AUTO: 26.6 PG (ref 24–34)
MCHC RBC AUTO-ENTMCNC: 31.4 G/DL (ref 31–37)
MCV RBC AUTO: 84.7 FL (ref 78–100)
MONOCYTES # BLD: 0.8 K/UL (ref 0.05–1.2)
MONOCYTES NFR BLD: 7 % (ref 3–10)
NEUTS SEG # BLD: 7.7 K/UL (ref 1.8–8)
NEUTS SEG NFR BLD: 67 % (ref 40–73)
NRBC # BLD: 0 K/UL (ref 0–0.01)
NRBC BLD-RTO: 0 PER 100 WBC
NT PRO BNP: 119 PG/ML (ref 0–900)
PLATELET # BLD AUTO: 325 K/UL (ref 135–420)
PMV BLD AUTO: 11.2 FL (ref 9.2–11.8)
POTASSIUM SERPL-SCNC: 4.4 MMOL/L (ref 3.5–5.5)
PROT SERPL-MCNC: 7 G/DL (ref 6.4–8.2)
RBC # BLD AUTO: 4.7 M/UL (ref 4.35–5.65)
SODIUM SERPL-SCNC: 136 MMOL/L (ref 136–145)
TROPONIN I SERPL HS-MCNC: 10 NG/L (ref 0–78)
WBC # BLD AUTO: 11.6 K/UL (ref 4.6–13.2)

## 2024-01-30 PROCEDURE — 99285 EMERGENCY DEPT VISIT HI MDM: CPT

## 2024-01-30 PROCEDURE — 6360000004 HC RX CONTRAST MEDICATION: Performed by: PHYSICIAN ASSISTANT

## 2024-01-30 PROCEDURE — 93005 ELECTROCARDIOGRAM TRACING: CPT | Performed by: PHYSICIAN ASSISTANT

## 2024-01-30 PROCEDURE — 84484 ASSAY OF TROPONIN QUANT: CPT

## 2024-01-30 PROCEDURE — 74177 CT ABD & PELVIS W/CONTRAST: CPT

## 2024-01-30 PROCEDURE — 71045 X-RAY EXAM CHEST 1 VIEW: CPT

## 2024-01-30 PROCEDURE — 85379 FIBRIN DEGRADATION QUANT: CPT

## 2024-01-30 PROCEDURE — 83880 ASSAY OF NATRIURETIC PEPTIDE: CPT

## 2024-01-30 PROCEDURE — 96375 TX/PRO/DX INJ NEW DRUG ADDON: CPT

## 2024-01-30 PROCEDURE — 85025 COMPLETE CBC W/AUTO DIFF WBC: CPT

## 2024-01-30 PROCEDURE — 80053 COMPREHEN METABOLIC PANEL: CPT

## 2024-01-30 PROCEDURE — 6360000002 HC RX W HCPCS: Performed by: PHYSICIAN ASSISTANT

## 2024-01-30 PROCEDURE — 71275 CT ANGIOGRAPHY CHEST: CPT

## 2024-01-30 PROCEDURE — 83690 ASSAY OF LIPASE: CPT

## 2024-01-30 RX ORDER — MORPHINE SULFATE 4 MG/ML
4 INJECTION, SOLUTION INTRAMUSCULAR; INTRAVENOUS
Status: COMPLETED | OUTPATIENT
Start: 2024-01-30 | End: 2024-01-30

## 2024-01-30 RX ORDER — ONDANSETRON 2 MG/ML
4 INJECTION INTRAMUSCULAR; INTRAVENOUS
Status: COMPLETED | OUTPATIENT
Start: 2024-01-30 | End: 2024-01-30

## 2024-01-30 RX ORDER — IPRATROPIUM BROMIDE AND ALBUTEROL SULFATE 2.5; .5 MG/3ML; MG/3ML
1 SOLUTION RESPIRATORY (INHALATION)
Status: COMPLETED | OUTPATIENT
Start: 2024-01-31 | End: 2024-01-31

## 2024-01-30 RX ADMIN — MORPHINE SULFATE 4 MG: 4 INJECTION, SOLUTION INTRAMUSCULAR; INTRAVENOUS at 21:42

## 2024-01-30 RX ADMIN — IOPAMIDOL 100 ML: 755 INJECTION, SOLUTION INTRAVENOUS at 23:46

## 2024-01-30 RX ADMIN — ONDANSETRON 4 MG: 2 INJECTION INTRAMUSCULAR; INTRAVENOUS at 21:42

## 2024-01-30 ASSESSMENT — PAIN DESCRIPTION - LOCATION: LOCATION: CHEST

## 2024-01-30 ASSESSMENT — ENCOUNTER SYMPTOMS
RHINORRHEA: 0
CONSTIPATION: 0
COUGH: 0
NAUSEA: 1
SORE THROAT: 0
WHEEZING: 0
VOMITING: 1
ABDOMINAL PAIN: 1
STRIDOR: 0
EYE DISCHARGE: 0
DIARRHEA: 0
EYE REDNESS: 0
BACK PAIN: 0
SHORTNESS OF BREATH: 0

## 2024-01-30 ASSESSMENT — PAIN - FUNCTIONAL ASSESSMENT: PAIN_FUNCTIONAL_ASSESSMENT: 0-10

## 2024-01-30 ASSESSMENT — PAIN SCALES - GENERAL: PAINLEVEL_OUTOF10: 8

## 2024-01-31 ENCOUNTER — APPOINTMENT (OUTPATIENT)
Facility: HOSPITAL | Age: 64
DRG: 438 | End: 2024-01-31
Payer: MEDICARE

## 2024-01-31 VITALS
WEIGHT: 242 LBS | OXYGEN SATURATION: 95 % | DIASTOLIC BLOOD PRESSURE: 70 MMHG | RESPIRATION RATE: 19 BRPM | TEMPERATURE: 98.4 F | BODY MASS INDEX: 34.65 KG/M2 | HEIGHT: 70 IN | SYSTOLIC BLOOD PRESSURE: 125 MMHG | HEART RATE: 82 BPM

## 2024-01-31 PROCEDURE — 6370000000 HC RX 637 (ALT 250 FOR IP): Performed by: PHYSICIAN ASSISTANT

## 2024-01-31 PROCEDURE — 76705 ECHO EXAM OF ABDOMEN: CPT

## 2024-01-31 PROCEDURE — 2700000000 HC OXYGEN THERAPY PER DAY

## 2024-01-31 PROCEDURE — 96366 THER/PROPH/DIAG IV INF ADDON: CPT

## 2024-01-31 PROCEDURE — 96365 THER/PROPH/DIAG IV INF INIT: CPT

## 2024-01-31 PROCEDURE — 94761 N-INVAS EAR/PLS OXIMETRY MLT: CPT

## 2024-01-31 PROCEDURE — 96376 TX/PRO/DX INJ SAME DRUG ADON: CPT

## 2024-01-31 PROCEDURE — 6360000002 HC RX W HCPCS: Performed by: PHYSICIAN ASSISTANT

## 2024-01-31 PROCEDURE — 96368 THER/DIAG CONCURRENT INF: CPT

## 2024-01-31 PROCEDURE — 96375 TX/PRO/DX INJ NEW DRUG ADDON: CPT

## 2024-01-31 PROCEDURE — 2580000003 HC RX 258: Performed by: PHYSICIAN ASSISTANT

## 2024-01-31 RX ORDER — ONDANSETRON 2 MG/ML
4 INJECTION INTRAMUSCULAR; INTRAVENOUS
Status: COMPLETED | OUTPATIENT
Start: 2024-01-31 | End: 2024-01-31

## 2024-01-31 RX ORDER — OXYCODONE HYDROCHLORIDE AND ACETAMINOPHEN 5; 325 MG/1; MG/1
1 TABLET ORAL EVERY 6 HOURS PRN
Qty: 14 TABLET | Refills: 0 | Status: ON HOLD | OUTPATIENT
Start: 2024-01-31 | End: 2024-02-03

## 2024-01-31 RX ORDER — DOXYCYCLINE HYCLATE 100 MG
100 TABLET ORAL 2 TIMES DAILY
Qty: 20 TABLET | Refills: 0 | Status: SHIPPED | OUTPATIENT
Start: 2024-01-31 | End: 2024-02-10

## 2024-01-31 RX ORDER — MORPHINE SULFATE 4 MG/ML
4 INJECTION, SOLUTION INTRAMUSCULAR; INTRAVENOUS
Status: COMPLETED | OUTPATIENT
Start: 2024-01-31 | End: 2024-01-31

## 2024-01-31 RX ORDER — ONDANSETRON 4 MG/1
4 TABLET, ORALLY DISINTEGRATING ORAL 3 TIMES DAILY PRN
Qty: 21 TABLET | Refills: 0 | Status: SHIPPED | OUTPATIENT
Start: 2024-01-31

## 2024-01-31 RX ORDER — HYDROMORPHONE HYDROCHLORIDE 1 MG/ML
1 INJECTION, SOLUTION INTRAMUSCULAR; INTRAVENOUS; SUBCUTANEOUS
Status: COMPLETED | OUTPATIENT
Start: 2024-01-31 | End: 2024-01-31

## 2024-01-31 RX ADMIN — VANCOMYCIN HYDROCHLORIDE 2000 MG: 1 INJECTION, POWDER, LYOPHILIZED, FOR SOLUTION INTRAVENOUS at 02:57

## 2024-01-31 RX ADMIN — IPRATROPIUM BROMIDE AND ALBUTEROL SULFATE 1 DOSE: .5; 3 SOLUTION RESPIRATORY (INHALATION) at 01:44

## 2024-01-31 RX ADMIN — MORPHINE SULFATE 4 MG: 4 INJECTION, SOLUTION INTRAMUSCULAR; INTRAVENOUS at 01:44

## 2024-01-31 RX ADMIN — ONDANSETRON 4 MG: 2 INJECTION INTRAMUSCULAR; INTRAVENOUS at 03:07

## 2024-01-31 RX ADMIN — PIPERACILLIN AND TAZOBACTAM 4500 MG: 4; .5 INJECTION, POWDER, FOR SOLUTION INTRAVENOUS at 01:44

## 2024-01-31 RX ADMIN — HYDROMORPHONE HYDROCHLORIDE 1 MG: 1 INJECTION, SOLUTION INTRAMUSCULAR; INTRAVENOUS; SUBCUTANEOUS at 03:07

## 2024-01-31 NOTE — PROGRESS NOTES
[facility]   Pharmacy Pharmacokinetic Monitoring Service - Vancomycin     Solo Souza is a 63 y.o. male starting on vancomycin therapy for Intra-abdominal Infection. Pharmacy consulted for monitoring and adjustment.    Target Concentration: Goal AUC/BERNA 400-600 mg*hr/L    Additional Antimicrobials: Piperacillin/Tazobactam    Pertinent Laboratory Values:   Temp: 98.9 °F (37.2 °C), Weight - Scale: 109.8 kg (242 lb)  Recent Labs     01/30/24  1942   CREATININE 1.05   BUN 10   WBC 11.6     Estimated Creatinine Clearance: 90 mL/min (based on SCr of 1.05 mg/dL).    Pertinent Cultures:  Culture Date Source Results   01/31 Blood Pending   MRSA Nasal Swab: N/A. Non-respiratory infection    Plan:  Dosing recommendations based on Bayesian software  Start vancomycin 2000 mg IV x 1 followed by 1 gm IV q12h  Anticipated AUC of 452 and trough concentration of 13.7 at steady state  Renal labs as indicated   Vancomycin concentration ordered for  02/01 @ 0400  Pharmacy will continue to monitor patient and adjust therapy as indicated    Thank you for the consult,  ANGELA MITCHELL [facility]  1/31/2024

## 2024-01-31 NOTE — ED PROVIDER NOTES
Brain Natriuretic Peptide    Collection Time: 01/30/24  7:42 PM   Result Value Ref Range    NT Pro- 0 - 900 PG/ML       Radiologic Studies -   US GALLBLADDER RUQ   Final Result   1. Slightly distended gallbladder with slightly thickened wall but no gallstones   or sonographic findings of acute cholecystitis. CBD not well visualized for   measurement.   2. Hepatomegaly.      CT ABDOMEN PELVIS W IV CONTRAST Additional Contrast? None   Final Result   1. Distended gallbladder with questionable gallstone and wall thickening,   cholecystitis not excluded.   2. Mild edema in the pancreatic head which can be compatible with mild   pancreatitis. No pseudocyst cyst or significant surrounding inflammation.   3. Colonic diverticulosis with no diverticulitis or obstruction.   4. L4-L5 fusion with lucency surrounding the right L5 screw, loosening or   infection not excluded.      CTA CHEST W WO CONTRAST   Final Result   1. No pulmonary embolism or aortic dissection.   2. Developing bronchitis with atelectasis and likely infectious infiltrate in   the left posterior lung base. Clinical correlation and follow-up to resolution   recommended.      XR CHEST PORTABLE   Final Result   1.  No acute process identified.         [unfilled]  [unfilled]      Medical Decision Making   I am the first provider for this patient.    I reviewed the vital signs, available nursing notes, past medical history, past surgical history, family history and social history.    Vital Signs-Reviewed the patient's vital signs.    Records Reviewed: Zabrina Eugene PA-C     Procedures:  Procedures    Provider Notes (Medical Decision Making): Impression:  chest pain, pancreatitis.CAP     EKG sinus rhythm with first-degree AV block no STEMI, rate 73 no STEMI or acute changes, reviewed by myself and the ED attending.  Chest x-ray negative for acute process  Labs white blood cell count 11.6 hemoglobin 12.5 hematocrit 39.8 glucose 277 chloride 96 bicarb 36 troponin

## 2024-01-31 NOTE — ED NOTES
Pt O2 desat to 87%. Pt C/O sob and states he sometimes wears O2 at home. Pt placed on 3L nasal cannula.

## 2024-01-31 NOTE — ED TRIAGE NOTES
Pt to ED reports midsternal CP onset 1800 today pt reports took 0.4mg SL NG w/ no relief, x 1 episode of emesis Pt also endorses SOB

## 2024-01-31 NOTE — ED NOTES
Assumed care of pt. Pt A+Ox4 C/O chest pain that was not relived by nitro at home. Pt SOB. Placed on monitor. Vitals stable at this time.

## 2024-02-01 ENCOUNTER — HOSPITAL ENCOUNTER (INPATIENT)
Facility: HOSPITAL | Age: 64
LOS: 2 days | Discharge: HOME | DRG: 438 | End: 2024-02-03
Attending: EMERGENCY MEDICINE | Admitting: HOSPITALIST
Payer: MEDICARE

## 2024-02-01 ENCOUNTER — HOSPITAL ENCOUNTER (EMERGENCY)
Facility: HOSPITAL | Age: 64
DRG: 438 | End: 2024-02-01
Payer: MEDICARE

## 2024-02-01 DIAGNOSIS — R10.13 ABDOMINAL PAIN, EPIGASTRIC: ICD-10-CM

## 2024-02-01 DIAGNOSIS — K85.90 ACUTE PANCREATITIS WITHOUT INFECTION OR NECROSIS, UNSPECIFIED PANCREATITIS TYPE: ICD-10-CM

## 2024-02-01 DIAGNOSIS — R11.2 NAUSEA AND VOMITING, UNSPECIFIED VOMITING TYPE: Primary | ICD-10-CM

## 2024-02-01 PROBLEM — E87.20 LACTIC ACIDOSIS: Status: ACTIVE | Noted: 2024-02-01

## 2024-02-01 PROBLEM — E11.9 DIABETES MELLITUS, TYPE 2 (HCC): Status: ACTIVE | Noted: 2024-02-01

## 2024-02-01 PROBLEM — F19.10 POLYSUBSTANCE ABUSE (HCC): Status: ACTIVE | Noted: 2024-02-01

## 2024-02-01 LAB
ALBUMIN SERPL-MCNC: 3.6 G/DL (ref 3.4–5)
ALBUMIN/GLOB SERPL: 1.1 (ref 0.8–1.7)
ALP SERPL-CCNC: 81 U/L (ref 45–117)
ALT SERPL-CCNC: 15 U/L (ref 16–61)
ANION GAP SERPL CALC-SCNC: 7 MMOL/L (ref 3–18)
APPEARANCE UR: CLEAR
AST SERPL-CCNC: 13 U/L (ref 10–38)
BASOPHILS # BLD: 0.1 K/UL (ref 0–0.1)
BASOPHILS NFR BLD: 0 % (ref 0–2)
BILIRUB SERPL-MCNC: 0.6 MG/DL (ref 0.2–1)
BILIRUB UR QL: NEGATIVE
BUN SERPL-MCNC: 11 MG/DL (ref 7–18)
BUN/CREAT SERPL: 10 (ref 12–20)
CALCIUM SERPL-MCNC: 10.4 MG/DL (ref 8.5–10.1)
CHLORIDE SERPL-SCNC: 94 MMOL/L (ref 100–111)
CHOLEST SERPL-MCNC: 130 MG/DL
CO2 SERPL-SCNC: 31 MMOL/L (ref 21–32)
COLOR UR: YELLOW
CREAT SERPL-MCNC: 1.11 MG/DL (ref 0.6–1.3)
DIFFERENTIAL METHOD BLD: ABNORMAL
EKG ATRIAL RATE: 73 BPM
EKG DIAGNOSIS: NORMAL
EKG P-R INTERVAL: 216 MS
EKG Q-T INTERVAL: 384 MS
EKG QRS DURATION: 86 MS
EKG QTC CALCULATION (BAZETT): 423 MS
EKG R AXIS: 77 DEGREES
EKG T AXIS: 49 DEGREES
EKG VENTRICULAR RATE: 73 BPM
EOSINOPHIL # BLD: 0.1 K/UL (ref 0–0.4)
EOSINOPHIL NFR BLD: 1 % (ref 0–5)
ERYTHROCYTE [DISTWIDTH] IN BLOOD BY AUTOMATED COUNT: 13.8 % (ref 11.6–14.5)
ETHANOL SERPL-MCNC: <3 MG/DL (ref 0–3)
GLOBULIN SER CALC-MCNC: 3.3 G/DL (ref 2–4)
GLUCOSE BLD STRIP.AUTO-MCNC: 217 MG/DL (ref 70–110)
GLUCOSE BLD STRIP.AUTO-MCNC: 239 MG/DL (ref 70–110)
GLUCOSE SERPL-MCNC: 300 MG/DL (ref 74–99)
GLUCOSE UR STRIP.AUTO-MCNC: >1000 MG/DL
HCT VFR BLD AUTO: 37.8 % (ref 36–48)
HDLC SERPL-MCNC: 37 MG/DL (ref 40–60)
HDLC SERPL: 3.5 (ref 0–5)
HGB BLD-MCNC: 12.1 G/DL (ref 13–16)
HGB UR QL STRIP: NEGATIVE
IMM GRANULOCYTES # BLD AUTO: 0 K/UL (ref 0–0.04)
IMM GRANULOCYTES NFR BLD AUTO: 0 % (ref 0–0.5)
KETONES UR QL STRIP.AUTO: NEGATIVE MG/DL
LACTATE SERPL-SCNC: 2.5 MMOL/L (ref 0.4–2)
LACTATE SERPL-SCNC: 3.2 MMOL/L (ref 0.4–2)
LACTATE SERPL-SCNC: 4.3 MMOL/L (ref 0.4–2)
LDLC SERPL CALC-MCNC: 47 MG/DL (ref 0–100)
LEUKOCYTE ESTERASE UR QL STRIP.AUTO: NEGATIVE
LIPASE SERPL-CCNC: 201 U/L (ref 13–75)
LIPID PANEL: ABNORMAL
LYMPHOCYTES # BLD: 1.8 K/UL (ref 0.9–3.6)
LYMPHOCYTES NFR BLD: 15 % (ref 21–52)
MAGNESIUM SERPL-MCNC: 1.6 MG/DL (ref 1.6–2.6)
MCH RBC QN AUTO: 26.9 PG (ref 24–34)
MCHC RBC AUTO-ENTMCNC: 32 G/DL (ref 31–37)
MCV RBC AUTO: 84.2 FL (ref 78–100)
MONOCYTES # BLD: 0.8 K/UL (ref 0.05–1.2)
MONOCYTES NFR BLD: 6 % (ref 3–10)
NEUTS SEG # BLD: 9.6 K/UL (ref 1.8–8)
NEUTS SEG NFR BLD: 78 % (ref 40–73)
NITRITE UR QL STRIP.AUTO: NEGATIVE
NRBC # BLD: 0 K/UL (ref 0–0.01)
NRBC BLD-RTO: 0 PER 100 WBC
PH UR STRIP: 5 (ref 5–8)
PLATELET # BLD AUTO: 307 K/UL (ref 135–420)
PMV BLD AUTO: 11.1 FL (ref 9.2–11.8)
POTASSIUM SERPL-SCNC: 4.6 MMOL/L (ref 3.5–5.5)
PROT SERPL-MCNC: 6.9 G/DL (ref 6.4–8.2)
PROT UR STRIP-MCNC: NEGATIVE MG/DL
RBC # BLD AUTO: 4.49 M/UL (ref 4.35–5.65)
SODIUM SERPL-SCNC: 132 MMOL/L (ref 136–145)
SP GR UR REFRACTOMETRY: 1.02 (ref 1–1.03)
TRIGL SERPL-MCNC: 230 MG/DL
TROPONIN I SERPL HS-MCNC: 13 NG/L (ref 0–78)
UROBILINOGEN UR QL STRIP.AUTO: 0.2 EU/DL (ref 0.2–1)
VLDLC SERPL CALC-MCNC: 46 MG/DL
WBC # BLD AUTO: 12.4 K/UL (ref 4.6–13.2)

## 2024-02-01 PROCEDURE — 99222 1ST HOSP IP/OBS MODERATE 55: CPT

## 2024-02-01 PROCEDURE — 96376 TX/PRO/DX INJ SAME DRUG ADON: CPT

## 2024-02-01 PROCEDURE — 96375 TX/PRO/DX INJ NEW DRUG ADDON: CPT

## 2024-02-01 PROCEDURE — 83605 ASSAY OF LACTIC ACID: CPT

## 2024-02-01 PROCEDURE — 80061 LIPID PANEL: CPT

## 2024-02-01 PROCEDURE — 83735 ASSAY OF MAGNESIUM: CPT

## 2024-02-01 PROCEDURE — 1100000000 HC RM PRIVATE

## 2024-02-01 PROCEDURE — 6360000002 HC RX W HCPCS: Performed by: EMERGENCY MEDICINE

## 2024-02-01 PROCEDURE — 2580000003 HC RX 258: Performed by: EMERGENCY MEDICINE

## 2024-02-01 PROCEDURE — 80053 COMPREHEN METABOLIC PANEL: CPT

## 2024-02-01 PROCEDURE — 83690 ASSAY OF LIPASE: CPT

## 2024-02-01 PROCEDURE — 76705 ECHO EXAM OF ABDOMEN: CPT

## 2024-02-01 PROCEDURE — 96361 HYDRATE IV INFUSION ADD-ON: CPT

## 2024-02-01 PROCEDURE — 93010 ELECTROCARDIOGRAM REPORT: CPT | Performed by: INTERNAL MEDICINE

## 2024-02-01 PROCEDURE — 94664 DEMO&/EVAL PT USE INHALER: CPT

## 2024-02-01 PROCEDURE — 36415 COLL VENOUS BLD VENIPUNCTURE: CPT

## 2024-02-01 PROCEDURE — 94761 N-INVAS EAR/PLS OXIMETRY MLT: CPT

## 2024-02-01 PROCEDURE — 6360000002 HC RX W HCPCS

## 2024-02-01 PROCEDURE — 94640 AIRWAY INHALATION TREATMENT: CPT

## 2024-02-01 PROCEDURE — 2580000003 HC RX 258

## 2024-02-01 PROCEDURE — 99285 EMERGENCY DEPT VISIT HI MDM: CPT

## 2024-02-01 PROCEDURE — 82962 GLUCOSE BLOOD TEST: CPT

## 2024-02-01 PROCEDURE — 6370000000 HC RX 637 (ALT 250 FOR IP)

## 2024-02-01 PROCEDURE — 81003 URINALYSIS AUTO W/O SCOPE: CPT

## 2024-02-01 PROCEDURE — 2700000000 HC OXYGEN THERAPY PER DAY

## 2024-02-01 PROCEDURE — 85025 COMPLETE CBC W/AUTO DIFF WBC: CPT

## 2024-02-01 PROCEDURE — 93005 ELECTROCARDIOGRAM TRACING: CPT | Performed by: EMERGENCY MEDICINE

## 2024-02-01 PROCEDURE — 82077 ASSAY SPEC XCP UR&BREATH IA: CPT

## 2024-02-01 PROCEDURE — 6360000002 HC RX W HCPCS: Performed by: HOSPITALIST

## 2024-02-01 PROCEDURE — 96374 THER/PROPH/DIAG INJ IV PUSH: CPT

## 2024-02-01 PROCEDURE — 84484 ASSAY OF TROPONIN QUANT: CPT

## 2024-02-01 RX ORDER — ASPIRIN 81 MG/1
81 TABLET ORAL DAILY
Status: DISCONTINUED | OUTPATIENT
Start: 2024-02-02 | End: 2024-02-03 | Stop reason: HOSPADM

## 2024-02-01 RX ORDER — INSULIN LISPRO 100 [IU]/ML
0-8 INJECTION, SOLUTION INTRAVENOUS; SUBCUTANEOUS
Status: DISCONTINUED | OUTPATIENT
Start: 2024-02-01 | End: 2024-02-03 | Stop reason: HOSPADM

## 2024-02-01 RX ORDER — LEVOFLOXACIN 500 MG/1
500 TABLET, FILM COATED ORAL ONCE
Status: COMPLETED | OUTPATIENT
Start: 2024-02-01 | End: 2024-02-01

## 2024-02-01 RX ORDER — MAGNESIUM SULFATE IN WATER 40 MG/ML
2000 INJECTION, SOLUTION INTRAVENOUS PRN
Status: DISCONTINUED | OUTPATIENT
Start: 2024-02-01 | End: 2024-02-03 | Stop reason: HOSPADM

## 2024-02-01 RX ORDER — LISINOPRIL 5 MG/1
5 TABLET ORAL DAILY
Status: DISCONTINUED | OUTPATIENT
Start: 2024-02-02 | End: 2024-02-03 | Stop reason: HOSPADM

## 2024-02-01 RX ORDER — CLOPIDOGREL BISULFATE 75 MG/1
75 TABLET ORAL DAILY
Status: DISCONTINUED | OUTPATIENT
Start: 2024-02-02 | End: 2024-02-03 | Stop reason: HOSPADM

## 2024-02-01 RX ORDER — MORPHINE SULFATE 4 MG/ML
4 INJECTION, SOLUTION INTRAMUSCULAR; INTRAVENOUS
Status: COMPLETED | OUTPATIENT
Start: 2024-02-01 | End: 2024-02-01

## 2024-02-01 RX ORDER — SODIUM CHLORIDE, SODIUM LACTATE, POTASSIUM CHLORIDE, AND CALCIUM CHLORIDE .6; .31; .03; .02 G/100ML; G/100ML; G/100ML; G/100ML
1000 INJECTION, SOLUTION INTRAVENOUS ONCE
Status: COMPLETED | OUTPATIENT
Start: 2024-02-01 | End: 2024-02-01

## 2024-02-01 RX ORDER — NALOXONE HYDROCHLORIDE 0.4 MG/ML
0.4 INJECTION, SOLUTION INTRAMUSCULAR; INTRAVENOUS; SUBCUTANEOUS PRN
Status: DISCONTINUED | OUTPATIENT
Start: 2024-02-01 | End: 2024-02-03 | Stop reason: HOSPADM

## 2024-02-01 RX ORDER — POTASSIUM CHLORIDE 7.45 MG/ML
10 INJECTION INTRAVENOUS PRN
Status: DISCONTINUED | OUTPATIENT
Start: 2024-02-01 | End: 2024-02-03 | Stop reason: HOSPADM

## 2024-02-01 RX ORDER — METOPROLOL TARTRATE 50 MG/1
50 TABLET, FILM COATED ORAL EVERY 12 HOURS
Status: DISCONTINUED | OUTPATIENT
Start: 2024-02-01 | End: 2024-02-03 | Stop reason: HOSPADM

## 2024-02-01 RX ORDER — SODIUM CHLORIDE 0.9 % (FLUSH) 0.9 %
5-40 SYRINGE (ML) INJECTION PRN
Status: DISCONTINUED | OUTPATIENT
Start: 2024-02-01 | End: 2024-02-03 | Stop reason: HOSPADM

## 2024-02-01 RX ORDER — ACETAMINOPHEN 325 MG/1
650 TABLET ORAL EVERY 6 HOURS PRN
Status: DISCONTINUED | OUTPATIENT
Start: 2024-02-01 | End: 2024-02-03 | Stop reason: HOSPADM

## 2024-02-01 RX ORDER — ATORVASTATIN CALCIUM 40 MG/1
80 TABLET, FILM COATED ORAL NIGHTLY
Status: DISCONTINUED | OUTPATIENT
Start: 2024-02-01 | End: 2024-02-03 | Stop reason: HOSPADM

## 2024-02-01 RX ORDER — ONDANSETRON 2 MG/ML
4 INJECTION INTRAMUSCULAR; INTRAVENOUS
Status: COMPLETED | OUTPATIENT
Start: 2024-02-01 | End: 2024-02-01

## 2024-02-01 RX ORDER — ONDANSETRON 2 MG/ML
4 INJECTION INTRAMUSCULAR; INTRAVENOUS EVERY 6 HOURS PRN
Status: DISCONTINUED | OUTPATIENT
Start: 2024-02-01 | End: 2024-02-03 | Stop reason: HOSPADM

## 2024-02-01 RX ORDER — ONDANSETRON 4 MG/1
4 TABLET, ORALLY DISINTEGRATING ORAL EVERY 8 HOURS PRN
Status: DISCONTINUED | OUTPATIENT
Start: 2024-02-01 | End: 2024-02-03 | Stop reason: HOSPADM

## 2024-02-01 RX ORDER — SODIUM CHLORIDE 0.9 % (FLUSH) 0.9 %
5-40 SYRINGE (ML) INJECTION EVERY 12 HOURS SCHEDULED
Status: DISCONTINUED | OUTPATIENT
Start: 2024-02-01 | End: 2024-02-03 | Stop reason: HOSPADM

## 2024-02-01 RX ORDER — POTASSIUM CHLORIDE 20 MEQ/1
40 TABLET, EXTENDED RELEASE ORAL PRN
Status: DISCONTINUED | OUTPATIENT
Start: 2024-02-01 | End: 2024-02-03 | Stop reason: HOSPADM

## 2024-02-01 RX ORDER — BUDESONIDE 0.25 MG/2ML
0.25 INHALANT ORAL
Status: DISCONTINUED | OUTPATIENT
Start: 2024-02-01 | End: 2024-02-03 | Stop reason: HOSPADM

## 2024-02-01 RX ORDER — 0.9 % SODIUM CHLORIDE 0.9 %
1000 INTRAVENOUS SOLUTION INTRAVENOUS ONCE
Status: COMPLETED | OUTPATIENT
Start: 2024-02-01 | End: 2024-02-01

## 2024-02-01 RX ORDER — DEXTROSE MONOHYDRATE 100 MG/ML
INJECTION, SOLUTION INTRAVENOUS CONTINUOUS PRN
Status: DISCONTINUED | OUTPATIENT
Start: 2024-02-01 | End: 2024-02-03 | Stop reason: HOSPADM

## 2024-02-01 RX ORDER — HYDROMORPHONE HYDROCHLORIDE 1 MG/ML
1 INJECTION, SOLUTION INTRAMUSCULAR; INTRAVENOUS; SUBCUTANEOUS EVERY 4 HOURS PRN
Status: DISCONTINUED | OUTPATIENT
Start: 2024-02-01 | End: 2024-02-03 | Stop reason: HOSPADM

## 2024-02-01 RX ORDER — ENOXAPARIN SODIUM 100 MG/ML
30 INJECTION SUBCUTANEOUS 2 TIMES DAILY
Status: DISCONTINUED | OUTPATIENT
Start: 2024-02-01 | End: 2024-02-03 | Stop reason: HOSPADM

## 2024-02-01 RX ORDER — ARFORMOTEROL TARTRATE 15 UG/2ML
15 SOLUTION RESPIRATORY (INHALATION)
Status: DISCONTINUED | OUTPATIENT
Start: 2024-02-01 | End: 2024-02-03 | Stop reason: HOSPADM

## 2024-02-01 RX ORDER — POLYETHYLENE GLYCOL 3350 17 G/17G
17 POWDER, FOR SOLUTION ORAL DAILY PRN
Status: DISCONTINUED | OUTPATIENT
Start: 2024-02-01 | End: 2024-02-03 | Stop reason: HOSPADM

## 2024-02-01 RX ORDER — SODIUM CHLORIDE 9 MG/ML
INJECTION, SOLUTION INTRAVENOUS CONTINUOUS
Status: DISPENSED | OUTPATIENT
Start: 2024-02-01 | End: 2024-02-02

## 2024-02-01 RX ORDER — ACETAMINOPHEN 650 MG/1
650 SUPPOSITORY RECTAL EVERY 6 HOURS PRN
Status: DISCONTINUED | OUTPATIENT
Start: 2024-02-01 | End: 2024-02-03 | Stop reason: HOSPADM

## 2024-02-01 RX ADMIN — HYDROMORPHONE HYDROCHLORIDE 1 MG: 1 INJECTION, SOLUTION INTRAMUSCULAR; INTRAVENOUS; SUBCUTANEOUS at 21:26

## 2024-02-01 RX ADMIN — INSULIN LISPRO 4 UNITS: 100 INJECTION, SOLUTION INTRAVENOUS; SUBCUTANEOUS at 21:28

## 2024-02-01 RX ADMIN — MORPHINE SULFATE 4 MG: 4 INJECTION, SOLUTION INTRAMUSCULAR; INTRAVENOUS at 16:27

## 2024-02-01 RX ADMIN — ONDANSETRON 4 MG: 2 INJECTION INTRAMUSCULAR; INTRAVENOUS at 13:19

## 2024-02-01 RX ADMIN — ONDANSETRON 4 MG: 2 INJECTION INTRAMUSCULAR; INTRAVENOUS at 16:26

## 2024-02-01 RX ADMIN — SODIUM CHLORIDE, SODIUM LACTATE, POTASSIUM CHLORIDE, AND CALCIUM CHLORIDE 1000 ML: 600; 310; 30; 20 INJECTION, SOLUTION INTRAVENOUS at 14:52

## 2024-02-01 RX ADMIN — LEVOFLOXACIN 500 MG: 500 TABLET, FILM COATED ORAL at 21:29

## 2024-02-01 RX ADMIN — ATORVASTATIN CALCIUM 80 MG: 40 TABLET, FILM COATED ORAL at 21:29

## 2024-02-01 RX ADMIN — ENOXAPARIN SODIUM 30 MG: 100 INJECTION SUBCUTANEOUS at 21:29

## 2024-02-01 RX ADMIN — SODIUM CHLORIDE, PRESERVATIVE FREE 10 ML: 5 INJECTION INTRAVENOUS at 21:33

## 2024-02-01 RX ADMIN — MORPHINE SULFATE 4 MG: 4 INJECTION, SOLUTION INTRAMUSCULAR; INTRAVENOUS at 13:19

## 2024-02-01 RX ADMIN — ARFORMOTEROL TARTRATE 15 MCG: 15 SOLUTION RESPIRATORY (INHALATION) at 21:41

## 2024-02-01 RX ADMIN — BUDESONIDE 250 MCG: 0.25 SUSPENSION RESPIRATORY (INHALATION) at 21:41

## 2024-02-01 RX ADMIN — SODIUM CHLORIDE: 9 INJECTION, SOLUTION INTRAVENOUS at 19:42

## 2024-02-01 RX ADMIN — IPRATROPIUM BROMIDE 0.5 MG: 0.5 SOLUTION RESPIRATORY (INHALATION) at 21:41

## 2024-02-01 RX ADMIN — SODIUM CHLORIDE 1000 ML: 9 INJECTION, SOLUTION INTRAVENOUS at 13:16

## 2024-02-01 RX ADMIN — LIDOCAINE HYDROCHLORIDE 40 ML: 20 SOLUTION ORAL; TOPICAL at 19:12

## 2024-02-01 ASSESSMENT — PAIN DESCRIPTION - LOCATION
LOCATION: ABDOMEN;BACK
LOCATION: ABDOMEN
LOCATION: ABDOMEN;BACK

## 2024-02-01 ASSESSMENT — PAIN SCALES - GENERAL
PAINLEVEL_OUTOF10: 7
PAINLEVEL_OUTOF10: 2
PAINLEVEL_OUTOF10: 0
PAINLEVEL_OUTOF10: 0
PAINLEVEL_OUTOF10: 6
PAINLEVEL_OUTOF10: 0
PAINLEVEL_OUTOF10: 8
PAINLEVEL_OUTOF10: 8
PAINLEVEL_OUTOF10: 4

## 2024-02-01 ASSESSMENT — PAIN DESCRIPTION - DESCRIPTORS
DESCRIPTORS: CRAMPING
DESCRIPTORS: OTHER (COMMENT)
DESCRIPTORS: CRAMPING

## 2024-02-01 ASSESSMENT — LIFESTYLE VARIABLES
HOW MANY STANDARD DRINKS CONTAINING ALCOHOL DO YOU HAVE ON A TYPICAL DAY: 1 OR 2
HOW OFTEN DO YOU HAVE A DRINK CONTAINING ALCOHOL: MONTHLY OR LESS

## 2024-02-01 ASSESSMENT — PAIN DESCRIPTION - ORIENTATION: ORIENTATION: RIGHT

## 2024-02-01 ASSESSMENT — PAIN DESCRIPTION - PAIN TYPE
TYPE: ACUTE PAIN

## 2024-02-01 NOTE — ED NOTES
TRANSFER - OUT REPORT:    Verbal report given to 466 rn on Solo Souza  being transferred to [facility] for routine progression of patient care       Report consisted of patient's Situation, Background, Assessment and   Recommendations(SBAR).     Information from the following report(s) Nurse Handoff Report was reviewed with the receiving nurse.    Edilma Fall Assessment:    Presents to emergency department  because of falls (Syncope, seizure, or loss of consciousness): No  Age > 70: No  Altered Mental Status, Intoxication with alcohol or substance confusion (Disorientation, impaired judgment, poor safety awaremess, or inability to follow instructions): No  Impaired Mobility: Ambulates or transfers with assistive devices or assistance; Unable to ambulate or transer.: No  Nursing Judgement: No          Lines:   Peripheral IV 02/01/24 Left Antecubital (Active)   Site Assessment Clean, dry & intact 02/01/24 1316   Line Status Blood return noted;Brisk blood return;Specimen collected;Normal saline locked 02/01/24 1316   Line Care Cap changed 02/01/24 1316   Phlebitis Assessment No symptoms 02/01/24 1316   Infiltration Assessment 0 02/01/24 1316   Alcohol Cap Used No 02/01/24 1316   Dressing Status New dressing applied;Clean, dry & intact 02/01/24 1316   Dressing Type Gauze;Transparent 02/01/24 1316   Dressing Intervention New 02/01/24 1316        Opportunity for questions and clarification was provided.      Patient transported with:  Tech

## 2024-02-01 NOTE — ED TRIAGE NOTES
PATIENT PRESENTED TO THE EMERGENCY DEPT WITH A COMPLAINT OF RIGHT UPPER QUADRANT ABDOMINAL PAIN X YESTERDAY PATIENT RATES PAIN 8/10 ON PAIN SCALE. PATIENT HAVING VOMITING X 3 EPISODES LIGHT YELLOW SUBSTANCES.       PATIENT ALERT AND ORIENTED X 4, PATIENT BREATHES FREELY ON ROOM AIR IN NIL CARDIOPULMOANRY DISTRESS

## 2024-02-01 NOTE — ED PROVIDER NOTES
2:50 PM :Pt care assumed from Dr. Anguiano , ED provider. Pt complaint(s), current treatment plan, progression and available diagnostic results have been discussed thoroughly. The patient was seen and evaluated on my shift.   Rounding occurred: Yes  Intended Disposition: TBD  Pending diagnostic reports and/or labs (please list): no meds for acute pancreatitis, lipase trending down, CT 1/30/24 equivocal US/CT, drinks once a week, lipid panel, feels better and wife picked up meds from recent presentation, fluids/morphine/zofran/repeat US today, on doxy now for respiratory infection       Jani Wayne MD  02/01/24 7254    Ultrasound shows no acute process in the patient is now needing more pain medication nausea medication has intractable pain with acute pancreatitis which is new for him.  Patient has some hypertriglyceridemia and hyperglycemia which is likely triggering some of this pancreatitis.  Given his intractable pain I discussed case with the hospitalist will be admitted for further care.  Patient's lactate is trending down and may be related to dehydration as there is no signs of infection.    Is this patient to be included in the SEP-1 core measure? No Exclusion criteria - the patient is NOT to be included for SEP-1 Core Measure due to: Infection is not suspected      Jani Wayne MD  02/01/24 1214

## 2024-02-01 NOTE — H&P
Pancreatitis - ethanol <3, trigs 230 , Lipase 1279--201   Lactic acidosis - 4.3--3.2   LLL Pneumonia- was placed on Levaquin OP for 10 days, last dose tonight , has been ordered   COPD, not in exacerbation  DMT2  Hypertension  CAD s/p stent in 2020 and 2022   Polysubstance Abuse- drinks 10-12 beers every Friday, smokes 1-2 cigarettes every other day, smokes marijuana 1-2 times a month     Plan:  - Clear Liquid diet  - IVF -  ml/hr   - PRN pain meds and antiemetics - IV Dilaudid  - Check TSH, Mag, CBC  - GI cocktail ordered   - SSI, check A1c   - Holding home diuretics as patient is being hydrated (lasix and spironolactone)   - Resume appropriate home meds- ASA, Statin, Plavix, Trelegy, Lisinopril, Metoprolol with hold parameters   - GI added to the treatment team      PT/OT/IS     Anticipated Discharge: 2 days     DVT Prophylaxis:  [x]Lovenox  []Hep SQ  []SCDs  []Coumadin []DOAC  []On Heparin gtt     I have personally reviewed all pertinent labs, films and EKGs that have officially resulted. I reviewed available electronic documentation outlining the initial presentation as well as the emergency room physician's encounter.    Time spent reviewing records, independently interpreting results, obtaining history from patient or caregiver, performing physical exam, ordering tests and medications, communicating with specialists, documenting in the chart, and coordinating overall care is  >55 minutes     DAYNA Heard  [facility]  Hospitalist Division  Office:  [phone]

## 2024-02-01 NOTE — ED PROVIDER NOTES
Procedures and Critical Care     Performed by: Solo Anguiano MD    Procedures     Solo Anguiano MD    Medical Decision Making and ED Course   - I am the first and primary provider for this patient AND AM THE PRIMARY PROVIDER OF RECORD.    - I reviewed the vital signs, available nursing notes, past medical history, past surgical history, family history and social history.    - Initial assessment performed. The patients presenting problems have been discussed, and the staff are in agreement with the care plan formulated and outlined with them.  I have encouraged them to ask questions as they arise throughout their visit.    Vital Signs-Reviewed the patient's vital signs.    Patient Vitals for the past 12 hrs:   Temp Pulse Resp BP SpO2   02/01/24 1319 -- -- 15 -- --   02/01/24 1250 -- 71 20 (!) 103/58 95 %   02/01/24 1247 98.2 °F (36.8 °C) -- -- -- --         Provider Notes (Medical Decision Making):        Patient with persistent pain and vomiting after diagnosis of pancreatitis 2 days ago.  Sent back to the ED for PCP.  Patient  upper abdomen.    Initial Differential Diagnosis: Calculus cholecystitis, pancreatitis, electrolyte derangement, dehydration    Clinical presentation most consistent with acute pancreatitis.  Labs show resolution of elevated Lipase. Persistent leukocytosis, mostly unchanged.  Mild lactic acidosis.  Patient or repeat ultrasound due to equivocal for acute cholecystitis in the day.    Signed out at shift change pending ultrasound, repeat lactic acid, p.o. challenge and final disposition.    Documentation/Prior Results Review:  Nursing notes    Social Determinants of Health: None identified        Meds Given in ED:  Medications   lactated ringers bolus bolus 1,000 mL (0 mLs IntraVENous Stopped 2/1/24 1652)   sodium chloride 0.9 % bolus 1,000 mL (0 mLs IntraVENous Stopped 2/1/24 1400)   ondansetron (ZOFRAN) injection 4 mg (4 mg IntraVENous Given 2/1/24 1319)   morphine  sulfate (PF) injection 4 mg (4 mg IntraVENous Given 2/1/24 9420)       Final Diagnosis:  1. Nausea and vomiting, unspecified vomiting type    2. Abdominal pain, epigastric        Disposition: Shift Change Turnover  Destination: ED Obs    Discharge Rx:   New Prescriptions    No medications on file         Dictation disclaimer: Please note that this dictation was completed with [employer], the computer voice recognition software. Quite often unanticipated grammatical, syntax, homophones, and other interpretive errors are inadvertently transcribed by the computer software. Please disregard these errors. Please excuse any errors that have escaped final proofreading.     Solo Anguiano MD  Emergency Physician   Acute [facility]             Solo Anguiano MD  02/01/24 6060

## 2024-02-02 LAB
ANION GAP SERPL CALC-SCNC: 5 MMOL/L (ref 3–18)
BASOPHILS # BLD: 0 K/UL (ref 0–0.1)
BASOPHILS NFR BLD: 0 % (ref 0–2)
BUN SERPL-MCNC: 10 MG/DL (ref 7–18)
BUN/CREAT SERPL: 11 (ref 12–20)
CALCIUM SERPL-MCNC: 9.4 MG/DL (ref 8.5–10.1)
CHLORIDE SERPL-SCNC: 100 MMOL/L (ref 100–111)
CO2 SERPL-SCNC: 30 MMOL/L (ref 21–32)
CREAT SERPL-MCNC: 0.88 MG/DL (ref 0.6–1.3)
DIFFERENTIAL METHOD BLD: ABNORMAL
EKG ATRIAL RATE: 69 BPM
EKG DIAGNOSIS: NORMAL
EKG P AXIS: -11 DEGREES
EKG P-R INTERVAL: 216 MS
EKG Q-T INTERVAL: 386 MS
EKG QRS DURATION: 104 MS
EKG QTC CALCULATION (BAZETT): 413 MS
EKG R AXIS: 61 DEGREES
EKG T AXIS: 69 DEGREES
EKG VENTRICULAR RATE: 69 BPM
EOSINOPHIL # BLD: 0.2 K/UL (ref 0–0.4)
EOSINOPHIL NFR BLD: 2 % (ref 0–5)
ERYTHROCYTE [DISTWIDTH] IN BLOOD BY AUTOMATED COUNT: 13.8 % (ref 11.6–14.5)
EST. AVERAGE GLUCOSE BLD GHB EST-MCNC: 303 MG/DL
GLUCOSE BLD STRIP.AUTO-MCNC: 152 MG/DL (ref 70–110)
GLUCOSE BLD STRIP.AUTO-MCNC: 224 MG/DL (ref 70–110)
GLUCOSE BLD STRIP.AUTO-MCNC: 257 MG/DL (ref 70–110)
GLUCOSE BLD STRIP.AUTO-MCNC: 285 MG/DL (ref 70–110)
GLUCOSE SERPL-MCNC: 152 MG/DL (ref 74–99)
HBA1C MFR BLD: 12.2 % (ref 4.2–5.6)
HCT VFR BLD AUTO: 36.1 % (ref 36–48)
HGB BLD-MCNC: 11.1 G/DL (ref 13–16)
IMM GRANULOCYTES # BLD AUTO: 0 K/UL (ref 0–0.04)
IMM GRANULOCYTES NFR BLD AUTO: 0 % (ref 0–0.5)
LACTATE SERPL-SCNC: 1.3 MMOL/L (ref 0.4–2)
LIPASE SERPL-CCNC: 155 U/L (ref 13–75)
LYMPHOCYTES # BLD: 2.2 K/UL (ref 0.9–3.6)
LYMPHOCYTES NFR BLD: 29 % (ref 21–52)
MAGNESIUM SERPL-MCNC: 1.6 MG/DL (ref 1.6–2.6)
MCH RBC QN AUTO: 26.1 PG (ref 24–34)
MCHC RBC AUTO-ENTMCNC: 30.7 G/DL (ref 31–37)
MCV RBC AUTO: 84.9 FL (ref 78–100)
MONOCYTES # BLD: 0.5 K/UL (ref 0.05–1.2)
MONOCYTES NFR BLD: 7 % (ref 3–10)
NEUTS SEG # BLD: 4.7 K/UL (ref 1.8–8)
NEUTS SEG NFR BLD: 61 % (ref 40–73)
NRBC # BLD: 0 K/UL (ref 0–0.01)
NRBC BLD-RTO: 0 PER 100 WBC
PLATELET # BLD AUTO: 254 K/UL (ref 135–420)
PMV BLD AUTO: 10.9 FL (ref 9.2–11.8)
POTASSIUM SERPL-SCNC: 4 MMOL/L (ref 3.5–5.5)
RBC # BLD AUTO: 4.25 M/UL (ref 4.35–5.65)
SODIUM SERPL-SCNC: 135 MMOL/L (ref 136–145)
TSH SERPL DL<=0.05 MIU/L-ACNC: 3.69 UIU/ML (ref 0.36–3.74)
WBC # BLD AUTO: 7.6 K/UL (ref 4.6–13.2)

## 2024-02-02 PROCEDURE — 97165 OT EVAL LOW COMPLEX 30 MIN: CPT

## 2024-02-02 PROCEDURE — 83605 ASSAY OF LACTIC ACID: CPT

## 2024-02-02 PROCEDURE — 83036 HEMOGLOBIN GLYCOSYLATED A1C: CPT

## 2024-02-02 PROCEDURE — 94640 AIRWAY INHALATION TREATMENT: CPT

## 2024-02-02 PROCEDURE — 84443 ASSAY THYROID STIM HORMONE: CPT

## 2024-02-02 PROCEDURE — 83690 ASSAY OF LIPASE: CPT

## 2024-02-02 PROCEDURE — 6370000000 HC RX 637 (ALT 250 FOR IP): Performed by: INTERNAL MEDICINE

## 2024-02-02 PROCEDURE — 6360000002 HC RX W HCPCS

## 2024-02-02 PROCEDURE — 6370000000 HC RX 637 (ALT 250 FOR IP)

## 2024-02-02 PROCEDURE — 82962 GLUCOSE BLOOD TEST: CPT

## 2024-02-02 PROCEDURE — 83735 ASSAY OF MAGNESIUM: CPT

## 2024-02-02 PROCEDURE — 93010 ELECTROCARDIOGRAM REPORT: CPT | Performed by: INTERNAL MEDICINE

## 2024-02-02 PROCEDURE — 80048 BASIC METABOLIC PNL TOTAL CA: CPT

## 2024-02-02 PROCEDURE — 2580000003 HC RX 258

## 2024-02-02 PROCEDURE — 94761 N-INVAS EAR/PLS OXIMETRY MLT: CPT

## 2024-02-02 PROCEDURE — 99232 SBSQ HOSP IP/OBS MODERATE 35: CPT | Performed by: INTERNAL MEDICINE

## 2024-02-02 PROCEDURE — 85025 COMPLETE CBC W/AUTO DIFF WBC: CPT

## 2024-02-02 PROCEDURE — 6360000002 HC RX W HCPCS: Performed by: INTERNAL MEDICINE

## 2024-02-02 PROCEDURE — 1100000000 HC RM PRIVATE

## 2024-02-02 PROCEDURE — 36415 COLL VENOUS BLD VENIPUNCTURE: CPT

## 2024-02-02 PROCEDURE — 6360000002 HC RX W HCPCS: Performed by: HOSPITALIST

## 2024-02-02 PROCEDURE — 2700000000 HC OXYGEN THERAPY PER DAY

## 2024-02-02 RX ORDER — M-VIT,TX,IRON,MINS/CALC/FOLIC 27MG-0.4MG
1 TABLET ORAL DAILY
Status: DISCONTINUED | OUTPATIENT
Start: 2024-02-02 | End: 2024-02-03 | Stop reason: HOSPADM

## 2024-02-02 RX ORDER — GAUZE BANDAGE 2" X 2"
100 BANDAGE TOPICAL DAILY
Status: DISCONTINUED | OUTPATIENT
Start: 2024-02-02 | End: 2024-02-03 | Stop reason: HOSPADM

## 2024-02-02 RX ORDER — FOLIC ACID 1 MG/1
1 TABLET ORAL DAILY
Status: DISCONTINUED | OUTPATIENT
Start: 2024-02-02 | End: 2024-02-03 | Stop reason: HOSPADM

## 2024-02-02 RX ORDER — MAGNESIUM SULFATE IN WATER 40 MG/ML
2000 INJECTION, SOLUTION INTRAVENOUS ONCE
Status: COMPLETED | OUTPATIENT
Start: 2024-02-02 | End: 2024-02-02

## 2024-02-02 RX ADMIN — INSULIN LISPRO 4 UNITS: 100 INJECTION, SOLUTION INTRAVENOUS; SUBCUTANEOUS at 11:43

## 2024-02-02 RX ADMIN — BUDESONIDE 250 MCG: 0.25 SUSPENSION RESPIRATORY (INHALATION) at 20:07

## 2024-02-02 RX ADMIN — LISINOPRIL 5 MG: 5 TABLET ORAL at 08:45

## 2024-02-02 RX ADMIN — SODIUM CHLORIDE, PRESERVATIVE FREE 10 ML: 5 INJECTION INTRAVENOUS at 20:40

## 2024-02-02 RX ADMIN — Medication 100 MG: at 11:04

## 2024-02-02 RX ADMIN — BUDESONIDE 250 MCG: 0.25 SUSPENSION RESPIRATORY (INHALATION) at 07:56

## 2024-02-02 RX ADMIN — IPRATROPIUM BROMIDE 0.5 MG: 0.5 SOLUTION RESPIRATORY (INHALATION) at 07:56

## 2024-02-02 RX ADMIN — SODIUM CHLORIDE, PRESERVATIVE FREE 10 ML: 5 INJECTION INTRAVENOUS at 08:45

## 2024-02-02 RX ADMIN — INSULIN LISPRO 6 UNITS: 100 INJECTION, SOLUTION INTRAVENOUS; SUBCUTANEOUS at 21:33

## 2024-02-02 RX ADMIN — SODIUM CHLORIDE: 9 INJECTION, SOLUTION INTRAVENOUS at 02:06

## 2024-02-02 RX ADMIN — SODIUM CHLORIDE: 9 INJECTION, SOLUTION INTRAVENOUS at 11:07

## 2024-02-02 RX ADMIN — ARFORMOTEROL TARTRATE 15 MCG: 15 SOLUTION RESPIRATORY (INHALATION) at 20:06

## 2024-02-02 RX ADMIN — ATORVASTATIN CALCIUM 80 MG: 40 TABLET, FILM COATED ORAL at 20:39

## 2024-02-02 RX ADMIN — IPRATROPIUM BROMIDE 0.5 MG: 0.5 SOLUTION RESPIRATORY (INHALATION) at 13:16

## 2024-02-02 RX ADMIN — HYDROMORPHONE HYDROCHLORIDE 1 MG: 1 INJECTION, SOLUTION INTRAMUSCULAR; INTRAVENOUS; SUBCUTANEOUS at 06:38

## 2024-02-02 RX ADMIN — CLOPIDOGREL BISULFATE 75 MG: 75 TABLET ORAL at 08:45

## 2024-02-02 RX ADMIN — INSULIN LISPRO 2 UNITS: 100 INJECTION, SOLUTION INTRAVENOUS; SUBCUTANEOUS at 08:44

## 2024-02-02 RX ADMIN — HYDROMORPHONE HYDROCHLORIDE 1 MG: 1 INJECTION, SOLUTION INTRAMUSCULAR; INTRAVENOUS; SUBCUTANEOUS at 20:41

## 2024-02-02 RX ADMIN — INSULIN LISPRO 6 UNITS: 100 INJECTION, SOLUTION INTRAVENOUS; SUBCUTANEOUS at 16:12

## 2024-02-02 RX ADMIN — ASPIRIN 81 MG: 81 TABLET, COATED ORAL at 08:44

## 2024-02-02 RX ADMIN — MAGNESIUM SULFATE HEPTAHYDRATE 2000 MG: 40 INJECTION, SOLUTION INTRAVENOUS at 08:45

## 2024-02-02 RX ADMIN — ENOXAPARIN SODIUM 30 MG: 100 INJECTION SUBCUTANEOUS at 08:44

## 2024-02-02 RX ADMIN — HYDROMORPHONE HYDROCHLORIDE 1 MG: 1 INJECTION, SOLUTION INTRAMUSCULAR; INTRAVENOUS; SUBCUTANEOUS at 16:11

## 2024-02-02 RX ADMIN — Medication 1 TABLET: at 11:04

## 2024-02-02 RX ADMIN — ARFORMOTEROL TARTRATE 15 MCG: 15 SOLUTION RESPIRATORY (INHALATION) at 07:56

## 2024-02-02 RX ADMIN — ONDANSETRON 4 MG: 4 TABLET, ORALLY DISINTEGRATING ORAL at 16:57

## 2024-02-02 RX ADMIN — IPRATROPIUM BROMIDE 0.5 MG: 0.5 SOLUTION RESPIRATORY (INHALATION) at 20:07

## 2024-02-02 RX ADMIN — HYDROMORPHONE HYDROCHLORIDE 1 MG: 1 INJECTION, SOLUTION INTRAMUSCULAR; INTRAVENOUS; SUBCUTANEOUS at 11:04

## 2024-02-02 RX ADMIN — ENOXAPARIN SODIUM 30 MG: 100 INJECTION SUBCUTANEOUS at 20:39

## 2024-02-02 RX ADMIN — HYDROMORPHONE HYDROCHLORIDE 1 MG: 1 INJECTION, SOLUTION INTRAMUSCULAR; INTRAVENOUS; SUBCUTANEOUS at 02:02

## 2024-02-02 RX ADMIN — FOLIC ACID 1 MG: 1 TABLET ORAL at 11:04

## 2024-02-02 RX ADMIN — METOPROLOL TARTRATE 50 MG: 50 TABLET, FILM COATED ORAL at 05:25

## 2024-02-02 ASSESSMENT — PAIN DESCRIPTION - LOCATION
LOCATION: ABDOMEN

## 2024-02-02 ASSESSMENT — PAIN SCALES - GENERAL
PAINLEVEL_OUTOF10: 0
PAINLEVEL_OUTOF10: 4
PAINLEVEL_OUTOF10: 7
PAINLEVEL_OUTOF10: 5
PAINLEVEL_OUTOF10: 7
PAINLEVEL_OUTOF10: 0
PAINLEVEL_OUTOF10: 4
PAINLEVEL_OUTOF10: 10
PAINLEVEL_OUTOF10: 0
PAINLEVEL_OUTOF10: 7
PAINLEVEL_OUTOF10: 7
PAINLEVEL_OUTOF10: 6

## 2024-02-02 ASSESSMENT — PAIN DESCRIPTION - DESCRIPTORS
DESCRIPTORS: OTHER (COMMENT)
DESCRIPTORS: ACHING
DESCRIPTORS: ACHING
DESCRIPTORS: OTHER (COMMENT)
DESCRIPTORS: ACHING

## 2024-02-02 ASSESSMENT — PAIN DESCRIPTION - ORIENTATION
ORIENTATION: RIGHT

## 2024-02-02 ASSESSMENT — PAIN DESCRIPTION - PAIN TYPE: TYPE: ACUTE PAIN

## 2024-02-02 ASSESSMENT — PAIN SCALES - WONG BAKER: WONGBAKER_NUMERICALRESPONSE: 0

## 2024-02-02 NOTE — PROGRESS NOTES
1955: Bedside and Verbal shift change report given to BRIAN Cruz (oncoming nurse) by BRIAN Patten (offgoing nurse). Report included the following information Nurse Handoff Report, Index, Intake/Output, MAR, Cardiac Rhythm SR, and Quality Measures.     Pt's girlfriend at the bedside.    2007: Admission assessment data base completed.     2008: Critical result received from [facility], about pt's lactic acid of 2.5.   MD paged    2821: Received a call back from MD, Dr. Garner was notified of pt's lactic critical result of 2.5, no new order received at this time.

## 2024-02-02 NOTE — PROGRESS NOTES
Comprehensive Nutrition Assessment    Type and Reason for Visit:  Initial, Positive Nutrition Screen, NPO/Clear Liquid    Nutrition Recommendations/Plan:   Continue current diet as tolerated, advance as tolerated when medically feasible.  Order/trial Gelatein (each provides 80 kcal, 20g protein) BID while on CLD.  Order MVI/min, folic acid and thiamine supplementation due to ETOH use.  Continue to monitor tolerance of PO, compliance of oral supplements, weight, labs, and plan of care during admission.         Malnutrition Assessment:  Malnutrition Status:  Mild malnutrition (02/02/24 1046)    Context:  Acute Illness     Findings of the 6 clinical characteristics of malnutrition:  Energy Intake:  50% or less of estimated energy requirements for 5 or more days  Weight Loss:  No significant weight loss     Body Fat Loss:  No significant body fat loss Orbital, Buccal region   Muscle Mass Loss:  No significant muscle mass loss Clavicles (pectoralis & deltoids), Temples (temporalis)  Fluid Accumulation:  Unable to assess     Strength:  Not Performed    Nutrition History and Allergies:   PMHx: COPD, DMT2, HTN. Wt hx: 242 lb (12/27/23), 242 lb (1/19/24), 235 lb (2/1/24) - stated. Visited pt, reports UBW ~250 lb a couple months ago but has lost wt recently, last weighing 234 lb. Bedscale taken this date 244 lb. Wt loss of 16 lb (6.4%) x a couple months per pt report. Wt loss of 7 lb (3%) x 2 weeks per EMR hx. States appetite/intake has gradually decreased over the past month but was not paying attention to it; reports early satiety, some difficulty swallowing. States negligible PO x a few days PTA due to pain, n/v. NKFA.    Nutrition Assessment:    Admitted for c/o right upper quadrant pain; seen in the ED 1/30 for similar complains and found to have mild pancreatitis and developing bronchitis. Was d/c home, advised to come back d/t continued n/v and pain. Positive nutrition screen noted, MST. Visited pt, reports

## 2024-02-02 NOTE — PROGRESS NOTES
[facility]  Progress Note    Patient: Solo Souza Age: 63 y.o. : 1960 MR#: [id number] SSN: [id number]  Date/Time: 2024     Subjective:   Patient doing well. Patient states he has intermittent dysphagia but Egd unable to be done due to anticoagulation. Patient reports slightly improved pain.    Review of systems  General: No fevers or chills.  Cardiovascular: No chest pain or pressure. No palpitations.   Pulmonary: No shortness of breath, cough or wheeze.   Gastrointestinal: No abdominal pain, nausea, vomiting or diarrhea.   Genitourinary: No urinary frequency, urgency, hesitancy or dysuria.   Musculoskeletal: No joint or muscle pain, no back pain, no recent trauma.    Neurologic: No headache, numbness, tingling or weakness.   Assessment/Plan:   Acute Pancreatitis - ethanol <3, trigs 230 , Lipase 1279--201   Lactic acidosis - 4.3--3.2   LLL Pneumonia- was placed on Levaquin OP for 10 days, last dose tonight  has been ordered   COPD, not in exacerbation  DMT2  Hypertension  CAD s/p stent in  and    Polysubstance Abuse- drinks 10-12 beers every Friday, smokes 1-2 cigarettes every other day, smokes marijuana 1-2 times a month   dysphagia    Admitted to 4n  Cardiac monitoring  Clear liquid diet, advance as tolerated  IV  ml/hr  PRN pain meds and antiemetics - IV Dilaudid   Replace mag, 1.6 today, TSH good  A1c wildly out of control, counseled patient on diet  Lantus and SSI  Holding lasix and aldactone at this time    Resume appropriate home meds- ASA, Statin, Plavix, Trelegy, Lisinopril, Metoprolol with hold parameters    Dysphagia possibly due to achalasia or stricture, may need EGD In the future.    I spent 40 minutes with the patient in face-to-face consultation, of which greater than 50% was spent in counseling and coordination of care as described above.    Case discussed with:  [x]Patient  []Family  []Nursing  []Case Management  DVT  Or    acetaminophen (TYLENOL) suppository 650 mg  650 mg Rectal Q6H PRN    0.9 % sodium chloride infusion   IntraVENous Continuous    enoxaparin Sodium (LOVENOX) injection 30 mg  30 mg SubCUTAneous BID    HYDROmorphone HCl PF (DILAUDID) injection 1 mg  1 mg IntraVENous Q4H PRN    naloxone (NARCAN) injection 0.4 mg  0.4 mg IntraVENous PRN    insulin lispro (HUMALOG) injection vial 0-8 Units  0-8 Units SubCUTAneous 4x Daily AC & HS    glucose chewable tablet 16 g  4 tablet Oral PRN    dextrose bolus 10% 125 mL  125 mL IntraVENous PRN    Or    dextrose bolus 10% 250 mL  250 mL IntraVENous PRN    glucagon injection 1 mg  1 mg SubCUTAneous PRN    dextrose 10 % infusion   IntraVENous Continuous PRN    aspirin EC tablet 81 mg  81 mg Oral Daily    atorvastatin (LIPITOR) tablet 80 mg  80 mg Oral Nightly    clopidogrel (PLAVIX) tablet 75 mg  75 mg Oral Daily    lisinopril (PRINIVIL;ZESTRIL) tablet 5 mg  5 mg Oral Daily    metoprolol tartrate (LOPRESSOR) tablet 50 mg  50 mg Oral Q12H    arformoterol tartrate (BROVANA) nebulizer solution 15 mcg  15 mcg Nebulization BID RT    budesonide (PULMICORT) nebulizer suspension 250 mcg  0.25 mg Nebulization BID RT    ipratropium (ATROVENT) 0.02 % nebulizer solution 0.5 mg  0.5 mg Nebulization TID RT       Labs:    Recent Results (from the past 24 hour(s))   Lactic Acid    Collection Time: 02/01/24  4:03 PM   Result Value Ref Range    Lactic Acid, Plasma 3.2 (HH) 0.4 - 2.0 MMOL/L   Urinalysis    Collection Time: 02/01/24  4:05 PM   Result Value Ref Range    Color, UA YELLOW      Appearance CLEAR      Specific Gravity, UA 1.016 1.005 - 1.030      pH, Urine 5.0 5.0 - 8.0      Protein, UA Negative NEG mg/dL    Glucose, UA >1000 (A) NEG mg/dL    Ketones, Urine Negative NEG mg/dL    Bilirubin Urine Negative NEG      Blood, Urine Negative NEG      Urobilinogen, Urine 0.2 0.2 - 1.0 EU/dL    Nitrite, Urine Negative NEG      Leukocyte Esterase, Urine Negative NEG     POCT Glucose    Collection

## 2024-02-02 NOTE — PLAN OF CARE
Problem: Pain  Goal: Verbalizes/displays adequate comfort level or baseline comfort level  Outcome: Progressing     Problem: Skin/Tissue Integrity  Goal: Absence of new skin breakdown  Description: 1.  Monitor for areas of redness and/or skin breakdown  2.  Assess vascular access sites hourly  3.  Every 4-6 hours minimum:  Change oxygen saturation probe site  4.  Every 4-6 hours:  If on nasal continuous positive airway pressure, respiratory therapy assess nares and determine need for appliance change or resting period.  Outcome: Progressing     Problem: Safety - Adult  Goal: Free from fall injury  Outcome: Progressing     Problem: ABCDS Injury Assessment  Goal: Absence of physical injury  Outcome: Progressing     Problem: Discharge Planning  Goal: Discharge to home or other facility with appropriate resources  Outcome: Progressing

## 2024-02-02 NOTE — ACP (ADVANCE CARE PLANNING)
Advance Care Planning     Advance Care Planning Inpatient Note  [facility] Department    Today's Date: 2/2/2024  Unit: [facility]    Received request from rounding.  Upon review of chart and communication with care team, patient's decision making abilities are not in question.. Patient was/were present in the room during visit.    Goals of ACP Conversation:  Discuss advance care planning documents    Health Care Decision Makers:     No healthcare decision makers have been documented.  Click here to complete HealthCare Decision Makers including selection of the Healthcare Decision Maker Relationship (ie \"Primary\")  Summary:  No Decision Maker named by patient at this time    Advance Care Planning Documents (Patient Wishes):  None     Assessment:  Patient disclosed having two daughters and  a girlfriend. No names given.    Interventions:  Patient DECLINED ACP conversation    Care Preferences Communicated:   No    Outcomes/Plan:  ACP Discussion: Refused    Electronically signed by DIANA Can on 2/2/2024 at 6:18 PM

## 2024-02-02 NOTE — PROGRESS NOTES
Physical Therapy    PT order received and chart reviewed. Spoke with pt at bedside who reports independence with all functional mobility, gets up ad kacie in room with no AD. Denies mobility concerns and is just limited by abdominal pain. Will sign off. Thank you for this referral. Malaika Boston, PT, DPT

## 2024-02-02 NOTE — PROGRESS NOTES
conducted an initial consultation and Spiritual Assessment for Solo Souza, who is a 63 y.o.,male. Patient’s Primary Language is: English.   According to the patient’s EMR Anglican Affiliation is: None.     The reason the Patient came to the hospital is:   Patient Active Problem List    Diagnosis Date Noted    CAD (coronary artery disease) 03/12/2020    Acute pancreatitis without necrosis or infection, unspecified 02/01/2024    Diabetes mellitus, type 2 (HCC) 02/01/2024    Lactic acidosis 02/01/2024    Polysubstance abuse (HCC) 02/01/2024    Chronic respiratory failure with hypoxia ([facility]) 11/21/2023    Acute chest pain 05/16/2022    Pneumonia due to COVID-19 virus 02/14/2022    NSTEMI (non-ST elevated myocardial infarction) ([facility]) 03/12/2020    COPD without exacerbation ([facility])     Hypertension         The  provided the following Interventions:  Initiated a relationship of care and support.   Explored issues of jurgen, belief, spirituality and Sikhism/ritual needs while hospitalized.  Listened empathically.  Provided chaplaincy education.  Provided information about Spiritual Care Services.  Offered prayer and assurance of continued prayers on patient's behalf.   Chart reviewed.    The following outcomes where achieved:  Patient shared limited information about both his/her medical narrative and spiritual journey/beliefs.   confirmed Patient's Anglican Affiliation.  Patient processed feeling about current hospitalization.  Patient expressed gratitude for 's visit.    Assessment:  Patient does not have any Sikhism/cultural needs that will affect patient’s preferences in health care.  There are no spiritual or Sikhism issues which require intervention at this time.     Plan:  Chaplains will continue to follow and will provide pastoral care on an as needed/requested basis.   recommends bedside caregivers page  on duty if patient shows signs of acute spiritual or

## 2024-02-02 NOTE — CONSULTS
GASTROENTEROLOGY CONSULT        Impression:   1. Acute uncomplicated pancreatitis - unclear etiology   - ethanol neg; not significant elevated triglycerides  2. Esophageal dysphagia  3. Severe COPD with supplemental O2 dependent  4. Chronic respiratory failure - hypoxemia  5. Anticoagulated (Plavix, Lovenox, ASA)  6. Current Cigarette smoker approx 47 years  7. Coronary artery disease with stents  8. Abnormal finding gallbladder  9. Hepatomegaly  10. Hyperglycemia      Plan:     1. Best supportive care as established for management of acute uncomplicated pancreatitis. Advanced abdominal imaging not indicated.  2. Appropriate for EGD with dilation to address esophageal dysphagia, however, unable to accommodate procedure today. Option to coordinate for outpatient EGD.  As outpatient, anticoagulant interruption from Dr Bobby is needed prior to EGD and would be coordinated by our office. Inpatient EGD is appropriate if bolus impaction or other alarm presentation requiring immediate EGD.  3. Safe swallowing precautions.  4. Outpatient HIDA to assess gallbladder function  5. Liver serologies - ordered for morning labs  6. Outpatient liver elastography  7. Inpatient medical management per primary team.  8. Diet as tolerated  9. Smoking cessation    Close outpatient GI follow-up needed. Will facilitate this for him.    Will not need to active follow over the weekend; however, Dr Mccormick will be available.     Will sign off but available as needed.  Thank you for this consultation and the opportunity to participate in the care of this patient. Please do not hesitate to call with any questions or concerns, or should event occur that may necessitate additional GI evaluation.         Chief Complaint: acute pancreatitis      HPI:  Solo Souza is a 63 y.o. male who I am being asked to see in consultation for an opinion regarding acute pancreatitis without complications. Patient admitted 2/1/2024 for inpatient management of  0.5 cm in diameter. No gallstones. Negative Marquis's sign.   Impression: Normal right upper quadrant ultrasound.             JOHN Gallagher.   [facility]  Office: [phone]  Cell: [phone]  [email or web address]

## 2024-02-02 NOTE — CARE COORDINATION
2/2/24    Case Management Assessment  Initial Evaluation    Date/Time of Evaluation: 2/2/2024 4:38 PM  Assessment Completed by: Arlene Marie    If patient is discharged prior to next notation, then this note serves as note for discharge by case management.    Patient Name: Solo Souza                   YOB: 1960  Diagnosis: Abdominal pain, epigastric [R10.13]  Acute pancreatitis without necrosis or infection, unspecified [K85.90]  Nausea and vomiting, unspecified vomiting type [R11.2]                   Date / Time: 2/1/2024 12:53 PM    Patient Admission Status: Inpatient   Readmission Risk (Low < 19, Mod (19-27), High > 27): Readmission Risk Score: 14.6    Current PCP: Paco Chin MD  PCP verified by CM? (P) Yes    Chart Reviewed: Yes      History Provided by: (P) Patient  Patient Orientation: (P) Alert and Oriented    Patient Cognition: (P) Alert    Hospitalization in the last 30 days (Readmission):  No    If yes, Readmission Assessment in  Navigator will be completed.    Advance Directives:      Code Status: Full Code   Patient's Primary Decision Maker is: (P) Legal Next of Kin      Discharge Planning:    Patient lives with: (P) Alone Type of Home: (P) House  Primary Care Giver: (P) Self  Patient Support Systems include: (P) Spouse/Significant Other   Current Financial resources: (P) Medicaid  Current community resources: (P) None  Current services prior to admission: (P) None            Current DME:              Type of Home Care services:  (P) None (refusal of HH if needed per patient)    ADLS  Prior functional level: (P) Independent in ADLs/IADLs  Current functional level: (P) Independent in ADLs/IADLs    PT AM-PAC:   /24  OT AM-PAC: 24 /24    Family can provide assistance at DC: (P) Yes  Would you like Case Management to discuss the discharge plan with any other family members/significant others, and if so, who? (P) No  Plans to Return to Present Housing: (P) Yes  Potential

## 2024-02-02 NOTE — PROGRESS NOTES
2007hrs:  Contacted by [facility] from lab.  Pt with critical lab.  Telephone with read back received/completed:  Lactic Acid, 2.5.      Primary RN notified.

## 2024-02-02 NOTE — PROGRESS NOTES
OCCUPATIONAL THERAPY EVALUATION/DISCHARGE    Patient: Solo Souza (63 y.o. male)  Date: 2/2/2024  Primary Diagnosis: Abdominal pain, epigastric [R10.13]  Acute pancreatitis without necrosis or infection, unspecified [K85.90]  Nausea and vomiting, unspecified vomiting type [R11.2]       Precautions: None  PLOF: pt able to perform self care tasks and functional mobility tasks prior to admission.     ASSESSMENT AND RECOMMENDATIONS:  Pt is able to perform basic self care tasks and functional mobility without assistance. Pt left seated in recliner with all needs met.    Maximum therapeutic gains met at current level of care and patient will be discharged from occupational therapy at this time.    Further Equipment Recommendations for Discharge:  NA    AMPAC: AM-PAC Inpatient Daily Activity Raw Score: 24    At this time and based on an AM-PAC score, no further OT is recommended upon discharge due to (patient at baseline functional status).  Recommend patient returns to prior setting with prior services.    This [facility] score should be considered in conjunction with interdisciplinary team recommendations to determine the most appropriate discharge setting. Patient's social support, diagnosis, medical stability, and prior level of function should also be taken into consideration.     SUBJECTIVE:   Patient stated “I've been watching the clock waiting for my pain meds.”    OBJECTIVE DATA SUMMARY:     Past Medical History:   Diagnosis Date    COPD (chronic obstructive pulmonary disease) ([facility])     Diabetes ([facility])     Emphysema     H/O cardiovascular stress test 2012    negative    Hypertension     MI (myocardial infarction) ([facility])      Past Surgical History:   Procedure Laterality Date    CORONARY ANGIOPLASTY WITH STENT PLACEMENT         Home Situation:   Social/Functional History  Lives With: Alone  Home Layout: One level  Bathroom Shower/Tub: Tub/Shower unit, Shower chair without back  Home Equipment: Oxygen  [x]  Right hand

## 2024-02-03 VITALS
DIASTOLIC BLOOD PRESSURE: 58 MMHG | RESPIRATION RATE: 17 BRPM | HEIGHT: 70 IN | OXYGEN SATURATION: 93 % | BODY MASS INDEX: 33.64 KG/M2 | HEART RATE: 82 BPM | WEIGHT: 235 LBS | TEMPERATURE: 98.7 F | SYSTOLIC BLOOD PRESSURE: 109 MMHG

## 2024-02-03 PROBLEM — I25.10 CAD (CORONARY ARTERY DISEASE): Status: ACTIVE | Noted: 2020-03-12

## 2024-02-03 LAB
ALBUMIN SERPL-MCNC: 2.8 G/DL (ref 3.4–5)
ALBUMIN/GLOB SERPL: 0.8 (ref 0.8–1.7)
ALP SERPL-CCNC: 75 U/L (ref 45–117)
ALT SERPL-CCNC: 18 U/L (ref 16–61)
ANION GAP SERPL CALC-SCNC: 3 MMOL/L (ref 3–18)
AST SERPL-CCNC: 11 U/L (ref 10–38)
BASOPHILS # BLD: 0 K/UL (ref 0–0.1)
BASOPHILS NFR BLD: 0 % (ref 0–2)
BILIRUB DIRECT SERPL-MCNC: <0.1 MG/DL (ref 0–0.2)
BILIRUB SERPL-MCNC: 0.4 MG/DL (ref 0.2–1)
BUN SERPL-MCNC: 11 MG/DL (ref 7–18)
BUN/CREAT SERPL: 12 (ref 12–20)
CALCIUM SERPL-MCNC: 8.5 MG/DL (ref 8.5–10.1)
CHLORIDE SERPL-SCNC: 102 MMOL/L (ref 100–111)
CO2 SERPL-SCNC: 32 MMOL/L (ref 21–32)
CREAT SERPL-MCNC: 0.94 MG/DL (ref 0.6–1.3)
DIFFERENTIAL METHOD BLD: ABNORMAL
EOSINOPHIL # BLD: 0.2 K/UL (ref 0–0.4)
EOSINOPHIL NFR BLD: 2 % (ref 0–5)
ERYTHROCYTE [DISTWIDTH] IN BLOOD BY AUTOMATED COUNT: 13.8 % (ref 11.6–14.5)
GLOBULIN SER CALC-MCNC: 3.5 G/DL (ref 2–4)
GLUCOSE BLD STRIP.AUTO-MCNC: 254 MG/DL (ref 70–110)
GLUCOSE BLD STRIP.AUTO-MCNC: 267 MG/DL (ref 70–110)
GLUCOSE SERPL-MCNC: 327 MG/DL (ref 74–99)
HBV SURFACE AG SER QL: <0.1 INDEX
HBV SURFACE AG SER QL: NEGATIVE
HCT VFR BLD AUTO: 35 % (ref 36–48)
HGB BLD-MCNC: 10.7 G/DL (ref 13–16)
IMM GRANULOCYTES # BLD AUTO: 0 K/UL (ref 0–0.04)
IMM GRANULOCYTES NFR BLD AUTO: 0 % (ref 0–0.5)
LYMPHOCYTES # BLD: 1.7 K/UL (ref 0.9–3.6)
LYMPHOCYTES NFR BLD: 25 % (ref 21–52)
MCH RBC QN AUTO: 26.3 PG (ref 24–34)
MCHC RBC AUTO-ENTMCNC: 30.6 G/DL (ref 31–37)
MCV RBC AUTO: 86 FL (ref 78–100)
MONOCYTES # BLD: 0.5 K/UL (ref 0.05–1.2)
MONOCYTES NFR BLD: 7 % (ref 3–10)
NEUTS SEG # BLD: 4.4 K/UL (ref 1.8–8)
NEUTS SEG NFR BLD: 66 % (ref 40–73)
NRBC # BLD: 0 K/UL (ref 0–0.01)
NRBC BLD-RTO: 0 PER 100 WBC
PLATELET # BLD AUTO: 242 K/UL (ref 135–420)
PMV BLD AUTO: 11.4 FL (ref 9.2–11.8)
POTASSIUM SERPL-SCNC: 4.2 MMOL/L (ref 3.5–5.5)
PROT SERPL-MCNC: 6.3 G/DL (ref 6.4–8.2)
RBC # BLD AUTO: 4.07 M/UL (ref 4.35–5.65)
SODIUM SERPL-SCNC: 137 MMOL/L (ref 136–145)
WBC # BLD AUTO: 6.7 K/UL (ref 4.6–13.2)

## 2024-02-03 PROCEDURE — 82962 GLUCOSE BLOOD TEST: CPT

## 2024-02-03 PROCEDURE — 85025 COMPLETE CBC W/AUTO DIFF WBC: CPT

## 2024-02-03 PROCEDURE — 6370000000 HC RX 637 (ALT 250 FOR IP)

## 2024-02-03 PROCEDURE — 36415 COLL VENOUS BLD VENIPUNCTURE: CPT

## 2024-02-03 PROCEDURE — 94640 AIRWAY INHALATION TREATMENT: CPT

## 2024-02-03 PROCEDURE — 2580000003 HC RX 258

## 2024-02-03 PROCEDURE — 6360000002 HC RX W HCPCS: Performed by: INTERNAL MEDICINE

## 2024-02-03 PROCEDURE — 86803 HEPATITIS C AB TEST: CPT

## 2024-02-03 PROCEDURE — 87340 HEPATITIS B SURFACE AG IA: CPT

## 2024-02-03 PROCEDURE — 6370000000 HC RX 637 (ALT 250 FOR IP): Performed by: INTERNAL MEDICINE

## 2024-02-03 PROCEDURE — 80048 BASIC METABOLIC PNL TOTAL CA: CPT

## 2024-02-03 PROCEDURE — 2700000000 HC OXYGEN THERAPY PER DAY

## 2024-02-03 PROCEDURE — 82787 IGG 1 2 3 OR 4 EACH: CPT

## 2024-02-03 PROCEDURE — 94761 N-INVAS EAR/PLS OXIMETRY MLT: CPT

## 2024-02-03 PROCEDURE — 86706 HEP B SURFACE ANTIBODY: CPT

## 2024-02-03 PROCEDURE — 99239 HOSP IP/OBS DSCHRG MGMT >30: CPT | Performed by: INTERNAL MEDICINE

## 2024-02-03 PROCEDURE — 86015 ACTIN ANTIBODY EACH: CPT

## 2024-02-03 PROCEDURE — 86708 HEPATITIS A ANTIBODY: CPT

## 2024-02-03 PROCEDURE — 86381 MITOCHONDRIAL ANTIBODY EACH: CPT

## 2024-02-03 PROCEDURE — 6360000002 HC RX W HCPCS

## 2024-02-03 PROCEDURE — 80076 HEPATIC FUNCTION PANEL: CPT

## 2024-02-03 PROCEDURE — 6360000002 HC RX W HCPCS: Performed by: HOSPITALIST

## 2024-02-03 RX ORDER — INSULIN GLARGINE 100 [IU]/ML
10 INJECTION, SOLUTION SUBCUTANEOUS NIGHTLY
Qty: 5 ADJUSTABLE DOSE PRE-FILLED PEN SYRINGE | Refills: 3 | Status: SHIPPED | OUTPATIENT
Start: 2024-02-03

## 2024-02-03 RX ORDER — OXYCODONE HYDROCHLORIDE AND ACETAMINOPHEN 5; 325 MG/1; MG/1
1 TABLET ORAL EVERY 8 HOURS PRN
Qty: 9 TABLET | Refills: 0 | Status: SHIPPED | OUTPATIENT
Start: 2024-02-03 | End: 2024-02-06

## 2024-02-03 RX ORDER — FUROSEMIDE 10 MG/ML
20 INJECTION INTRAMUSCULAR; INTRAVENOUS ONCE
Status: COMPLETED | OUTPATIENT
Start: 2024-02-03 | End: 2024-02-03

## 2024-02-03 RX ORDER — INSULIN GLARGINE 100 [IU]/ML
5 INJECTION, SOLUTION SUBCUTANEOUS NIGHTLY
Status: DISCONTINUED | OUTPATIENT
Start: 2024-02-03 | End: 2024-02-03 | Stop reason: HOSPADM

## 2024-02-03 RX ORDER — THIAMINE MONONITRATE (VIT B1) 100 MG
100 TABLET ORAL DAILY
Qty: 30 TABLET | Refills: 2 | Status: SHIPPED | OUTPATIENT
Start: 2024-02-04

## 2024-02-03 RX ORDER — M-VIT,TX,IRON,MINS/CALC/FOLIC 27MG-0.4MG
1 TABLET ORAL DAILY
Qty: 30 TABLET | Refills: 2 | Status: SHIPPED | OUTPATIENT
Start: 2024-02-04

## 2024-02-03 RX ORDER — PEN NEEDLE, DIABETIC 31 G X1/4"
1 NEEDLE, DISPOSABLE MISCELLANEOUS DAILY
Qty: 100 EACH | Refills: 3 | Status: SHIPPED | OUTPATIENT
Start: 2024-02-03

## 2024-02-03 RX ADMIN — Medication 100 MG: at 08:42

## 2024-02-03 RX ADMIN — LISINOPRIL 5 MG: 5 TABLET ORAL at 08:42

## 2024-02-03 RX ADMIN — CLOPIDOGREL BISULFATE 75 MG: 75 TABLET ORAL at 08:42

## 2024-02-03 RX ADMIN — HYDROMORPHONE HYDROCHLORIDE 1 MG: 1 INJECTION, SOLUTION INTRAMUSCULAR; INTRAVENOUS; SUBCUTANEOUS at 04:31

## 2024-02-03 RX ADMIN — FUROSEMIDE 20 MG: 10 INJECTION, SOLUTION INTRAMUSCULAR; INTRAVENOUS at 11:36

## 2024-02-03 RX ADMIN — Medication 1 TABLET: at 08:42

## 2024-02-03 RX ADMIN — INSULIN LISPRO 6 UNITS: 100 INJECTION, SOLUTION INTRAVENOUS; SUBCUTANEOUS at 08:41

## 2024-02-03 RX ADMIN — HYDROMORPHONE HYDROCHLORIDE 1 MG: 1 INJECTION, SOLUTION INTRAMUSCULAR; INTRAVENOUS; SUBCUTANEOUS at 08:53

## 2024-02-03 RX ADMIN — ASPIRIN 81 MG: 81 TABLET, COATED ORAL at 08:43

## 2024-02-03 RX ADMIN — ONDANSETRON 4 MG: 2 INJECTION INTRAMUSCULAR; INTRAVENOUS at 04:31

## 2024-02-03 RX ADMIN — INSULIN LISPRO 6 UNITS: 100 INJECTION, SOLUTION INTRAVENOUS; SUBCUTANEOUS at 11:35

## 2024-02-03 RX ADMIN — SODIUM CHLORIDE, PRESERVATIVE FREE 10 ML: 5 INJECTION INTRAVENOUS at 08:54

## 2024-02-03 RX ADMIN — FOLIC ACID 1 MG: 1 TABLET ORAL at 08:42

## 2024-02-03 RX ADMIN — IPRATROPIUM BROMIDE 0.5 MG: 0.5 SOLUTION RESPIRATORY (INHALATION) at 08:14

## 2024-02-03 RX ADMIN — METOPROLOL TARTRATE 50 MG: 50 TABLET, FILM COATED ORAL at 04:32

## 2024-02-03 RX ADMIN — ARFORMOTEROL TARTRATE 15 MCG: 15 SOLUTION RESPIRATORY (INHALATION) at 08:14

## 2024-02-03 RX ADMIN — ENOXAPARIN SODIUM 30 MG: 100 INJECTION SUBCUTANEOUS at 08:42

## 2024-02-03 ASSESSMENT — PAIN DESCRIPTION - LOCATION
LOCATION: ABDOMEN
LOCATION: ABDOMEN

## 2024-02-03 ASSESSMENT — PAIN SCALES - GENERAL
PAINLEVEL_OUTOF10: 8
PAINLEVEL_OUTOF10: 8
PAINLEVEL_OUTOF10: 5
PAINLEVEL_OUTOF10: 0

## 2024-02-03 ASSESSMENT — PAIN DESCRIPTION - DESCRIPTORS
DESCRIPTORS: OTHER (COMMENT)
DESCRIPTORS: ACHING

## 2024-02-03 ASSESSMENT — PAIN DESCRIPTION - ORIENTATION: ORIENTATION: RIGHT

## 2024-02-03 ASSESSMENT — PAIN - FUNCTIONAL ASSESSMENT: PAIN_FUNCTIONAL_ASSESSMENT: ACTIVITIES ARE NOT PREVENTED

## 2024-02-03 NOTE — CARE COORDINATION
Discharge order noted for today. Orders received. No needs identified at this time. Case management remains available as needed.     Pt's wife in room to transfer pt home at discharge.

## 2024-02-03 NOTE — PROGRESS NOTES
Completed discharge instruction to patient. Provided opportunity to ask question and answered to patient satisfaction. Discharge to home,

## 2024-02-04 LAB
HAV AB SER QL IA: NEGATIVE
HCV AB SERPL QL IA: NORMAL
HCV IGG SERPL QL IA: NON REACTIVE S/CO RATIO

## 2024-02-04 NOTE — DISCHARGE SUMMARY
Discharge Summary    Patient: Solo Souza MRN: [id number]  CSN: [id number]    YOB: 1960  Age: 63 y.o.  Sex: male    DOA: 2/1/2024 LOS:  LOS: 2 days   Discharge Date: 2/3/2024     Admission Diagnosis: Abdominal pain, epigastric [R10.13]  Acute pancreatitis without necrosis or infection, unspecified [K85.90]  Nausea and vomiting, unspecified vomiting type [R11.2]    Discharge Diagnosis:    Acute Pancreatitis - ethanol <3, trigs 230 , Lipase 1279--201   Lactic acidosis - 4.3--3.2   LLL Pneumonia- was placed on Levaquin OP for 10 days, last dose tonight  has been ordered   COPD, not in exacerbation  DMT2  Hypertension  CAD s/p stent in 2020 and 2022   Polysubstance Abuse- drinks 10-12 beers every Friday, smokes 1-2 cigarettes every other day, smokes marijuana 1-2 times a month   dysphagia    Discharge Condition: Stable    PHYSICAL EXAM  Visit Vitals  BP (!) 109/58   Pulse 82   Temp 98.7 °F (37.1 °C) (Infrared)   Resp 17   Ht 1.778 m (5' 10\")   Wt 106.6 kg (235 lb)   SpO2 93%   BMI 33.72 kg/m²       General:  Alert, cooperative, no acute distress    HEENT: PERRLA, anicteric sclerae.  Pulmonary:  CTA Bilaterally. No Wheezing/Rales.  Cardiovascular: Regular rate and Rhythm.  GI:  + RUQ tendernes , slight distention. + Bowel sounds.  Extremities:  No edema. No calf tenderness.   Psych: Good insight. Not anxious or agitated.  Neurologic: Alert and oriented X 3. Moves all ext.  Additional:    Hospital Course: Per the H&P:Solo Souza is a 63 y.o. male with a PMHx of COPD, DMT2, HTN who presented to the ED with complaints of right upper quadrant pain.  Patient was seen in the ED on 1/30/2024 with similar complaints and patient was found to have mild pancreatitis and developing bronchitis.  Patient was discharged home on pain medication and antibiotics.  Patient went to follow-up with his PCP today and was advised to come back to the ED for further evaluation due to continued nausea vomiting and right upper

## 2024-02-05 LAB
HBV SURFACE AB SER QL IA: NEGATIVE
HBV SURFACE AB SERPL IA-ACNC: <3.1 MIU/ML
HEP BS AB COMMENT: ABNORMAL
MITOCHONDRIA M2 IGG SER-ACNC: <20 UNITS (ref 0–20)
SMA IGG SER-ACNC: 13 UNITS (ref 0–19)

## 2024-02-06 LAB — IGG4 SER-MCNC: 36 MG/DL (ref 2–96)

## 2024-02-08 ENCOUNTER — TELEPHONE (OUTPATIENT)
Age: 64
End: 2024-02-08

## 2024-02-08 NOTE — TELEPHONE ENCOUNTER
[facility] faxed request for cardiac clearance for endoscopy on 2-29-24    Verbal order and read back per Alf Bobby MD    Low risk for procedure ,       Faxed form back to provider office

## 2024-02-19 NOTE — PROGRESS NOTES
Pressure/Heart medications.  Bring inhaler.  Bring CPAP machine.    Any questions regarding prep, please call the office at [phone].    For any questions or concerns on the day of procedure, please call the Endo Suite at [phone].    These surgical instructions were reviewed with the patient during the PAT phone call.

## 2024-02-20 RX ORDER — FUROSEMIDE 40 MG/1
80 TABLET ORAL DAILY
Qty: 180 TABLET | Refills: 3 | Status: SHIPPED | OUTPATIENT
Start: 2024-02-20

## 2024-02-28 ENCOUNTER — ANESTHESIA EVENT (OUTPATIENT)
Facility: HOSPITAL | Age: 64
End: 2024-02-28
Payer: MEDICARE

## 2024-02-29 ENCOUNTER — HOSPITAL ENCOUNTER (OUTPATIENT)
Facility: HOSPITAL | Age: 64
Setting detail: OUTPATIENT SURGERY
Discharge: HOME | End: 2024-02-29
Attending: STUDENT IN AN ORGANIZED HEALTH CARE EDUCATION/TRAINING PROGRAM | Admitting: STUDENT IN AN ORGANIZED HEALTH CARE EDUCATION/TRAINING PROGRAM
Payer: MEDICARE

## 2024-02-29 ENCOUNTER — ANESTHESIA (OUTPATIENT)
Facility: HOSPITAL | Age: 64
End: 2024-02-29
Payer: MEDICARE

## 2024-02-29 VITALS
HEART RATE: 57 BPM | RESPIRATION RATE: 15 BRPM | OXYGEN SATURATION: 95 % | SYSTOLIC BLOOD PRESSURE: 122 MMHG | TEMPERATURE: 98 F | DIASTOLIC BLOOD PRESSURE: 63 MMHG

## 2024-02-29 LAB
GLUCOSE BLD STRIP.AUTO-MCNC: 179 MG/DL (ref 70–110)
GLUCOSE BLD STRIP.AUTO-MCNC: 229 MG/DL (ref 70–110)

## 2024-02-29 PROCEDURE — 2709999900 HC NON-CHARGEABLE SUPPLY: Performed by: STUDENT IN AN ORGANIZED HEALTH CARE EDUCATION/TRAINING PROGRAM

## 2024-02-29 PROCEDURE — 2500000003 HC RX 250 WO HCPCS: Performed by: ANESTHESIOLOGY

## 2024-02-29 PROCEDURE — A4216 STERILE WATER/SALINE, 10 ML: HCPCS | Performed by: NURSE ANESTHETIST, CERTIFIED REGISTERED

## 2024-02-29 PROCEDURE — 7100000000 HC PACU RECOVERY - FIRST 15 MIN: Performed by: STUDENT IN AN ORGANIZED HEALTH CARE EDUCATION/TRAINING PROGRAM

## 2024-02-29 PROCEDURE — 88305 TISSUE EXAM BY PATHOLOGIST: CPT

## 2024-02-29 PROCEDURE — 88342 IMHCHEM/IMCYTCHM 1ST ANTB: CPT

## 2024-02-29 PROCEDURE — 3600007502: Performed by: STUDENT IN AN ORGANIZED HEALTH CARE EDUCATION/TRAINING PROGRAM

## 2024-02-29 PROCEDURE — 7100000010 HC PHASE II RECOVERY - FIRST 15 MIN: Performed by: STUDENT IN AN ORGANIZED HEALTH CARE EDUCATION/TRAINING PROGRAM

## 2024-02-29 PROCEDURE — 6360000002 HC RX W HCPCS: Performed by: ANESTHESIOLOGY

## 2024-02-29 PROCEDURE — 6370000000 HC RX 637 (ALT 250 FOR IP): Performed by: ANESTHESIOLOGY

## 2024-02-29 PROCEDURE — 2500000003 HC RX 250 WO HCPCS: Performed by: NURSE ANESTHETIST, CERTIFIED REGISTERED

## 2024-02-29 PROCEDURE — 3700000000 HC ANESTHESIA ATTENDED CARE: Performed by: STUDENT IN AN ORGANIZED HEALTH CARE EDUCATION/TRAINING PROGRAM

## 2024-02-29 PROCEDURE — 2580000003 HC RX 258: Performed by: NURSE ANESTHETIST, CERTIFIED REGISTERED

## 2024-02-29 PROCEDURE — 82962 GLUCOSE BLOOD TEST: CPT

## 2024-02-29 RX ORDER — DEXTROSE MONOHYDRATE 100 MG/ML
INJECTION, SOLUTION INTRAVENOUS CONTINUOUS PRN
Status: DISCONTINUED | OUTPATIENT
Start: 2024-02-29 | End: 2024-02-29 | Stop reason: HOSPADM

## 2024-02-29 RX ORDER — INSULIN LISPRO 100 [IU]/ML
0-4 INJECTION, SOLUTION INTRAVENOUS; SUBCUTANEOUS EVERY 4 HOURS
Status: DISCONTINUED | OUTPATIENT
Start: 2024-02-29 | End: 2024-02-29 | Stop reason: HOSPADM

## 2024-02-29 RX ORDER — SODIUM CHLORIDE, SODIUM LACTATE, POTASSIUM CHLORIDE, CALCIUM CHLORIDE 600; 310; 30; 20 MG/100ML; MG/100ML; MG/100ML; MG/100ML
INJECTION, SOLUTION INTRAVENOUS CONTINUOUS
Status: DISCONTINUED | OUTPATIENT
Start: 2024-02-29 | End: 2024-02-29 | Stop reason: HOSPADM

## 2024-02-29 RX ORDER — INSULIN LISPRO 100 [IU]/ML
0-15 INJECTION, SOLUTION INTRAVENOUS; SUBCUTANEOUS ONCE
Status: COMPLETED | OUTPATIENT
Start: 2024-02-29 | End: 2024-02-29

## 2024-02-29 RX ORDER — PROPOFOL 10 MG/ML
INJECTION, EMULSION INTRAVENOUS PRN
Status: DISCONTINUED | OUTPATIENT
Start: 2024-02-29 | End: 2024-02-29 | Stop reason: SDUPTHER

## 2024-02-29 RX ORDER — LIDOCAINE HYDROCHLORIDE 20 MG/ML
INJECTION, SOLUTION EPIDURAL; INFILTRATION; INTRACAUDAL; PERINEURAL PRN
Status: DISCONTINUED | OUTPATIENT
Start: 2024-02-29 | End: 2024-02-29 | Stop reason: SDUPTHER

## 2024-02-29 RX ORDER — LIDOCAINE HYDROCHLORIDE 10 MG/ML
1 INJECTION, SOLUTION EPIDURAL; INFILTRATION; INTRACAUDAL; PERINEURAL
Status: DISCONTINUED | OUTPATIENT
Start: 2024-02-29 | End: 2024-02-29 | Stop reason: HOSPADM

## 2024-02-29 RX ADMIN — INSULIN LISPRO 6 UNITS: 100 INJECTION, SOLUTION INTRAVENOUS; SUBCUTANEOUS at 07:13

## 2024-02-29 RX ADMIN — FAMOTIDINE 20 MG: 10 INJECTION, SOLUTION INTRAVENOUS at 07:11

## 2024-02-29 RX ADMIN — SODIUM CHLORIDE, POTASSIUM CHLORIDE, SODIUM LACTATE AND CALCIUM CHLORIDE: 600; 310; 30; 20 INJECTION, SOLUTION INTRAVENOUS at 07:11

## 2024-02-29 RX ADMIN — PROPOFOL 100 MG: 10 INJECTION, EMULSION INTRAVENOUS at 07:43

## 2024-02-29 RX ADMIN — LIDOCAINE HYDROCHLORIDE 60 MG: 20 INJECTION, SOLUTION EPIDURAL; INFILTRATION; INTRACAUDAL; PERINEURAL at 07:43

## 2024-02-29 ASSESSMENT — LIFESTYLE VARIABLES: SMOKING_STATUS: 1

## 2024-02-29 ASSESSMENT — PAIN - FUNCTIONAL ASSESSMENT: PAIN_FUNCTIONAL_ASSESSMENT: 0-10

## 2024-02-29 ASSESSMENT — COPD QUESTIONNAIRES: CAT_SEVERITY: MODERATE

## 2024-02-29 ASSESSMENT — PAIN SCALES - GENERAL: PAINLEVEL_OUTOF10: 0

## 2024-02-29 NOTE — ANESTHESIA PRE PROCEDURE
Department of Anesthesiology  Preprocedure Note       Name:  Solo Souza   Age:  63 y.o.  :  1960                                          MRN:  [id number]         Date:  2024      Surgeon: Surgeon(s):  Jose Rico MD    Procedure: Procedure(s):  ESOPHAGOGASTRODUODENOSCOPY    Medications prior to admission:   Prior to Admission medications    Medication Sig Start Date End Date Taking? Authorizing Provider   furosemide (LASIX) 40 MG tablet TAKE 2 TABLETS BY MOUTH DAILY 24   Natalee Shannon APRN - NP   insulin glargine (LANTUS SOLOSTAR) 100 UNIT/ML injection pen Inject 10 Units into the skin nightly 2/3/24   Braulio Kilgore DO   Insulin Pen Needle ([employer] PEN NEEDLES) 31G X 6 MM MISC 1 each by Does not apply route daily 2/3/24   Braulio Kilgore DO   Multiple Vitamins-Minerals (THERAPEUTIC MULTIVITAMIN-MINERALS) tablet Take 1 tablet by mouth daily  Patient not taking: Reported on 2024   Braulio Kilgore DO   thiamine mononitrate (THIAMINE) 100 MG tablet Take 1 tablet by mouth daily  Patient not taking: Reported on 2024   Braulio Kilgore DO   ondansetron (ZOFRAN-ODT) 4 MG disintegrating tablet Take 1 tablet by mouth 3 times daily as needed for Nausea or Vomiting  Patient not taking: Reported on 2024   Zabrina Eugene, RAINE   fluticasone-umeclidin-vilant (TRELEGY ELLIPTA) 100-62.5-25 MCG/ACT AEPB inhaler Inhale 1 puff into the lungs daily 24   Patt Akins MD   magnesium cl-calcium carbonate (SLOWMAG MG MUSCLE/HEART) 71.5-119 MG TBEC tablet Take 2 tablets by mouth daily 23   Alf Bobby MD   clopidogrel (PLAVIX) 75 MG tablet Take 1 tablet by mouth daily 23   Natlaee Shannon APRN - NP   OXYGEN Inhale 3 L into the lungs    ProviderRoseanne MD   ipratropium 0.5 mg-albuterol 2.5 mg (DUONEB) 0.5-2.5 (3) MG/3ML SOLN nebulizer solution  23   Provider, MD Roseanne   atorvastatin (LIPITOR) 80 MG tablet TAKE ONE TABLET BY

## 2024-02-29 NOTE — H&P
02/29/24  7:00 AM   Result Value Ref Range    POC Glucose 229 (H) 70 - 110 mg/dL       Imp/ Plan: Will proceed with Upper endoscopy (Esophagogastroduodenoscopy) as planned. Risk benefits alternative including but not limited to infection, bleeding, perforation of viscous, allergic reaction and resultant morbidity and mortality was discussed. Chance of missing a significant lesion due to various reasons were discussed.    Jose Rico MD   [facility]

## 2024-02-29 NOTE — ANESTHESIA POSTPROCEDURE EVALUATION
Department of Anesthesiology  Postprocedure Note    Patient: Solo Souza  MRN: [id number]  YOB: 1960  Date of evaluation: 2/29/2024    Procedure Summary       Date: 02/29/24 Room / Location: [facility] ENDO 02 / [facility] ENDOSCOPY    Anesthesia Start: 0740 Anesthesia Stop: 0755    Procedure: ESOPHAGOGASTRODUODENOSCOPY with bxs (Upper GI Region) Diagnosis:       Dysphagia, unspecified type      Acute pancreatitis, unspecified complication status, unspecified pancreatitis type      Personal history of colonic polyps      Obesity (BMI 30-39.9)      (Dysphagia, unspecified type [R13.10])      (Acute pancreatitis, unspecified complication status, unspecified pancreatitis type [K85.90])      (Personal history of colonic polyps [Z86.010])      (Obesity (BMI 30-39.9) [E66.9])    Surgeons: Jose Rico MD Responsible Provider: Verna Nicolas MD    Anesthesia Type: MAC ASA Status: 3            Anesthesia Type: No value filed.    Shama Phase I: Shama Score: 10    Shama Phase II: Shama Score: 10    Anesthesia Post Evaluation    Patient location during evaluation: PACU  Patient participation: complete - patient participated  Level of consciousness: awake and alert  Pain score: 0  Airway patency: patent  Nausea & Vomiting: no nausea and no vomiting  Cardiovascular status: hemodynamically stable  Respiratory status: acceptable  Hydration status: euvolemic  Multimodal analgesia pain management approach  Pain management: adequate    No notable events documented.

## 2024-03-18 RX ORDER — SPIRONOLACTONE 25 MG/1
25 TABLET ORAL DAILY
Qty: 90 TABLET | Refills: 3 | Status: SHIPPED | OUTPATIENT
Start: 2024-03-18

## 2024-03-18 RX ORDER — METOPROLOL TARTRATE 50 MG/1
50 TABLET, FILM COATED ORAL EVERY 12 HOURS
Qty: 180 TABLET | Refills: 3 | Status: SHIPPED | OUTPATIENT
Start: 2024-03-18

## 2024-04-30 ENCOUNTER — APPOINTMENT (OUTPATIENT)
Facility: HOSPITAL | Age: 64
End: 2024-04-30
Payer: MEDICARE

## 2024-04-30 ENCOUNTER — HOSPITAL ENCOUNTER (EMERGENCY)
Facility: HOSPITAL | Age: 64
Discharge: HOME | End: 2024-05-01
Attending: STUDENT IN AN ORGANIZED HEALTH CARE EDUCATION/TRAINING PROGRAM
Payer: MEDICARE

## 2024-04-30 DIAGNOSIS — R06.2 WHEEZING: ICD-10-CM

## 2024-04-30 DIAGNOSIS — E83.42 HYPOMAGNESEMIA: ICD-10-CM

## 2024-04-30 DIAGNOSIS — J44.1 COPD EXACERBATION (HCC): ICD-10-CM

## 2024-04-30 DIAGNOSIS — R06.02 SHORTNESS OF BREATH: ICD-10-CM

## 2024-04-30 DIAGNOSIS — D64.9 NORMOCYTIC ANEMIA: ICD-10-CM

## 2024-04-30 DIAGNOSIS — R07.9 CHEST PAIN, UNSPECIFIED TYPE: Primary | ICD-10-CM

## 2024-04-30 LAB
ALBUMIN SERPL-MCNC: 3.8 G/DL (ref 3.4–5)
ALBUMIN/GLOB SERPL: 1.1 (ref 0.8–1.7)
ALP SERPL-CCNC: 89 U/L (ref 45–117)
ALT SERPL-CCNC: 14 U/L (ref 16–61)
ANION GAP SERPL CALC-SCNC: 2 MMOL/L (ref 3–18)
AST SERPL-CCNC: 14 U/L (ref 10–38)
BASOPHILS # BLD: 0 K/UL (ref 0–0.1)
BASOPHILS NFR BLD: 0 % (ref 0–2)
BILIRUB SERPL-MCNC: 1 MG/DL (ref 0.2–1)
BUN SERPL-MCNC: 16 MG/DL (ref 7–18)
BUN/CREAT SERPL: 16 (ref 12–20)
CALCIUM SERPL-MCNC: 9.4 MG/DL (ref 8.5–10.1)
CHLORIDE SERPL-SCNC: 98 MMOL/L (ref 100–111)
CO2 SERPL-SCNC: 35 MMOL/L (ref 21–32)
CREAT SERPL-MCNC: 0.97 MG/DL (ref 0.6–1.3)
DIFFERENTIAL METHOD BLD: ABNORMAL
EOSINOPHIL # BLD: 0.2 K/UL (ref 0–0.4)
EOSINOPHIL NFR BLD: 2 % (ref 0–5)
ERYTHROCYTE [DISTWIDTH] IN BLOOD BY AUTOMATED COUNT: 13.3 % (ref 11.6–14.5)
GLOBULIN SER CALC-MCNC: 3.5 G/DL (ref 2–4)
GLUCOSE SERPL-MCNC: 219 MG/DL (ref 74–99)
HCT VFR BLD AUTO: 38.4 % (ref 36–48)
HGB BLD-MCNC: 12.7 G/DL (ref 13–16)
IMM GRANULOCYTES # BLD AUTO: 0 K/UL (ref 0–0.04)
IMM GRANULOCYTES NFR BLD AUTO: 0 % (ref 0–0.5)
LYMPHOCYTES # BLD: 3.1 K/UL (ref 0.9–3.6)
LYMPHOCYTES NFR BLD: 34 % (ref 21–52)
MAGNESIUM SERPL-MCNC: 1.5 MG/DL (ref 1.6–2.6)
MCH RBC QN AUTO: 28.2 PG (ref 24–34)
MCHC RBC AUTO-ENTMCNC: 33.1 G/DL (ref 31–37)
MCV RBC AUTO: 85.3 FL (ref 78–100)
MONOCYTES # BLD: 0.8 K/UL (ref 0.05–1.2)
MONOCYTES NFR BLD: 8 % (ref 3–10)
NEUTS SEG # BLD: 5.1 K/UL (ref 1.8–8)
NEUTS SEG NFR BLD: 56 % (ref 40–73)
NRBC # BLD: 0 K/UL (ref 0–0.01)
NRBC BLD-RTO: 0 PER 100 WBC
NT PRO BNP: 237 PG/ML (ref 0–900)
PLATELET # BLD AUTO: 208 K/UL (ref 135–420)
PMV BLD AUTO: 11.1 FL (ref 9.2–11.8)
POTASSIUM SERPL-SCNC: 4.5 MMOL/L (ref 3.5–5.5)
PROT SERPL-MCNC: 7.3 G/DL (ref 6.4–8.2)
RBC # BLD AUTO: 4.5 M/UL (ref 4.35–5.65)
SODIUM SERPL-SCNC: 135 MMOL/L (ref 136–145)
TROPONIN I SERPL HS-MCNC: 13 NG/L (ref 0–78)
TROPONIN I SERPL HS-MCNC: 16 NG/L (ref 0–78)
WBC # BLD AUTO: 9.2 K/UL (ref 4.6–13.2)

## 2024-04-30 PROCEDURE — 71045 X-RAY EXAM CHEST 1 VIEW: CPT

## 2024-04-30 PROCEDURE — 2580000003 HC RX 258

## 2024-04-30 PROCEDURE — 80053 COMPREHEN METABOLIC PANEL: CPT

## 2024-04-30 PROCEDURE — 6360000002 HC RX W HCPCS

## 2024-04-30 PROCEDURE — 6370000000 HC RX 637 (ALT 250 FOR IP)

## 2024-04-30 PROCEDURE — 96375 TX/PRO/DX INJ NEW DRUG ADDON: CPT

## 2024-04-30 PROCEDURE — 85025 COMPLETE CBC W/AUTO DIFF WBC: CPT

## 2024-04-30 PROCEDURE — 99285 EMERGENCY DEPT VISIT HI MDM: CPT

## 2024-04-30 PROCEDURE — 96365 THER/PROPH/DIAG IV INF INIT: CPT

## 2024-04-30 PROCEDURE — 93005 ELECTROCARDIOGRAM TRACING: CPT

## 2024-04-30 PROCEDURE — 83735 ASSAY OF MAGNESIUM: CPT

## 2024-04-30 PROCEDURE — 96366 THER/PROPH/DIAG IV INF ADDON: CPT

## 2024-04-30 PROCEDURE — 93005 ELECTROCARDIOGRAM TRACING: CPT | Performed by: STUDENT IN AN ORGANIZED HEALTH CARE EDUCATION/TRAINING PROGRAM

## 2024-04-30 PROCEDURE — 84484 ASSAY OF TROPONIN QUANT: CPT

## 2024-04-30 PROCEDURE — 83880 ASSAY OF NATRIURETIC PEPTIDE: CPT

## 2024-04-30 RX ORDER — MAGNESIUM SULFATE IN WATER 40 MG/ML
2000 INJECTION, SOLUTION INTRAVENOUS ONCE
Status: COMPLETED | OUTPATIENT
Start: 2024-04-30 | End: 2024-05-01

## 2024-04-30 RX ORDER — NITROGLYCERIN 0.4 MG/1
0.4 TABLET SUBLINGUAL ONCE
Status: COMPLETED | OUTPATIENT
Start: 2024-04-30 | End: 2024-04-30

## 2024-04-30 RX ORDER — IPRATROPIUM BROMIDE AND ALBUTEROL SULFATE 2.5; .5 MG/3ML; MG/3ML
1 SOLUTION RESPIRATORY (INHALATION)
Status: COMPLETED | OUTPATIENT
Start: 2024-04-30 | End: 2024-04-30

## 2024-04-30 RX ORDER — IPRATROPIUM BROMIDE AND ALBUTEROL SULFATE 2.5; .5 MG/3ML; MG/3ML
1 SOLUTION RESPIRATORY (INHALATION) ONCE
Status: COMPLETED | OUTPATIENT
Start: 2024-04-30 | End: 2024-04-30

## 2024-04-30 RX ADMIN — MAGNESIUM SULFATE HEPTAHYDRATE 2000 MG: 40 INJECTION, SOLUTION INTRAVENOUS at 23:03

## 2024-04-30 RX ADMIN — WATER 125 MG: 1 INJECTION INTRAMUSCULAR; INTRAVENOUS; SUBCUTANEOUS at 22:08

## 2024-04-30 RX ADMIN — IPRATROPIUM BROMIDE AND ALBUTEROL SULFATE 1 DOSE: .5; 3 SOLUTION RESPIRATORY (INHALATION) at 23:02

## 2024-04-30 RX ADMIN — NITROGLYCERIN 0.4 MG: 0.4 TABLET, ORALLY DISINTEGRATING SUBLINGUAL at 22:02

## 2024-04-30 RX ADMIN — IPRATROPIUM BROMIDE AND ALBUTEROL SULFATE 1 DOSE: .5; 3 SOLUTION RESPIRATORY (INHALATION) at 22:02

## 2024-04-30 ASSESSMENT — HEART SCORE: ECG: NORMAL

## 2024-04-30 ASSESSMENT — PAIN DESCRIPTION - LOCATION: LOCATION: CHEST

## 2024-04-30 ASSESSMENT — PAIN SCALES - GENERAL: PAINLEVEL_OUTOF10: 5

## 2024-04-30 ASSESSMENT — PAIN - FUNCTIONAL ASSESSMENT: PAIN_FUNCTIONAL_ASSESSMENT: 0-10

## 2024-05-01 VITALS
TEMPERATURE: 98.1 F | RESPIRATION RATE: 16 BRPM | WEIGHT: 240 LBS | HEIGHT: 71 IN | DIASTOLIC BLOOD PRESSURE: 69 MMHG | OXYGEN SATURATION: 93 % | BODY MASS INDEX: 33.6 KG/M2 | HEART RATE: 68 BPM | SYSTOLIC BLOOD PRESSURE: 130 MMHG

## 2024-05-01 PROBLEM — R06.2 WHEEZING: Status: ACTIVE | Noted: 2024-05-01

## 2024-05-01 PROBLEM — J44.1 COPD EXACERBATION (HCC): Status: ACTIVE | Noted: 2024-05-01

## 2024-05-01 LAB
EKG ATRIAL RATE: 61 BPM
EKG ATRIAL RATE: 67 BPM
EKG DIAGNOSIS: NORMAL
EKG DIAGNOSIS: NORMAL
EKG P AXIS: -13 DEGREES
EKG P AXIS: 88 DEGREES
EKG P-R INTERVAL: 196 MS
EKG P-R INTERVAL: 204 MS
EKG Q-T INTERVAL: 392 MS
EKG Q-T INTERVAL: 426 MS
EKG QRS DURATION: 106 MS
EKG QRS DURATION: 88 MS
EKG QTC CALCULATION (BAZETT): 414 MS
EKG QTC CALCULATION (BAZETT): 428 MS
EKG R AXIS: 63 DEGREES
EKG R AXIS: 69 DEGREES
EKG T AXIS: 41 DEGREES
EKG T AXIS: 59 DEGREES
EKG VENTRICULAR RATE: 61 BPM
EKG VENTRICULAR RATE: 67 BPM

## 2024-05-01 PROCEDURE — 93010 ELECTROCARDIOGRAM REPORT: CPT | Performed by: INTERNAL MEDICINE

## 2024-05-01 RX ORDER — METHYLPREDNISOLONE 4 MG/1
TABLET ORAL
Qty: 21 TABLET | Refills: 0 | Status: SHIPPED | OUTPATIENT
Start: 2024-05-01 | End: 2024-05-07

## 2024-05-01 RX ORDER — AZITHROMYCIN 500 MG/1
500 TABLET, FILM COATED ORAL DAILY
Qty: 3 TABLET | Refills: 0 | Status: SHIPPED | OUTPATIENT
Start: 2024-05-01 | End: 2024-05-04

## 2024-05-01 NOTE — ED PROVIDER NOTES
05/01/24 2004 Tue Apr 30, 2024 2136 Patient is a 63-year-old male presenting for chest pain that began an hour to hour and a half ago patient states that his left-sided chest pressure that radiates to his left arm patient took 1 nitroglycerin which relieved the pain but then it came back.  Patient states that the pain is constant and the left-sided pressure patient also reports shortness of breath, nausea and being diaphoretic.  Patient has had 2 MIs in 2022 in 2020, history of cysts diabetes hyperlipidemia COPD on oxygen at home. [MF]   2140 Nitroglycerin ordered [MF]   2142 Pt has diffuse insp/expiratory wheezing, IV solumedrol and duo-neb ordered [MF]   2151 Magnesium(!): 1.5 [MF]   2151 Hemoglobin Quant(!): 12.7 [MF]   2151 MCV: 85.3  Will give 3g IV Mag sulfate. Pt has normocytic anemia [MF]   2257 Patient notes improvement in his shortness of breath, still having chest pressure he states is worsened by sob. Will administer another breathing treatment. [MF]   Wed May 01, 2024   0014 Patient currently on 2 L nasal cannula, patient wears 3 L nasal cannula at home, states his oxygen never gets above 94. [MF]   0015 Troponin, High Sensitivity: 13  Second troponin and EKG unremarkable [MF]   0030 Patient currently denying any chest pain and states improvement in his shortness of breath.  I discussed with patient his results of his second troponin and EKG as well as his unremarkable laboratory and cardiac workup.  No clinical indications of fluid overload, DVT, hypoxia, hemoptysis, pleuritic chest pain low concern for PE.  I discussed with patient this is consistent with COPD exacerbation that we will discharge him home with return precautions and a course of oral prednisone and antibiotics.  Also discussed with patient to immediately seek resources and quit all tobacco use and to abstain from binge drinking alcohol. [MF]   0034 Patient and wife are agreeable to plan of care and verbalized understanding, all

## 2024-05-01 NOTE — ED TRIAGE NOTES
Pt c/o CP and SOB, and arm tingling that started during a nap    Hx of 2 heart attacks, 2 stents placed  Hx of COPD, wears O2 at night and PRN     EKG and labs done

## 2024-05-20 ENCOUNTER — HOSPITAL ENCOUNTER (OUTPATIENT)
Facility: HOSPITAL | Age: 64
Discharge: HOME | End: 2024-05-23
Payer: MEDICARE

## 2024-05-20 DIAGNOSIS — E78.2 MIXED HYPERLIPIDEMIA: ICD-10-CM

## 2024-05-20 DIAGNOSIS — E11.65 INADEQUATELY CONTROLLED DIABETES MELLITUS (HCC): ICD-10-CM

## 2024-05-20 DIAGNOSIS — Z87.19 HEALED YELLOW ATROPHY OF LIVER: ICD-10-CM

## 2024-05-20 DIAGNOSIS — J44.9 VANISHING LUNG (HCC): ICD-10-CM

## 2024-05-20 LAB
25(OH)D3 SERPL-MCNC: 37.4 NG/ML (ref 30–100)
ALBUMIN SERPL-MCNC: 3.5 G/DL (ref 3.4–5)
ALBUMIN/GLOB SERPL: 1.1 (ref 0.8–1.7)
ALP SERPL-CCNC: 75 U/L (ref 45–117)
ALT SERPL-CCNC: 19 U/L (ref 16–61)
ANION GAP SERPL CALC-SCNC: 2 MMOL/L (ref 3–18)
AST SERPL-CCNC: 14 U/L (ref 10–38)
BASOPHILS # BLD: 0 K/UL (ref 0–0.1)
BASOPHILS NFR BLD: 1 % (ref 0–2)
BILIRUB SERPL-MCNC: 0.9 MG/DL (ref 0.2–1)
BUN SERPL-MCNC: 19 MG/DL (ref 7–18)
BUN/CREAT SERPL: 21 (ref 12–20)
CALCIUM SERPL-MCNC: 8.7 MG/DL (ref 8.5–10.1)
CHLORIDE SERPL-SCNC: 99 MMOL/L (ref 100–111)
CHOLEST SERPL-MCNC: 96 MG/DL
CO2 SERPL-SCNC: 37 MMOL/L (ref 21–32)
CREAT SERPL-MCNC: 0.92 MG/DL (ref 0.6–1.3)
CREAT UR-MCNC: 125 MG/DL (ref 30–125)
DIFFERENTIAL METHOD BLD: ABNORMAL
EOSINOPHIL # BLD: 0.3 K/UL (ref 0–0.4)
EOSINOPHIL NFR BLD: 3 % (ref 0–5)
ERYTHROCYTE [DISTWIDTH] IN BLOOD BY AUTOMATED COUNT: 13.3 % (ref 11.6–14.5)
EST. AVERAGE GLUCOSE BLD GHB EST-MCNC: 246 MG/DL
GLOBULIN SER CALC-MCNC: 3.2 G/DL (ref 2–4)
GLUCOSE SERPL-MCNC: 142 MG/DL (ref 74–99)
HBA1C MFR BLD: 10.2 % (ref 4.2–5.6)
HCT VFR BLD AUTO: 38.9 % (ref 36–48)
HDLC SERPL-MCNC: 35 MG/DL (ref 40–60)
HDLC SERPL: 2.7 (ref 0–5)
HGB BLD-MCNC: 12.2 G/DL (ref 13–16)
IMM GRANULOCYTES # BLD AUTO: 0 K/UL (ref 0–0.04)
IMM GRANULOCYTES NFR BLD AUTO: 0 % (ref 0–0.5)
LDLC SERPL CALC-MCNC: 37.4 MG/DL (ref 0–100)
LIPID PANEL: ABNORMAL
LYMPHOCYTES # BLD: 2.2 K/UL (ref 0.9–3.6)
LYMPHOCYTES NFR BLD: 29 % (ref 21–52)
MCH RBC QN AUTO: 27.5 PG (ref 24–34)
MCHC RBC AUTO-ENTMCNC: 31.4 G/DL (ref 31–37)
MCV RBC AUTO: 87.6 FL (ref 78–100)
MICROALBUMIN UR-MCNC: 5.92 MG/DL (ref 0–3)
MICROALBUMIN/CREAT UR-RTO: 47 MG/G (ref 0–30)
MONOCYTES # BLD: 0.7 K/UL (ref 0.05–1.2)
MONOCYTES NFR BLD: 9 % (ref 3–10)
NEUTS SEG # BLD: 4.5 K/UL (ref 1.8–8)
NEUTS SEG NFR BLD: 58 % (ref 40–73)
NRBC # BLD: 0 K/UL (ref 0–0.01)
NRBC BLD-RTO: 0 PER 100 WBC
PLATELET # BLD AUTO: 225 K/UL (ref 135–420)
PMV BLD AUTO: 11.7 FL (ref 9.2–11.8)
POTASSIUM SERPL-SCNC: 4.1 MMOL/L (ref 3.5–5.5)
PROT SERPL-MCNC: 6.7 G/DL (ref 6.4–8.2)
RBC # BLD AUTO: 4.44 M/UL (ref 4.35–5.65)
SODIUM SERPL-SCNC: 138 MMOL/L (ref 136–145)
T4 FREE SERPL-MCNC: 0.8 NG/DL (ref 0.7–1.5)
TRIGL SERPL-MCNC: 118 MG/DL
TSH SERPL DL<=0.05 MIU/L-ACNC: 1.13 UIU/ML (ref 0.36–3.74)
VLDLC SERPL CALC-MCNC: 23.6 MG/DL
WBC # BLD AUTO: 7.7 K/UL (ref 4.6–13.2)

## 2024-05-20 PROCEDURE — 84439 ASSAY OF FREE THYROXINE: CPT

## 2024-05-20 PROCEDURE — 82306 VITAMIN D 25 HYDROXY: CPT

## 2024-05-20 PROCEDURE — 84443 ASSAY THYROID STIM HORMONE: CPT

## 2024-05-20 PROCEDURE — 80061 LIPID PANEL: CPT

## 2024-05-20 PROCEDURE — 36415 COLL VENOUS BLD VENIPUNCTURE: CPT

## 2024-05-20 PROCEDURE — 80053 COMPREHEN METABOLIC PANEL: CPT

## 2024-05-20 PROCEDURE — 82043 UR ALBUMIN QUANTITATIVE: CPT

## 2024-05-20 PROCEDURE — 85025 COMPLETE CBC W/AUTO DIFF WBC: CPT

## 2024-05-20 PROCEDURE — 83036 HEMOGLOBIN GLYCOSYLATED A1C: CPT

## 2024-05-20 PROCEDURE — 82570 ASSAY OF URINE CREATININE: CPT

## 2024-05-28 NOTE — Clinical Note
He is having issues with his stream and is wondering if while he's under for the biopsy you can resolve that issue too   Lesion 1: Guidewire redirected and used as the primary guidewire crosses lesion.  Re-wired back into CX lesion

## 2024-06-28 NOTE — PROGRESS NOTES
Solo Souza presents today for a 7 month check-up.  Overall, he has been feeling about the same.  His main complaint is of chronic SOB due to his COPD and continued tobacco use.  His only other complaint is of some dizziness especially with sudden position changes.  He has lost 11 pounds since his last visit.  He was accompanied by his significant other.     He is a 63 year old male with history of CAD (hx of non-STEMI in March 2020), hypertension, hyperlipidemia, diabetes, COPD, tobacco use.  He had Covid in 2022 and states that his breathing has worsened since he got it.  His last echo was done in April 2021 and it showed an EF of 50-55%, PASP of 35 mmHg.  He states that he has oxygen therapy at home but does not use it routinely.  He uses inhalers and a nebulizer which helps with the shortness of breath.  He is not followed by pulmonology.   He was hospitalized from 5/16/22 through 5/17/22 for chest pain, acute COPD.  He presented with complaints of substernal chest pain at rest associated with some dyspnea and radiation to left arm.  He underwent cardiac catheterization which showed:  Chest pain consistent with coronary ischemia.  Previously stented first diagonal branch is closed with RICARDO 0-I flow. This lesion was left alone.  Mid LAD has new focal 85% stenosis beyond previous stent. This was stented using 2.75 mm PETERSON to residual 0%. Previous stent IFR was 0.86. Post stent IFR was 1.00.  Ostial circumflex is new 60% stenosis since 2020 cath. iFR of this lesion was 1.00. This lesion was left alone.  RCA is large and dominant. There is proximal 25-30% stenosis noted. This is also new since 2020.  LVEDP was 14 mmHg. EF is 40-45% with anteroapical hypokinesis.  Again, coronary anatomy was explained. He needs to discontinue tobacco use. He needs to continue on baby aspirin, Plavix, statins.  If able to resume physical activity without discomfort, he can be discharged home on 5/17/2022.    He presented to the ER

## 2024-07-08 ENCOUNTER — OFFICE VISIT (OUTPATIENT)
Age: 64
End: 2024-07-08
Payer: MEDICARE

## 2024-07-08 VITALS
HEART RATE: 61 BPM | HEIGHT: 71 IN | DIASTOLIC BLOOD PRESSURE: 70 MMHG | OXYGEN SATURATION: 93 % | WEIGHT: 233 LBS | BODY MASS INDEX: 32.62 KG/M2 | SYSTOLIC BLOOD PRESSURE: 108 MMHG

## 2024-07-08 DIAGNOSIS — E78.5 HYPERLIPIDEMIA, UNSPECIFIED HYPERLIPIDEMIA TYPE: ICD-10-CM

## 2024-07-08 DIAGNOSIS — I25.10 CORONARY ARTERY DISEASE INVOLVING NATIVE CORONARY ARTERY OF NATIVE HEART WITHOUT ANGINA PECTORIS: ICD-10-CM

## 2024-07-08 DIAGNOSIS — I10 ESSENTIAL (PRIMARY) HYPERTENSION: Primary | ICD-10-CM

## 2024-07-08 DIAGNOSIS — J44.9 CHRONIC OBSTRUCTIVE PULMONARY DISEASE, UNSPECIFIED COPD TYPE (HCC): ICD-10-CM

## 2024-07-08 PROCEDURE — G8427 DOCREV CUR MEDS BY ELIG CLIN: HCPCS | Performed by: NURSE PRACTITIONER

## 2024-07-08 PROCEDURE — 3078F DIAST BP <80 MM HG: CPT | Performed by: NURSE PRACTITIONER

## 2024-07-08 PROCEDURE — 3023F SPIROM DOC REV: CPT | Performed by: NURSE PRACTITIONER

## 2024-07-08 PROCEDURE — 3017F COLORECTAL CA SCREEN DOC REV: CPT | Performed by: NURSE PRACTITIONER

## 2024-07-08 PROCEDURE — G8417 CALC BMI ABV UP PARAM F/U: HCPCS | Performed by: NURSE PRACTITIONER

## 2024-07-08 PROCEDURE — 99214 OFFICE O/P EST MOD 30 MIN: CPT | Performed by: NURSE PRACTITIONER

## 2024-07-08 PROCEDURE — 3074F SYST BP LT 130 MM HG: CPT | Performed by: NURSE PRACTITIONER

## 2024-07-08 PROCEDURE — 4004F PT TOBACCO SCREEN RCVD TLK: CPT | Performed by: NURSE PRACTITIONER

## 2024-07-08 RX ORDER — ATORVASTATIN CALCIUM 80 MG/1
80 TABLET, FILM COATED ORAL NIGHTLY
Qty: 90 TABLET | Refills: 3 | Status: SHIPPED | OUTPATIENT
Start: 2024-07-08

## 2024-07-08 RX ORDER — FUROSEMIDE 40 MG/1
40 TABLET ORAL DAILY
Qty: 1 TABLET | Refills: 0
Start: 2024-07-08

## 2024-07-08 ASSESSMENT — PATIENT HEALTH QUESTIONNAIRE - PHQ9
SUM OF ALL RESPONSES TO PHQ QUESTIONS 1-9: 0
SUM OF ALL RESPONSES TO PHQ9 QUESTIONS 1 & 2: 0
1. LITTLE INTEREST OR PLEASURE IN DOING THINGS: NOT AT ALL
SUM OF ALL RESPONSES TO PHQ QUESTIONS 1-9: 0
2. FEELING DOWN, DEPRESSED OR HOPELESS: NOT AT ALL

## 2024-07-08 ASSESSMENT — ENCOUNTER SYMPTOMS: WHEEZING: 1
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
## 2024-07-08 NOTE — PATIENT INSTRUCTIONS
Decrease metoprolol tartrate to 25mg twice a day.  Take 1/2 of your 50mg tablet twice a day  All other medications to remain the same  Monitor and record blood pressures at home.  Check BP at least 2-3 hours after taking your medications.  Goal blood pressure is less than 140/90.  If blood pressures are consistently above this goal, please call the office as we may need to adjust your medications  Smoking cessation discussed and highly encouraged  BMP, Mg at your earliest convenience  Follow-up with Dr. Bobby as scheduled and as needed  Follow-up with Pulmonology, call and schedule appointment

## 2024-07-22 ENCOUNTER — TELEPHONE (OUTPATIENT)
Age: 64
End: 2024-07-22

## 2024-08-29 ENCOUNTER — HOSPITAL ENCOUNTER (EMERGENCY)
Facility: HOSPITAL | Age: 64
Discharge: HOME | End: 2024-08-29
Attending: EMERGENCY MEDICINE
Payer: MEDICARE

## 2024-08-29 ENCOUNTER — APPOINTMENT (OUTPATIENT)
Facility: HOSPITAL | Age: 64
End: 2024-08-29
Payer: MEDICARE

## 2024-08-29 VITALS
OXYGEN SATURATION: 93 % | WEIGHT: 225 LBS | BODY MASS INDEX: 31.5 KG/M2 | SYSTOLIC BLOOD PRESSURE: 122 MMHG | HEART RATE: 67 BPM | RESPIRATION RATE: 14 BRPM | TEMPERATURE: 97.8 F | DIASTOLIC BLOOD PRESSURE: 77 MMHG | HEIGHT: 71 IN

## 2024-08-29 DIAGNOSIS — J44.1 COPD EXACERBATION (HCC): Primary | ICD-10-CM

## 2024-08-29 LAB
ALBUMIN SERPL-MCNC: 3.4 G/DL (ref 3.4–5)
ALBUMIN/GLOB SERPL: 1.2 (ref 0.8–1.7)
ALP SERPL-CCNC: 91 U/L (ref 45–117)
ALT SERPL-CCNC: 17 U/L (ref 16–61)
ANION GAP SERPL CALC-SCNC: 4 MMOL/L (ref 3–18)
AST SERPL-CCNC: 14 U/L (ref 10–38)
BASOPHILS # BLD: 0 K/UL (ref 0–0.1)
BASOPHILS NFR BLD: 0 % (ref 0–2)
BILIRUB SERPL-MCNC: 0.6 MG/DL (ref 0.2–1)
BUN SERPL-MCNC: 15 MG/DL (ref 7–18)
BUN/CREAT SERPL: 15 (ref 12–20)
CALCIUM SERPL-MCNC: 8.9 MG/DL (ref 8.5–10.1)
CHLORIDE SERPL-SCNC: 99 MMOL/L (ref 100–111)
CO2 SERPL-SCNC: 33 MMOL/L (ref 21–32)
CREAT SERPL-MCNC: 0.97 MG/DL (ref 0.6–1.3)
DIFFERENTIAL METHOD BLD: ABNORMAL
EOSINOPHIL # BLD: 0.2 K/UL (ref 0–0.4)
EOSINOPHIL NFR BLD: 2 % (ref 0–5)
ERYTHROCYTE [DISTWIDTH] IN BLOOD BY AUTOMATED COUNT: 12.9 % (ref 11.6–14.5)
FLUAV RNA SPEC QL NAA+PROBE: NOT DETECTED
FLUBV RNA SPEC QL NAA+PROBE: NOT DETECTED
GLOBULIN SER CALC-MCNC: 2.9 G/DL (ref 2–4)
GLUCOSE SERPL-MCNC: 308 MG/DL (ref 74–99)
HCT VFR BLD AUTO: 38 % (ref 36–48)
HGB BLD-MCNC: 12.4 G/DL (ref 13–16)
IMM GRANULOCYTES # BLD AUTO: 0 K/UL (ref 0–0.04)
IMM GRANULOCYTES NFR BLD AUTO: 0 % (ref 0–0.5)
LYMPHOCYTES # BLD: 1.9 K/UL (ref 0.9–3.6)
LYMPHOCYTES NFR BLD: 24 % (ref 21–52)
MCH RBC QN AUTO: 28.8 PG (ref 24–34)
MCHC RBC AUTO-ENTMCNC: 32.6 G/DL (ref 31–37)
MCV RBC AUTO: 88.2 FL (ref 78–100)
MONOCYTES # BLD: 0.8 K/UL (ref 0.05–1.2)
MONOCYTES NFR BLD: 11 % (ref 3–10)
NEUTS SEG # BLD: 4.9 K/UL (ref 1.8–8)
NEUTS SEG NFR BLD: 63 % (ref 40–73)
NRBC # BLD: 0 K/UL (ref 0–0.01)
NRBC BLD-RTO: 0 PER 100 WBC
PLATELET # BLD AUTO: 200 K/UL (ref 135–420)
PMV BLD AUTO: 10.8 FL (ref 9.2–11.8)
POTASSIUM SERPL-SCNC: 4.2 MMOL/L (ref 3.5–5.5)
PROT SERPL-MCNC: 6.3 G/DL (ref 6.4–8.2)
RBC # BLD AUTO: 4.31 M/UL (ref 4.35–5.65)
SARS-COV-2 RNA RESP QL NAA+PROBE: NOT DETECTED
SODIUM SERPL-SCNC: 136 MMOL/L (ref 136–145)
SOURCE: NORMAL
TROPONIN I SERPL HS-MCNC: 9 NG/L (ref 0–78)
WBC # BLD AUTO: 7.8 K/UL (ref 4.6–13.2)

## 2024-08-29 PROCEDURE — 80053 COMPREHEN METABOLIC PANEL: CPT

## 2024-08-29 PROCEDURE — 85025 COMPLETE CBC W/AUTO DIFF WBC: CPT

## 2024-08-29 PROCEDURE — 6360000002 HC RX W HCPCS

## 2024-08-29 PROCEDURE — 87636 SARSCOV2 & INF A&B AMP PRB: CPT

## 2024-08-29 PROCEDURE — 84484 ASSAY OF TROPONIN QUANT: CPT

## 2024-08-29 PROCEDURE — 94640 AIRWAY INHALATION TREATMENT: CPT

## 2024-08-29 PROCEDURE — 71045 X-RAY EXAM CHEST 1 VIEW: CPT

## 2024-08-29 PROCEDURE — 93005 ELECTROCARDIOGRAM TRACING: CPT

## 2024-08-29 PROCEDURE — 99285 EMERGENCY DEPT VISIT HI MDM: CPT

## 2024-08-29 RX ORDER — DOXYCYCLINE 100 MG/1
100 CAPSULE ORAL 2 TIMES DAILY
Qty: 14 CAPSULE | Refills: 0 | Status: SHIPPED | OUTPATIENT
Start: 2024-08-29 | End: 2024-09-05

## 2024-08-29 RX ORDER — PREDNISONE 20 MG/1
40 TABLET ORAL DAILY
Qty: 10 TABLET | Refills: 0 | Status: SHIPPED | OUTPATIENT
Start: 2024-08-29 | End: 2024-09-03

## 2024-08-29 RX ORDER — IPRATROPIUM BROMIDE AND ALBUTEROL SULFATE 2.5; .5 MG/3ML; MG/3ML
1 SOLUTION RESPIRATORY (INHALATION) EVERY 4 HOURS PRN
Qty: 360 ML | Refills: 0 | Status: SHIPPED | OUTPATIENT
Start: 2024-08-29

## 2024-08-29 RX ORDER — ALBUTEROL SULFATE 0.83 MG/ML
2.5 SOLUTION RESPIRATORY (INHALATION)
Status: COMPLETED | OUTPATIENT
Start: 2024-08-29 | End: 2024-08-29

## 2024-08-29 RX ORDER — ALBUTEROL SULFATE 0.83 MG/ML
SOLUTION RESPIRATORY (INHALATION)
Status: COMPLETED
Start: 2024-08-29 | End: 2024-08-29

## 2024-08-29 RX ADMIN — ALBUTEROL SULFATE 2.5 MG: 2.5 SOLUTION RESPIRATORY (INHALATION) at 17:38

## 2024-08-29 NOTE — ED PROVIDER NOTES
0.97 0.6 - 1.3 MG/DL    BUN/Creatinine Ratio 15 12 - 20      Est, Glom Filt Rate 88 >60 ml/min/1.73m2    Calcium 8.9 8.5 - 10.1 MG/DL    Total Bilirubin 0.6 0.2 - 1.0 MG/DL    ALT 17 16 - 61 U/L    AST 14 10 - 38 U/L    Alk Phosphatase 91 45 - 117 U/L    Total Protein 6.3 (L) 6.4 - 8.2 g/dL    Albumin 3.4 3.4 - 5.0 g/dL    Globulin 2.9 2.0 - 4.0 g/dL    Albumin/Globulin Ratio 1.2 0.8 - 1.7     Troponin    Collection Time: 08/29/24  2:50 PM   Result Value Ref Range    Troponin, High Sensitivity 9 0 - 78 ng/L   COVID-19 & Influenza Combo    Collection Time: 08/29/24  3:05 PM    Specimen: Nasopharyngeal   Result Value Ref Range    Source Nasopharyngeal      SARS-CoV-2, PCR Not detected NOTD      Rapid Influenza A By PCR Not detected NOTD      Rapid Influenza B By PCR Not detected NOTD        Labs Reviewed   CBC WITH AUTO DIFFERENTIAL - Abnormal; Notable for the following components:       Result Value    RBC 4.31 (*)     Hemoglobin 12.4 (*)     Monocytes % 11 (*)     All other components within normal limits   COMPREHENSIVE METABOLIC PANEL - Abnormal; Notable for the following components:    Chloride 99 (*)     CO2 33 (*)     Glucose 308 (*)     Total Protein 6.3 (*)     All other components within normal limits   COVID-19 & INFLUENZA COMBO   TROPONIN       Radiologic Studies -   XR CHEST PORTABLE   Final Result   No acute pulmonary infiltrates are demonstrated.      Electronically signed by Eagle Meredith            The laboratory results, imaging results, and other diagnostic exams were reviewed in the EMR.    Medical Decision Making   I am the first provider for this patient.    I reviewed the vital signs, available nursing notes, past medical history, past surgical history, family history and social history.    Vital Signs-Reviewed the patient's vital signs.    ED EKG interpretation:  No acute changes       Records Reviewed: Personally, on initial evaluation    MDM:   DDX includes but is not limited to: viral URI  Wheezing     * albuterol sulfate  (90 Base) MCG/ACT inhaler  Commonly known as: Ventolin HFA  Inhale 2 puffs into the lungs 4 times daily as needed for Wheezing     aspirin 81 MG EC tablet     atorvastatin 80 MG tablet  Commonly known as: LIPITOR  Take 1 tablet by mouth nightly     clopidogrel 75 MG tablet  Commonly known as: PLAVIX  Take 1 tablet by mouth daily     furosemide 40 MG tablet  Commonly known as: LASIX  Take 1 tablet by mouth daily     glyBURIDE 5 MG tablet  Commonly known as: DIABETA     lisinopril 5 MG tablet  Commonly known as: PRINIVIL;ZESTRIL     metFORMIN 1000 MG tablet  Commonly known as: GLUCOPHAGE     metoprolol tartrate 50 MG tablet  Commonly known as: LOPRESSOR  TAKE ONE TABLET BY MOUTH EVERY 12 HOURS     nitroGLYCERIN 0.4 MG SL tablet  Commonly known as: NITROSTAT     OXYGEN     SlowMag Mg Muscle/Heart 71.5-119 MG Tbec tablet  Generic drug: magnesium cl-calcium carbonate  Take 2 tablets by mouth daily     spironolactone 25 MG tablet  Commonly known as: ALDACTONE  TAKE ONE TABLET BY MOUTH DAILY           * This list has 2 medication(s) that are the same as other medications prescribed for you. Read the directions carefully, and ask your doctor or other care provider to review them with you.                   Where to Get Your Medications        These medications were sent to [facility] [id number] - [place], [place] - [address] [place] [address] - P [phone] - F [phone]  [address] [place] [address]      Phone: [phone]   albuterol-ipratropium  MCG/ACT Aers inhaler  doxycycline hyclate 100 MG capsule  ipratropium 0.5 mg-albuterol 2.5 mg 0.5-2.5 (3) MG/3ML Soln nebulizer solution  predniSONE 20 MG tablet         Disposition: home    Patient condition at time of disposition: Stable    DISCHARGE NOTE:   Pt has been reexamined. Patient has no new complaints, changes, or physical findings.  Care plan outlined and precautions discussed.  Results were reviewed with the

## 2024-08-29 NOTE — DISCHARGE INSTRUCTIONS
You were evaluated in the ED for cough causing chest pain. Your COVID and flu were negative. Your workup to determine if your symptoms were coming from your heart was negative. You are likely experiencing a COPD exacerbation. Prescriptions for antibiotics, steroids, nebulizer treatments, and a maintenance inhaler have been sent to your pharmacy to manage your symptoms. Please contact your PCP or return to the ED if your symptoms worsen or do not improve.

## 2024-09-01 LAB
EKG ATRIAL RATE: 70 BPM
EKG DIAGNOSIS: NORMAL
EKG P AXIS: 77 DEGREES
EKG P-R INTERVAL: 226 MS
EKG Q-T INTERVAL: 382 MS
EKG QRS DURATION: 98 MS
EKG QTC CALCULATION (BAZETT): 412 MS
EKG R AXIS: 76 DEGREES
EKG T AXIS: 74 DEGREES
EKG VENTRICULAR RATE: 70 BPM

## 2024-09-09 ENCOUNTER — OFFICE VISIT (OUTPATIENT)
Age: 64
End: 2024-09-09
Payer: MEDICARE

## 2024-09-09 VITALS
HEIGHT: 71 IN | SYSTOLIC BLOOD PRESSURE: 109 MMHG | BODY MASS INDEX: 32.23 KG/M2 | DIASTOLIC BLOOD PRESSURE: 62 MMHG | TEMPERATURE: 97.9 F | RESPIRATION RATE: 20 BRPM | OXYGEN SATURATION: 94 % | WEIGHT: 230.2 LBS | HEART RATE: 71 BPM

## 2024-09-09 DIAGNOSIS — Z72.0 TOBACCO USE: Primary | ICD-10-CM

## 2024-09-09 DIAGNOSIS — J96.11 CHRONIC RESPIRATORY FAILURE WITH HYPOXIA (HCC): ICD-10-CM

## 2024-09-09 DIAGNOSIS — J44.9 STAGE 4 VERY SEVERE COPD BY GOLD CLASSIFICATION (HCC): ICD-10-CM

## 2024-09-09 PROCEDURE — G8417 CALC BMI ABV UP PARAM F/U: HCPCS | Performed by: HOSPITALIST

## 2024-09-09 PROCEDURE — 3017F COLORECTAL CA SCREEN DOC REV: CPT | Performed by: HOSPITALIST

## 2024-09-09 PROCEDURE — G8427 DOCREV CUR MEDS BY ELIG CLIN: HCPCS | Performed by: HOSPITALIST

## 2024-09-09 PROCEDURE — 99407 BEHAV CHNG SMOKING > 10 MIN: CPT | Performed by: HOSPITALIST

## 2024-09-09 PROCEDURE — 3078F DIAST BP <80 MM HG: CPT | Performed by: HOSPITALIST

## 2024-09-09 PROCEDURE — 99215 OFFICE O/P EST HI 40 MIN: CPT | Performed by: HOSPITALIST

## 2024-09-09 PROCEDURE — 4004F PT TOBACCO SCREEN RCVD TLK: CPT | Performed by: HOSPITALIST

## 2024-09-09 PROCEDURE — 3023F SPIROM DOC REV: CPT | Performed by: HOSPITALIST

## 2024-09-09 PROCEDURE — 3074F SYST BP LT 130 MM HG: CPT | Performed by: HOSPITALIST

## 2024-09-09 RX ORDER — VARENICLINE TARTRATE 0.5 MG/1
TABLET, FILM COATED ORAL
Qty: 57 TABLET | Refills: 0 | Status: SHIPPED | OUTPATIENT
Start: 2024-09-09 | End: 2024-09-10

## 2024-09-09 RX ORDER — FLUTICASONE FUROATE, UMECLIDINIUM BROMIDE AND VILANTEROL TRIFENATATE 100; 62.5; 25 UG/1; UG/1; UG/1
1 POWDER RESPIRATORY (INHALATION) DAILY
Qty: 1 EACH | Refills: 3 | Status: SHIPPED | COMMUNITY
Start: 2024-09-09

## 2024-09-09 RX ORDER — FLUTICASONE PROPIONATE 110 UG/1
2 AEROSOL, METERED RESPIRATORY (INHALATION) 2 TIMES DAILY
Qty: 12 G | Refills: 3 | Status: SHIPPED | OUTPATIENT
Start: 2024-09-09 | End: 2025-09-09

## 2024-09-09 ASSESSMENT — PATIENT HEALTH QUESTIONNAIRE - PHQ9
SUM OF ALL RESPONSES TO PHQ QUESTIONS 1-9: 0
1. LITTLE INTEREST OR PLEASURE IN DOING THINGS: NOT AT ALL
2. FEELING DOWN, DEPRESSED OR HOPELESS: NOT AT ALL
SUM OF ALL RESPONSES TO PHQ9 QUESTIONS 1 & 2: 0
SUM OF ALL RESPONSES TO PHQ QUESTIONS 1-9: 0

## 2024-09-10 RX ORDER — VARENICLINE TARTRATE 0.5 MG/1
.5-1 TABLET, FILM COATED ORAL SEE ADMIN INSTRUCTIONS
Qty: 56 TABLET | Refills: 0 | Status: SHIPPED | OUTPATIENT
Start: 2024-09-10

## 2024-09-24 ENCOUNTER — APPOINTMENT (OUTPATIENT)
Facility: HOSPITAL | Age: 64
DRG: 872 | End: 2024-09-24
Payer: MEDICARE

## 2024-09-24 ENCOUNTER — HOSPITAL ENCOUNTER (INPATIENT)
Facility: HOSPITAL | Age: 64
LOS: 3 days | Discharge: LEFT BEFORE DISCHARGE | DRG: 872 | End: 2024-09-27
Attending: FAMILY MEDICINE | Admitting: FAMILY MEDICINE
Payer: MEDICARE

## 2024-09-24 DIAGNOSIS — M00.9 SEPTIC ARTHRITIS OF RIGHT FOOT, DUE TO UNSPECIFIED ORGANISM: Primary | ICD-10-CM

## 2024-09-24 PROBLEM — A41.9 SEPSIS (HCC): Status: ACTIVE | Noted: 2024-09-24

## 2024-09-24 LAB
ALBUMIN SERPL-MCNC: 3.3 G/DL (ref 3.4–5)
ALBUMIN/GLOB SERPL: 0.9 (ref 0.8–1.7)
ALP SERPL-CCNC: 82 U/L (ref 45–117)
ALT SERPL-CCNC: 19 U/L (ref 16–61)
AMORPH CRY URNS QL MICRO: ABNORMAL
ANION GAP SERPL CALC-SCNC: 5 MMOL/L (ref 3–18)
APPEARANCE UR: ABNORMAL
AST SERPL-CCNC: 20 U/L (ref 10–38)
BACTERIA URNS QL MICRO: ABNORMAL /HPF
BASOPHILS # BLD: 0 K/UL (ref 0–0.1)
BASOPHILS NFR BLD: 0 % (ref 0–2)
BILIRUB SERPL-MCNC: 1.5 MG/DL (ref 0.2–1)
BILIRUB UR QL: NEGATIVE
BUN SERPL-MCNC: 16 MG/DL (ref 7–18)
BUN/CREAT SERPL: 15 (ref 12–20)
CALCIUM SERPL-MCNC: 8.7 MG/DL (ref 8.5–10.1)
CHLORIDE SERPL-SCNC: 95 MMOL/L (ref 100–111)
CO2 SERPL-SCNC: 30 MMOL/L (ref 21–32)
COLOR UR: YELLOW
CREAT SERPL-MCNC: 1.1 MG/DL (ref 0.6–1.3)
CRP SERPL-MCNC: 18.8 MG/DL (ref 0–0.3)
DIFFERENTIAL METHOD BLD: ABNORMAL
EKG ATRIAL RATE: 86 BPM
EKG DIAGNOSIS: NORMAL
EKG P AXIS: 88 DEGREES
EKG P-R INTERVAL: 186 MS
EKG Q-T INTERVAL: 368 MS
EKG QRS DURATION: 96 MS
EKG QTC CALCULATION (BAZETT): 440 MS
EKG R AXIS: 74 DEGREES
EKG T AXIS: 66 DEGREES
EKG VENTRICULAR RATE: 86 BPM
EOSINOPHIL # BLD: 0 K/UL (ref 0–0.4)
EOSINOPHIL NFR BLD: 0 % (ref 0–5)
EPITH CASTS URNS QL MICRO: NEGATIVE /LPF (ref 0–5)
ERYTHROCYTE [DISTWIDTH] IN BLOOD BY AUTOMATED COUNT: 12.8 % (ref 11.6–14.5)
ERYTHROCYTE [SEDIMENTATION RATE] IN BLOOD: 78 MM/HR (ref 0–20)
GLOBULIN SER CALC-MCNC: 3.5 G/DL (ref 2–4)
GLUCOSE BLD STRIP.AUTO-MCNC: 235 MG/DL (ref 70–110)
GLUCOSE SERPL-MCNC: 196 MG/DL (ref 74–99)
GLUCOSE UR STRIP.AUTO-MCNC: >1000 MG/DL
GRAN CASTS URNS QL MICRO: ABNORMAL /LPF
HCT VFR BLD AUTO: 37.9 % (ref 36–48)
HGB BLD-MCNC: 12.5 G/DL (ref 13–16)
HGB UR QL STRIP: NEGATIVE
HYALINE CASTS URNS QL MICRO: ABNORMAL /LPF (ref 0–2)
IMM GRANULOCYTES # BLD AUTO: 0.1 K/UL (ref 0–0.04)
IMM GRANULOCYTES NFR BLD AUTO: 0 % (ref 0–0.5)
KETONES UR QL STRIP.AUTO: NEGATIVE MG/DL
LACTATE BLD-SCNC: 1.74 MMOL/L (ref 0.4–2)
LEUKOCYTE ESTERASE UR QL STRIP.AUTO: NEGATIVE
LYMPHOCYTES # BLD: 1.1 K/UL (ref 0.9–3.6)
LYMPHOCYTES NFR BLD: 8 % (ref 21–52)
MCH RBC QN AUTO: 28.4 PG (ref 24–34)
MCHC RBC AUTO-ENTMCNC: 33 G/DL (ref 31–37)
MCV RBC AUTO: 86.1 FL (ref 78–100)
MONOCYTES # BLD: 1.4 K/UL (ref 0.05–1.2)
MONOCYTES NFR BLD: 10 % (ref 3–10)
MUCOUS THREADS URNS QL MICRO: NEGATIVE /LPF
NEUTS SEG # BLD: 12 K/UL (ref 1.8–8)
NEUTS SEG NFR BLD: 82 % (ref 40–73)
NITRITE UR QL STRIP.AUTO: NEGATIVE
NRBC # BLD: 0 K/UL (ref 0–0.01)
NRBC BLD-RTO: 0 PER 100 WBC
PH UR STRIP: 5.5 (ref 5–8)
PLATELET # BLD AUTO: 158 K/UL (ref 135–420)
PMV BLD AUTO: 10.8 FL (ref 9.2–11.8)
POTASSIUM SERPL-SCNC: 4.1 MMOL/L (ref 3.5–5.5)
PROCALCITONIN SERPL-MCNC: 0.39 NG/ML
PROT SERPL-MCNC: 6.8 G/DL (ref 6.4–8.2)
PROT UR STRIP-MCNC: 100 MG/DL
RBC # BLD AUTO: 4.4 M/UL (ref 4.35–5.65)
RBC #/AREA URNS HPF: ABNORMAL /HPF (ref 0–5)
SODIUM SERPL-SCNC: 130 MMOL/L (ref 136–145)
SP GR UR REFRACTOMETRY: 1.02 (ref 1–1.03)
TROPONIN I SERPL HS-MCNC: 15 NG/L (ref 0–78)
URATE SERPL-MCNC: 4.8 MG/DL (ref 2.6–7.2)
UROBILINOGEN UR QL STRIP.AUTO: 1 EU/DL (ref 0.2–1)
WBC # BLD AUTO: 14.6 K/UL (ref 4.6–13.2)
WBC URNS QL MICRO: ABNORMAL /HPF (ref 0–4)

## 2024-09-24 PROCEDURE — 6370000000 HC RX 637 (ALT 250 FOR IP)

## 2024-09-24 PROCEDURE — 87077 CULTURE AEROBIC IDENTIFY: CPT

## 2024-09-24 PROCEDURE — 86140 C-REACTIVE PROTEIN: CPT

## 2024-09-24 PROCEDURE — 84145 PROCALCITONIN (PCT): CPT

## 2024-09-24 PROCEDURE — 93010 ELECTROCARDIOGRAM REPORT: CPT | Performed by: INTERNAL MEDICINE

## 2024-09-24 PROCEDURE — 81001 URINALYSIS AUTO W/SCOPE: CPT

## 2024-09-24 PROCEDURE — 87040 BLOOD CULTURE FOR BACTERIA: CPT

## 2024-09-24 PROCEDURE — 99285 EMERGENCY DEPT VISIT HI MDM: CPT

## 2024-09-24 PROCEDURE — 6360000002 HC RX W HCPCS: Performed by: FAMILY MEDICINE

## 2024-09-24 PROCEDURE — 2580000003 HC RX 258

## 2024-09-24 PROCEDURE — 96365 THER/PROPH/DIAG IV INF INIT: CPT

## 2024-09-24 PROCEDURE — 87070 CULTURE OTHR SPECIMN AEROBIC: CPT

## 2024-09-24 PROCEDURE — 93005 ELECTROCARDIOGRAM TRACING: CPT

## 2024-09-24 PROCEDURE — 87186 SC STD MICRODIL/AGAR DIL: CPT

## 2024-09-24 PROCEDURE — 6370000000 HC RX 637 (ALT 250 FOR IP): Performed by: FAMILY MEDICINE

## 2024-09-24 PROCEDURE — 87154 CUL TYP ID BLD PTHGN 6+ TRGT: CPT

## 2024-09-24 PROCEDURE — 84484 ASSAY OF TROPONIN QUANT: CPT

## 2024-09-24 PROCEDURE — 80053 COMPREHEN METABOLIC PANEL: CPT

## 2024-09-24 PROCEDURE — 85025 COMPLETE CBC W/AUTO DIFF WBC: CPT

## 2024-09-24 PROCEDURE — 83605 ASSAY OF LACTIC ACID: CPT

## 2024-09-24 PROCEDURE — 20605 DRAIN/INJ JOINT/BURSA W/O US: CPT | Performed by: ORTHOPAEDIC SURGERY

## 2024-09-24 PROCEDURE — 84550 ASSAY OF BLOOD/URIC ACID: CPT

## 2024-09-24 PROCEDURE — 82962 GLUCOSE BLOOD TEST: CPT

## 2024-09-24 PROCEDURE — 99223 1ST HOSP IP/OBS HIGH 75: CPT | Performed by: ORTHOPAEDIC SURGERY

## 2024-09-24 PROCEDURE — 1100000000 HC RM PRIVATE

## 2024-09-24 PROCEDURE — 2580000003 HC RX 258: Performed by: FAMILY MEDICINE

## 2024-09-24 PROCEDURE — 99223 1ST HOSP IP/OBS HIGH 75: CPT | Performed by: FAMILY MEDICINE

## 2024-09-24 PROCEDURE — 6360000002 HC RX W HCPCS

## 2024-09-24 PROCEDURE — 85652 RBC SED RATE AUTOMATED: CPT

## 2024-09-24 PROCEDURE — 73630 X-RAY EXAM OF FOOT: CPT

## 2024-09-24 PROCEDURE — 87205 SMEAR GRAM STAIN: CPT

## 2024-09-24 PROCEDURE — 71045 X-RAY EXAM CHEST 1 VIEW: CPT

## 2024-09-24 RX ORDER — SPIRONOLACTONE 25 MG/1
25 TABLET ORAL DAILY
Status: DISCONTINUED | OUTPATIENT
Start: 2024-09-25 | End: 2024-09-28 | Stop reason: HOSPADM

## 2024-09-24 RX ORDER — SODIUM CHLORIDE 9 MG/ML
INJECTION, SOLUTION INTRAVENOUS PRN
Status: DISCONTINUED | OUTPATIENT
Start: 2024-09-24 | End: 2024-09-28 | Stop reason: HOSPADM

## 2024-09-24 RX ORDER — LORAZEPAM 1 MG/1
2 TABLET ORAL
Status: DISCONTINUED | OUTPATIENT
Start: 2024-09-24 | End: 2024-09-27

## 2024-09-24 RX ORDER — IBUPROFEN 200 MG
200 TABLET ORAL EVERY 6 HOURS PRN
COMMUNITY

## 2024-09-24 RX ORDER — LORAZEPAM 2 MG/ML
3 INJECTION INTRAMUSCULAR
Status: DISCONTINUED | OUTPATIENT
Start: 2024-09-24 | End: 2024-09-27

## 2024-09-24 RX ORDER — LORAZEPAM 2 MG/ML
2 INJECTION INTRAMUSCULAR
Status: DISCONTINUED | OUTPATIENT
Start: 2024-09-24 | End: 2024-09-27

## 2024-09-24 RX ORDER — LORAZEPAM 2 MG/ML
1 INJECTION INTRAMUSCULAR
Status: DISCONTINUED | OUTPATIENT
Start: 2024-09-24 | End: 2024-09-27

## 2024-09-24 RX ORDER — ASPIRIN 81 MG/1
81 TABLET ORAL DAILY
Status: DISCONTINUED | OUTPATIENT
Start: 2024-09-25 | End: 2024-09-28 | Stop reason: HOSPADM

## 2024-09-24 RX ORDER — OXYCODONE HYDROCHLORIDE 5 MG/1
10 TABLET ORAL EVERY 4 HOURS PRN
Status: DISCONTINUED | OUTPATIENT
Start: 2024-09-24 | End: 2024-09-27

## 2024-09-24 RX ORDER — LORAZEPAM 1 MG/1
4 TABLET ORAL
Status: DISCONTINUED | OUTPATIENT
Start: 2024-09-24 | End: 2024-09-27

## 2024-09-24 RX ORDER — GAUZE BANDAGE 2" X 2"
100 BANDAGE TOPICAL DAILY
Status: DISCONTINUED | OUTPATIENT
Start: 2024-09-24 | End: 2024-09-26

## 2024-09-24 RX ORDER — METOPROLOL TARTRATE 50 MG
50 TABLET ORAL EVERY 12 HOURS
Status: DISCONTINUED | OUTPATIENT
Start: 2024-09-24 | End: 2024-09-28 | Stop reason: HOSPADM

## 2024-09-24 RX ORDER — ATORVASTATIN CALCIUM 40 MG/1
80 TABLET, FILM COATED ORAL NIGHTLY
Status: DISCONTINUED | OUTPATIENT
Start: 2024-09-24 | End: 2024-09-28 | Stop reason: HOSPADM

## 2024-09-24 RX ORDER — ALBUTEROL SULFATE 5 MG/ML
2.5 SOLUTION RESPIRATORY (INHALATION) EVERY 4 HOURS PRN
Status: DISCONTINUED | OUTPATIENT
Start: 2024-09-24 | End: 2024-09-24 | Stop reason: CLARIF

## 2024-09-24 RX ORDER — OXYCODONE AND ACETAMINOPHEN 5; 325 MG/1; MG/1
1 TABLET ORAL
Status: COMPLETED | OUTPATIENT
Start: 2024-09-24 | End: 2024-09-24

## 2024-09-24 RX ORDER — MAGNESIUM SULFATE IN WATER 40 MG/ML
2000 INJECTION, SOLUTION INTRAVENOUS PRN
Status: DISCONTINUED | OUTPATIENT
Start: 2024-09-24 | End: 2024-09-28 | Stop reason: HOSPADM

## 2024-09-24 RX ORDER — SODIUM CHLORIDE 0.9 % (FLUSH) 0.9 %
5-40 SYRINGE (ML) INJECTION PRN
Status: DISCONTINUED | OUTPATIENT
Start: 2024-09-24 | End: 2024-09-28 | Stop reason: HOSPADM

## 2024-09-24 RX ORDER — ACETAMINOPHEN 650 MG/1
650 SUPPOSITORY RECTAL EVERY 6 HOURS PRN
Status: DISCONTINUED | OUTPATIENT
Start: 2024-09-24 | End: 2024-09-28 | Stop reason: HOSPADM

## 2024-09-24 RX ORDER — LORAZEPAM 1 MG/1
1 TABLET ORAL
Status: DISCONTINUED | OUTPATIENT
Start: 2024-09-24 | End: 2024-09-27

## 2024-09-24 RX ORDER — LORAZEPAM 2 MG/ML
4 INJECTION INTRAMUSCULAR
Status: DISCONTINUED | OUTPATIENT
Start: 2024-09-24 | End: 2024-09-27

## 2024-09-24 RX ORDER — MORPHINE SULFATE 2 MG/ML
2 INJECTION, SOLUTION INTRAMUSCULAR; INTRAVENOUS EVERY 4 HOURS PRN
Status: DISCONTINUED | OUTPATIENT
Start: 2024-09-24 | End: 2024-09-28 | Stop reason: HOSPADM

## 2024-09-24 RX ORDER — SODIUM CHLORIDE 0.9 % (FLUSH) 0.9 %
5-40 SYRINGE (ML) INJECTION EVERY 12 HOURS SCHEDULED
Status: DISCONTINUED | OUTPATIENT
Start: 2024-09-24 | End: 2024-09-28 | Stop reason: HOSPADM

## 2024-09-24 RX ORDER — BUDESONIDE 0.5 MG/2ML
0.5 INHALANT ORAL
Status: DISCONTINUED | OUTPATIENT
Start: 2024-09-24 | End: 2024-09-26

## 2024-09-24 RX ORDER — LORAZEPAM 1 MG/1
3 TABLET ORAL
Status: DISCONTINUED | OUTPATIENT
Start: 2024-09-24 | End: 2024-09-27

## 2024-09-24 RX ORDER — ENOXAPARIN SODIUM 100 MG/ML
40 INJECTION SUBCUTANEOUS DAILY
Status: DISCONTINUED | OUTPATIENT
Start: 2024-09-24 | End: 2024-09-28 | Stop reason: HOSPADM

## 2024-09-24 RX ORDER — ONDANSETRON 4 MG/1
4 TABLET, ORALLY DISINTEGRATING ORAL EVERY 8 HOURS PRN
Status: DISCONTINUED | OUTPATIENT
Start: 2024-09-24 | End: 2024-09-28 | Stop reason: HOSPADM

## 2024-09-24 RX ORDER — POTASSIUM CHLORIDE 7.45 MG/ML
10 INJECTION INTRAVENOUS PRN
Status: DISCONTINUED | OUTPATIENT
Start: 2024-09-24 | End: 2024-09-28 | Stop reason: HOSPADM

## 2024-09-24 RX ORDER — POTASSIUM CHLORIDE 1500 MG/1
40 TABLET, EXTENDED RELEASE ORAL PRN
Status: DISCONTINUED | OUTPATIENT
Start: 2024-09-24 | End: 2024-09-28 | Stop reason: HOSPADM

## 2024-09-24 RX ORDER — ALBUTEROL SULFATE 0.83 MG/ML
2.5 SOLUTION RESPIRATORY (INHALATION) EVERY 4 HOURS PRN
Status: DISCONTINUED | OUTPATIENT
Start: 2024-09-24 | End: 2024-09-28 | Stop reason: HOSPADM

## 2024-09-24 RX ORDER — ACETAMINOPHEN 325 MG/1
650 TABLET ORAL EVERY 6 HOURS PRN
Status: DISCONTINUED | OUTPATIENT
Start: 2024-09-24 | End: 2024-09-28 | Stop reason: HOSPADM

## 2024-09-24 RX ORDER — ONDANSETRON 2 MG/ML
4 INJECTION INTRAMUSCULAR; INTRAVENOUS EVERY 6 HOURS PRN
Status: DISCONTINUED | OUTPATIENT
Start: 2024-09-24 | End: 2024-09-28 | Stop reason: HOSPADM

## 2024-09-24 RX ORDER — LISINOPRIL 5 MG/1
5 TABLET ORAL DAILY
Status: DISCONTINUED | OUTPATIENT
Start: 2024-09-25 | End: 2024-09-28 | Stop reason: HOSPADM

## 2024-09-24 RX ORDER — CLOPIDOGREL BISULFATE 75 MG/1
75 TABLET ORAL DAILY
Status: DISCONTINUED | OUTPATIENT
Start: 2024-09-24 | End: 2024-09-28 | Stop reason: HOSPADM

## 2024-09-24 RX ORDER — POLYETHYLENE GLYCOL 3350 17 G/17G
17 POWDER, FOR SOLUTION ORAL DAILY PRN
Status: DISCONTINUED | OUTPATIENT
Start: 2024-09-24 | End: 2024-09-28 | Stop reason: HOSPADM

## 2024-09-24 RX ADMIN — ATORVASTATIN CALCIUM 80 MG: 40 TABLET, FILM COATED ORAL at 20:30

## 2024-09-24 RX ADMIN — ACETAMINOPHEN 325MG 650 MG: 325 TABLET ORAL at 20:29

## 2024-09-24 RX ADMIN — Medication 100 MG: at 18:38

## 2024-09-24 RX ADMIN — PIPERACILLIN AND TAZOBACTAM 4500 MG: 4; .5 INJECTION, POWDER, FOR SOLUTION INTRAVENOUS at 13:37

## 2024-09-24 RX ADMIN — CLOPIDOGREL BISULFATE 75 MG: 75 TABLET ORAL at 17:01

## 2024-09-24 RX ADMIN — ENOXAPARIN SODIUM 40 MG: 100 INJECTION SUBCUTANEOUS at 17:01

## 2024-09-24 RX ADMIN — VANCOMYCIN HYDROCHLORIDE 2000 MG: 10 INJECTION, POWDER, LYOPHILIZED, FOR SOLUTION INTRAVENOUS at 14:46

## 2024-09-24 RX ADMIN — METOPROLOL TARTRATE 50 MG: 50 TABLET, FILM COATED ORAL at 20:30

## 2024-09-24 RX ADMIN — BUDESONIDE 500 MCG: 0.5 INHALANT RESPIRATORY (INHALATION) at 19:14

## 2024-09-24 RX ADMIN — SODIUM CHLORIDE, PRESERVATIVE FREE 10 ML: 5 INJECTION INTRAVENOUS at 21:00

## 2024-09-24 RX ADMIN — OXYCODONE HYDROCHLORIDE AND ACETAMINOPHEN 1 TABLET: 5; 325 TABLET ORAL at 13:28

## 2024-09-24 RX ADMIN — PIPERACILLIN AND TAZOBACTAM 3375 MG: 3; .375 INJECTION, POWDER, FOR SOLUTION INTRAVENOUS at 20:34

## 2024-09-24 RX ADMIN — SODIUM CHLORIDE, PRESERVATIVE FREE 10 ML: 5 INJECTION INTRAVENOUS at 20:30

## 2024-09-24 RX ADMIN — OXYCODONE 10 MG: 5 TABLET ORAL at 17:21

## 2024-09-24 ASSESSMENT — PAIN DESCRIPTION - LOCATION
LOCATION: FOOT;ANKLE
LOCATION: LEG
LOCATION: FOOT

## 2024-09-24 ASSESSMENT — PAIN DESCRIPTION - ORIENTATION
ORIENTATION: RIGHT

## 2024-09-24 ASSESSMENT — PAIN DESCRIPTION - PAIN TYPE: TYPE: CHRONIC PAIN

## 2024-09-24 ASSESSMENT — PAIN SCALES - GENERAL
PAINLEVEL_OUTOF10: 7
PAINLEVEL_OUTOF10: 8
PAINLEVEL_OUTOF10: 0
PAINLEVEL_OUTOF10: 8
PAINLEVEL_OUTOF10: 0
PAINLEVEL_OUTOF10: 9

## 2024-09-24 ASSESSMENT — PAIN DESCRIPTION - DESCRIPTORS
DESCRIPTORS: SHARP
DESCRIPTORS: ACHING;BURNING
DESCRIPTORS: SHARP

## 2024-09-24 ASSESSMENT — PAIN - FUNCTIONAL ASSESSMENT
PAIN_FUNCTIONAL_ASSESSMENT: 0-10
PAIN_FUNCTIONAL_ASSESSMENT: ACTIVITIES ARE NOT PREVENTED

## 2024-09-24 ASSESSMENT — PAIN DESCRIPTION - ONSET: ONSET: ON-GOING

## 2024-09-24 ASSESSMENT — PAIN DESCRIPTION - FREQUENCY: FREQUENCY: CONTINUOUS

## 2024-09-24 NOTE — ED NOTES
TRANSFER - OUT REPORT:    Verbal report given to BRIAN Junior on Solo Souza  being transferred to [facility] for ordered procedure       Report consisted of patient's Situation, Background, Assessment and   Recommendations(SBAR).     Information from the following report(s) Nurse Handoff Report, ED Encounter Summary, ED SBAR, and Neuro Assessment was reviewed with the receiving nurse.    [place] Fall Assessment:    Presents to emergency department  because of falls (Syncope, seizure, or loss of consciousness): No  Age > 70: No  Altered Mental Status, Intoxication with alcohol or substance confusion (Disorientation, impaired judgment, poor safety awaremess, or inability to follow instructions): No  Impaired Mobility: Ambulates or transfers with assistive devices or assistance; Unable to ambulate or transer.: No  Nursing Judgement: No          Lines:   Peripheral IV 09/24/24 Right Antecubital (Active)   Site Assessment Clean, dry & intact 09/24/24 1307       Peripheral IV 09/24/24 Left Antecubital (Active)        Opportunity for questions and clarification was provided.      Patient transported with:  Tech

## 2024-09-24 NOTE — ED PROVIDER NOTES
EMERGENCY DEPARTMENT HISTORY AND PHYSICAL EXAM      Patient Name: Solo Souza  MRN: [id number]  YOB: 1960  Provider: Carmen Barboza PA-C  PCP: Paco Chin MD   Time/Date of evaluation: 12:11 PM EDT on 9/24/24    History of Presenting Illness     Chief Complaint   Patient presents with    Foot Swelling    Foot Pain       History Provided By: Patient     History (Narative):   Solo Souza is a 63 y.o. male with a PMHX of COPD, diabetes, emphysema MI, hypertension, pneumonia  who presents to the emergency department  by POV C/O foot swelling and foot pain.  Patient states that for the past 2 days he noticed progressive foot swelling, and foot pain, he states that he was at a baseball field, noticed that there was tightness in his right foot and its gotten progressively worse.  He states that he looked down this morning and notices that it was red.  He denies any falls or injuries recent instrumentation or IV drug use.  Patient did endorse that he has been feeling ill and has been having multiple episodes of vomiting.  He denies any over-the-counter medication.    Past History     Past Medical History:  Past Medical History:   Diagnosis Date    COPD (chronic obstructive pulmonary disease) (HCC)     Diabetes (HCC)     Emphysema     H/O cardiovascular stress test 2012    negative    Hypertension     MI (myocardial infarction) (HCC)     ARMANDO (obstructive sleep apnea)     no CPAP    Pneumonia 02/01/2024       Past Surgical History:  Past Surgical History:   Procedure Laterality Date    BACK SURGERY  2021    COLONOSCOPY      CORONARY ANGIOPLASTY WITH STENT PLACEMENT      HERNIA REPAIR      umbilical    UPPER GASTROINTESTINAL ENDOSCOPY N/A 2/29/2024    ESOPHAGOGASTRODUODENOSCOPY with bxs performed by Jose Rico MD at [facility] ENDOSCOPY       Family History:  Family History   Problem Relation Age of Onset    Diabetes Mother        Social History:  Social History     Tobacco Use    Smoking status:

## 2024-09-25 ENCOUNTER — APPOINTMENT (OUTPATIENT)
Facility: HOSPITAL | Age: 64
DRG: 872 | End: 2024-09-25
Payer: MEDICARE

## 2024-09-25 PROBLEM — L03.119 CELLULITIS IN DIABETIC FOOT (HCC): Status: ACTIVE | Noted: 2024-09-25

## 2024-09-25 PROBLEM — M65.971 SYNOVITIS OF RIGHT ANKLE: Status: ACTIVE | Noted: 2024-09-25

## 2024-09-25 PROBLEM — A41.01 STAPHYLOCOCCUS AUREUS BACTEREMIA WITH SEPSIS (HCC): Status: ACTIVE | Noted: 2024-09-25

## 2024-09-25 PROBLEM — M00.9 SEPTIC ARTHRITIS OF RIGHT FOOT: Status: ACTIVE | Noted: 2024-09-25

## 2024-09-25 PROBLEM — E11.628 CELLULITIS IN DIABETIC FOOT (HCC): Status: ACTIVE | Noted: 2024-09-25

## 2024-09-25 LAB
ACB COMPLEX DNA BLD POS QL NAA+NON-PROBE: NOT DETECTED
ACCESSION NUMBER, LLC1M: ABNORMAL
ANION GAP BLD CALC-SCNC: ABNORMAL MMOL/L (ref 10–20)
ANION GAP SERPL CALC-SCNC: 6 MMOL/L (ref 3–18)
ARTERIAL PATENCY WRIST A: POSITIVE
B FRAGILIS DNA BLD POS QL NAA+NON-PROBE: NOT DETECTED
BASE EXCESS BLD CALC-SCNC: 2.1 MMOL/L
BASOPHILS # BLD: 0 K/UL (ref 0–0.1)
BASOPHILS NFR BLD: 0 % (ref 0–2)
BDY SITE: ABNORMAL
BIOFIRE TEST COMMENT: ABNORMAL
BUN SERPL-MCNC: 22 MG/DL (ref 7–18)
BUN/CREAT SERPL: 15 (ref 12–20)
C ALBICANS DNA BLD POS QL NAA+NON-PROBE: NOT DETECTED
C AURIS DNA BLD POS QL NAA+NON-PROBE: NOT DETECTED
C GATTII+NEOFOR DNA BLD POS QL NAA+N-PRB: NOT DETECTED
C GLABRATA DNA BLD POS QL NAA+NON-PROBE: NOT DETECTED
C KRUSEI DNA BLD POS QL NAA+NON-PROBE: NOT DETECTED
C PARAP DNA BLD POS QL NAA+NON-PROBE: NOT DETECTED
C TROPICLS DNA BLD POS QL NAA+NON-PROBE: NOT DETECTED
CA-I BLD-MCNC: 1.13 MMOL/L (ref 1.15–1.33)
CALCIUM SERPL-MCNC: 8.4 MG/DL (ref 8.5–10.1)
CHLORIDE BLD-SCNC: 94 MMOL/L (ref 98–107)
CHLORIDE SERPL-SCNC: 94 MMOL/L (ref 100–111)
CO2 BLD-SCNC: 27 MMOL/L (ref 22–29)
CO2 SERPL-SCNC: 27 MMOL/L (ref 21–32)
CREAT BLD-MCNC: 1.14 MG/DL (ref 0.6–1.3)
CREAT SERPL-MCNC: 1.47 MG/DL (ref 0.6–1.3)
DIFFERENTIAL METHOD BLD: ABNORMAL
E CLOAC COMP DNA BLD POS NAA+NON-PROBE: NOT DETECTED
E COLI DNA BLD POS QL NAA+NON-PROBE: NOT DETECTED
E FAECALIS DNA BLD POS QL NAA+NON-PROBE: NOT DETECTED
E FAECIUM DNA BLD POS QL NAA+NON-PROBE: NOT DETECTED
ENTEROBACTERALES DNA BLD POS NAA+N-PRB: NOT DETECTED
EOSINOPHIL # BLD: 0 K/UL (ref 0–0.4)
EOSINOPHIL NFR BLD: 0 % (ref 0–5)
ERYTHROCYTE [DISTWIDTH] IN BLOOD BY AUTOMATED COUNT: 12.9 % (ref 11.6–14.5)
GAS FLOW.O2 O2 DELIVERY SYS: ABNORMAL
GLUCOSE BLD-MCNC: 156 MG/DL (ref 74–99)
GLUCOSE SERPL-MCNC: 285 MG/DL (ref 74–99)
GP B STREP DNA BLD POS QL NAA+NON-PROBE: NOT DETECTED
HAEM INFLU DNA BLD POS QL NAA+NON-PROBE: NOT DETECTED
HCO3 BLD-SCNC: 27.3 MMOL/L (ref 21–28)
HCT VFR BLD AUTO: 33.7 % (ref 36–48)
HGB BLD-MCNC: 10.9 G/DL (ref 13–16)
IMM GRANULOCYTES # BLD AUTO: 0.1 K/UL (ref 0–0.04)
IMM GRANULOCYTES NFR BLD AUTO: 1 % (ref 0–0.5)
K OXYTOCA DNA BLD POS QL NAA+NON-PROBE: NOT DETECTED
KLEBSIELLA SP DNA BLD POS QL NAA+NON-PRB: NOT DETECTED
KLEBSIELLA SP DNA BLD POS QL NAA+NON-PRB: NOT DETECTED
L MONOCYTOG DNA BLD POS QL NAA+NON-PROBE: NOT DETECTED
LACTATE BLD-SCNC: 0.65 MMOL/L (ref 0.4–2)
LYMPHOCYTES # BLD: 1.1 K/UL (ref 0.9–3.6)
LYMPHOCYTES NFR BLD: 7 % (ref 21–52)
MCH RBC QN AUTO: 28.3 PG (ref 24–34)
MCHC RBC AUTO-ENTMCNC: 32.3 G/DL (ref 31–37)
MCV RBC AUTO: 87.5 FL (ref 78–100)
MECA+MECC+MREJ ISLT/SPM QL: NOT DETECTED
MONOCYTES # BLD: 1.7 K/UL (ref 0.05–1.2)
MONOCYTES NFR BLD: 11 % (ref 3–10)
N MEN DNA BLD POS QL NAA+NON-PROBE: NOT DETECTED
NEUTS SEG # BLD: 12.4 K/UL (ref 1.8–8)
NEUTS SEG NFR BLD: 81 % (ref 40–73)
NRBC # BLD: 0 K/UL (ref 0–0.01)
NRBC BLD-RTO: 0 PER 100 WBC
P AERUGINOSA DNA BLD POS NAA+NON-PROBE: NOT DETECTED
PCO2 BLD: 44.3 MMHG (ref 35–48)
PH BLD: 7.4 (ref 7.35–7.45)
PLATELET # BLD AUTO: 149 K/UL (ref 135–420)
PMV BLD AUTO: 11.3 FL (ref 9.2–11.8)
PO2 BLD: 72 MMHG (ref 83–108)
POTASSIUM BLD-SCNC: 4.2 MMOL/L (ref 3.5–5.1)
POTASSIUM SERPL-SCNC: 3.8 MMOL/L (ref 3.5–5.5)
PROTEUS SP DNA BLD POS QL NAA+NON-PROBE: NOT DETECTED
RBC # BLD AUTO: 3.85 M/UL (ref 4.35–5.65)
RESISTANT GENE TARGETS: ABNORMAL
S AUREUS DNA BLD POS QL NAA+NON-PROBE: DETECTED
S AUREUS+CONS DNA BLD POS NAA+NON-PROBE: DETECTED
S EPIDERMIDIS DNA BLD POS QL NAA+NON-PRB: NOT DETECTED
S LUGDUNENSIS DNA BLD POS QL NAA+NON-PRB: NOT DETECTED
S MALTOPHILIA DNA BLD POS QL NAA+NON-PRB: NOT DETECTED
S MARCESCENS DNA BLD POS NAA+NON-PROBE: NOT DETECTED
S PNEUM DNA BLD POS QL NAA+NON-PROBE: NOT DETECTED
S PYO DNA BLD POS QL NAA+NON-PROBE: NOT DETECTED
SALMONELLA DNA BLD POS QL NAA+NON-PROBE: NOT DETECTED
SAO2 % BLD: 94 % (ref 94–98)
SERVICE CMNT-IMP: ABNORMAL
SODIUM BLD-SCNC: 130 MMOL/L (ref 136–145)
SODIUM SERPL-SCNC: 127 MMOL/L (ref 136–145)
SPECIMEN SITE: ABNORMAL
STREPTOCOCCUS DNA BLD POS NAA+NON-PROBE: NOT DETECTED
VANCOMYCIN SERPL-MCNC: 23 UG/ML (ref 5–40)
WBC # BLD AUTO: 15.3 K/UL (ref 4.6–13.2)

## 2024-09-25 PROCEDURE — 1100000000 HC RM PRIVATE

## 2024-09-25 PROCEDURE — 99231 SBSQ HOSP IP/OBS SF/LOW 25: CPT | Performed by: ORTHOPAEDIC SURGERY

## 2024-09-25 PROCEDURE — 6360000002 HC RX W HCPCS: Performed by: FAMILY MEDICINE

## 2024-09-25 PROCEDURE — 73720 MRI LWR EXTREMITY W/O&W/DYE: CPT

## 2024-09-25 PROCEDURE — 85014 HEMATOCRIT: CPT

## 2024-09-25 PROCEDURE — 80048 BASIC METABOLIC PNL TOTAL CA: CPT

## 2024-09-25 PROCEDURE — 82947 ASSAY GLUCOSE BLOOD QUANT: CPT

## 2024-09-25 PROCEDURE — 6360000002 HC RX W HCPCS: Performed by: STUDENT IN AN ORGANIZED HEALTH CARE EDUCATION/TRAINING PROGRAM

## 2024-09-25 PROCEDURE — 2580000003 HC RX 258: Performed by: STUDENT IN AN ORGANIZED HEALTH CARE EDUCATION/TRAINING PROGRAM

## 2024-09-25 PROCEDURE — 94761 N-INVAS EAR/PLS OXIMETRY MLT: CPT

## 2024-09-25 PROCEDURE — 94640 AIRWAY INHALATION TREATMENT: CPT

## 2024-09-25 PROCEDURE — 83605 ASSAY OF LACTIC ACID: CPT

## 2024-09-25 PROCEDURE — 92526 ORAL FUNCTION THERAPY: CPT

## 2024-09-25 PROCEDURE — 80202 ASSAY OF VANCOMYCIN: CPT

## 2024-09-25 PROCEDURE — 80307 DRUG TEST PRSMV CHEM ANLYZR: CPT

## 2024-09-25 PROCEDURE — 73721 MRI JNT OF LWR EXTRE W/O DYE: CPT

## 2024-09-25 PROCEDURE — A9577 INJ MULTIHANCE: HCPCS

## 2024-09-25 PROCEDURE — 99232 SBSQ HOSP IP/OBS MODERATE 35: CPT | Performed by: STUDENT IN AN ORGANIZED HEALTH CARE EDUCATION/TRAINING PROGRAM

## 2024-09-25 PROCEDURE — 84132 ASSAY OF SERUM POTASSIUM: CPT

## 2024-09-25 PROCEDURE — 2580000003 HC RX 258: Performed by: FAMILY MEDICINE

## 2024-09-25 PROCEDURE — 36415 COLL VENOUS BLD VENIPUNCTURE: CPT

## 2024-09-25 PROCEDURE — 82803 BLOOD GASES ANY COMBINATION: CPT

## 2024-09-25 PROCEDURE — 6370000000 HC RX 637 (ALT 250 FOR IP): Performed by: FAMILY MEDICINE

## 2024-09-25 PROCEDURE — 36600 WITHDRAWAL OF ARTERIAL BLOOD: CPT

## 2024-09-25 PROCEDURE — 84295 ASSAY OF SERUM SODIUM: CPT

## 2024-09-25 PROCEDURE — 85025 COMPLETE CBC W/AUTO DIFF WBC: CPT

## 2024-09-25 PROCEDURE — 92610 EVALUATE SWALLOWING FUNCTION: CPT

## 2024-09-25 PROCEDURE — 82330 ASSAY OF CALCIUM: CPT

## 2024-09-25 PROCEDURE — 6360000004 HC RX CONTRAST MEDICATION

## 2024-09-25 RX ORDER — SODIUM CHLORIDE, SODIUM LACTATE, POTASSIUM CHLORIDE, CALCIUM CHLORIDE 600; 310; 30; 20 MG/100ML; MG/100ML; MG/100ML; MG/100ML
INJECTION, SOLUTION INTRAVENOUS CONTINUOUS
Status: DISCONTINUED | OUTPATIENT
Start: 2024-09-25 | End: 2024-09-28 | Stop reason: HOSPADM

## 2024-09-25 RX ADMIN — ACETAMINOPHEN 325MG 650 MG: 325 TABLET ORAL at 09:02

## 2024-09-25 RX ADMIN — OXYCODONE 10 MG: 5 TABLET ORAL at 00:40

## 2024-09-25 RX ADMIN — Medication 100 MG: at 09:03

## 2024-09-25 RX ADMIN — ALBUTEROL SULFATE 2.5 MG: 2.5 SOLUTION RESPIRATORY (INHALATION) at 01:25

## 2024-09-25 RX ADMIN — VANCOMYCIN HYDROCHLORIDE 1000 MG: 1 INJECTION, POWDER, LYOPHILIZED, FOR SOLUTION INTRAVENOUS at 01:42

## 2024-09-25 RX ADMIN — SODIUM CHLORIDE, PRESERVATIVE FREE 10 ML: 5 INJECTION INTRAVENOUS at 09:10

## 2024-09-25 RX ADMIN — GADOBENATE DIMEGLUMINE 20 ML: 529 INJECTION, SOLUTION INTRAVENOUS at 17:21

## 2024-09-25 RX ADMIN — CLOPIDOGREL BISULFATE 75 MG: 75 TABLET ORAL at 09:03

## 2024-09-25 RX ADMIN — SPIRONOLACTONE 25 MG: 25 TABLET ORAL at 09:03

## 2024-09-25 RX ADMIN — PIPERACILLIN AND TAZOBACTAM 3375 MG: 3; .375 INJECTION, POWDER, FOR SOLUTION INTRAVENOUS at 04:22

## 2024-09-25 RX ADMIN — LISINOPRIL 5 MG: 5 TABLET ORAL at 09:03

## 2024-09-25 RX ADMIN — SODIUM CHLORIDE, PRESERVATIVE FREE 5 ML: 5 INJECTION INTRAVENOUS at 21:04

## 2024-09-25 RX ADMIN — PIPERACILLIN AND TAZOBACTAM 3375 MG: 3; .375 INJECTION, POWDER, FOR SOLUTION INTRAVENOUS at 12:49

## 2024-09-25 RX ADMIN — METOPROLOL TARTRATE 50 MG: 50 TABLET, FILM COATED ORAL at 09:03

## 2024-09-25 RX ADMIN — BUDESONIDE 500 MCG: 0.5 INHALANT RESPIRATORY (INHALATION) at 19:25

## 2024-09-25 RX ADMIN — LORAZEPAM 1 MG: 2 INJECTION INTRAMUSCULAR; INTRAVENOUS at 04:16

## 2024-09-25 RX ADMIN — VANCOMYCIN HYDROCHLORIDE 1500 MG: 10 INJECTION, POWDER, LYOPHILIZED, FOR SOLUTION INTRAVENOUS at 11:31

## 2024-09-25 RX ADMIN — BUDESONIDE 500 MCG: 0.5 INHALANT RESPIRATORY (INHALATION) at 08:24

## 2024-09-25 RX ADMIN — SODIUM CHLORIDE, POTASSIUM CHLORIDE, SODIUM LACTATE AND CALCIUM CHLORIDE: 600; 310; 30; 20 INJECTION, SOLUTION INTRAVENOUS at 11:30

## 2024-09-25 RX ADMIN — LORAZEPAM 3 MG: 2 INJECTION INTRAMUSCULAR; INTRAVENOUS at 09:01

## 2024-09-25 RX ADMIN — ONDANSETRON 4 MG: 4 TABLET, ORALLY DISINTEGRATING ORAL at 09:03

## 2024-09-25 RX ADMIN — ASPIRIN 81 MG: 81 TABLET, COATED ORAL at 09:03

## 2024-09-25 RX ADMIN — PIPERACILLIN AND TAZOBACTAM 3375 MG: 3; .375 INJECTION, POWDER, FOR SOLUTION INTRAVENOUS at 21:00

## 2024-09-25 RX ADMIN — ENOXAPARIN SODIUM 40 MG: 100 INJECTION SUBCUTANEOUS at 09:03

## 2024-09-25 RX ADMIN — METOPROLOL TARTRATE 50 MG: 50 TABLET, FILM COATED ORAL at 20:57

## 2024-09-25 RX ADMIN — ATORVASTATIN CALCIUM 80 MG: 40 TABLET, FILM COATED ORAL at 20:57

## 2024-09-25 ASSESSMENT — PAIN SCALES - GENERAL
PAINLEVEL_OUTOF10: 0
PAINLEVEL_OUTOF10: 8
PAINLEVEL_OUTOF10: 0

## 2024-09-25 ASSESSMENT — PAIN DESCRIPTION - FREQUENCY: FREQUENCY: INTERMITTENT

## 2024-09-25 ASSESSMENT — PAIN - FUNCTIONAL ASSESSMENT: PAIN_FUNCTIONAL_ASSESSMENT: ACTIVITIES ARE NOT PREVENTED

## 2024-09-25 ASSESSMENT — PAIN DESCRIPTION - ONSET: ONSET: ON-GOING

## 2024-09-25 ASSESSMENT — PAIN DESCRIPTION - LOCATION: LOCATION: FOOT

## 2024-09-25 ASSESSMENT — PAIN DESCRIPTION - DESCRIPTORS: DESCRIPTORS: THROBBING

## 2024-09-25 ASSESSMENT — PAIN DESCRIPTION - ORIENTATION: ORIENTATION: RIGHT

## 2024-09-25 ASSESSMENT — PAIN DESCRIPTION - PAIN TYPE: TYPE: ACUTE PAIN

## 2024-09-26 ENCOUNTER — APPOINTMENT (OUTPATIENT)
Facility: HOSPITAL | Age: 64
DRG: 872 | End: 2024-09-26
Payer: MEDICARE

## 2024-09-26 LAB
AMPHET UR QL SCN: NEGATIVE
ANION GAP SERPL CALC-SCNC: 6 MMOL/L (ref 3–18)
BARBITURATES UR QL SCN: NEGATIVE
BENZODIAZ UR QL: NEGATIVE
BUN SERPL-MCNC: 27 MG/DL (ref 7–18)
BUN/CREAT SERPL: 20 (ref 12–20)
CALCIUM SERPL-MCNC: 8.4 MG/DL (ref 8.5–10.1)
CANNABINOIDS UR QL SCN: NEGATIVE
CHLORIDE SERPL-SCNC: 100 MMOL/L (ref 100–111)
CO2 SERPL-SCNC: 28 MMOL/L (ref 21–32)
COCAINE UR QL SCN: POSITIVE
CREAT SERPL-MCNC: 1.38 MG/DL (ref 0.6–1.3)
ERYTHROCYTE [DISTWIDTH] IN BLOOD BY AUTOMATED COUNT: 13.2 % (ref 11.6–14.5)
GLUCOSE SERPL-MCNC: 110 MG/DL (ref 74–99)
HCT VFR BLD AUTO: 29 % (ref 36–48)
HGB BLD-MCNC: 9.7 G/DL (ref 13–16)
Lab: ABNORMAL
MCH RBC QN AUTO: 29.2 PG (ref 24–34)
MCHC RBC AUTO-ENTMCNC: 33.4 G/DL (ref 31–37)
MCV RBC AUTO: 87.3 FL (ref 78–100)
METHADONE UR QL: NEGATIVE
NRBC # BLD: 0 K/UL (ref 0–0.01)
NRBC BLD-RTO: 0 PER 100 WBC
OPIATES UR QL: POSITIVE
PCP UR QL: NEGATIVE
PLATELET # BLD AUTO: 160 K/UL (ref 135–420)
PMV BLD AUTO: 11.3 FL (ref 9.2–11.8)
POTASSIUM SERPL-SCNC: 4.1 MMOL/L (ref 3.5–5.5)
RBC # BLD AUTO: 3.32 M/UL (ref 4.35–5.65)
SODIUM SERPL-SCNC: 134 MMOL/L (ref 136–145)
WBC # BLD AUTO: 12.7 K/UL (ref 4.6–13.2)

## 2024-09-26 PROCEDURE — 99232 SBSQ HOSP IP/OBS MODERATE 35: CPT | Performed by: ORTHOPAEDIC SURGERY

## 2024-09-26 PROCEDURE — 36415 COLL VENOUS BLD VENIPUNCTURE: CPT

## 2024-09-26 PROCEDURE — 73701 CT LOWER EXTREMITY W/DYE: CPT

## 2024-09-26 PROCEDURE — 2580000003 HC RX 258: Performed by: STUDENT IN AN ORGANIZED HEALTH CARE EDUCATION/TRAINING PROGRAM

## 2024-09-26 PROCEDURE — 99232 SBSQ HOSP IP/OBS MODERATE 35: CPT | Performed by: STUDENT IN AN ORGANIZED HEALTH CARE EDUCATION/TRAINING PROGRAM

## 2024-09-26 PROCEDURE — 85027 COMPLETE CBC AUTOMATED: CPT

## 2024-09-26 PROCEDURE — 6360000004 HC RX CONTRAST MEDICATION: Performed by: STUDENT IN AN ORGANIZED HEALTH CARE EDUCATION/TRAINING PROGRAM

## 2024-09-26 PROCEDURE — 6360000002 HC RX W HCPCS: Performed by: FAMILY MEDICINE

## 2024-09-26 PROCEDURE — 1100000000 HC RM PRIVATE

## 2024-09-26 PROCEDURE — 2700000000 HC OXYGEN THERAPY PER DAY

## 2024-09-26 PROCEDURE — 94761 N-INVAS EAR/PLS OXIMETRY MLT: CPT

## 2024-09-26 PROCEDURE — 80048 BASIC METABOLIC PNL TOTAL CA: CPT

## 2024-09-26 PROCEDURE — 6360000002 HC RX W HCPCS: Performed by: STUDENT IN AN ORGANIZED HEALTH CARE EDUCATION/TRAINING PROGRAM

## 2024-09-26 PROCEDURE — 6370000000 HC RX 637 (ALT 250 FOR IP): Performed by: STUDENT IN AN ORGANIZED HEALTH CARE EDUCATION/TRAINING PROGRAM

## 2024-09-26 PROCEDURE — 87040 BLOOD CULTURE FOR BACTERIA: CPT

## 2024-09-26 PROCEDURE — 92526 ORAL FUNCTION THERAPY: CPT

## 2024-09-26 PROCEDURE — 6370000000 HC RX 637 (ALT 250 FOR IP): Performed by: FAMILY MEDICINE

## 2024-09-26 PROCEDURE — 2580000003 HC RX 258: Performed by: FAMILY MEDICINE

## 2024-09-26 RX ORDER — IOPAMIDOL 612 MG/ML
100 INJECTION, SOLUTION INTRAVASCULAR
Status: COMPLETED | OUTPATIENT
Start: 2024-09-26 | End: 2024-09-26

## 2024-09-26 RX ORDER — FOLIC ACID 1 MG/1
1 TABLET ORAL DAILY
Status: DISCONTINUED | OUTPATIENT
Start: 2024-09-26 | End: 2024-09-28 | Stop reason: HOSPADM

## 2024-09-26 RX ORDER — THIAMINE HYDROCHLORIDE 100 MG/ML
100 INJECTION, SOLUTION INTRAMUSCULAR; INTRAVENOUS DAILY
Status: DISCONTINUED | OUTPATIENT
Start: 2024-09-26 | End: 2024-09-28 | Stop reason: HOSPADM

## 2024-09-26 RX ADMIN — ATORVASTATIN CALCIUM 80 MG: 40 TABLET, FILM COATED ORAL at 21:28

## 2024-09-26 RX ADMIN — IOPAMIDOL 100 ML: 612 INJECTION, SOLUTION INTRAVENOUS at 18:49

## 2024-09-26 RX ADMIN — SODIUM CHLORIDE, PRESERVATIVE FREE 5 ML: 5 INJECTION INTRAVENOUS at 21:30

## 2024-09-26 RX ADMIN — OXYCODONE 10 MG: 5 TABLET ORAL at 14:17

## 2024-09-26 RX ADMIN — ENOXAPARIN SODIUM 40 MG: 100 INJECTION SUBCUTANEOUS at 11:20

## 2024-09-26 RX ADMIN — LORAZEPAM 1 MG: 2 INJECTION INTRAMUSCULAR; INTRAVENOUS at 11:04

## 2024-09-26 RX ADMIN — LISINOPRIL 5 MG: 5 TABLET ORAL at 11:20

## 2024-09-26 RX ADMIN — THIAMINE HYDROCHLORIDE 100 MG: 100 INJECTION, SOLUTION INTRAMUSCULAR; INTRAVENOUS at 11:20

## 2024-09-26 RX ADMIN — SODIUM CHLORIDE, POTASSIUM CHLORIDE, SODIUM LACTATE AND CALCIUM CHLORIDE: 600; 310; 30; 20 INJECTION, SOLUTION INTRAVENOUS at 03:13

## 2024-09-26 RX ADMIN — PIPERACILLIN AND TAZOBACTAM 3375 MG: 3; .375 INJECTION, POWDER, FOR SOLUTION INTRAVENOUS at 11:12

## 2024-09-26 RX ADMIN — SODIUM CHLORIDE, PRESERVATIVE FREE 5 ML: 5 INJECTION INTRAVENOUS at 21:29

## 2024-09-26 RX ADMIN — METOPROLOL TARTRATE 50 MG: 50 TABLET, FILM COATED ORAL at 11:20

## 2024-09-26 RX ADMIN — ASPIRIN 81 MG: 81 TABLET, COATED ORAL at 11:20

## 2024-09-26 RX ADMIN — SPIRONOLACTONE 25 MG: 25 TABLET ORAL at 11:35

## 2024-09-26 RX ADMIN — CLOPIDOGREL BISULFATE 75 MG: 75 TABLET ORAL at 11:20

## 2024-09-26 RX ADMIN — SODIUM CHLORIDE, PRESERVATIVE FREE 10 ML: 5 INJECTION INTRAVENOUS at 09:00

## 2024-09-26 RX ADMIN — METOPROLOL TARTRATE 50 MG: 50 TABLET, FILM COATED ORAL at 21:28

## 2024-09-26 RX ADMIN — FOLIC ACID 1 MG: 1 TABLET ORAL at 11:20

## 2024-09-26 ASSESSMENT — PAIN SCALES - GENERAL
PAINLEVEL_OUTOF10: 9
PAINLEVEL_OUTOF10: 9
PAINLEVEL_OUTOF10: 0
PAINLEVEL_OUTOF10: 0

## 2024-09-26 ASSESSMENT — PAIN DESCRIPTION - LOCATION
LOCATION: FOOT
LOCATION: FOOT

## 2024-09-26 ASSESSMENT — PAIN DESCRIPTION - ORIENTATION
ORIENTATION: RIGHT
ORIENTATION: RIGHT

## 2024-09-26 ASSESSMENT — PAIN DESCRIPTION - FREQUENCY: FREQUENCY: INTERMITTENT

## 2024-09-26 ASSESSMENT — PAIN DESCRIPTION - DESCRIPTORS: DESCRIPTORS: SHARP

## 2024-09-26 ASSESSMENT — PAIN DESCRIPTION - ONSET: ONSET: GRADUAL

## 2024-09-26 ASSESSMENT — PAIN DESCRIPTION - PAIN TYPE: TYPE: ACUTE PAIN

## 2024-09-26 ASSESSMENT — PAIN - FUNCTIONAL ASSESSMENT: PAIN_FUNCTIONAL_ASSESSMENT: ACTIVITIES ARE NOT PREVENTED

## 2024-09-26 NOTE — CARE COORDINATION
09/26/24 1700   Service Assessment   Patient Orientation Sedated   Cognition Other (see comment)   History Provided By Child/Family   Primary Caregiver Family   Accompanied By/Relationship Josefina Hendrickson( ex girlfriend   Support Systems Children;Spouse/Significant Other   Patient's Healthcare Decision Maker is: Legal Next of Kin   PCP Verified by CM Yes   Last Visit to PCP Within last 3 months   Prior Functional Level Independent in ADLs/IADLs   Current Functional Level Independent in ADLs/IADLs   Can patient return to prior living arrangement Yes   Ability to make needs known: Good   Family able to assist with home care needs: Yes   Would you like for me to discuss the discharge plan with any other family members/significant others, and if so, who? Yes   [place]   Community Resources None   CM/SW Referral New parent   Social/Functional History   Lives With Significant other   Type of Home House   Home Layout One level   Home Access Stairs to enter with rails   Entrance Stairs - Number of Steps 3   Entrance Stairs - Rails Both   Bathroom Shower/Tub Tub/Shower unit   Bathroom Toilet Standard   Bathroom Equipment None   Bathroom Accessibility Accessible   Home Equipment Oxygen  (Patient has O2 as PRN and portable o2)   Receives Help From Family   ADL Assistance Independent   Homemaking Assistance Needs assistance   Homemaking Responsibilities Yes   Ambulation Assistance Independent   Transfer Assistance Independent   Active  Yes   Mode of Transportation Car   Occupation Retired   Type of Occupation n/a   Discharge Planning   Type of Residence House   Living Arrangements Spouse/Significant Other;Children   Current Services Prior To Admission None   Potential Assistance Needed N/A   DME Ordered? No   Potential Assistance Purchasing Medications No   Type of Home Care Services None   One/Two Story Residence One story   History of falls? 0   Services At/After Discharge   Transition of Care 
   09/26/24 1700   Service Assessment   Patient Orientation Sedated   Cognition Other (see comment)   History Provided By Child/Family   Primary Caregiver Family   Accompanied By/Relationship Josefina Hendrickson( ex girlfriend   Support Systems Children;Spouse/Significant Other   Patient's Healthcare Decision Maker is: Legal Next of Kin   PCP Verified by CM Yes   Last Visit to PCP Within last 3 months   Prior Functional Level Independent in ADLs/IADLs   Current Functional Level Independent in ADLs/IADLs   Can patient return to prior living arrangement Yes   Ability to make needs known: Good   Family able to assist with home care needs: Yes   Would you like for me to discuss the discharge plan with any other family members/significant others, and if so, who? Yes   Financial Resources  ([place]   Community Resources None   CM/SW Referral New parent   Social/Functional History   Lives With Significant other   Type of Home House   Home Layout One level   Home Access Stairs to enter with rails   Entrance Stairs - Number of Steps 3   Entrance Stairs - Rails Both   Bathroom Shower/Tub Tub/Shower unit   Bathroom Toilet Standard   Bathroom Equipment None   Bathroom Accessibility Accessible   Home Equipment Oxygen  (Patient has O2 as PRN and portable o2)   Receives Help From Family   ADL Assistance Independent   Homemaking Assistance Needs assistance   Homemaking Responsibilities Yes   Ambulation Assistance Independent   Transfer Assistance Independent   Active  Yes   Mode of Transportation Car   Occupation Retired   Type of Occupation n/a   Discharge Planning   Type of Residence House   Living Arrangements Spouse/Significant Other;Children   Current Services Prior To Admission None   Potential Assistance Needed N/A   DME Ordered? No   Potential Assistance Purchasing Medications No   Type of Home Care Services None   One/Two Story Residence One story   History of falls? 0   Services At/After Discharge   Transition of Care 
Patient/surrogate requesting vaccine

## 2024-09-26 NOTE — CONSULTS
CONSULTATION NOTE      Patient: Solo Souza                   MRN: [id number]         SSN: [id number]  YOB: 1960            AGE: 63 y.o.          SEX: male           HISTORY       Solo Rubio evaluated today for chief complaint of: 2 days onset, of right foot pain, and right ankle pain.  He denies any history of fever shakes chills night sweats, or any history of trauma.  He states he is doing quite a bit of walking and standing, on the baseball field, that he noticed tightness in his right foot that progressively got worse.  Got worse over the last 2 days, in fact yesterday had to sit down to have his leg elevated.  He states this morning, he noticed redness to his right foot.  No history of IV drug abuse, no history of wounds to his right foot and ankle.  He does endorse feeling ill, and having several episodes of vomiting, over the last few days.    He does take Eliquis, and Plavix.  4.     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    Serologic studies, reveal his white count is elevated 14.6, with 82% polymorphonuclear sites elevated.  His ESR is elevated at 78, hemoglobin A1c is elevated 10.2.  His lactate level is 1.74, normal.    X-rays of his right foot, 3 views, AP, lateral oblique, nonweightbearing x-rays, shows Soft tissue swelling, seen to the dorsal part of his forefoot, as well as calcified atherosclerotic changes in the soft tissues, and vascular distribution.  There is a slight lucent line seen to the medial most cuneiform.    63-year-old male, with who clinically has forefoot and midfoot cellulitis.  An aspiration was performed the right ankle, retrieved and normal synovial fluid.  Cultures were obtained of this fluid.    1.  Recommend continued IV antibiotics for cellulitis of his right foot  2.  MRI, right ankle right foot: Assess for any occult deep abscesses.  
Neurology consultation: Possible Parkinson's    Chief complaint: Tremors or other involuntary movements noted at home.    HPI: Patient is a 63-year-old man who has been admitted for evaluation and treatment of suspected cellulitis and sepsis due to an infection in the right lower extremity.  He has been followed closely by orthopedics and he is on intravenous antibiotics.  His daughter who is with him today reports that at home prior to admission he has had some intermittent jerking movements of his upper extremities, usually not at the same time.  This is raised a concern about Parkinson's disease.    Past medical history includes COPD, diabetes, emphysema, hypertension, and pneumonia who was admitted 2 days ago through the emergency room.  When admitted he had a low-grade fever and significant erythema in the right lower extremity.  He had a leukocytosis with a left shift.    Examination: This is a overweight man who looks older than his stated age, and he was restless at times.  He was able to follow simple instructions for the examination.  He had no tremor at rest.    Cranial nerves: Full extraocular movements, no facial paralysis.  Speech was unremarkable.    Motor exam: He can stand on his own, and has no focal upper extremity weakness.  Upper extremity muscle tone was normal.    His daughter showed me a video of him at home lying in bed with quick isolated jerking movements of of the right and the left upper extremities.    Impression: This is not an ideal time to assess somebody for the possibility of Parkinson's, but in general I think it is very unlikely.  He has no rigidity and no tremor at rest.  The jerks which led to the concern about Parkinson's look like myoclonic jerks, possibly related to his underlying medical condition.  
  CBC w/Diff Recent Labs     09/24/24  1257 09/25/24  0321   WBC 14.6* 15.3*   RBC 4.40 3.85*   HGB 12.5* 10.9*   HCT 37.9 33.7*    149      Microbiology Results       Procedure Component Value Units Date/Time    Culture, Body Fluid [[id number]] Collected: 09/24/24 1749    Order Status: Completed Specimen: Body Fluid from Synovial Fluid Updated: 09/25/24 0012     Special Requests TIBIOTALAR JOINT     Gram Stain RARE WBCS SEEN         NO ORGANISMS SEEN        Culture PENDING    Culture, Body Fluid [[id number]] Collected: 09/24/24 1749    Order Status: Completed Specimen: Body Fluid from Synovial Fluid Updated: 09/25/24 0020     Special Requests TIBIOTALAR JOINT     Gram Stain RARE WBCS SEEN         NO ORGANISMS SEEN        Culture PENDING    Blood Culture 2 [[id number]] Collected: 09/24/24 1307    Order Status: Completed Specimen: Blood Updated: 09/25/24 1044     Special Requests NO SPECIAL REQUESTS        Gram Stain       AEROBIC AND ANAEROBIC BOTTLES Gram positive cocci IN GROUPS                  SMEAR CALLED TO AND CORRECTLY REPEATED BY: TOM BOLANOS RN 4N ON 092524 AT 1040 TO [facility]           Culture       CULTURE IN PROGRESS,FURTHER UPDATES TO FOLLOW                  Sent to [facility] for ID/Susceptibility if indicated.          Blood Culture 1 [[id number]] Collected: 09/24/24 1257    Order Status: Completed Specimen: Blood Updated: 09/25/24 1045     Special Requests NO SPECIAL REQUESTS        Gram Stain       AEROBIC AND ANAEROBIC BOTTLES Gram positive cocci IN GROUPS                  SMEAR CALLED TO AND CORRECTLY REPEATED BY: TOM BOLANOS RN 4N ON 092524 AT 1040 TO [facility]           Culture       CULTURE IN PROGRESS,FURTHER UPDATES TO FOLLOW                  Sent to [facility] for ID/Susceptibility if indicated.          Culture, Blood, PCR ID Panel [[id number]]  (Abnormal) Collected: 09/24/24 1257    Order Status: Completed Specimen: Blood Updated: 09/25/24 1040     Accession Number [id number]     [employer]

## 2024-09-27 ENCOUNTER — APPOINTMENT (OUTPATIENT)
Facility: HOSPITAL | Age: 64
DRG: 872 | End: 2024-09-27
Payer: MEDICARE

## 2024-09-27 LAB
ANION GAP SERPL CALC-SCNC: 9 MMOL/L (ref 3–18)
BACTERIA SPEC CULT: ABNORMAL
BACTERIA SPEC CULT: ABNORMAL
BUN SERPL-MCNC: 26 MG/DL (ref 7–18)
BUN/CREAT SERPL: 19 (ref 12–20)
CALCIUM SERPL-MCNC: 9 MG/DL (ref 8.5–10.1)
CHLORIDE SERPL-SCNC: 102 MMOL/L (ref 100–111)
CO2 SERPL-SCNC: 25 MMOL/L (ref 21–32)
CREAT SERPL-MCNC: 1.38 MG/DL (ref 0.6–1.3)
ERYTHROCYTE [DISTWIDTH] IN BLOOD BY AUTOMATED COUNT: 13.2 % (ref 11.6–14.5)
GLUCOSE SERPL-MCNC: 203 MG/DL (ref 74–99)
GRAM STN SPEC: ABNORMAL
HCT VFR BLD AUTO: 32.5 % (ref 36–48)
HGB BLD-MCNC: 10.1 G/DL (ref 13–16)
MCH RBC QN AUTO: 28.3 PG (ref 24–34)
MCHC RBC AUTO-ENTMCNC: 31.1 G/DL (ref 31–37)
MCV RBC AUTO: 91 FL (ref 78–100)
NRBC # BLD: 0 K/UL (ref 0–0.01)
NRBC BLD-RTO: 0 PER 100 WBC
PLATELET # BLD AUTO: 191 K/UL (ref 135–420)
PMV BLD AUTO: 11.5 FL (ref 9.2–11.8)
POTASSIUM SERPL-SCNC: 4.4 MMOL/L (ref 3.5–5.5)
RBC # BLD AUTO: 3.57 M/UL (ref 4.35–5.65)
SERVICE CMNT-IMP: ABNORMAL
SERVICE CMNT-IMP: ABNORMAL
SODIUM SERPL-SCNC: 136 MMOL/L (ref 136–145)
WBC # BLD AUTO: 14 K/UL (ref 4.6–13.2)

## 2024-09-27 PROCEDURE — 2580000003 HC RX 258: Performed by: FAMILY MEDICINE

## 2024-09-27 PROCEDURE — 74230 X-RAY XM SWLNG FUNCJ C+: CPT

## 2024-09-27 PROCEDURE — 1100000000 HC RM PRIVATE

## 2024-09-27 PROCEDURE — 92611 MOTION FLUOROSCOPY/SWALLOW: CPT

## 2024-09-27 PROCEDURE — 6370000000 HC RX 637 (ALT 250 FOR IP): Performed by: STUDENT IN AN ORGANIZED HEALTH CARE EDUCATION/TRAINING PROGRAM

## 2024-09-27 PROCEDURE — 99232 SBSQ HOSP IP/OBS MODERATE 35: CPT | Performed by: STUDENT IN AN ORGANIZED HEALTH CARE EDUCATION/TRAINING PROGRAM

## 2024-09-27 PROCEDURE — 2500000003 HC RX 250 WO HCPCS: Performed by: STUDENT IN AN ORGANIZED HEALTH CARE EDUCATION/TRAINING PROGRAM

## 2024-09-27 PROCEDURE — 2580000003 HC RX 258: Performed by: STUDENT IN AN ORGANIZED HEALTH CARE EDUCATION/TRAINING PROGRAM

## 2024-09-27 PROCEDURE — 36415 COLL VENOUS BLD VENIPUNCTURE: CPT

## 2024-09-27 PROCEDURE — 6370000000 HC RX 637 (ALT 250 FOR IP): Performed by: FAMILY MEDICINE

## 2024-09-27 PROCEDURE — 92526 ORAL FUNCTION THERAPY: CPT

## 2024-09-27 PROCEDURE — 85027 COMPLETE CBC AUTOMATED: CPT

## 2024-09-27 PROCEDURE — 6360000002 HC RX W HCPCS: Performed by: STUDENT IN AN ORGANIZED HEALTH CARE EDUCATION/TRAINING PROGRAM

## 2024-09-27 PROCEDURE — 87040 BLOOD CULTURE FOR BACTERIA: CPT

## 2024-09-27 PROCEDURE — 80048 BASIC METABOLIC PNL TOTAL CA: CPT

## 2024-09-27 PROCEDURE — 99232 SBSQ HOSP IP/OBS MODERATE 35: CPT | Performed by: ORTHOPAEDIC SURGERY

## 2024-09-27 PROCEDURE — 6360000002 HC RX W HCPCS: Performed by: FAMILY MEDICINE

## 2024-09-27 RX ORDER — OXYCODONE HYDROCHLORIDE 5 MG/1
5 TABLET ORAL EVERY 4 HOURS PRN
Status: DISCONTINUED | OUTPATIENT
Start: 2024-09-27 | End: 2024-09-28 | Stop reason: HOSPADM

## 2024-09-27 RX ADMIN — OXYCODONE 10 MG: 5 TABLET ORAL at 08:08

## 2024-09-27 RX ADMIN — SODIUM CHLORIDE, PRESERVATIVE FREE 10 ML: 5 INJECTION INTRAVENOUS at 08:10

## 2024-09-27 RX ADMIN — BARIUM SULFATE 45 ML: 400 SUSPENSION ORAL at 14:02

## 2024-09-27 RX ADMIN — FOLIC ACID 1 MG: 1 TABLET ORAL at 08:07

## 2024-09-27 RX ADMIN — VANCOMYCIN HYDROCHLORIDE 1500 MG: 10 INJECTION, POWDER, LYOPHILIZED, FOR SOLUTION INTRAVENOUS at 17:45

## 2024-09-27 RX ADMIN — SODIUM CHLORIDE, PRESERVATIVE FREE 10 ML: 5 INJECTION INTRAVENOUS at 21:57

## 2024-09-27 RX ADMIN — BARIUM SULFATE 45 G: 960 POWDER, FOR SUSPENSION ORAL at 14:02

## 2024-09-27 RX ADMIN — VANCOMYCIN HYDROCHLORIDE 1500 MG: 10 INJECTION, POWDER, LYOPHILIZED, FOR SOLUTION INTRAVENOUS at 00:32

## 2024-09-27 RX ADMIN — CLOPIDOGREL BISULFATE 75 MG: 75 TABLET ORAL at 08:08

## 2024-09-27 RX ADMIN — METOPROLOL TARTRATE 50 MG: 50 TABLET, FILM COATED ORAL at 08:07

## 2024-09-27 RX ADMIN — LISINOPRIL 5 MG: 5 TABLET ORAL at 08:07

## 2024-09-27 RX ADMIN — ASPIRIN 81 MG: 81 TABLET, COATED ORAL at 08:07

## 2024-09-27 RX ADMIN — ENOXAPARIN SODIUM 40 MG: 100 INJECTION SUBCUTANEOUS at 08:08

## 2024-09-27 RX ADMIN — SODIUM CHLORIDE, PRESERVATIVE FREE 10 ML: 5 INJECTION INTRAVENOUS at 08:09

## 2024-09-27 RX ADMIN — SPIRONOLACTONE 25 MG: 25 TABLET ORAL at 08:07

## 2024-09-27 RX ADMIN — THIAMINE HYDROCHLORIDE 100 MG: 100 INJECTION, SOLUTION INTRAMUSCULAR; INTRAVENOUS at 08:08

## 2024-09-27 ASSESSMENT — PAIN DESCRIPTION - ORIENTATION: ORIENTATION: RIGHT

## 2024-09-27 ASSESSMENT — PAIN SCALES - GENERAL
PAINLEVEL_OUTOF10: 0
PAINLEVEL_OUTOF10: 8
PAINLEVEL_OUTOF10: 0
PAINLEVEL_OUTOF10: 0

## 2024-09-27 ASSESSMENT — PAIN - FUNCTIONAL ASSESSMENT: PAIN_FUNCTIONAL_ASSESSMENT: PREVENTS OR INTERFERES SOME ACTIVE ACTIVITIES AND ADLS

## 2024-09-27 ASSESSMENT — PAIN DESCRIPTION - DIRECTION: RADIATING_TOWARDS: NO

## 2024-09-27 ASSESSMENT — PAIN DESCRIPTION - DESCRIPTORS: DESCRIPTORS: ACHING

## 2024-09-27 ASSESSMENT — PAIN DESCRIPTION - PAIN TYPE: TYPE: ACUTE PAIN

## 2024-09-27 ASSESSMENT — PAIN DESCRIPTION - LOCATION: LOCATION: FOOT

## 2024-09-27 NOTE — PLAN OF CARE
Problem: Pain  Goal: Verbalizes/displays adequate comfort level or baseline comfort level  9/25/2024 0513 by Silvestre Rodriguez RN  Outcome: Progressing  9/24/2024 1752 by Aisha Dove RN  Outcome: Progressing  Flowsheets (Taken 9/24/2024 1721)  Verbalizes/displays adequate comfort level or baseline comfort level:   Encourage patient to monitor pain and request assistance   Assess pain using appropriate pain scale   Administer analgesics based on type and severity of pain and evaluate response     Problem: ABCDS Injury Assessment  Goal: Absence of physical injury  9/25/2024 0513 by Silvestre Rodriguez RN  Outcome: Progressing  9/24/2024 1752 by Aisha Dove RN  Outcome: Progressing     Problem: Safety - Adult  Goal: Free from fall injury  9/25/2024 0513 by Silvestre Rodriguez RN  Outcome: Progressing  9/24/2024 1752 by Aisha Dove RN  Outcome: Progressing     Problem: Skin/Tissue Integrity  Goal: Absence of new skin breakdown  Description: 1.  Monitor for areas of redness and/or skin breakdown  2.  Assess vascular access sites hourly  3.  Every 4-6 hours minimum:  Change oxygen saturation probe site  4.  Every 4-6 hours:  If on nasal continuous positive airway pressure, respiratory therapy assess nares and determine need for appliance change or resting period.  9/25/2024 0513 by Silvestre Rodriguez RN  Outcome: Progressing  9/24/2024 1752 by Aisha Dove RN  Outcome: Progressing     
  Problem: Pain  Goal: Verbalizes/displays adequate comfort level or baseline comfort level  9/25/2024 1636 by Sandi Camilo RN  Outcome: Progressing  9/25/2024 0513 by Silvestre Rodriguez RN  Outcome: Progressing     Problem: ABCDS Injury Assessment  Goal: Absence of physical injury  9/25/2024 1636 by Sandi Camilo RN  Outcome: Progressing  9/25/2024 0513 by Silvestre Rodriguez RN  Outcome: Progressing     Problem: Safety - Adult  Goal: Free from fall injury  9/25/2024 1636 by Sandi Camilo RN  Outcome: Progressing  Flowsheets (Taken 9/25/2024 0723)  Free From Fall Injury: Instruct family/caregiver on patient safety  9/25/2024 0513 by Silvestre Rodriguez RN  Outcome: Progressing     Problem: Skin/Tissue Integrity  Goal: Absence of new skin breakdown  Description: 1.  Monitor for areas of redness and/or skin breakdown  2.  Assess vascular access sites hourly  3.  Every 4-6 hours minimum:  Change oxygen saturation probe site  4.  Every 4-6 hours:  If on nasal continuous positive airway pressure, respiratory therapy assess nares and determine need for appliance change or resting period.  9/25/2024 1636 by Sandi Camilo RN  Outcome: Progressing  9/25/2024 0513 by Silvestre Rodriguez RN  Outcome: Progressing     Problem: SLP Adult - Impaired Swallowing  Goal: By Discharge: Advance to least restrictive diet without signs or symptoms of aspiration for planned discharge setting.  See evaluation for individualized goals.  Description: Patient will:  1. Tolerate PO trials with 0 s/s overt distress in 4/5 trials  2. Utilize compensatory swallow strategies/maneuvers (decrease bite/sip, size/rate, alt. liq/sol) with min cues in 4/5 trials  3. Perform oral-motor/laryngeal exercises to increase oropharyngeal swallow function with min cues  4. Complete an objective swallow study (i.e., MBSS) to assess swallow integrity, r/o aspiration, and determine of safest LRD, min A as indicated/ordered by MD     Rec:     Regular diet 
  Problem: Pain  Goal: Verbalizes/displays adequate comfort level or baseline comfort level  9/27/2024 0703 by Kaylin Barahona RN  Outcome: Progressing  9/26/2024 2353 by Rossy Torres RN  Outcome: Progressing  9/26/2024 1915 by Jaelyn Cornelius RN  Outcome: Progressing     Problem: ABCDS Injury Assessment  Goal: Absence of physical injury  9/27/2024 0703 by Kaylin Barahona RN  Outcome: Progressing  9/26/2024 2353 by Rossy Torres RN  Outcome: Progressing  9/26/2024 1915 by Jaelyn Cornelius RN  Outcome: Progressing     Problem: Safety - Adult  Goal: Free from fall injury  9/27/2024 0703 by Kaylin Barahona RN  Outcome: Progressing  9/26/2024 2353 by Rossy Torres RN  Outcome: Progressing  9/26/2024 1915 by Jaelyn Cornelius RN  Outcome: Progressing     Problem: Skin/Tissue Integrity  Goal: Absence of new skin breakdown  Description: 1.  Monitor for areas of redness and/or skin breakdown  2.  Assess vascular access sites hourly  3.  Every 4-6 hours minimum:  Change oxygen saturation probe site  4.  Every 4-6 hours:  If on nasal continuous positive airway pressure, respiratory therapy assess nares and determine need for appliance change or resting period.  9/27/2024 0703 by Kaylin Barahona RN  Outcome: Progressing  9/26/2024 2353 by Rossy Torres RN  Outcome: Progressing  9/26/2024 1915 by Jaelyn Cornelius RN  Outcome: Progressing     Problem: Chronic Conditions and Co-morbidities  Goal: Patient's chronic conditions and co-morbidity symptoms are monitored and maintained or improved  9/26/2024 2353 by Rossy Torres RN  Outcome: Progressing  Flowsheets (Taken 9/26/2024 1924)  Care Plan - Patient's Chronic Conditions and Co-Morbidity Symptoms are Monitored and Maintained or Improved:   Monitor and assess patient's chronic conditions and comorbid symptoms for stability, deterioration, or improvement   Collaborate with multidisciplinary team to address chronic and comorbid conditions 
  Problem: Pain  Goal: Verbalizes/displays adequate comfort level or baseline comfort level  Outcome: Progressing     Problem: ABCDS Injury Assessment  Goal: Absence of physical injury  Outcome: Progressing     Problem: Safety - Adult  Goal: Free from fall injury  Outcome: Progressing     Problem: Skin/Tissue Integrity  Goal: Absence of new skin breakdown  Description: 1.  Monitor for areas of redness and/or skin breakdown  2.  Assess vascular access sites hourly  3.  Every 4-6 hours minimum:  Change oxygen saturation probe site  4.  Every 4-6 hours:  If on nasal continuous positive airway pressure, respiratory therapy assess nares and determine need for appliance change or resting period.  Outcome: Progressing     
  Problem: Pain  Goal: Verbalizes/displays adequate comfort level or baseline comfort level  Outcome: Progressing     Problem: ABCDS Injury Assessment  Goal: Absence of physical injury  Outcome: Progressing     Problem: Safety - Adult  Goal: Free from fall injury  Outcome: Progressing     Problem: Skin/Tissue Integrity  Goal: Absence of new skin breakdown  Description: 1.  Monitor for areas of redness and/or skin breakdown  2.  Assess vascular access sites hourly  3.  Every 4-6 hours minimum:  Change oxygen saturation probe site  4.  Every 4-6 hours:  If on nasal continuous positive airway pressure, respiratory therapy assess nares and determine need for appliance change or resting period.  Outcome: Progressing     Problem: SLP Adult - Impaired Swallowing  Goal: By Discharge: Advance to least restrictive diet without signs or symptoms of aspiration for planned discharge setting.  See evaluation for individualized goals.  Description: Patient will:  1. Tolerate PO trials with 0 s/s overt distress in 4/5 trials  2. Utilize compensatory swallow strategies/maneuvers (decrease bite/sip, size/rate, alt. liq/sol) with min cues in 4/5 trials  3. Perform oral-motor/laryngeal exercises to increase oropharyngeal swallow function with min cues  4. Complete an objective swallow study (i.e., MBSS) to assess swallow integrity, r/o aspiration, and determine of safest LRD, min A as indicated/ordered by MD     Rec:     Regular diet with thin liquids  Aspiration precautions  HOB >45 during po intake, remain >30 for 30-45 minutes after po   Small bites/sips; alternate liquid/solid with slow feeding rate   Oral care TID  Meds per pt preference  9/26/2024 1150 by Jaylin Byers, SLP  Outcome: Progressing     
toward goals  []         Improving slowly and progressing toward goals  []         Not making progress toward goals and plan of care will be adjusted     PLAN:  Recommendations and Planned Interventions:  Recommendations  Requires SLP Intervention: Yes  Recommendations: Dysphagia treatment  D/C Recommendations: Ongoing speech therapy is recommended during this hospitalization  Diet Solids Recommendation: Regular  Liquid Consistency Recommendation: Thin  Compensatory Swallowing Strategies : Alternate solids and liquids;Eat/Feed slowly;Upright as possible for all oral intake;Remain upright for 30-45 minutes after meals;Small bites/sips  Recommended Form of Meds: PO  Therapeutic Interventions: Patient/Family education  Patient Education: Pt edu re: apsiration precuations and role of SLP  Patient Education Response: Verbalizes understanding;Needs reinforcement    Patient continues to benefit from skilled intervention to address the above impairments. Continue treatment per established plan of care.    Frequency/Duration: Patient will be followed by speech-language pathology 1-2 times per day/3-5 days per week to address goals.    Discharge Recommendations: D/C Recommendations: Ongoing speech therapy is recommended during this hospitalization     SUBJECTIVE:   Patient stated “I can't get it all the way down sometimes.”    OBJECTIVE:   Daily Assessment:  Baseline Assessment  Temperature Spikes Noted: No  Respiratory Status: O2 via nasual cannula  O2 Device: Nasal cannula  Liters of Oxygen: 3 L  Communication Observation: Functional  Follows Directions: Simple  Current Diet : Regular  Current Liquid Diet : Thin  Dentition: Adequate  Patient Positioning: Upright in bed  Baseline Vocal Quality: Normal  Volitional Cough: Strong    Orientation:  Person, Place, Time, and Situation    Dysphagia Treatment:  Oral Assessment:  Oral Motor   Labial: No impairment  Dentition: Natural  Oral Hygiene: Clean, Moist  Lingual: No 
disease) (HCC)     Diabetes (HCC)     Emphysema     H/O cardiovascular stress test 2012    negative    Hypertension     MI (myocardial infarction) (HCC)     ARMANDO (obstructive sleep apnea)     no CPAP    Pneumonia 02/01/2024     Past Surgical History:   Procedure Laterality Date    BACK SURGERY  2021    COLONOSCOPY      CORONARY ANGIOPLASTY WITH STENT PLACEMENT      HERNIA REPAIR      umbilical    UPPER GASTROINTESTINAL ENDOSCOPY N/A 2/29/2024    ESOPHAGOGASTRODUODENOSCOPY with bxs performed by Jose Rico MD at [facility] ENDOSCOPY     Prior Level of Function/Home Situation:   Social/Functional History  Lives With: Significant other  Type of Home: House  Home Layout: One level  Home Access: Stairs to enter with rails  Entrance Stairs - Number of Steps: 3  Entrance Stairs - Rails: Both  Bathroom Shower/Tub: Tub/Shower unit  Bathroom Toilet: Standard  Bathroom Equipment: None  Bathroom Accessibility: Accessible  Home Equipment: Oxygen (Patient has O2 as PRN and portable o2)  Receives Help From: Family  ADL Assistance: Independent  Homemaking Assistance: Needs assistance  Homemaking Responsibilities: Yes  Ambulation Assistance: Independent  Transfer Assistance: Independent  Active : Yes  Mode of Transportation: Car  Occupation: Retired  Type of Occupation: n/a  Diet prior to admission: Regular and thin liquids  Current Diet:  Regular and thin liquids     Orientation:  Other, unable to assess due to Pt's mentation    Type of Study: Modified barium swallow  Patient Position: Upright in fluoro chair    Trial 1: Trial 2:   Consistencies Administered: Thin - straw;Mildly Thick - straw Consistencies Administered: Moderately Thick  - straw   How Presented: SLP-fed/Presented;Straw How Presented: SLP-fed/Presented;Straw   Bolus Acceptance: Impaired Bolus Acceptance : Impaired   Bolus Formation/Control: Impaired Bolus Formation/Control: Impaired     Propulsion: Delayed (# of seconds) Type of Impairment: Delayed Propulsion: 
Patient left in no apparent distress sitting up in chair  [x]            Patient left in no apparent distress in bed  [x]            Call bell left within reach  [x]            Nursing notified  []            Family present  []            Caregiver present  []            Bed alarm activated    COMMUNICATION/EDUCATION:   [x]            Aspiration precautions; swallow safety; compensatory techniques provided via demonstration, verbalization and teach back of comprehension  [x]         Patient/family have participated as able in goal setting and plan of care.  []            Patient/family agree to work toward stated goals and plan of care.  []            Patient understands intent and goals of therapy, neutral about participation.  []            Patient unable to participate in goal setting/plan of care secondary to cognition, hearing/vision deficits; education ongoing with interdisciplinary staff   []            Handout regarding diet recommendations and thickener instructions provided.  [x]         Posted safety precautions in patient's room.    Thank you for this referral.    Delaney Guzman M.S., CCC-SLP/L  Speech-Language Pathologist

## 2024-09-27 NOTE — PROGRESS NOTES
Solo Souza is seen this early afternoon.  He still remains somewhat sleepy.  He is arousable, does follow commands, but is still remains quite sleepy.      Impression: Cellulitis, extensive, right foot: No definable discrete drainable abscess is noted.  Await declaration of any abscess that may become surgical.  Continue IV antibiotics.  The CT scan, does show some degenerative changes and erosive cyst, seen the medial most cuneiform, in the middle cuneiform, as what was seen on MRI.  There is a question, subcentimeter fluid collection, plantar aspect, of the right forefoot, near the fourth and fifth metatarsal basilar regions, that 2 small for any drainage does not drainable.  Clinically, he is non tender, at the specific region.  I still recommend continued observation and IV antibiotics, at this current time, of this individual.      Altered mental status: He had a positive drug screen, here in the hospital, cocaine positive.  Neurologic workup, has been commenced, as the daughter is concerned that he may be history of early Parkinson's disease.    Vitals:    09/27/24 1101   BP:    Pulse: 95   Resp:    Temp:    SpO2:        Right foot: Extensive cellulitis still remains to the right foot.  There are small areas of skin thickening, and almost looks like xanthelasmas that can appear in people that have hypercholesterolemia.  But any event, he does have extensive cellulitis of his right foot.  No ballotable palpable abscess or fluctuance, appreciated.    Intact peripheral pulses.  Spontaneous ankle and hindfoot motion, painless.   at the dorsal part of her right foot at the region of erythema.    I spoke with Dr. Kaorl Akins, we each agree, for continued observation to see whether any abscess becomes declared.    EUGENIO Aldrich MD  9/27/2024 1:48 PM    
               Solo ZOHAIB CasasLibby is seen this morning.  He is sleeping at this time.    Subjective:    Patient is resting at this time, he was not awakened    The that    Vitals:    09/24/24 1721   BP:    Pulse:    Resp: 18   Temp:    SpO2:      Objective exam:    Patient sleeping, he was not awakened.  I removed the sock from his right foot, to examine his foot and right ankle.     Right ankle right foot: There is less swelling to the dorsal part of his right midfoot.  There is no deformity.  Edema still remains on the dorsal part of his midfoot no fluctuance the right foot and ankle at this time.      Impression and Plan    63-year-old male, with a diagnosis of cellulitis of his right ankle.  Aspiration was conducted yesterday, revealing normal synovial type fluid, although this was sent for culture.      1.  Continue IV antibiotics  2.  Weightbearing status:He can weight-bear as tolerated, to the bathroom, for ADLs, but not for any exercising in terms of walking down the hallway yet.  But the time duration that he does stand, will try to keep to minimal, continue elevation right lower extremity.  3.  MRI pending, right foot and ankle    EUGENIO Aldrich MD  9/24/2024 6:30 PM    
               Solo ZOHAIB CasasLibby is this afternoon.  He still remains somewhat sleepy.  He is arousable, does follow commands, but is still quite sleepy.      The quality of the MRI, of the foot and ankle, is limited by patient motion.  Thus, recommendation, is for CT of the right foot and ankle, with contrast.    Clinically, he still has cellulitis, the dorsal part of his midfoot going out of the forefoot.  I detect no fluctuance, to his foot, at this current time.  There is a questionable, very small, fluid collection, along the plantar lateral side of the midfoot on this MRI, but again the MRI was of limited quality.       EUGENIO Aldrich MD  9/26/2024 5:05 PM    
0623 - Patient made several attempts to get out of the bed, stating he had to go to the bathroom to urinate. Patient has an external cath in place and is periodically confused.    0632 - Dr. Robert place an order for a sitter.  
4 Eyes Skin Assessment     NAME:  Solo Souza  YOB: 1960  MEDICAL RECORD NUMBER:  [id number]    The patient is being assessed for  Shift Handoff    I agree that at least one RN has performed a thorough Head to Toe Skin Assessment on the patient. ALL assessment sites listed below have been assessed.      Areas assessed by both nurses:    Head, Face, Ears, Shoulders, Back, Chest, Arms, Elbows, Hands, Sacrum. Buttock, Coccyx, Ischium, Legs. Feet and Heels, and Under Medical Devices         Does the Patient have a Wound? {Action Wound:89258}       Jesus Prevention initiated by RN: No  Wound Care Orders initiated by RN: No    Pressure Injury (Stage 3,4, Unstageable, DTI, NWPT, and Complex wounds) if present, place Wound referral order by RN under : No    New Ostomies, if present place, Ostomy referral order under : No     Nurse 1 eSignature: Electronically signed by Rossy Torres RN on 9/26/24 at 8:51 AM EDT    **SHARE this note so that the co-signing nurse can place an eSignature**    Nurse 2 eSignature: {Esignature:[id number]}   
4 Eyes Skin Assessment     NAME:  Solo Souza  YOB: 1960  MEDICAL RECORD NUMBER:  [id number]    The patient is being assessed for  Shift Handoff    I agree that at least one RN has performed a thorough Head to Toe Skin Assessment on the patient. ALL assessment sites listed below have been assessed.      Areas assessed by both nurses:    Head, Face, Ears, Shoulders, Back, Chest, Arms, Elbows, Hands, Sacrum. Buttock, Coccyx, Ischium, Legs. Feet and Heels, and Under Medical Devices         Does the Patient have a Wound? No noted wound(s)       Jesus Prevention initiated by RN: Yes  Wound Care Orders initiated by RN: No    Pressure Injury (Stage 3,4, Unstageable, DTI, NWPT, and Complex wounds) if present, place Wound referral order by RN under : No    New Ostomies, if present place, Ostomy referral order under : No     Nurse 1 eSignature: Electronically signed by Sandi Camilo RN on 9/25/24 at 8:26 PM EDT    **SHARE this note so that the co-signing nurse can place an eSignature**    Nurse 2 eSignature: {Esignature:[id number]}   
4 Eyes Skin Assessment     NAME:  Solo Souza  YOB: 1960  MEDICAL RECORD NUMBER:  [id number]    The patient is being assessed for  Admission    I agree that at least one RN has performed a thorough Head to Toe Skin Assessment on the patient. ALL assessment sites listed below have been assessed.      Areas assessed by both nurses:    Head, Face, Ears, Shoulders, Back, Chest, Arms, Elbows, Hands, Sacrum. Buttock, Coccyx, Ischium, and Legs. Feet and Heels        Does the Patient have a Wound? No noted wound(s)       Jesus Prevention initiated by RN: No  Wound Care Orders initiated by RN: No    Pressure Injury (Stage 3,4, Unstageable, DTI, NWPT, and Complex wounds) if present, place Wound referral order by RN under : No    New Ostomies, if present place, Ostomy referral order under : No     Nurse 1 eSignature: Electronically signed by Aisha Dove RN on 9/24/24 at 5:27 PM EDT    **SHARE this note so that the co-signing nurse can place an eSignature**    Nurse 2 eSignature: {Esignature:[id number]}   
4 Eyes Skin Assessment     NAME:  Solo Sozua  YOB: 1960  MEDICAL RECORD NUMBER:  [id number]    The patient is being assessed for  Shift Handoff    I agree that at least one RN has performed a thorough Head to Toe Skin Assessment on the patient. ALL assessment sites listed below have been assessed.      Areas assessed by both nurses:    Head, Face, Ears, Shoulders, Back, Chest, Arms, Elbows, Hands, Sacrum. Buttock, Coccyx, Ischium, Legs. Feet and Heels, and Under Medical Devices         Does the Patient have a Wound? No noted wound(s)       Jesus Prevention initiated by RN: No  Wound Care Orders initiated by RN: No    Pressure Injury (Stage 3,4, Unstageable, DTI, NWPT, and Complex wounds) if present, place Wound referral order by RN under : No    New Ostomies, if present place, Ostomy referral order under : No     Nurse 1 eSignature: Electronically signed by Silvestre Rodriguez RN on 9/25/24 at 7:55 AM EDT    **SHARE this note so that the co-signing nurse can place an eSignature**    Nurse 2 eSignature: Electronically signed by Sandi Camilo RN on 9/25/24 at 4:18 PM EDT   
Charge RN notified by wife that she was leaving bedside and stated patient is sitting on the edge of the bed and is \"pretty out of it\". Charge RN (writer) went to bedside and patient was drifting to sleep. Patient observed to have visible tremors, states he has a headache, feels nauseous, and is confused. Patient was asked if he consumes alcohol. Patient stated he does once a day, however, he consumes \"a lot\" in a day. Patient unable to tell Charge RN how exactly how much and what he drinks. Patient was falling asleep during conversation. Patient assisted back into bed, bed alarm engaged and bed in lowest position, call bell on bed within reach. Primary RN notified to follow up [facility] assessment and protocol. MD made aware.   
[place] [facility]   Pharmacy Pharmacokinetic Monitoring Service - Vancomycin     Solo Souza is a 63 y.o. male starting on vancomycin therapy for Skin and Soft Tissue Infection. Pharmacy consulted for monitoring and adjustment.    Target Concentration: Goal AUC/BERNA 400-600 mg*hr/L    Additional Antimicrobials:     Pertinent Laboratory Values:   Temp: 99.5 °F (37.5 °C), Weight - Scale: 99.8 kg (220 lb)  Recent Labs     09/24/24  1257   CREATININE 1.10   BUN 16   WBC 14.6*   PROCAL 0.39     Estimated Creatinine Clearance: 84 mL/min (based on SCr of 1.1 mg/dL).    Pertinent Cultures:  Culture Date Source Results        MRSA Nasal Swab: N/A. Non-respiratory infection    Plan:  Dosing recommendations based on Bayesian software  Start vancomycin 2000 mg x 1 then 1000mg q12  Anticipated AUC of 507 and trough concentration of 15.6 at steady state  Renal labs as indicated   Vancomycin concentration ordered for AM labs tomorrow  Pharmacy will continue to monitor patient and adjust therapy as indicated    Thank you for the consult,  Mateusz Rolon RP  9/24/2024   
[place]   Pharmacy Pharmacokinetic Monitoring Service - Vancomycin    Indication: SSTI  Goal AUC/BERNA: 400-600  Day of Therapy: 2  Additional Antimicrobials: pip-tazo    Pertinent Laboratory Values:   Wt Readings from Last 1 Encounters:   09/24/24 99.8 kg (220 lb)     Temp Readings from Last 1 Encounters:   09/25/24 98.7 °F (37.1 °C) (Oral)     Estimated Creatinine Clearance: 63 mL/min (A) (based on SCr of 1.47 mg/dL (H)).    Recent Labs     09/24/24  1257 09/25/24  0321   CREATININE 1.10 1.47*   BUN 16 22*   WBC 14.6* 15.3*     Pertinent Cultures:  Date Source Results   9/24 blood NGTD   9/24 Synovial fluid pending   MRSA Nasal Swab: NA    Assessment:  Date Current Dose Level (mg/L) Timing of Level (h) AUC/BERNA   9/24 2,000 mg x1 - - -   9/25 1,000 mg q12h  1,500 mg q18h 23.0 2 522  520   Note: Serum concentrations collected for AUC dosing may appear elevated if collected in close proximity to the dose administered, this is not necessarily an indication of toxicity    Plan:  Adjust dose from 1,000 mg q12h to 1,500 mg q18h  No level ordered at this time  Pharmacy will continue to monitor patient and adjust therapy as indicated    Thank you for the consult,  Syd Mei RPH  9/25/2024  
[place] [facility]   Pharmacy Pharmacokinetic Monitoring Service - Vancomycin    Indication: SSTI  Goal AUC/BERNA: 400-600  Day of Therapy: 4  Additional Antimicrobials: none    Pertinent Laboratory Values:   Wt Readings from Last 1 Encounters:   09/24/24 99.8 kg (220 lb)     Temp Readings from Last 1 Encounters:   09/27/24 98.6 °F (37 °C) (Oral)     Estimated Creatinine Clearance: 67 mL/min (A) (based on SCr of 1.38 mg/dL (H)).    Recent Labs     09/26/24  0932 09/27/24  0236   CREATININE 1.38* 1.38*   BUN 27* 26*   WBC 12.7 14.0*     Pertinent Cultures:  Date Source Results   9/26 blood pending   9/24 blood S.aureus   9/24 Synovial fluid ngtd   MRSA Nasal Swab: NA    Assessment:  Date Current Dose Level (mg/L) Timing of Level (h) AUC/BERNA   9/24 2,000 mg x1 - - -   9/25 1,000 mg q12h  1,500 mg q18h 23.0 2 522  520   9/26 1,500mg q18h - - -   9/27 1,500mg q18h - - 511   Note: Serum concentrations collected for AUC dosing may appear elevated if collected in close proximity to the dose administered, this is not necessarily an indication of toxicity    Plan:  Continue dose of Vancomycin 1,500 mg q18h  No level ordered at this time  Pharmacy will continue to monitor patient and adjust therapy as indicated    Thank you for the consult,  CASPER HUSAIN, [facility]  9/27/2024  
I was notified by Charge nurse that the patient needed to be assessed per CIWA protocol as the patients wife verbally informed Charge nurse the patient appeared to be \"pretty out of it\".     I entered the room and patient was sitting on the bedside with bedside table in front of him eating breakfast and drinking his coffee while visibly falling asleep and swaying back and forth. Patient observed to have visible tremors and an inability to stay fully awake for the full assessment, but was easy to arouse verbally. Patient stated he had a headache, described as feel \"full\" be the patient. He also stated he felt anxious and was slightly sweating.     The patient verbalized that he was seeing and hearing things that were not present. The patient stated \" my daughter keeps walking in the hallway\". I informed the patient his daughter was not present at the hospital yet. He verbalized that he felt nauseas and confused. I ask if he drank alcohol regularly and he said \"only once a week\".     Per CIWA protocol I withdrew the appropriate dose of Ativan for the patient. I contacted the MD to inform her of the patients high CIWA score and to verify the dose was appropriate.     Administered to the patient and reassessed him every hour until the Doctor was able to assess the patient.    Family informed, Plan of care ongoing.  
Infectious Disease progress Note        Reason: Sepsis    Current abx Prior abx   Pip/tazo, vancomycin since 9/24      Lines:       Assessment :   63 y.o. male with a PMHX of COPD, type 2 diabetes, emphysema MI, hypertension, pneumonia  who presents to the emergency department  by POV C/O foot swelling and foot pain.     Clinical picture consistent with sepsis-present on admission due to Staphylococcus aureus bacteremia, right foot cellulitis    Most likely source of Staphylococcus aureus bacteremia is right foot cellulitis  Rule out abscess    Ortho surgery evaluation appreciated.  Status post right ankle joint aspiration.  No evidence of septic arthritis noted    Limited MRI 9/25 due to motion artifact/tremors.   Plans for CT scan noted    Increased right foot erythema noted on today's exam.    Unreported illicit drug use-urine drug screen 9/25 positive for opiates/cocaine      Recommendations:    Continue vancomycin.  Discontinue piperacillin/tazobactam  Follow-up right ankle joint fluid cultures, susceptibility of Staphylococcus aureus in blood culture  Repeat blood culture tomorrow to determine clearance of bloodstream infection  Follow-up CT scan right foot  Follow-up Ortho surgery recommendations  Further recommendations based on about test results, clinical course        Thank you for consultation request. Above plan was discussed in details with patient, wife and daughter at bedside, dr. Morrell. Please call me if any further questions or concerns. Will continue to participate in the care of this patient.      HPI:    Complains of pain in right foot.  Denies any trauma to the right foot prior to admission.  Feels better.  Denies significant alcohol use.  States that takes1 drink socially on Fridays.  Wife at bedside upset since patient has been asked questions about alcohol/drug use      Past Medical History:   Diagnosis Date    COPD (chronic obstructive pulmonary disease) (HCC)     Diabetes (HCC)     
MRI screening form needs to be filled out and faxed to  [phone] BEFORE MRI can be scheduled.  If unable to obtain information from patient , MPOA needs to be contacted .   If patient is claustrophobic or will needs pain meds, please have ordered in advance in order to facilitate exam.      If POA is unavailable or unsure of patient history, screening X-rays will need to be ordered.   
Patient was told repeatedly to remain still during exam and patient refused. Patient indicated that we should repeat the sequence even though patient was consistently reminded that when they move the sequence has to be repeated in order to get a diagnostic image. Patient stated that they didn't care and continued to move. Patient pulled off nasal cannula twice and did not want to put it back on. Patient attempted to roll off the MRI diagnostic table twice and would not continue the ankle exam. RT Ankle exam was completed without contrast. Patient was cursing and was attempting to rip off gown. Patient was sent back upstairs with new nasal cannula but patient is a safety risk and is not a good candidate for MRI.   
Pt has attempted to get out of bed multiple times. Pt is not redirectable.    He was medicated once with Roxicodone 10mg. Pt did not tolerate well. He has been drowsy a few hours. Easy to arouse but very drowsy. Decree follow in commands. He has been sleeping but awakes and try to get out of the bed.   
Speech Pathology    MBS completed with silent aspiration on thin and nectar thick liquids. Recommending full honey thick liquids. Full report to follow.     Thank you for this referral.    Jossy Sanderson M.S. CCC-SLP  Speech Language Pathologist    
Spiritual Health History and Assessment/Progress Note  [facility]    Loneliness/Social Isolation,  ,  ,      Name: Solo Souza MRN: [id number]    Age: 63 y.o.     Sex: male   Language: English   Mormonism: [employer]   Sepsis (HCC)     Date: 9/25/2024            Total Time Calculated: 7 min              Spiritual Assessment began in [address] MEDICAL        Referral/Consult From: Nurse   Encounter Overview/Reason: Loneliness/Social Isolation  Service Provided For: Patient, Friend    Kaylee, Belief, Meaning:   Patient has beliefs or practices that help with coping during difficult times  Family/Friends Other: unknown      Importance and Influence:  Patient has spiritual/personal beliefs that influence decisions regarding their health  Family/Friends Other: unknown    Community:  Patient Other: supported  Family/Friends Other: unknown    Assessment and Plan of Care:     Patient Interventions include: Affirmed coping skills/support systems  Family/Friends Interventions include: Affirmed coping skills/support systems    Patient Plan of Care: Spiritual Care available upon further referral  Family/Friends Plan of Care: Spiritual Care available upon further referral    Electronically signed by DIANA Rodriguez on 9/25/2024 at 5:55 PM    
will try to keep to minimal, continue elevation right lower extremity.  3.  MRI pending, right foot and ankle  4.  Leukocytosis : White count of 15.3 today, 14.6 yesterday.  5.  Ankle aspiration: Cultures from 9//24/2024, at this point, are negative for growth.    EUGENIO Aldrich MD  9/25/2024 6:35 PM    
IntraVENous, 2 times per day  sodium chloride flush 0.9 % injection 5-40 mL, 5-40 mL, IntraVENous, PRN  0.9 % sodium chloride infusion, , IntraVENous, PRN  potassium chloride (KLOR-CON M) extended release tablet 40 mEq, 40 mEq, Oral, PRN **OR** potassium bicarb-citric acid (EFFER-K) effervescent tablet 40 mEq, 40 mEq, Oral, PRN **OR** potassium chloride 10 mEq/100 mL IVPB (Peripheral Line), 10 mEq, IntraVENous, PRN  magnesium sulfate 2000 mg in 50 mL IVPB premix, 2,000 mg, IntraVENous, PRN  enoxaparin (LOVENOX) injection 40 mg, 40 mg, SubCUTAneous, Daily  ondansetron (ZOFRAN-ODT) disintegrating tablet 4 mg, 4 mg, Oral, Q8H PRN **OR** ondansetron (ZOFRAN) injection 4 mg, 4 mg, IntraVENous, Q6H PRN  polyethylene glycol (GLYCOLAX) packet 17 g, 17 g, Oral, Daily PRN  acetaminophen (TYLENOL) tablet 650 mg, 650 mg, Oral, Q6H PRN **OR** acetaminophen (TYLENOL) suppository 650 mg, 650 mg, Rectal, Q6H PRN  morphine (PF) injection 2 mg, 2 mg, IntraVENous, Q4H PRN  albuterol (PROVENTIL) (2.5 MG/3ML) 0.083% nebulizer solution 2.5 mg, 2.5 mg, Nebulization, Q4H PRN  sodium chloride flush 0.9 % injection 5-40 mL, 5-40 mL, IntraVENous, 2 times per day  sodium chloride flush 0.9 % injection 5-40 mL, 5-40 mL, IntraVENous, PRN  0.9 % sodium chloride infusion, , IntraVENous, PRN      Assessment//Plan           Hospital Problems             Last Modified POA    * (Principal) Sepsis ([facility]) 9/24/2024 Yes    COPD without exacerbation ([facility]) 9/25/2024 Yes    Hypertension 9/25/2024 Yes    Synovitis of right ankle 9/25/2024 Yes    Cellulitis in diabetic foot ([facility]) 9/25/2024 Yes    Staphylococcus aureus bacteremia with sepsis ([facility]) 9/25/2024 Yes    Septic arthritis of right foot ([facility]) 9/25/2024 Yes     Assessment:    Condition: In stable condition.  Improving.   (    #Right foot/ankle cellulitis, S/p aspiration. CT ankle/foot does not show abscess large enough to drain  #Staph bacteremia  #Hyponatremia, improving  #Hypertension  #DM 2  #COPD, 
injection 5-40 mL, 5-40 mL, IntraVENous, PRN  0.9 % sodium chloride infusion, , IntraVENous, PRN  potassium chloride (KLOR-CON M) extended release tablet 40 mEq, 40 mEq, Oral, PRN **OR** potassium bicarb-citric acid (EFFER-K) effervescent tablet 40 mEq, 40 mEq, Oral, PRN **OR** potassium chloride 10 mEq/100 mL IVPB (Peripheral Line), 10 mEq, IntraVENous, PRN  magnesium sulfate 2000 mg in 50 mL IVPB premix, 2,000 mg, IntraVENous, PRN  enoxaparin (LOVENOX) injection 40 mg, 40 mg, SubCUTAneous, Daily  ondansetron (ZOFRAN-ODT) disintegrating tablet 4 mg, 4 mg, Oral, Q8H PRN **OR** ondansetron (ZOFRAN) injection 4 mg, 4 mg, IntraVENous, Q6H PRN  polyethylene glycol (GLYCOLAX) packet 17 g, 17 g, Oral, Daily PRN  acetaminophen (TYLENOL) tablet 650 mg, 650 mg, Oral, Q6H PRN **OR** acetaminophen (TYLENOL) suppository 650 mg, 650 mg, Rectal, Q6H PRN  oxyCODONE (ROXICODONE) immediate release tablet 10 mg, 10 mg, Oral, Q4H PRN  morphine (PF) injection 2 mg, 2 mg, IntraVENous, Q4H PRN  albuterol (PROVENTIL) (2.5 MG/3ML) 0.083% nebulizer solution 2.5 mg, 2.5 mg, Nebulization, Q4H PRN  piperacillin-tazobactam (ZOSYN) 3,375 mg in sodium chloride 0.9 % 50 mL IVPB (mini-bag), 3,375 mg, IntraVENous, Q8H  sodium chloride flush 0.9 % injection 5-40 mL, 5-40 mL, IntraVENous, 2 times per day  sodium chloride flush 0.9 % injection 5-40 mL, 5-40 mL, IntraVENous, PRN  0.9 % sodium chloride infusion, , IntraVENous, PRN  thiamine mononitrate tablet 100 mg, 100 mg, Oral, Daily  LORazepam (ATIVAN) tablet 1 mg, 1 mg, Oral, Q1H PRN **OR** LORazepam (ATIVAN) injection 1 mg, 1 mg, IntraVENous, Q1H PRN **OR** LORazepam (ATIVAN) tablet 2 mg, 2 mg, Oral, Q1H PRN **OR** LORazepam (ATIVAN) injection 2 mg, 2 mg, IntraVENous, Q1H PRN **OR** LORazepam (ATIVAN) tablet 3 mg, 3 mg, Oral, Q1H PRN **OR** LORazepam (ATIVAN) injection 3 mg, 3 mg, IntraVENous, Q1H PRN **OR** LORazepam (ATIVAN) tablet 4 mg, 4 mg, Oral, Q1H PRN **OR** LORazepam (ATIVAN) injection 4 
sodium chloride infusion, , IntraVENous, PRN  potassium chloride (KLOR-CON M) extended release tablet 40 mEq, 40 mEq, Oral, PRN **OR** potassium bicarb-citric acid (EFFER-K) effervescent tablet 40 mEq, 40 mEq, Oral, PRN **OR** potassium chloride 10 mEq/100 mL IVPB (Peripheral Line), 10 mEq, IntraVENous, PRN  magnesium sulfate 2000 mg in 50 mL IVPB premix, 2,000 mg, IntraVENous, PRN  enoxaparin (LOVENOX) injection 40 mg, 40 mg, SubCUTAneous, Daily  ondansetron (ZOFRAN-ODT) disintegrating tablet 4 mg, 4 mg, Oral, Q8H PRN **OR** ondansetron (ZOFRAN) injection 4 mg, 4 mg, IntraVENous, Q6H PRN  polyethylene glycol (GLYCOLAX) packet 17 g, 17 g, Oral, Daily PRN  acetaminophen (TYLENOL) tablet 650 mg, 650 mg, Oral, Q6H PRN **OR** acetaminophen (TYLENOL) suppository 650 mg, 650 mg, Rectal, Q6H PRN  oxyCODONE (ROXICODONE) immediate release tablet 10 mg, 10 mg, Oral, Q4H PRN  morphine (PF) injection 2 mg, 2 mg, IntraVENous, Q4H PRN  albuterol (PROVENTIL) (2.5 MG/3ML) 0.083% nebulizer solution 2.5 mg, 2.5 mg, Nebulization, Q4H PRN  sodium chloride flush 0.9 % injection 5-40 mL, 5-40 mL, IntraVENous, 2 times per day  sodium chloride flush 0.9 % injection 5-40 mL, 5-40 mL, IntraVENous, PRN  0.9 % sodium chloride infusion, , IntraVENous, PRN  LORazepam (ATIVAN) tablet 1 mg, 1 mg, Oral, Q1H PRN **OR** LORazepam (ATIVAN) injection 1 mg, 1 mg, IntraVENous, Q1H PRN **OR** LORazepam (ATIVAN) tablet 2 mg, 2 mg, Oral, Q1H PRN **OR** LORazepam (ATIVAN) injection 2 mg, 2 mg, IntraVENous, Q1H PRN **OR** LORazepam (ATIVAN) tablet 3 mg, 3 mg, Oral, Q1H PRN **OR** LORazepam (ATIVAN) injection 3 mg, 3 mg, IntraVENous, Q1H PRN **OR** LORazepam (ATIVAN) tablet 4 mg, 4 mg, Oral, Q1H PRN **OR** LORazepam (ATIVAN) injection 4 mg, 4 mg, IntraVENous, Q1H PRN      Assessment//Plan           Hospital Problems             Last Modified POA    * (Principal) Sepsis (HCC) 9/24/2024 Yes    COPD without exacerbation (HCC) 9/25/2024 Yes    Hypertension 
Specialist  [phone]  September 27, 2024  7:58 AM

## 2024-09-28 VITALS
DIASTOLIC BLOOD PRESSURE: 77 MMHG | WEIGHT: 220 LBS | SYSTOLIC BLOOD PRESSURE: 154 MMHG | HEART RATE: 96 BPM | TEMPERATURE: 98.2 F | RESPIRATION RATE: 18 BRPM | OXYGEN SATURATION: 92 % | BODY MASS INDEX: 30.8 KG/M2 | HEIGHT: 71 IN

## 2024-09-28 LAB
BACTERIA SPEC CULT: NORMAL
GRAM STN SPEC: NORMAL
SERVICE CMNT-IMP: NORMAL
SERVICE CMNT-IMP: NORMAL

## 2024-10-01 RX ORDER — ATORVASTATIN CALCIUM 80 MG/1
80 TABLET, FILM COATED ORAL NIGHTLY
Qty: 90 TABLET | Refills: 3 | Status: SHIPPED | OUTPATIENT
Start: 2024-10-01

## 2024-10-02 LAB
BACTERIA SPEC CULT: NORMAL
SERVICE CMNT-IMP: NORMAL

## 2024-10-03 LAB
BACTERIA SPEC CULT: NORMAL
SERVICE CMNT-IMP: NORMAL

## 2024-10-09 ENCOUNTER — OFFICE VISIT (OUTPATIENT)
Age: 64
End: 2024-10-09
Payer: MEDICARE

## 2024-10-09 VITALS
WEIGHT: 236 LBS | OXYGEN SATURATION: 92 % | HEIGHT: 71 IN | DIASTOLIC BLOOD PRESSURE: 60 MMHG | HEART RATE: 86 BPM | SYSTOLIC BLOOD PRESSURE: 110 MMHG | BODY MASS INDEX: 33.04 KG/M2

## 2024-10-09 DIAGNOSIS — E78.5 HYPERLIPIDEMIA, UNSPECIFIED HYPERLIPIDEMIA TYPE: ICD-10-CM

## 2024-10-09 DIAGNOSIS — I10 PRIMARY HYPERTENSION: ICD-10-CM

## 2024-10-09 DIAGNOSIS — I10 ESSENTIAL (PRIMARY) HYPERTENSION: ICD-10-CM

## 2024-10-09 DIAGNOSIS — R06.02 SHORTNESS OF BREATH: ICD-10-CM

## 2024-10-09 DIAGNOSIS — R00.2 PALPITATIONS: ICD-10-CM

## 2024-10-09 DIAGNOSIS — I25.10 ATHEROSCLEROSIS OF NATIVE CORONARY ARTERY OF NATIVE HEART WITHOUT ANGINA PECTORIS: Primary | ICD-10-CM

## 2024-10-09 PROCEDURE — 93000 ELECTROCARDIOGRAM COMPLETE: CPT | Performed by: INTERNAL MEDICINE

## 2024-10-09 PROCEDURE — G8417 CALC BMI ABV UP PARAM F/U: HCPCS | Performed by: INTERNAL MEDICINE

## 2024-10-09 PROCEDURE — 1111F DSCHRG MED/CURRENT MED MERGE: CPT | Performed by: INTERNAL MEDICINE

## 2024-10-09 PROCEDURE — G8427 DOCREV CUR MEDS BY ELIG CLIN: HCPCS | Performed by: INTERNAL MEDICINE

## 2024-10-09 PROCEDURE — 3074F SYST BP LT 130 MM HG: CPT | Performed by: INTERNAL MEDICINE

## 2024-10-09 PROCEDURE — 4004F PT TOBACCO SCREEN RCVD TLK: CPT | Performed by: INTERNAL MEDICINE

## 2024-10-09 PROCEDURE — 99214 OFFICE O/P EST MOD 30 MIN: CPT | Performed by: INTERNAL MEDICINE

## 2024-10-09 PROCEDURE — G8484 FLU IMMUNIZE NO ADMIN: HCPCS | Performed by: INTERNAL MEDICINE

## 2024-10-09 PROCEDURE — 3078F DIAST BP <80 MM HG: CPT | Performed by: INTERNAL MEDICINE

## 2024-10-09 PROCEDURE — 3017F COLORECTAL CA SCREEN DOC REV: CPT | Performed by: INTERNAL MEDICINE

## 2024-10-09 ASSESSMENT — PATIENT HEALTH QUESTIONNAIRE - PHQ9
SUM OF ALL RESPONSES TO PHQ QUESTIONS 1-9: 0
SUM OF ALL RESPONSES TO PHQ QUESTIONS 1-9: 0
SUM OF ALL RESPONSES TO PHQ9 QUESTIONS 1 & 2: 0
2. FEELING DOWN, DEPRESSED OR HOPELESS: NOT AT ALL
SUM OF ALL RESPONSES TO PHQ QUESTIONS 1-9: 0
1. LITTLE INTEREST OR PLEASURE IN DOING THINGS: NOT AT ALL
SUM OF ALL RESPONSES TO PHQ QUESTIONS 1-9: 0

## 2024-10-09 ASSESSMENT — ENCOUNTER SYMPTOMS: SHORTNESS OF BREATH: 1

## 2024-10-09 NOTE — PROGRESS NOTES
HISTORY OF PRESENT ILLNESS  Solo Souza  64 y.o. male     Chief Complaint   Patient presents with    Follow-up     Overdue f/u     Chest Pain     Left chest pain at times     Shortness of Breath     SOB with exertion/rest     Dizziness     Dizzy when up walking around       ASSESSMENT and PLAN    The primary encounter diagnosis was Atherosclerosis of native coronary artery of native heart without angina pectoris. Diagnoses of Primary hypertension, Shortness of breath, Essential (primary) hypertension, Palpitations, and Hyperlipidemia, unspecified hyperlipidemia type were also pertinent to this visit.    Mr. Solo Souza presented in March 2020 with chest pains.  He was diagnosed with non-STEMI.  He underwent emergent heart catheterization, coronary angiography.  His EF was noted to be 45-50% with anteroapical hypokinesis.  He had long mid 95% LAD stenosis which was successfully stented to residual 0%.  He also had diagonal branch lesion which was balloon dilated.  Unfortunately, he has smoked for over 40 years and continued to smoke at the time of his non-STEMI.  He has COPD as well as insulin-dependent diabetes mellitus.  He has hypertension and hyperlipidemia.  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  He has no employees.  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  When he gets tired, he slows down.  He has not had any recurrent episodes of chest pain despite vigorous physical activity.  He was seen in the emergency room on 8/24/2020 for atypical chest pain.  He was evaluated and discharged home.  His echocardiogram in April 2021 revealed EF 50-55%.  PA pressure was noted to be 35 mmHg.  In February 2022, he was admitted with COVID pneumonia.  He had acute respiratory distress.  He was diagnosed with COPD exacerbation.  Despite this, he continues to smoke about a pack a day.  In May 2022, he presented to the hospital with chest pains.  Repeat coronary angiography

## 2024-10-09 NOTE — PROGRESS NOTES
Solo Souza presents today for   Chief Complaint   Patient presents with    Follow-up     Overdue f/u     Chest Pain     Left chest pain at times     Shortness of Breath     SOB with exertion/rest     Dizziness     Dizzy when up walking around       Solo Souza preferred language for health care discussion is english/other.    Is someone accompanying this pt? yes    Is the patient using any DME equipment during OV? yes    Depression Screening:  Depression: Not at risk (10/9/2024)    PHQ-2     PHQ-2 Score: 0        Learning Assessment:  Who is the primary learner? Patient    What is the preferred language for health care of the primary learner? ENGLISH    How does the primary learner prefer to learn new concepts? DEMONSTRATION    Answered By patient    Relationship to Learner SELF           Pt currently taking Anticoagulant therapy? no    Pt currently taking Antiplatelet therapy ? ASA 81 mg QD and Plavix 75 mg QD      Coordination of Care:  1. Have you been to the ER, urgent care clinic since your last visit? Hospitalized since your last visit? yes    2. Have you seen or consulted any other health care providers outside of the [facility] since your last visit? Include any pap smears or colon screening. no

## 2024-12-09 ENCOUNTER — OFFICE VISIT (OUTPATIENT)
Age: 64
End: 2024-12-09
Payer: MEDICARE

## 2024-12-09 VITALS
RESPIRATION RATE: 18 BRPM | HEIGHT: 71 IN | OXYGEN SATURATION: 93 % | TEMPERATURE: 98.4 F | SYSTOLIC BLOOD PRESSURE: 135 MMHG | BODY MASS INDEX: 32.92 KG/M2 | DIASTOLIC BLOOD PRESSURE: 59 MMHG | HEART RATE: 91 BPM

## 2024-12-09 DIAGNOSIS — Z72.0 TOBACCO USE: ICD-10-CM

## 2024-12-09 DIAGNOSIS — J96.11 CHRONIC RESPIRATORY FAILURE WITH HYPOXIA: ICD-10-CM

## 2024-12-09 DIAGNOSIS — J44.9 STAGE 4 VERY SEVERE COPD BY GOLD CLASSIFICATION (HCC): Primary | ICD-10-CM

## 2024-12-09 PROCEDURE — 99406 BEHAV CHNG SMOKING 3-10 MIN: CPT | Performed by: HOSPITALIST

## 2024-12-09 PROCEDURE — 3075F SYST BP GE 130 - 139MM HG: CPT | Performed by: HOSPITALIST

## 2024-12-09 PROCEDURE — G8417 CALC BMI ABV UP PARAM F/U: HCPCS | Performed by: HOSPITALIST

## 2024-12-09 PROCEDURE — 3023F SPIROM DOC REV: CPT | Performed by: HOSPITALIST

## 2024-12-09 PROCEDURE — G8484 FLU IMMUNIZE NO ADMIN: HCPCS | Performed by: HOSPITALIST

## 2024-12-09 PROCEDURE — 4004F PT TOBACCO SCREEN RCVD TLK: CPT | Performed by: HOSPITALIST

## 2024-12-09 PROCEDURE — 3017F COLORECTAL CA SCREEN DOC REV: CPT | Performed by: HOSPITALIST

## 2024-12-09 PROCEDURE — G8427 DOCREV CUR MEDS BY ELIG CLIN: HCPCS | Performed by: HOSPITALIST

## 2024-12-09 PROCEDURE — 99215 OFFICE O/P EST HI 40 MIN: CPT | Performed by: HOSPITALIST

## 2024-12-09 PROCEDURE — 3078F DIAST BP <80 MM HG: CPT | Performed by: HOSPITALIST

## 2024-12-09 RX ORDER — ARFORMOTEROL TARTRATE 15 UG/2ML
1 SOLUTION RESPIRATORY (INHALATION) 2 TIMES DAILY
Qty: 120 ML | Refills: 3 | Status: SHIPPED | OUTPATIENT
Start: 2024-12-09 | End: 2024-12-09 | Stop reason: SDUPTHER

## 2024-12-09 RX ORDER — ARFORMOTEROL TARTRATE 15 UG/2ML
1 SOLUTION RESPIRATORY (INHALATION) 2 TIMES DAILY
Qty: 120 ML | Refills: 3 | Status: SHIPPED | OUTPATIENT
Start: 2024-12-09

## 2024-12-09 RX ORDER — FLUTICASONE PROPIONATE 110 UG/1
2 AEROSOL, METERED RESPIRATORY (INHALATION) 2 TIMES DAILY
Qty: 12 G | Refills: 3 | Status: SHIPPED | OUTPATIENT
Start: 2024-12-09 | End: 2025-12-09

## 2024-12-09 RX ORDER — BUDESONIDE 0.5 MG/2ML
500 INHALANT ORAL 2 TIMES DAILY
Qty: 60 EACH | Refills: 3 | Status: SHIPPED | OUTPATIENT
Start: 2024-12-09

## 2024-12-09 RX ORDER — BUDESONIDE 0.5 MG/2ML
500 INHALANT ORAL 2 TIMES DAILY
Qty: 60 EACH | Refills: 3 | Status: SHIPPED | OUTPATIENT
Start: 2024-12-09 | End: 2024-12-09 | Stop reason: SDUPTHER

## 2024-12-09 ASSESSMENT — PATIENT HEALTH QUESTIONNAIRE - PHQ9
SUM OF ALL RESPONSES TO PHQ QUESTIONS 1-9: 0
2. FEELING DOWN, DEPRESSED OR HOPELESS: NOT AT ALL
SUM OF ALL RESPONSES TO PHQ9 QUESTIONS 1 & 2: 0
SUM OF ALL RESPONSES TO PHQ QUESTIONS 1-9: 0
1. LITTLE INTEREST OR PLEASURE IN DOING THINGS: NOT AT ALL

## 2024-12-09 NOTE — PROGRESS NOTES
Solo Souza presents today for   Chief Complaint   Patient presents with    COPD       Is someone accompanying this pt? wife    Is the patient using any DME equipment during OV? oxygen    -[employer] pt is unaware    Depression Screenin/9/2024    10:53 AM   PHQ-9 Questionaire   Little interest or pleasure in doing things 0   Feeling down, depressed, or hopeless 0   PHQ-9 Total Score 0       Learning Assessment:    Failed to redirect to the Timeline version of the [employer] Smart[employer].    Abuse Screening:         No data to display                Fall Risk    Failed to redirect to the Timeline version of the [employer] Smart[employer].    Coordination of Care:    1. Have you been to the ER, urgent care clinic since your last visit? Hospitalized since your last visit? no    2. Have you seen or consulted any other health care providers outside of the [facility] since your last visit? Include any pap smears or colon screening. no    Medication list has been update per patient.  
Hypertension    Acute chest pain    Chronic respiratory failure with hypoxia    Acute pancreatitis without necrosis or infection, unspecified    Diabetes mellitus, type 2 (HCC)    Lactic acidosis    Polysubstance abuse (HCC)    Chest pain    Hypomagnesemia    Shortness of breath    Normocytic anemia    Wheezing    COPD exacerbation (HCC)    Sepsis (HCC)    Synovitis of right ankle    Cellulitis in diabetic foot (HCC)    Staphylococcus aureus bacteremia with sepsis (HCC)    Septic arthritis of right foot       IMPRESSION:   COPD GOLD Stage 3E - Post BD FEV1 31% with significant bronchodilator change. Significant air trapping  Lung volumes interpretation with caution due to patient stopping test prematurely but overall clinical and spirometry data suggests severe COPD.  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  Chronic obstructive bronchitis-thick mucus with difficulty clearing  Chronic respiratory failure-hypoxemia on exertion with patient reported documented saturations as low as in the 70% range with activity.  History of smoking-47 pack years and currently active  ARMANDO - not on CPAP  Coronary artery disease  History of COVID-pneumonia-2020       RECOMMENDATIONS:   Needs triple inhaler therapy for severe COPD however he has financial difficulty affording maintenance inhaler therapy as he is Medicare only.    For now, he has ample samples of Trelegy available from his PCP  Will work on obtaining nebulizer medications and billing onto Medicare part B.  Placed orders for Pulmicort and Brovana.  Contacted rep with [employer] on this issue as well.  Once he is approved he can switch to these after finishing his current amount of Trelegy samples  For now, prescribing daily Flovent and encouraged TID duoneb use on a scheduled basis.    Needs to use his supplemental oxygen more consistently  Complete cessation of cigarette smoking recommended.  He is reluctant to

## 2024-12-16 ENCOUNTER — TELEPHONE (OUTPATIENT)
Age: 64
End: 2024-12-16

## 2024-12-16 NOTE — TELEPHONE ENCOUNTER
----- Message from Dr. Alf Bobby MD sent at 12/6/2024  9:46 AM EST -----  I need to look at his images.  But would follow him clinically, and symptomatically.  May be see him little earlier than April 2025.  Obviously, if he has symptoms, I would like to see him back sooner.  ----- Message -----  From: Hamida Hernandez RN  Sent: 12/6/2024   6:48 AM EST  To: Alf Bobby MD    Per your last note:    In May 2022, he presented to the hospital with chest pains.  Repeat coronary angiography revealed 85% LAD ISR; he had his LAD lesion stented to residual 0%.  He also has proximal RCA 30 to 40% stenosis as well as ostial circumflex 60% stenosis.  The ostial circumflex lesion underwent IFR which was 0.98.  This was left alone.  He has had recurrent episodes of shortness of breath.  He discontinued tobacco use early part of November 2022.  
Spoke with patient. Explained Dr. Bobby reviewed his echo/ Nuclear Stress test he compared to his previous testing. He will go into further details at the next upcoming appointment.   
3 = A little assistance

## 2025-01-08 ENCOUNTER — OFFICE VISIT (OUTPATIENT)
Age: 65
End: 2025-01-08
Payer: MEDICARE

## 2025-01-08 VITALS
OXYGEN SATURATION: 96 % | SYSTOLIC BLOOD PRESSURE: 130 MMHG | HEART RATE: 73 BPM | DIASTOLIC BLOOD PRESSURE: 70 MMHG | HEIGHT: 71 IN | BODY MASS INDEX: 32.92 KG/M2

## 2025-01-08 DIAGNOSIS — R06.02 SHORTNESS OF BREATH: ICD-10-CM

## 2025-01-08 DIAGNOSIS — I10 ESSENTIAL (PRIMARY) HYPERTENSION: ICD-10-CM

## 2025-01-08 DIAGNOSIS — I25.10 ATHEROSCLEROSIS OF NATIVE CORONARY ARTERY OF NATIVE HEART WITHOUT ANGINA PECTORIS: Primary | ICD-10-CM

## 2025-01-08 DIAGNOSIS — I10 PRIMARY HYPERTENSION: ICD-10-CM

## 2025-01-08 DIAGNOSIS — E78.5 HYPERLIPIDEMIA, UNSPECIFIED HYPERLIPIDEMIA TYPE: ICD-10-CM

## 2025-01-08 DIAGNOSIS — R00.2 PALPITATIONS: ICD-10-CM

## 2025-01-08 PROCEDURE — M1308 PR FLU IMMUNIZE NO ADMIN: HCPCS | Performed by: INTERNAL MEDICINE

## 2025-01-08 PROCEDURE — 3017F COLORECTAL CA SCREEN DOC REV: CPT | Performed by: INTERNAL MEDICINE

## 2025-01-08 PROCEDURE — G8427 DOCREV CUR MEDS BY ELIG CLIN: HCPCS | Performed by: INTERNAL MEDICINE

## 2025-01-08 PROCEDURE — 3075F SYST BP GE 130 - 139MM HG: CPT | Performed by: INTERNAL MEDICINE

## 2025-01-08 PROCEDURE — 3078F DIAST BP <80 MM HG: CPT | Performed by: INTERNAL MEDICINE

## 2025-01-08 PROCEDURE — 99214 OFFICE O/P EST MOD 30 MIN: CPT | Performed by: INTERNAL MEDICINE

## 2025-01-08 PROCEDURE — 4004F PT TOBACCO SCREEN RCVD TLK: CPT | Performed by: INTERNAL MEDICINE

## 2025-01-08 PROCEDURE — G8417 CALC BMI ABV UP PARAM F/U: HCPCS | Performed by: INTERNAL MEDICINE

## 2025-01-08 PROCEDURE — 93000 ELECTROCARDIOGRAM COMPLETE: CPT | Performed by: INTERNAL MEDICINE

## 2025-01-08 ASSESSMENT — PATIENT HEALTH QUESTIONNAIRE - PHQ9
SUM OF ALL RESPONSES TO PHQ9 QUESTIONS 1 & 2: 0
SUM OF ALL RESPONSES TO PHQ QUESTIONS 1-9: 0
SUM OF ALL RESPONSES TO PHQ QUESTIONS 1-9: 0
2. FEELING DOWN, DEPRESSED OR HOPELESS: NOT AT ALL
1. LITTLE INTEREST OR PLEASURE IN DOING THINGS: NOT AT ALL
SUM OF ALL RESPONSES TO PHQ QUESTIONS 1-9: 0
SUM OF ALL RESPONSES TO PHQ QUESTIONS 1-9: 0

## 2025-01-08 NOTE — PROGRESS NOTES
Solo Souza presents today for   Chief Complaint   Patient presents with    Follow-up     3 month f/u        Solo Souza preferred language for health care discussion is english/other.    Is someone accompanying this pt? no    Is the patient using any DME equipment during OV? no    Depression Screening:  Depression: Not at risk (12/9/2024)    PHQ-2     PHQ-2 Score: 0        Learning Assessment:  No question data found.       Pt currently taking Anticoagulant therapy? no    Pt currently taking Antiplatelet therapy ? ASA 81 mg QD and Plavix 75 mg QD      Coordination of Care:  1. Have you been to the ER, urgent care clinic since your last visit? Hospitalized since your last visit? no    2. Have you seen or consulted any other health care providers outside of the [facility] since your last visit? Include any pap smears or colon screening. no    
Outpatient Medications   Medication Sig Dispense Refill    metFORMIN (GLUCOPHAGE) 500 MG tablet Take 1 tablet by mouth 2 times daily (with meals)      empagliflozin (JARDIANCE) 25 MG tablet Take 1 tablet by mouth daily      arformoterol tartrate (BROVANA) 15 MCG/2ML NEBU Take 1 ampule by nebulization 2 times daily 120 mL 3    budesonide (PULMICORT) 0.5 MG/2ML nebulizer suspension Take 2 mLs by nebulization 2 times daily 60 each 3    atorvastatin (LIPITOR) 80 MG tablet TAKE ONE TABLET BY MOUTH EVERY EVENING 90 tablet 3    ibuprofen (ADVIL;MOTRIN) 200 MG tablet Take 1 tablet by mouth every 6 hours as needed for Pain      fluticasone-umeclidin-vilant (TRELEGY ELLIPTA) 100-62.5-25 MCG/ACT AEPB inhaler Inhale 1 puff into the lungs daily 1 each 3    furosemide (LASIX) 40 MG tablet Take 1 tablet by mouth daily 1 tablet 0    spironolactone (ALDACTONE) 25 MG tablet TAKE ONE TABLET BY MOUTH DAILY 90 tablet 3    metoprolol tartrate (LOPRESSOR) 50 MG tablet TAKE ONE TABLET BY MOUTH EVERY 12 HOURS (Patient taking differently: Take 1 tablet by mouth daily) 180 tablet 3    magnesium cl-calcium carbonate (SLOWMAG MG MUSCLE/HEART) 71.5-119 MG TBEC tablet Take 2 tablets by mouth daily (Patient taking differently: Take 1 tablet by mouth daily) 60 tablet 3    clopidogrel (PLAVIX) 75 MG tablet Take 1 tablet by mouth daily 90 tablet 3    OXYGEN Inhale 3 L into the lungs as needed      albuterol sulfate HFA (VENTOLIN HFA) 108 (90 Base) MCG/ACT inhaler Inhale 2 puffs into the lungs 4 times daily as needed for Wheezing 54 g 1    albuterol (PROVENTIL) (5 MG/ML) 0.5% nebulizer solution Take 0.5 mLs by nebulization every 6 hours as needed for Wheezing 120 each 0    aspirin 81 MG EC tablet Take 1 tablet by mouth daily      glyBURIDE (DIABETA) 5 MG tablet Take 1 tablet by mouth every morning (before breakfast)      lisinopril (PRINIVIL;ZESTRIL) 5 MG tablet Take 1 tablet by mouth daily      nitroGLYCERIN (NITROSTAT) 0.4 MG SL tablet Place 1

## 2025-02-13 ENCOUNTER — TELEPHONE (OUTPATIENT)
Age: 65
End: 2025-02-13

## 2025-02-13 NOTE — TELEPHONE ENCOUNTER
Received fax for cardiac clearance from [facility] for patient to have right foot split thickness skin graft application with removal of external fixation on the right foot on 2/19/25. Medication to hold: Plavix. Asprin will not be held.

## 2025-02-18 NOTE — TELEPHONE ENCOUNTER
Faxed cardiac clearance to [facility] for patient to have right foot split thickness skin graft application with removal of external fixation on the right foot. Verbal order and read back per Alf Bobby MD from cardiac standpoint patient is low risk and can hold Plavix 5 days prior to procedure and continue Asprin preoperatively.

## 2025-02-26 RX ORDER — FUROSEMIDE 40 MG/1
80 TABLET ORAL DAILY
Qty: 180 TABLET | Refills: 3 | Status: SHIPPED | OUTPATIENT
Start: 2025-02-26

## 2025-04-08 ENCOUNTER — OFFICE VISIT (OUTPATIENT)
Age: 65
End: 2025-04-08
Payer: MEDICARE

## 2025-04-08 ENCOUNTER — TELEPHONE (OUTPATIENT)
Age: 65
End: 2025-04-08

## 2025-04-08 VITALS
HEIGHT: 71 IN | WEIGHT: 233 LBS | BODY MASS INDEX: 32.62 KG/M2 | HEART RATE: 63 BPM | DIASTOLIC BLOOD PRESSURE: 80 MMHG | SYSTOLIC BLOOD PRESSURE: 124 MMHG | OXYGEN SATURATION: 97 %

## 2025-04-08 DIAGNOSIS — I10 PRIMARY HYPERTENSION: ICD-10-CM

## 2025-04-08 DIAGNOSIS — I25.10 ATHEROSCLEROSIS OF NATIVE CORONARY ARTERY OF NATIVE HEART WITHOUT ANGINA PECTORIS: ICD-10-CM

## 2025-04-08 DIAGNOSIS — R42 DIZZINESS: Primary | ICD-10-CM

## 2025-04-08 DIAGNOSIS — R06.02 SHORTNESS OF BREATH: ICD-10-CM

## 2025-04-08 DIAGNOSIS — I10 ESSENTIAL (PRIMARY) HYPERTENSION: ICD-10-CM

## 2025-04-08 DIAGNOSIS — R00.2 PALPITATIONS: ICD-10-CM

## 2025-04-08 PROCEDURE — 99214 OFFICE O/P EST MOD 30 MIN: CPT | Performed by: INTERNAL MEDICINE

## 2025-04-08 PROCEDURE — 93000 ELECTROCARDIOGRAM COMPLETE: CPT | Performed by: INTERNAL MEDICINE

## 2025-04-08 PROCEDURE — 3074F SYST BP LT 130 MM HG: CPT | Performed by: INTERNAL MEDICINE

## 2025-04-08 PROCEDURE — 3079F DIAST BP 80-89 MM HG: CPT | Performed by: INTERNAL MEDICINE

## 2025-04-08 RX ORDER — CLOPIDOGREL BISULFATE 75 MG/1
75 TABLET ORAL DAILY
Qty: 90 TABLET | Refills: 3 | Status: CANCELLED | OUTPATIENT
Start: 2025-04-08

## 2025-04-08 ASSESSMENT — PATIENT HEALTH QUESTIONNAIRE - PHQ9
1. LITTLE INTEREST OR PLEASURE IN DOING THINGS: NOT AT ALL
SUM OF ALL RESPONSES TO PHQ QUESTIONS 1-9: 0
SUM OF ALL RESPONSES TO PHQ QUESTIONS 1-9: 0
2. FEELING DOWN, DEPRESSED OR HOPELESS: NOT AT ALL
SUM OF ALL RESPONSES TO PHQ QUESTIONS 1-9: 0
SUM OF ALL RESPONSES TO PHQ QUESTIONS 1-9: 0

## 2025-04-08 NOTE — PROGRESS NOTES
HISTORY OF PRESENT ILLNESS  Solo Souza  64 y.o. male     Chief Complaint   Patient presents with    Follow-up     3 month f/u     Dizziness     Dizzy spells        ASSESSMENT and PLAN    The primary encounter diagnosis was Dizziness. Diagnoses of Atherosclerosis of native coronary artery of native heart without angina pectoris, Primary hypertension, Shortness of breath, Essential (primary) hypertension, and Palpitations were also pertinent to this visit.    Mr. Solo Souza presented in March 2020 with chest pains and diagnosed with NSTEMI.  He had long mid 95% LAD stenosis stented to residual 0%.  He also had diagonal branch lesion which was balloon dilated.  His EF showed 45-50% with anteroapical hypokinesis.   Unfortunately, he has smoked for over 40 years and continued to smoke at the time of his non-STEMI.  He has COPD as well as insulin-dependent diabetes mellitus.  He has hypertension and hyperlipidemia.  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  He was seen in the emergency room on 8/24/2020 for atypical chest pain.  He was evaluated and discharged home.  His echocardiogram in April 2021 revealed EF 50-55%.  PA pressure was noted to be 35 mmHg.  In February 2022, he was admitted with COVID pneumonia and acute COPD exacerbation.  Despite this, he continued to smoke about a pack a day.  In May 2022, he presented to the hospital with chest pains.  Repeat coronary angiography revealed 85% LAD ISR which was stented to residual 0%.  He also has proximal RCA 30-40% stenosis as well as ostial circumflex 60% stenosis.  The ostial circumflex lesion underwent IFR which was 0.98.  This was left alone.  He has had recurrent episodes of shortness of breath.  In July 2023, he had swelling and increased dyspnea.  He was seen by our nurse practitioner and was started on Lasix.  Since then, he has had significant cramping at nights due to low magnesium.  In September

## 2025-04-08 NOTE — PROGRESS NOTES
Solo Souza presents today for   Chief Complaint   Patient presents with    Follow-up     3 month f/u     Dizziness     Dizzy spells        Solo Souza preferred language for health care discussion is english/other.    Is someone accompanying this pt? yes    Is the patient using any DME equipment during OV? no    Depression Screening:  Depression: Not at risk (4/8/2025)    PHQ-2     PHQ-2 Score: 0        Learning Assessment:  Who is the primary learner? Patient    What is the preferred language for health care of the primary learner? ENGLISH    How does the primary learner prefer to learn new concepts? DEMONSTRATION    Answered By patient    Relationship to Learner SELF           Pt currently taking Anticoagulant therapy? no    Pt currently taking Antiplatelet therapy ? ASA 81 mg QD and Plavix 75 mg QD      Coordination of Care:  1. Have you been to the ER, urgent care clinic since your last visit? Hospitalized since your last visit? no    2. Have you seen or consulted any other health care providers outside of the [facility] since your last visit? Include any pap smears or colon screening. no

## 2025-04-08 NOTE — TELEPHONE ENCOUNTER
----- Message from Michelle YARBROUGH sent at 4/7/2025  3:32 PM EDT -----  Regarding: med refill  Pt called stating he called [facility] to refill clopidogrel (PLAVIX) 75 MG tablet and was told the prescription was cancelled.  He states he still suppose to be taking it.    [id number] - [place], [place] - [address] [facility] - P [phone] - F [phone]  [address] [place] [address]  Phone: [phone]  Fax: [phone]

## 2025-05-20 ENCOUNTER — RESULTS FOLLOW-UP (OUTPATIENT)
Age: 65
End: 2025-05-20

## 2025-05-20 DIAGNOSIS — R42 DIZZINESS: ICD-10-CM

## 2025-05-20 DIAGNOSIS — R06.02 SHORTNESS OF BREATH: ICD-10-CM

## 2025-05-27 NOTE — TELEPHONE ENCOUNTER
----- Message from Dr. Alf Bobby MD sent at 5/23/2025 10:14 AM EDT -----  Notify the patient that his monitor event monitor is benign.  ----- Message -----  From: Kurt Rodriguez LPN  Sent: 5/20/2025   3:33 PM EDT  To: Alf Bobby MD    Per your last office note    Because of episodes of palpitations, and dizziness, I have requested echocardiogram and 2-week event monitor.

## 2025-05-27 NOTE — TELEPHONE ENCOUNTER
----- Message from Dr. Alf Bobby MD sent at 5/23/2025  9:55 AM EDT -----  Please notify the patient that his echocardiogram looks unchanged from before.  ----- Message -----  From: Kurt Rodriguez LPN  Sent: 5/20/2025   9:15 AM EDT  To: Alf Bobby MD    Per your last office note    Because of episodes of palpitations, and dizziness, I have requested echocardiogram and 2-week event monitor.

## 2025-05-27 NOTE — TELEPHONE ENCOUNTER
Spoke with patient  Verbal order and read back per Alf Bobby MD  Please notify the patient that his echocardiogram looks unchanged from before.   - his monitor event monitor is benign.     He verbally understood results, no further questions was asked. If patient have future questions he will ask at the upcoming appointment ton 6/16/25

## 2025-06-02 RX ORDER — SPIRONOLACTONE 25 MG/1
25 TABLET ORAL DAILY
Qty: 90 TABLET | Refills: 3 | Status: SHIPPED | OUTPATIENT
Start: 2025-06-02

## 2025-06-02 RX ORDER — METOPROLOL TARTRATE 50 MG
50 TABLET ORAL DAILY
Qty: 90 TABLET | Refills: 3 | Status: SHIPPED | OUTPATIENT
Start: 2025-06-02

## 2025-06-03 NOTE — PROGRESS NOTES
Solo Souza presents today for a 2 month check-up.  He was last seen by Dr. Bobby on 4/8/25, and during that visit, he complained of dizziness and palpitations.  He ordered an echocardiogram and a 2 week event monitor.  The echo was completed on 5/19/25 and it showed an EF of 45-50%, normal diastolic function, trace TR with RVSP 29 mmHg.  The event monitor was completed on 5/20/25 and it showed NSR, rare PVCs and PACs, 1 PAC triplet, no symptoms reported.     He was accompanied by his wife today.  He states that his osteomyelitis in his right foot has improved and is almost completely healed.  His next follow-up with Dr. Ha Shearer is in about 4 weeks.  At that time, he is hoping that he no longer has to wear the boot.  He is still wearing an orthopedic boot from his knee to his foot.  He was previously using a wheelchair but is now walking.  He continues to smoke about a pack of cigarettes every 3 days.     He admits to chronic shortness of breath for which he uses oxygen PRN. He states that he checks his oxygen saturation at home as needed.  Most of the time, it is in the 90's.  He states that he had an episode of sharp, left sided chest pain this morning that resolved on its own after about a minute.  He did not have to use any SL NTG.     He is a 64 year old male with history of CAD (hx of non-STEMI in March 2020), hypertension, hyperlipidemia, diabetes, COPD, tobacco use.  He had Covid in 2022 and states that his breathing has worsened since he got it.  His last echo was done in April 2021 and it showed an EF of 50-55%, PASP of 35 mmHg.  He states that he has oxygen therapy at home but does not use it routinely.  He uses inhalers and a nebulizer which helps with the shortness of breath.  He is not followed by pulmonology.   He was hospitalized from 5/16/22 through 5/17/22 for chest pain, acute COPD.  He presented with complaints of substernal chest pain at rest associated with some dyspnea and radiation to

## 2025-06-16 ENCOUNTER — OFFICE VISIT (OUTPATIENT)
Age: 65
End: 2025-06-16
Payer: MEDICARE

## 2025-06-16 VITALS
DIASTOLIC BLOOD PRESSURE: 78 MMHG | SYSTOLIC BLOOD PRESSURE: 130 MMHG | HEART RATE: 103 BPM | OXYGEN SATURATION: 92 % | HEIGHT: 71 IN | BODY MASS INDEX: 32.34 KG/M2 | WEIGHT: 231 LBS

## 2025-06-16 DIAGNOSIS — R42 DIZZINESS: ICD-10-CM

## 2025-06-16 DIAGNOSIS — R00.2 PALPITATIONS: ICD-10-CM

## 2025-06-16 DIAGNOSIS — R06.02 SHORTNESS OF BREATH: ICD-10-CM

## 2025-06-16 DIAGNOSIS — I10 ESSENTIAL (PRIMARY) HYPERTENSION: ICD-10-CM

## 2025-06-16 DIAGNOSIS — I10 PRIMARY HYPERTENSION: ICD-10-CM

## 2025-06-16 DIAGNOSIS — I25.10 ATHEROSCLEROSIS OF NATIVE CORONARY ARTERY OF NATIVE HEART WITHOUT ANGINA PECTORIS: Primary | ICD-10-CM

## 2025-06-16 DIAGNOSIS — E78.5 HYPERLIPIDEMIA, UNSPECIFIED HYPERLIPIDEMIA TYPE: ICD-10-CM

## 2025-06-16 PROCEDURE — 99214 OFFICE O/P EST MOD 30 MIN: CPT | Performed by: NURSE PRACTITIONER

## 2025-06-16 PROCEDURE — 3078F DIAST BP <80 MM HG: CPT | Performed by: NURSE PRACTITIONER

## 2025-06-16 PROCEDURE — 3075F SYST BP GE 130 - 139MM HG: CPT | Performed by: NURSE PRACTITIONER

## 2025-06-16 PROCEDURE — 93000 ELECTROCARDIOGRAM COMPLETE: CPT | Performed by: NURSE PRACTITIONER

## 2025-06-16 ASSESSMENT — PATIENT HEALTH QUESTIONNAIRE - PHQ9
1. LITTLE INTEREST OR PLEASURE IN DOING THINGS: NOT AT ALL
SUM OF ALL RESPONSES TO PHQ QUESTIONS 1-9: 0
2. FEELING DOWN, DEPRESSED OR HOPELESS: NOT AT ALL
SUM OF ALL RESPONSES TO PHQ QUESTIONS 1-9: 0

## 2025-06-16 ASSESSMENT — ENCOUNTER SYMPTOMS
ABDOMINAL DISTENTION: 1
SHORTNESS OF BREATH: 1

## 2025-06-16 NOTE — PROGRESS NOTES
Solo Souza presents today for   Chief Complaint   Patient presents with    Follow-up     2 month f/u     Chest Pain     Left chest sharp pains this AM     Shortness of Breath     SOB with exertion        Solo Souza preferred language for health care discussion is english/other.    Is someone accompanying this pt? yes    Is the patient using any DME equipment during OV? yes    Depression Screening:  Depression: Not at risk (6/16/2025)    PHQ-2     PHQ-2 Score: 0        Learning Assessment:  Who is the primary learner? Patient    What is the preferred language for health care of the primary learner? ENGLISH    How does the primary learner prefer to learn new concepts? DEMONSTRATION    Answered By PATIENT    Relationship to Learner SELF           Pt currently taking Anticoagulant therapy? no    Pt currently taking Antiplatelet therapy ? ASA 81 mg QD and Plavix 75 mg QD       Coordination of Care:  1. Have you been to the ER, urgent care clinic since your last visit? Hospitalized since your last visit? no    2. Have you seen or consulted any other health care providers outside of the [facility] since your last visit? Include any pap smears or colon screening. no

## 2025-06-16 NOTE — PATIENT INSTRUCTIONS
BMP   Follow-up with Dr. Bobby as scheduled and as needed  Smoking cessation discussed and highly encouraged

## 2025-07-21 ENCOUNTER — HOSPITAL ENCOUNTER (OUTPATIENT)
Facility: HOSPITAL | Age: 65
Discharge: HOME | End: 2025-07-24

## 2025-07-21 LAB — LABCORP SPECIMEN COLLECTION: NORMAL

## 2025-07-21 PROCEDURE — 99001 SPECIMEN HANDLING PT-LAB: CPT

## 2025-07-22 LAB
BUN SERPL-MCNC: 13 MG/DL (ref 8–27)
BUN/CREAT SERPL: 13 (ref 10–24)
CALCIUM SERPL-MCNC: 9 MG/DL (ref 8.6–10.2)
CHLORIDE SERPL-SCNC: 98 MMOL/L (ref 96–106)
CO2 SERPL-SCNC: 26 MMOL/L (ref 20–29)
CREAT SERPL-MCNC: 0.97 MG/DL (ref 0.76–1.27)
EGFRCR SERPLBLD CKD-EPI 2021: 88 ML/MIN/1.73
GLUCOSE SERPL-MCNC: 319 MG/DL (ref 70–99)
POTASSIUM SERPL-SCNC: 5.5 MMOL/L (ref 3.5–5.2)
SODIUM SERPL-SCNC: 138 MMOL/L (ref 134–144)
SPECIMEN STATUS REPORT: NORMAL

## 2025-07-28 ENCOUNTER — RESULTS FOLLOW-UP (OUTPATIENT)
Age: 65
End: 2025-07-28

## 2025-07-28 DIAGNOSIS — E87.5 HYPERKALEMIA: Primary | ICD-10-CM

## 2025-07-28 RX ORDER — SPIRONOLACTONE 25 MG/1
12.5 TABLET ORAL DAILY
COMMUNITY

## 2025-07-28 NOTE — TELEPHONE ENCOUNTER
----- Message from AYAAN Luis NP sent at 7/25/2025  3:11 PM EDT -----  Jaziel,    Can you please call Mr. Souza and instruct him to hold his spironolactone for 3 days and then decrease the dose to 12.5mg daily (take 1/2 of the 25mg tablet daily).  His most recent labs showed a potassium level of 5.5 (upper limit of normal).  Please make sure that he is not taking any over the counter potassium supplements and also tell him NOT TO EAT a lot of high potassium foods (such as bananas, honeydews, cantaloupe, watermelon) and DO NOT DRINK a lot of orange juice which is high in potassium.    Thank you,  Natalee  ----- Message -----  From: Suraj Blum Incoming Amb Ref Lab Orders To [facility]  Sent: 7/22/2025   7:39 AM EDT  To: AYAAN Macedo NP

## 2025-07-28 NOTE — TELEPHONE ENCOUNTER
Pt called and stated he had already taken the does of Spirolactone today The patient was instructed to not take until Thursday and then decrease the dose to 12.5mg daily and avoid high potassium food. Pt also instructed to go to the lab next week to get a BMP rechecked. Pt verbally understood.     Nirali Christopher RN   Clinical Coordinator   [facility] at [facility]

## (undated) DEVICE — SET FLD ADMIN 3 W STPCOCK FIX FEM L BOR 1IN

## (undated) DEVICE — TREK CORONARY DILATATION CATHETER 3.0 MM X 15 MM / RAPID-EXCHANGE: Brand: TREK

## (undated) DEVICE — SOLUTION IRRIG 1000ML H2O STRL BLT

## (undated) DEVICE — PROCEDURE KIT FLUID MGMT 10 FR CUST MAINFOLD

## (undated) DEVICE — CATHETER SUCT TR FL TIP 14FR W/ O CTRL

## (undated) DEVICE — CATH BLLN DIL 2.75X12MM RX -- EUPHORA

## (undated) DEVICE — GAUZE,SPONGE,4"X4",16PLY,STRL,LF,10/TRAY: Brand: MEDLINE

## (undated) DEVICE — TREK CORONARY DILATATION CATHETER 2.50 MM X 15 MM / RAPID-EXCHANGE: Brand: TREK

## (undated) DEVICE — PACK PROCEDURE SURG VASC CATH 161 MMC LF

## (undated) DEVICE — NC TREK CORONARY DILATATION CATHETER 3.5 MM X 12 MM / RAPID-EXCHANGE: Brand: NC TREK

## (undated) DEVICE — GLIDESHEATH SLENDER STAINLESS STEEL KIT: Brand: GLIDESHEATH SLENDER

## (undated) DEVICE — YANKAUER,SMOOTH HANDLE,HIGH CAPACITY: Brand: MEDLINE INDUSTRIES, INC.

## (undated) DEVICE — UNDERPAD INCONT W23XL36IN STD BLU POLYPR BK FLUF SFT

## (undated) DEVICE — STERILE POLYISOPRENE POWDER-FREE SURGICAL GLOVES: Brand: PROTEXIS

## (undated) DEVICE — SYRINGE MED 25GA 3ML L5/8IN SUBQ PLAS W/ DETACH NDL SFTY

## (undated) DEVICE — DEVICE INFL ENCORE MEDI 26

## (undated) DEVICE — BAND HEMOSTAT DRY D-STAT RAD --

## (undated) DEVICE — Device: Brand: OMNIWIRE PRESSURE GUIDE WIRE

## (undated) DEVICE — PRESSURE MONITORING SET: Brand: TRUWAVE

## (undated) DEVICE — LINER SUCT CANSTR 3000CC PLAS SFT PRE ASSEMB W/OUT TBNG W/

## (undated) DEVICE — CATHETER GUID AD 6FR L100CM COR PERIPH GRN NYL PTFE AL 1

## (undated) DEVICE — RADIFOCUS OPTITORQUE ANGIOGRAPHIC CATHETER: Brand: OPTITORQUE

## (undated) DEVICE — TREK CORONARY DILATATION CATHETER 2.50 MM X 20 MM / RAPID-EXCHANGE: Brand: TREK

## (undated) DEVICE — SYRINGE MED 50ML LUERSLIP TIP

## (undated) DEVICE — CATHETER GUID 6FR L100CM GRN PTFE XBLAD3.5 TRUELUMEN HYBRID

## (undated) DEVICE — BASIN EMSIS 16OZ GRAPHITE PLAS KID SHP MOLD GRAD FOR ORAL

## (undated) DEVICE — DEVICE INFL W ACCS + HEMSTAS VLV INSRT TOOL AND TORQ BASIX

## (undated) DEVICE — CANNULA NSL AD TBNG L14FT STD PVC O2 CRV CONN NONFLARED NSL

## (undated) DEVICE — GUIDEWIRE VASC L260CM DIA0035IN TIP L3MM PTFE J STD TAPR FIX

## (undated) DEVICE — DRAPE,ANGIO,BRACH,STERILE,38X44: Brand: MEDLINE

## (undated) DEVICE — FORCEPS BX L240CM JAW DIA2.4MM ORNG L CAP W/ NDL DISP RAD

## (undated) DEVICE — Z DUPLICATE USE 2103554 VALVE HEMOSTATIC BLEEDBK CTRL COPILOT

## (undated) DEVICE — HI-TORQUE BALANCE GUIDE WIRE W/HYDROCOAT .014 STRAIGHT TIP 3.0 CM X 190 CM: Brand: HI-TORQUE BALANCE

## (undated) DEVICE — ENDOSCOPY PUMP TUBING/ CAP SET: Brand: ERBE

## (undated) DEVICE — COPILOT KIT INCLUDES BLEEDBACK CONTROL VALVE 20/30 INDEFLATOR INFLATION DEVICE 30 ATM 20 CC / GUIDE WIRE INTRODUCER / TORQUE DEVICE: Brand: INDEFLATOR

## (undated) DEVICE — BITE BLOCK ENDOSCP UNIV AD 6 TO 9.4 MM

## (undated) DEVICE — MEDI-VAC NON-CONDUCTIVE SUCTION TUBING: Brand: CARDINAL HEALTH